# Patient Record
Sex: MALE | Race: WHITE | NOT HISPANIC OR LATINO | Employment: OTHER | URBAN - METROPOLITAN AREA
[De-identification: names, ages, dates, MRNs, and addresses within clinical notes are randomized per-mention and may not be internally consistent; named-entity substitution may affect disease eponyms.]

---

## 2017-01-12 ENCOUNTER — ALLSCRIPTS OFFICE VISIT (OUTPATIENT)
Dept: OTHER | Facility: OTHER | Age: 67
End: 2017-01-12

## 2017-04-21 ENCOUNTER — GENERIC CONVERSION - ENCOUNTER (OUTPATIENT)
Dept: OTHER | Facility: OTHER | Age: 67
End: 2017-04-21

## 2017-05-18 ENCOUNTER — ALLSCRIPTS OFFICE VISIT (OUTPATIENT)
Dept: OTHER | Facility: OTHER | Age: 67
End: 2017-05-18

## 2017-09-09 ENCOUNTER — GENERIC CONVERSION - ENCOUNTER (OUTPATIENT)
Dept: OTHER | Facility: OTHER | Age: 67
End: 2017-09-09

## 2017-10-30 ENCOUNTER — GENERIC CONVERSION - ENCOUNTER (OUTPATIENT)
Dept: OTHER | Facility: OTHER | Age: 67
End: 2017-10-30

## 2017-11-17 ENCOUNTER — GENERIC CONVERSION - ENCOUNTER (OUTPATIENT)
Dept: OTHER | Facility: OTHER | Age: 67
End: 2017-11-17

## 2017-11-21 ENCOUNTER — TRANSCRIBE ORDERS (OUTPATIENT)
Dept: ADMINISTRATIVE | Facility: HOSPITAL | Age: 67
End: 2017-11-21

## 2017-11-21 DIAGNOSIS — C61 PROSTATE CA (HCC): Primary | ICD-10-CM

## 2017-11-22 ENCOUNTER — GENERIC CONVERSION - ENCOUNTER (OUTPATIENT)
Dept: OTHER | Facility: OTHER | Age: 67
End: 2017-11-22

## 2017-11-24 ENCOUNTER — HOSPITAL ENCOUNTER (OUTPATIENT)
Dept: RADIOLOGY | Facility: HOSPITAL | Age: 67
Discharge: HOME | End: 2017-11-24
Payer: MEDICARE

## 2017-11-24 DIAGNOSIS — C61 PROSTATE CA (HCC): ICD-10-CM

## 2017-11-24 PROCEDURE — A9503 TC99M MEDRONATE: HCPCS

## 2017-11-24 PROCEDURE — 78306 BONE IMAGING WHOLE BODY: CPT

## 2017-11-24 PROCEDURE — 74178 CT ABD&PLV WO CNTR FLWD CNTR: CPT

## 2017-11-24 RX ADMIN — IOHEXOL 100 ML: 350 INJECTION, SOLUTION INTRAVENOUS at 08:08

## 2017-11-28 ENCOUNTER — GENERIC CONVERSION - ENCOUNTER (OUTPATIENT)
Dept: OTHER | Facility: OTHER | Age: 67
End: 2017-11-28

## 2017-12-15 ENCOUNTER — GENERIC CONVERSION - ENCOUNTER (OUTPATIENT)
Dept: FAMILY MEDICINE CLINIC | Facility: CLINIC | Age: 67
End: 2017-12-15

## 2017-12-20 ENCOUNTER — ALLSCRIPTS OFFICE VISIT (OUTPATIENT)
Dept: OTHER | Facility: OTHER | Age: 67
End: 2017-12-20

## 2018-01-10 ENCOUNTER — TRANSCRIBE ORDERS (OUTPATIENT)
Dept: ADMINISTRATIVE | Facility: HOSPITAL | Age: 68
End: 2018-01-10

## 2018-01-10 DIAGNOSIS — R60.0 LOCALIZED EDEMA: ICD-10-CM

## 2018-01-10 DIAGNOSIS — C61 PROSTATE CANCER (HCC): Primary | ICD-10-CM

## 2018-01-11 ENCOUNTER — GENERIC CONVERSION - ENCOUNTER (OUTPATIENT)
Dept: OTHER | Facility: OTHER | Age: 68
End: 2018-01-11

## 2018-01-11 ENCOUNTER — HOSPITAL ENCOUNTER (OUTPATIENT)
Dept: RADIOLOGY | Facility: HOSPITAL | Age: 68
Discharge: HOME | End: 2018-01-11
Payer: COMMERCIAL

## 2018-01-11 DIAGNOSIS — C61 PROSTATE CANCER (HCC): ICD-10-CM

## 2018-01-11 DIAGNOSIS — R60.0 LOCALIZED EDEMA: ICD-10-CM

## 2018-01-11 PROCEDURE — 93970 EXTREMITY STUDY: CPT

## 2018-01-11 NOTE — RESULT NOTES
Verified Results  (1) CBC/PLT/DIFF [id number] 07:59AM J Carlos Musca     Test Name Result Flag Reference   WBC 8 0 x10E3/uL  3 4-10 8   RBC 4 58 x10E6/uL  4 14-5 80   Hemoglobin 13 6 g/dL  12 6-17 7   Hematocrit 41 7 %  37 5-51 0   MCV 91 fL  79-97   MCH 29 7 pg  26 6-33 0   MCHC 32 6 g/dL  31 5-35 7   RDW 13 8 %  12 3-15 4   Platelets 373 G50V0/ZD  150-379   Neutrophils 56 %     Lymphs 29 %     Monocytes 11 %     Eos 3 %     Basos 1 %     Neutrophils (Absolute) 4 6 x10E3/uL  1 4-7 0   Lymphs (Absolute) 2 3 x10E3/uL  0 7-3 1   Monocytes(Absolute) 0 8 x10E3/uL  0 1-0 9   Eos (Absolute) 0 2 x10E3/uL  0 0-0 4   Baso (Absolute) 0 0 x10E3/uL  0 0-0 2   Immature Granulocytes 0 %     Immature Grans (Abs) 0 0 x10E3/uL  0 0-0 1     (1) COMPREHENSIVE METABOLIC PANEL [id number] 68:88NX J Carlos Musca     Test Name Result Flag Reference   Glucose, Serum 90 mg/dL  65-99   BUN 20 mg/dL  8-27   Creatinine, Serum 1 01 mg/dL  0 76-1 27   eGFR If NonAfricn Am 77 mL/min/1 73  >59   eGFR If Africn Am 89 mL/min/1 73  >59   BUN/Creatinine Ratio 20  10-22   Sodium, Serum 138 mmol/L  134-144   Potassium, Serum 4 3 mmol/L  3 5-5 2   Chloride, Serum 97 mmol/L     Carbon Dioxide, Total 23 mmol/L  18-29   Calcium, Serum 9 1 mg/dL  8 6-10 2   Protein, Total, Serum 7 4 g/dL  6 0-8 5   Albumin, Serum 4 1 g/dL  3 6-4 8   Globulin, Total 3 3 g/dL  1 5-4 5   A/G Ratio 1 2  1 1-2 5   Bilirubin, Total 0 6 mg/dL  0 0-1 2   Alkaline Phosphatase, S 39 IU/L     AST (SGOT) 16 IU/L  0-40   ALT (SGPT) 16 IU/L  0-44     (1) LIPID PANEL, FASTING [id number] 07:59AM J Carlos Lloyd     Test Name Result Flag Reference   Cholesterol, Total 171 mg/dL  100-199   Triglycerides 153 mg/dL H 0-149   HDL Cholesterol 43 mg/dL  >39   According to ATP-III Guidelines, HDL-C >59 mg/dL is considered a  negative risk factor for CHD  VLDL Cholesterol Marvin 31 mg/dL  5-40   LDL Cholesterol Calc 97 mg/dL  0-99   T  Chol/HDL Ratio 4 0 ratio units  0 0-5 0   T   Chol/HDL Ratio                                                             Men  Women                                               1/2 Avg  Risk  3 4    3 3                                                   Avg Risk  5 0    4 4                                                2X Avg  Risk  9 6    7 1                                                3X Avg  Risk 23 4   11 0       Discussion/Summary   Will discuss labs at follow up appt

## 2018-01-13 VITALS
WEIGHT: 230 LBS | TEMPERATURE: 97.7 F | HEIGHT: 73 IN | HEART RATE: 72 BPM | RESPIRATION RATE: 16 BRPM | DIASTOLIC BLOOD PRESSURE: 74 MMHG | BODY MASS INDEX: 30.48 KG/M2 | SYSTOLIC BLOOD PRESSURE: 118 MMHG

## 2018-01-15 VITALS
BODY MASS INDEX: 30.75 KG/M2 | HEIGHT: 73 IN | DIASTOLIC BLOOD PRESSURE: 76 MMHG | WEIGHT: 232 LBS | HEART RATE: 76 BPM | RESPIRATION RATE: 20 BRPM | SYSTOLIC BLOOD PRESSURE: 128 MMHG | TEMPERATURE: 97.8 F

## 2018-01-15 NOTE — PROGRESS NOTES
Assessment   1  Adenocarcinoma of prostate (185) (C61)  2  Anemia (285 9) (D64 9)  3  Benign essential hypertension (401 1) (I10)  4  Mixed hyperlipidemia (272 2) (E78 2)  5  Need for vaccination (V05 9) (Z23)  6  Encounter for screening for osteoporosis (V82 81) (Z13 820)  7  Medicare annual wellness visit, initial (V70 0) (Z00 00)    Plan  Benign essential hypertension    · Continue: Quinapril HCl - 10 MG Oral Tablet; TAKE 1 TABLET EVERY 12 HOURS DAILY  Encounter for screening for osteoporosis, Medicare annual wellness visit, initial    · * DXA BONE DENSITY SPINE HIP AND PELVIS; Status:Hold For - Scheduling;  Requested for:30Atd5306;   Need for vaccination    · Administer: Pneumo (Pneumovax); INJECT 0 5  ML Intramuscular; To Be Done:  [id number]  Screening for genitourinary condition    · *VB-Urinary Incontinence Screen (Dx V81 6 Screen for UI); Status:Complete;   Done:  08CSZ9140 10:57AM    Discussion/Summary    Pt did not do great on the depression screen but does not feel he is depressed  we will follow this  Impression: Initial Annual Wellness Visit  Cardiovascular screening and counseling: the risks and benefits of screening were discussed, screening is current, due for a lipid panel and Dx - V81 2 Screen for CV Disorder  Diabetes screening and counseling: the risks and benefits of screening were discussed, screening is current and Dx - V77 1 Screen for DM  Colorectal cancer screening and counseling: the risks and benefits of screening were discussed, screening is current, counseling was given on ways to eat a high fiber diet, due for a colonoscopy (low risk), due in [id number] and Dx - V76 51 Screen for CRC  Prostate cancer screening and counseling: the risks and benefits of screening were discussed, screening is current, Pt see dr Tatiana Roberto - cancer instite of [address] - last seen inmay and Dx - V76 44 Screen PSA     Osteoporosis screening and counseling: the risks and benefits of screening were discussed and due for bone density ultrasound  Abdominal aortic aneurysm screening and counseling: screening not indicated  Glaucoma screening and counseling: the risks and benefits of screening were discussed, screening is current, last seen 6 months ago and Dx - V80 1 Screen for Glaucoma  HIV screening and counseling: screening not indicated  Chief Complaint  AWV rmklpn      Advance Directives  Advance Directive [facility]:   YES - Patient has an advance health care directive  The patient has a living will located   Durable Power of  For Healthcare:    Name: Wife   Capacity/Competence: This patient has full decision making capacity for discussion of advance care planning and This patient has full decision making competency for discussion of advance care planning  The provider spent 5 minutes discussing Advance Directives  History of Present Illness  HPI: pt is here fow an Annual wellness visit  had labs   Welcome to [employer] and Wellness Visits: The patient is being seen for the initial annual wellness visit  Medicare Screening and Risk Factors   Hospitalizations: no previous hospitalizations  Medicare Screening Tests Risk Questions   Drug and Alcohol Use: The patient has never smoked cigarettes  The patient reports rare alcohol use  He has never used illicit drugs  Diet and Physical Activity: Current diet includes unhealthy food choices, frequent junk food, 1 servings of fruit per day, 1 servings of meat per day, 4 cups of coffee per day and 3 glasses of water  He exercises 4 times per week  Exercise: walking, water exercises 3-4 hours per week  Mood Disorder and Cognitive Impairment Screening: He reports feeling down, depressed, or hopeless over the past two weeks  He reports feeling little interest or pleasure in doing things over the past two weeks     Cognitive impairment screening: denies difficulty learning/retaining new information, denies difficulty handling complex tasks, denies difficulty with reasoning, denies difficulty with spatial ability and orientation, denies difficulty with language and denies difficulty with behavior  Functional Ability/Level of Safety: Hearing is normal bilaterally and a hearing aid is not used  He denies hearing difficulties  Activities of daily living details: needs help managing money, but does not need help using the phone, no transportation help needed, does not need help shopping, no meal preparation help needed, does not need help doing housework, does not need help doing laundry and does not need help managing medications  Fall risk factors: The patient fell 1 times in the past 12 months  R leg gave out  Home safety risk factors:  household clutter, but no unfamiliar surroundings, no loose rugs, no poor household lighting, no uneven floors, grab bars in the bathroom and handrails on the stairs  Advance Directives: Advance directives: living will and durable power of  for health care directives  Co-Managers and Medical Equipment/Suppliers: See Patient Care Team   Preventive Quality Program 65 and Older: The patient currently has no urinary incontinence symptoms  Patient Care Team    Care Team Member Role Specialty Office Number   Joby Montesinos Referring Family Medicine [phone]   Pablito Maynard MD Specialist Ophthalmology [phone]     Active Problems   1  Abnormal liver enzymes (790 5) (R74 8)  2  Actinic keratosis (702 0) (L57 0)  3  Acute gastroenteritis (558 9) (K52 9)  4  Acute upper respiratory infection (465 9) (J06 9)  5  Adenocarcinoma of prostate (185) (C61)  6  Anemia (285 9) (D64 9)  7  Arthralgia of right hip (719 45) (M25 551)  8  Benign essential hypertension (401 1) (I10)  9  Chronic rhinitis (472 0) (J31 0)  10  Contact dermatitis due to plant (692 6) (L25 5)  11  Dehydration (276 51) (E86 0)  12  Disc degeneration, lumbar (722 52) (M51 36)  13   Elevated liver function tests (790  6) (R79 89)  14  Encounter for screening for malignant neoplasm of colon (V76 51) (Z12 11)  15  Fatty liver (571 8) (K76 0)  16  Flu vaccine need (V04 81) (Z23)  17  Herpes simplex infection (054 9) (B00 9)  18  Hip arthritis (716 95) (M19 90)  19  Inguinal hernia (550 90) (K40 90)  20  Lumbar radiculopathy (724 4) (M54 16)  21  Lumbar Strain (847 2)  22  Mixed hyperlipidemia (272 2) (E78 2)  23  Narrowing of intervertebral disc space (722 6) (M99 79)  24  Need for pneumococcal vaccination (V03 82) (Z23)  25  Nodular prostate without lower urinary tract symptoms (600 10) (N40 2)  26  Organic impotence (607 84) (N52 9)  27  Otitis media, right (382 9) (H66 91)  28  Pain in joint (719 40) (M25 50)  29  Screening for diabetes mellitus (DM) (V77 1) (Z13 1)  30  Shoulder bursitis (726 10) (M75 50)  31  Syncope (780 2) (R55)  32  Well adult on routine health check (V70 0) (Z00 00)    Past Medical History    · History of Acute upper respiratory infection (465 9) (J06 9)   · History of Herpes simplex type 1 infection (054 9) (B00 9)   · History of acute bronchitis (V12 69) (Z87 09)   · History of hypertension (V12 59) (Z86 79)   · Screening for depression (V79 0) (Z13 89)   · Screening for genitourinary condition (V81 6) (Z13 89)   · Screening for neurological condition (V80 09) (Z13 89)    The active problems and past medical history were reviewed and updated today  Surgical History    · History of Hernia Repair Inguinal Sliding    The surgical history was reviewed and updated today  Family History  Mother    · Family history of hypertension (V17 49) (Z82 49)  Family History    · Family history of Lung Cancer (V16 1)   · Family history of Prostate Cancer (V16 42)    The family history was reviewed and updated today  Social History    · Never A Smoker  The social history was reviewed and updated today  Current Meds  1   Ibuprofen 800 MG Oral Tablet; TAKE 1 TABLET THREE TIMES A DAY AS NEEDED; Therapy: 90IHC1161 to (Last MH:55SRC1820)  Requested for: 55QMS4601 Ordered  2  Multivitamins Oral Capsule; TAKE 1 CAPSULE Daily Recorded  3  Quinapril HCl - 10 MG Oral Tablet; TAKE 1 TABLET EVERY 12 HOURS DAILY; Therapy: 01YDU2771 to (Evaluate:46Lqd7090)  Requested for: 12WUF7426; Last   Rx:92Qro2765 Ordered    The medication list was reviewed and updated today  Allergies   1  DEMEROL    Immunizations   ** Printed in Appendix #1 below  Vitals  Signs    Systolic: 295  Diastolic: 72  Heart Rate: 70  Respiration: 18  Temperature: 97 2 F  Height: 6 ft 1 in  Weight: 225 lb   BMI Calculated: 29 68  BSA Calculated: 2 26    Results/Data  *VB-Urinary Incontinence Screen (Dx V81 6 Screen for UI) 85IGX6062 10:57AM Beckabeverly Petersenes     Test Name Result Flag Reference   Urinary Incontinence Assessment 37SPT6647       PHQ-2 Adult Depression Screening 39Dso7178 10:55AM User, [facility]     Test Name Result Flag Reference   PHQ-2 Adult Depression Score 0     Over the last two weeks, how often have you been bothered by any of the following problems?   Little interest or pleasure in doing things: Not at all - 0  Feeling down, depressed, or hopeless: Not at all - 0   PHQ-2 Adult Depression Screening Negative       Falls Risk Assessment (Dx V80 09 Screen for Neurologic Disorder) 53FZV3669 10:55AM User, [facility]     Test Name Result Flag Reference   Falls Risk      1 fall without injury in the past year     (1) CBC/PLT/DIFF 74BKR5540 07:59AM [facility]     Test Name Result Flag Reference   WBC 8 0 x10E3/uL  3 4-10 8   RBC 4 58 x10E6/uL  4 14-5 80   Hemoglobin 13 6 g/dL  12 6-17 7   Hematocrit 41 7 %  37 5-51 0   MCV 91 fL  79-97   MCH 29 7 pg  26 6-33 0   MCHC 32 6 g/dL  31 5-35 7   RDW 13 8 %  12 3-15 4   Platelets 366 D41A9/VG  150-379   Neutrophils 56 %     Lymphs 29 %     Monocytes 11 %     Eos 3 %     Basos 1 %     Neutrophils (Absolute) 4 6 x10E3/uL  1 4-7 0   Lymphs (Absolute) 2 3 x10E3/uL  0 7-3 1   Monocytes(Absolute) 0 8 x10E3/uL  0 1-0 9   Eos (Absolute) 0 2 x10E3/uL  0 0-0 4   Baso (Absolute) 0 0 x10E3/uL  0 0-0 2   Immature Granulocytes 0 %     Immature Grans (Abs) 0 0 x10E3/uL  0 0-0 1     (1) COMPREHENSIVE METABOLIC PANEL 17MAP6942 06:84PG [facility]     Test Name Result Flag Reference   Glucose, Serum 90 mg/dL  65-99   BUN 20 mg/dL  8-27   Creatinine, Serum 1 01 mg/dL  0 76-1 27   eGFR If NonAfricn Am 77 mL/min/1 73  >59   eGFR If Africn Am 89 mL/min/1 73  >59   BUN/Creatinine Ratio 20  10-22   Sodium, Serum 138 mmol/L  134-144   Potassium, Serum 4 3 mmol/L  3 5-5 2   Chloride, Serum 97 mmol/L     Carbon Dioxide, Total 23 mmol/L  18-29   Calcium, Serum 9 1 mg/dL  8 6-10 2   Protein, Total, Serum 7 4 g/dL  6 0-8 5   Albumin, Serum 4 1 g/dL  3 6-4 8   Globulin, Total 3 3 g/dL  1 5-4 5   A/G Ratio 1 2  1 1-2 5   Bilirubin, Total 0 6 mg/dL  0 0-1 2   Alkaline Phosphatase, S 39 IU/L     AST (SGOT) 16 IU/L  0-40   ALT (SGPT) 16 IU/L  0-44     (1) LIPID PANEL, FASTING 29Jyf7336 07:59AM [facility]     Test Name Result Flag Reference   Cholesterol, Total 171 mg/dL  100-199   Triglycerides 153 mg/dL H 0-149   HDL Cholesterol 43 mg/dL  >39   According to ATP-III Guidelines, HDL-C >59 mg/dL is considered a  negative risk factor for CHD  VLDL Cholesterol Marvin 31 mg/dL  5-40   LDL Cholesterol Calc 97 mg/dL  0-99   T  Chol/HDL Ratio 4 0 ratio units  0 0-5 0   T  Chol/HDL Ratio                                                             Men  Women                                               1/2 Avg  Risk  3 4    3 3                                                   Avg Risk  5 0    4 4                                                2X Avg  Risk  9 6    7 1                                                3X Avg  Risk 23 4   11 0     [facility]) Cardiovascular Risk Assessment 14Qyo2211 07:59AM [facility]     Test Name Result Flag Reference   Interpretation Note     Supplement report is available  PDF Image          Health Management  Mixed hyperlipidemia   COLONOSCOPY; every 3 years; Next Due: 62VAB5543;  Overdue    Signatures   Electronically signed by : Felecia Chi DO; 2016  7:26PM EST                       (Author)    Appendix #1     Patient: Gonzalez Solitario ; : 1950; MRN: [id number]      1 2 3 4 5 6    Influenza  25-Oct-2007 13-Oct-2008 09-Sep-2009 04-Oct-2010 28-Sep-2011 25-Oct-2012    PCV  15-Lavell-2015         Tdap  2008         Zoster  15-Apr-2015

## 2018-01-22 VITALS
RESPIRATION RATE: 16 BRPM | WEIGHT: 234 LBS | SYSTOLIC BLOOD PRESSURE: 134 MMHG | DIASTOLIC BLOOD PRESSURE: 76 MMHG | BODY MASS INDEX: 31.01 KG/M2 | TEMPERATURE: 96.7 F | HEIGHT: 73 IN | HEART RATE: 84 BPM

## 2018-01-23 VITALS
DIASTOLIC BLOOD PRESSURE: 76 MMHG | WEIGHT: 230 LBS | BODY MASS INDEX: 30.48 KG/M2 | HEART RATE: 82 BPM | TEMPERATURE: 97.7 F | HEIGHT: 73 IN | RESPIRATION RATE: 16 BRPM | SYSTOLIC BLOOD PRESSURE: 126 MMHG

## 2018-01-23 NOTE — CONSULTS
Chief Complaint  pt present for follow up on prostate surgery on friday  ac/cma      History of Present Illness  Pre-Op Visit (Brief): The patient is being seen for a preoperative visit and Robot Prostatectomy  Surgical Risk Assessment:   Prior Anesthesia: He had prior anesthesia and no prior adverse reaction to general anesthesia  Pertinent Past Medical History: no angina, no arrhythmia, no CAD, CAD without prior MI, CAD without recent PCI, no CHF, no chronic liver disease, no acute hepatitis, no coagulation delay, no primary hypercoagulable state, no secondary hypercoagulable state, no pulmonary embolism, no DVT, does not use anticoagulants, no diabetes, does not use insulin, no thyroid disease, no neck osteoarthrosis, no TMJ osteoarthrosis, does not wear dentures, no seizure disorder, no CVA, no asthma, no COPD, not GRUPO, no renal disease, no low serum albumin and no obesity  Exercise Capacity: able to walk four blocks without symptoms and able to walk two flights of stairs without symptoms  Lifestyle Factors: denies alcohol use, denies tobacco use and denies illegal drug use  Symptoms: no easy bleeding, no easy bruising, no frequent nosebleeds, no chest pain, no cough, no dyspnea, no edema, no palpitations and no wheezing  Pertinent Family History: no pertinent family history  Living Situation: home is secure and supportive  HPI: pt is here to review chronic conditions prior to surgery  Robotic prostatectomy      Review of Systems    Constitutional: No fever or chills, feels well, no tiredness, no recent weight gain or weight loss  Eyes: No complaints of eye pain, no red eyes, no discharge from eyes, no itchy eyes  ENT: no complaints of earache, no hearing loss, no nosebleeds, no nasal discharge, no sore throat, no hoarseness  Cardiovascular: No complaints of slow heart rate, no fast heart rate, no chest pain, no palpitations, no leg claudication, no lower extremity     Respiratory: No complaints of shortness of breath, no wheezing, no cough, no SOB on exertion, no orthopnea or PND  Gastrointestinal: No complaints of abdominal pain, no constipation, no nausea or vomiting, no diarrhea or bloody stools  Musculoskeletal: No complaints of arthralgia, no myalgias, no joint swelling or stiffness, no limb pain or swelling  Integumentary: No complaints of skin rash or skin lesions, no itching, no skin wound, no dry skin  Neurological: No compliants of headache, no confusion, no convulsions, no numbness or tingling, no dizziness or fainting, no limb weakness, no difficulty walking  Psychiatric: Is not suicidal, no sleep disturbances, no anxiety or depression, no change in personality, no emotional problems  Endocrine: No complaints of proptosis, no hot flashes, no muscle weakness, no erectile dysfunction, no deepening of the voice, no feelings of weakness  Active Problems    1  Actinic keratosis (702 0) (L57 0)   2  Adenocarcinoma of prostate (185) (C61)   3  Anemia (285 9) (D64 9)   4  Arthralgia of right hip (719 45) (M25 551)   5  Benign essential hypertension (401 1) (I10)   6  BMI 30 0-30 9,adult (V85 30) (Z68 30)   7  Disc degeneration, lumbar (722 52) (M51 36)   8  Encounter for screening for malignant neoplasm of colon (V76 51) (Z12 11)   9  Encounter for screening for osteoporosis (V82 81) (Z13 820)   10  Fatty liver disease, nonalcoholic (496 8) (R23 1)   11  Herpes simplex infection (054 9) (B00 9)   12  Inguinal hernia (550 90) (K40 90)   13  Lumbar radiculopathy (724 4) (M54 16)   14  Medicare annual wellness visit, initial (V70 0) (Z00 00)   15  Mixed hyperlipidemia (272 2) (E78 2)   16  Narrowing of intervertebral disc space (722 6) (M99 79)   17  Never a smoker   18  Organic impotence (607 84) (N52 9)   19  Screening for diabetes mellitus (DM) (V77 1) (Z13 1)   20   Well adult on routine health check (V70 0) (Z00 00)    Past Medical History    · History of Acute gastroenteritis (558 9) (K52 9)   · History of Acute upper respiratory infection (465 9) (J06 9)   · History of Acute upper respiratory infection (465 9) (J06 9)   · History of Acute URI (465 9) (J06 9)   · History of Chronic rhinitis (472 0) (J31 0)   · History of Contact dermatitis due to plant (692 6) (L25 5)   · History of Elevated liver function tests (790 6) (R79 89)   · History of Flu vaccine need (V04 81) (Z23)   · History of Herpes simplex type 1 infection (054 9) (B00 9)   · History of Hip arthritis (716 95) (M16 10)   · History of acute bronchitis (V12 69) (Z87 09)   · History of dehydration (V12 29) (Z86 39)   · History of hypertension (V12 59) (Z86 79)   · History of influenza vaccination (V49 89) (Z92 29)   · History of neck pain (V13 59) (Z87 39)   · History of syncope (V15 89) (F83 719)   · History of tension headache (V13 89) (T05 225)   · History of Lumbar Strain (847 2)   · History of Need for pneumococcal vaccination (V03 82) (Z23)   · History of Need for vaccination (V05 9) (Z23)   · History of Otitis media, right (382 9) (H66 91)   · History of Pain in joint (719 40) (M25 50)   · Screening for depression (V79 0) (Z13 89)   · Screening for genitourinary condition (V81 6) (Z13 89)   · Screening for genitourinary condition (V81 6) (Z13 89)   · Screening for neurological condition (V80 09) (Z13 89)   · History of Shoulder bursitis (726 10) (M75 50)   · History of Skin lesion (709 9) (L98 9)    The active problems and past medical history were reviewed and updated today  Surgical History    · History of Hernia Repair Inguinal Sliding    The surgical history was reviewed and updated today  Family History    · Family history of hypertension (V17 49) (Z82 49)    · Family history of Lung Cancer (V16 1)   · Family history of Prostate Cancer (V16 42)    The family history was reviewed and updated today  Social History    · Never a smoker  The social history was reviewed and updated today  Current Meds   1  Multivitamins Oral Capsule; TAKE 1 CAPSULE Daily Recorded   2  Quinapril HCl - 10 MG Oral Tablet; TAKE 1 TABLET EVERY 12 HOURS DAILY; Therapy: 25MCH1106 to (Emcruzito Hidalgo)  Requested for: 02Sep2017; Last   Rx:28Rul2300 Ordered    The medication list was reviewed and updated today  Allergies    1  DEMEROL    Vitals  Signs    Temperature: 97 7 F  Heart Rate: 82  Respiration: 16  Systolic: 570  Diastolic: 76  Height: 6 ft 1 in  Weight: 230 lb   BMI Calculated: 30 35  BSA Calculated: 2 28    Physical Exam    Constitutional   General appearance: No acute distress, well appearing and well nourished  Head and Face   Head and face: Normal     Palpation of the face and sinuses: No sinus tenderness  Eyes   Conjunctiva and lids: No erythema, swelling or discharge  Pupils and irises: Equal, round, reactive to light  Ears, Nose, Mouth, and Throat   External inspection of ears and nose: Normal     Otoscopic examination: Tympanic membranes translucent with normal light reflex  Canals patent without erythema  Neck   Neck: Supple, symmetric, trachea midline, no masses  Thyroid: Normal, no thyromegaly  Pulmonary   Respiratory effort: No increased work of breathing or signs of respiratory distress  Percussion of chest: Normal     Cardiovascular   Auscultation of heart: Normal rate and rhythm, normal S1 and S2, no murmurs  Abdomen   Abdomen: Non-tender, no masses  Liver and spleen: No hepatomegaly or splenomegaly  Musculoskeletal   Gait and station: Normal     Skin   Skin and subcutaneous tissue: Normal without rashes or lesions  Assessment    1  Preoperative clearance (V72 84) (Z01 818)   2  Adenocarcinoma of prostate (185) (C61)   3  Benign essential hypertension (401 1) (I10)   4  Mixed hyperlipidemia (272 2) (E78 2)   5  Never a smoker   6   Fatty liver disease, nonalcoholic (058 3) (Z35 2)    Plan  Benign essential hypertension    · Quinapril HCl - 10 MG Oral Tablet; TAKE 1 TABLET EVERY 12 HOURS DAILY    Discussion/Summary  Surgical Clearance: He is at a LOW risk from a cardiovascular standpoint at this time without any additional cardiac testing  Reevaluation needed, if he should present with symptoms prior to surgery/procedure  Surgical clearance faxed to Dr Ann-Marie Infante   No nsaids 7 days prior to surgery  no fish oil 7 days prior to surgery    labs reviewed - were acceptable for surgery  EKG - sinus gayla - no signs of ischemia  Chest x ray - acceptable  End of Encounter Meds    1  Quinapril HCl - 10 MG Oral Tablet; TAKE 1 TABLET EVERY 12 HOURS DAILY; Therapy: 38EBC1721 to (Evaluate:17Nai1040)  Requested for: 19Egk3099; Last   Rx:[id number] Ordered    2   Multivitamins Oral Capsule; TAKE 1 CAPSULE Daily Recorded    Signatures   Electronically signed by : Ashish Morrissey DO; Dec 20 2017  2:22PM EST                       (Author)

## 2018-01-24 VITALS
DIASTOLIC BLOOD PRESSURE: 72 MMHG | WEIGHT: 230 LBS | BODY MASS INDEX: 30.48 KG/M2 | RESPIRATION RATE: 16 BRPM | TEMPERATURE: 97.6 F | SYSTOLIC BLOOD PRESSURE: 126 MMHG | HEART RATE: 84 BPM | HEIGHT: 73 IN

## 2018-03-01 DIAGNOSIS — I10 ESSENTIAL HYPERTENSION: Primary | ICD-10-CM

## 2018-03-01 RX ORDER — QUINAPRIL 10 MG/1
10 TABLET ORAL EVERY 12 HOURS
Qty: 90 TABLET | Refills: 3 | Status: SHIPPED | OUTPATIENT
Start: 2018-03-01 | End: 2018-07-24 | Stop reason: SDUPTHER

## 2018-03-01 RX ORDER — QUINAPRIL 10 MG/1
1 TABLET ORAL EVERY 12 HOURS
COMMUNITY
Start: 2015-01-21 | End: 2018-03-01 | Stop reason: SDUPTHER

## 2018-06-08 ENCOUNTER — OFFICE VISIT (OUTPATIENT)
Dept: FAMILY MEDICINE CLINIC | Facility: CLINIC | Age: 68
End: 2018-06-08
Payer: COMMERCIAL

## 2018-06-08 VITALS
BODY MASS INDEX: 30.42 KG/M2 | WEIGHT: 230.6 LBS | TEMPERATURE: 97.6 F | HEART RATE: 76 BPM | RESPIRATION RATE: 18 BRPM | SYSTOLIC BLOOD PRESSURE: 142 MMHG | DIASTOLIC BLOOD PRESSURE: 74 MMHG

## 2018-06-08 DIAGNOSIS — C61 ADENOCARCINOMA OF PROSTATE (HCC): ICD-10-CM

## 2018-06-08 DIAGNOSIS — I10 BENIGN ESSENTIAL HYPERTENSION: ICD-10-CM

## 2018-06-08 DIAGNOSIS — R19.09 GROIN MASS: ICD-10-CM

## 2018-06-08 DIAGNOSIS — R60.0 LOWER EXTREMITY EDEMA: Primary | ICD-10-CM

## 2018-06-08 PROBLEM — G44.209 TENSION TYPE HEADACHE: Status: ACTIVE | Noted: 2017-05-18

## 2018-06-08 PROCEDURE — 3725F SCREEN DEPRESSION PERFORMED: CPT | Performed by: FAMILY MEDICINE

## 2018-06-08 PROCEDURE — 1101F PT FALLS ASSESS-DOCD LE1/YR: CPT | Performed by: FAMILY MEDICINE

## 2018-06-08 PROCEDURE — 99214 OFFICE O/P EST MOD 30 MIN: CPT | Performed by: FAMILY MEDICINE

## 2018-06-08 RX ORDER — SILDENAFIL CITRATE 20 MG/1
20 TABLET ORAL AS NEEDED
COMMUNITY
Start: 2018-01-11 | End: 2019-09-19 | Stop reason: ALTCHOICE

## 2018-06-08 NOTE — ASSESSMENT & PLAN NOTE
Pt has unilateral swelling since surgery may be beginnings of lymphedema     He was scanned six months ago clot was neg,  Will do a reflux study not looking for insuf and follow

## 2018-06-08 NOTE — PROGRESS NOTES
Assessment/Plan:    Problem List Items Addressed This Visit     Adenocarcinoma of prostate ([facility] [address] )    Relevant Orders    CT abdomen pelvis wo contrast    Benign essential hypertension     Little elevated bt overall stable         Lower extremity edema - Primary     Pt has unilateral swelling since surgery may be beginnings of lymphedema   He was scanned six months ago clot was neg,  Will do a reflux study not looking for insuf and follow         Relevant Orders    VAS reflux lower limb venous duplex study with reflux assessment, complete bilateral    Groin mass     Given the history of prostate cancer I would like a ct scan, he may need a biopsy by gen surgery         Relevant Orders    CT abdomen pelvis wo contrast          There are no Patient Instructions on file for this visit  Return for Next scheduled follow up  Subjective:      Patient ID: Debbie Rdz is a 76 y o  male  Chief Complaint   Patient presents with    Possible cyst      for about a week  af/rma        Pt states he ha has some type of rash boil or something on his stomach that showed up a week or so ago    Pt is askingme to evaluate the swelling he has in his legs  Pt states this swelling started a montha fetr his prostate operation  Pt was scanned no clot  - we scanned this 6 months ago  No sob        The following portions of the patient's history were reviewed and updated as appropriate: allergies, current medications, past family history, past medical history, past social history, past surgical history and problem list     Review of Systems   Constitutional: Negative for activity change, appetite change, chills, diaphoresis, fatigue, fever and unexpected weight change  HENT: Negative for congestion, dental problem, ear pain, mouth sores, sinus pain, sinus pressure, sore throat and trouble swallowing  Eyes: Negative for photophobia, discharge and itching     Respiratory: Negative for apnea, chest tightness and shortness of breath  Cardiovascular: Positive for leg swelling  Negative for chest pain and palpitations  Gastrointestinal: Negative for abdominal distention, abdominal pain, blood in stool, nausea and vomiting  Endocrine: Negative for cold intolerance, heat intolerance, polydipsia, polyphagia and polyuria  Genitourinary: Negative for difficulty urinating  Musculoskeletal: Negative for arthralgias  Skin: Negative for color change and wound  Mass in skin   Neurological: Negative for dizziness, syncope, speech difficulty and headaches  Hematological: Negative for adenopathy  Psychiatric/Behavioral: Negative for agitation and behavioral problems  Current Outpatient Prescriptions   Medication Sig Dispense Refill    Multiple Vitamin (MULTIVITAMINS PO) Take 1 capsule by mouth daily      quinapril (ACCUPRIL) 10 mg tablet Take 1 tablet (10 mg total) by mouth every 12 (twelve) hours 90 tablet 3    sildenafil (REVATIO) 20 mg tablet Take by mouth       No current facility-administered medications for this visit  Objective:    /74   Pulse 76   Temp 97 6 °F (36 4 °C)   Resp 18   Wt 105 kg (230 lb 9 6 oz)   BMI 30 42 kg/m²        Physical Exam   Constitutional: He appears well-developed and well-nourished  No distress  HENT:   Head: Normocephalic and atraumatic  Right Ear: External ear normal    Left Ear: External ear normal    Nose: Nose normal    Mouth/Throat: Oropharynx is clear and moist  No oropharyngeal exudate  Eyes: EOM are normal  Pupils are equal, round, and reactive to light  Right eye exhibits no discharge  Left eye exhibits no discharge  No scleral icterus  Neck: No thyromegaly present  Cardiovascular: Normal rate and normal heart sounds  No murmur heard  Pulmonary/Chest: Effort normal and breath sounds normal  No respiratory distress  He has no wheezes  Abdominal: Soft  Bowel sounds are normal  He exhibits no distension and no mass  There is no tenderness  There is no rebound and no guarding  Musculoskeletal: Normal range of motion  Neurological: He is alert  He displays normal reflexes  Coordination normal    Skin: Skin is warm and dry  No rash noted  He is not diaphoretic  No erythema  Psychiatric: He has a normal mood and affect  His behavior is normal    Nursing note and vitals reviewed               Catarina Bloch, DO

## 2018-06-18 ENCOUNTER — HOSPITAL ENCOUNTER (OUTPATIENT)
Dept: RADIOLOGY | Facility: HOSPITAL | Age: 68
Discharge: HOME | End: 2018-06-18
Attending: FAMILY MEDICINE
Payer: COMMERCIAL

## 2018-06-18 DIAGNOSIS — R60.0 LOWER EXTREMITY EDEMA: ICD-10-CM

## 2018-06-18 PROCEDURE — 93970 EXTREMITY STUDY: CPT

## 2018-06-19 PROCEDURE — 93970 EXTREMITY STUDY: CPT | Performed by: SURGERY

## 2018-06-28 ENCOUNTER — OFFICE VISIT (OUTPATIENT)
Dept: FAMILY MEDICINE CLINIC | Facility: CLINIC | Age: 68
End: 2018-06-28
Payer: COMMERCIAL

## 2018-06-28 VITALS
HEART RATE: 72 BPM | TEMPERATURE: 96.9 F | SYSTOLIC BLOOD PRESSURE: 136 MMHG | RESPIRATION RATE: 18 BRPM | WEIGHT: 229.6 LBS | DIASTOLIC BLOOD PRESSURE: 78 MMHG | BODY MASS INDEX: 30.29 KG/M2

## 2018-06-28 DIAGNOSIS — I10 BENIGN ESSENTIAL HYPERTENSION: ICD-10-CM

## 2018-06-28 DIAGNOSIS — J06.9 ACUTE UPPER RESPIRATORY INFECTION: Primary | ICD-10-CM

## 2018-06-28 PROCEDURE — 3078F DIAST BP <80 MM HG: CPT | Performed by: NURSE PRACTITIONER

## 2018-06-28 PROCEDURE — 3075F SYST BP GE 130 - 139MM HG: CPT | Performed by: NURSE PRACTITIONER

## 2018-06-28 PROCEDURE — 99214 OFFICE O/P EST MOD 30 MIN: CPT | Performed by: NURSE PRACTITIONER

## 2018-06-28 NOTE — PROGRESS NOTES
Assessment/Plan:    Advised on supportive care of viral illness  Follow up for productive cough, fever, difficulty breathing or if symptoms do not improve in 1 week  Problem List Items Addressed This Visit     None      Visit Diagnoses     Acute upper respiratory infection    -  Primary          Patient Instructions   Coricidin HBP over the counter  Drink plenty of fluids  No Follow-up on file  Subjective:      Patient ID: Mary Reeder is a 76 y o  male  Chief Complaint   Patient presents with    Cough      pt  satets this has been going on since saturday 6/23/18  af/rma     Generalized Body Aches    chest congestion       4-5 days ago he had a sore throat  This improved and now has a dry cough and body aches  His eyes are watering  Symptoms got worse after he was outside cutting the grass  Has mild sinus congestion  Denies fevers, SOB, or wheezing  Breathing is heavy  Denies abdominal pain, n/v/d  Has been taking Tylenol OTC which helps his body aches  The following portions of the patient's history were reviewed and updated as appropriate: allergies, current medications, past family history, past medical history, past social history, past surgical history and problem list     Review of Systems   Constitutional: Positive for fatigue  Negative for chills and fever  HENT: Positive for congestion and sore throat  Negative for ear pain, postnasal drip, rhinorrhea and sinus pressure  Respiratory: Positive for cough  Negative for shortness of breath and wheezing  Cardiovascular: Negative for chest pain  Gastrointestinal: Negative for abdominal pain, diarrhea, nausea and vomiting  Musculoskeletal: Positive for arthralgias  Skin: Negative for rash  Neurological: Negative for headaches           Current Outpatient Prescriptions   Medication Sig Dispense Refill    Multiple Vitamin (MULTIVITAMINS PO) Take 1 capsule by mouth daily      quinapril (ACCUPRIL) 10 mg tablet Take 1 tablet (10 mg total) by mouth every 12 (twelve) hours 90 tablet 3    sildenafil (REVATIO) 20 mg tablet Take 20 mg by mouth as needed         No current facility-administered medications for this visit  Objective:    /78   Pulse 72   Temp (!) 96 9 °F (36 1 °C)   Resp 18   Wt 104 kg (229 lb 9 6 oz)   BMI 30 29 kg/m²        Physical Exam   Constitutional: He appears well-developed and well-nourished  HENT:   Right Ear: Tympanic membrane, external ear and ear canal normal    Left Ear: Tympanic membrane, external ear and ear canal normal    Nose: Mucosal edema and rhinorrhea (clear) present  Mouth/Throat: Oropharynx is clear and moist and mucous membranes are normal    Eyes: Conjunctivae are normal    Cardiovascular: Normal rate, regular rhythm and normal heart sounds  Pulmonary/Chest: Effort normal and breath sounds normal    Abdominal: Bowel sounds are normal  He exhibits no distension  There is no splenomegaly or hepatomegaly  There is no tenderness  Lymphadenopathy:        Right cervical: No superficial cervical adenopathy present  Left cervical: No superficial cervical adenopathy present  Skin: No rash noted  Psychiatric: He has a normal mood and affect  Nursing note and vitals reviewed               Leandro De Jesus

## 2018-07-13 ENCOUNTER — OFFICE VISIT (OUTPATIENT)
Dept: FAMILY MEDICINE CLINIC | Facility: CLINIC | Age: 68
End: 2018-07-13
Payer: COMMERCIAL

## 2018-07-13 VITALS
HEART RATE: 68 BPM | TEMPERATURE: 98.8 F | DIASTOLIC BLOOD PRESSURE: 70 MMHG | SYSTOLIC BLOOD PRESSURE: 110 MMHG | BODY MASS INDEX: 30.06 KG/M2 | HEIGHT: 73 IN | RESPIRATION RATE: 16 BRPM | WEIGHT: 226.8 LBS

## 2018-07-13 DIAGNOSIS — J01.00 ACUTE NON-RECURRENT MAXILLARY SINUSITIS: Primary | ICD-10-CM

## 2018-07-13 PROCEDURE — 99213 OFFICE O/P EST LOW 20 MIN: CPT | Performed by: FAMILY MEDICINE

## 2018-07-13 RX ORDER — AMOXICILLIN AND CLAVULANATE POTASSIUM 875; 125 MG/1; MG/1
1 TABLET, FILM COATED ORAL EVERY 12 HOURS SCHEDULED
Qty: 20 TABLET | Refills: 0 | Status: SHIPPED | OUTPATIENT
Start: 2018-07-13 | End: 2018-07-23

## 2018-07-13 RX ORDER — METHYLPREDNISOLONE 4 MG/1
TABLET ORAL
Qty: 21 TABLET | Refills: 0 | Status: SHIPPED | OUTPATIENT
Start: 2018-07-13 | End: 2019-07-09

## 2018-07-13 NOTE — PROGRESS NOTES
PAssessment/Plan:    Problem List Items Addressed This Visit     None      Visit Diagnoses     Acute non-recurrent maxillary sinusitis    -  Primary    Relevant Medications    amoxicillin-clavulanate (AUGMENTIN) 875-125 mg per tablet    Methylprednisolone 4 MG TBPK          There are no Patient Instructions on file for this visit  No Follow-up on file  Subjective:      Patient ID: Adiel Arriaza is a 76 y o  male  Chief Complaint   Patient presents with    Headache     Left side prcma    Sinus Problem    Fatigue     For 10 days        Pt states he was here a week or two ago with a diff providor - states he was told he was viral    Pt states he is tired all the time  States lunder left eye pain and pressure  Pt states he is congested  Took mucinex this am  No fevr no chills          The following portions of the patient's history were reviewed and updated as appropriate: allergies, current medications, past family history, past medical history, past social history, past surgical history and problem list     Review of Systems   Constitutional: Negative for activity change, appetite change, diaphoresis, fatigue, fever and unexpected weight change  HENT: Positive for congestion, sinus pain and sinus pressure  Negative for dental problem, ear pain, mouth sores, sore throat and trouble swallowing  Eyes: Negative for photophobia, discharge and itching  Respiratory: Positive for cough  Negative for apnea, chest tightness and shortness of breath  Cardiovascular: Negative for chest pain, palpitations and leg swelling  Gastrointestinal: Negative for abdominal distention, abdominal pain, blood in stool, nausea and vomiting  Endocrine: Negative for cold intolerance, heat intolerance, polydipsia, polyphagia and polyuria  Genitourinary: Negative for difficulty urinating  Musculoskeletal: Negative for arthralgias  Skin: Negative for color change and wound     Neurological: Negative for dizziness, syncope, speech difficulty and headaches  Hematological: Negative for adenopathy  Psychiatric/Behavioral: Negative for agitation and behavioral problems  Current Outpatient Prescriptions   Medication Sig Dispense Refill    Multiple Vitamin (MULTIVITAMINS PO) Take 1 capsule by mouth daily      quinapril (ACCUPRIL) 10 mg tablet Take 1 tablet (10 mg total) by mouth every 12 (twelve) hours 90 tablet 3    amoxicillin-clavulanate (AUGMENTIN) 875-125 mg per tablet Take 1 tablet by mouth every 12 (twelve) hours for 10 days 20 tablet 0    Methylprednisolone 4 MG TBPK Use as directed on package 21 tablet 0    sildenafil (REVATIO) 20 mg tablet Take 20 mg by mouth as needed         No current facility-administered medications for this visit  Objective:    /70   Pulse 68   Temp 98 8 °F (37 1 °C)   Resp 16   Ht 6' 1" (1 854 m)   Wt 103 kg (226 lb 12 8 oz)   BMI 29 92 kg/m²        Physical Exam   Constitutional: He appears well-developed and well-nourished  No distress  HENT:   Head: Normocephalic and atraumatic  Right Ear: External ear normal  Tympanic membrane is bulging  Left Ear: External ear normal  Tympanic membrane is erythematous and bulging  Nose: Mucosal edema present  Mouth/Throat: Posterior oropharyngeal erythema present  No oropharyngeal exudate  Eyes: EOM are normal  Pupils are equal, round, and reactive to light  Right eye exhibits no discharge  Left eye exhibits no discharge  No scleral icterus  Neck: No thyromegaly present  Cardiovascular: Normal rate and normal heart sounds  No murmur heard  Pulmonary/Chest: Effort normal and breath sounds normal  No respiratory distress  He has no wheezes  Abdominal: Soft  Bowel sounds are normal  He exhibits no distension and no mass  There is no tenderness  There is no rebound and no guarding  Musculoskeletal: Normal range of motion  Neurological: He is alert  He displays normal reflexes   Coordination normal  Skin: Skin is warm and dry  No rash noted  He is not diaphoretic  No erythema  Psychiatric: He has a normal mood and affect  His behavior is normal    Nursing note and vitals reviewed               Louis Orta DO

## 2018-07-23 ENCOUNTER — OFFICE VISIT (OUTPATIENT)
Dept: FAMILY MEDICINE CLINIC | Facility: CLINIC | Age: 68
End: 2018-07-23
Payer: COMMERCIAL

## 2018-07-23 VITALS
HEIGHT: 73 IN | HEART RATE: 64 BPM | TEMPERATURE: 97.7 F | DIASTOLIC BLOOD PRESSURE: 66 MMHG | BODY MASS INDEX: 30.75 KG/M2 | RESPIRATION RATE: 18 BRPM | SYSTOLIC BLOOD PRESSURE: 102 MMHG | WEIGHT: 232 LBS

## 2018-07-23 DIAGNOSIS — R11.0 NAUSEA: Primary | ICD-10-CM

## 2018-07-23 PROCEDURE — 99213 OFFICE O/P EST LOW 20 MIN: CPT | Performed by: FAMILY MEDICINE

## 2018-07-23 PROCEDURE — 3008F BODY MASS INDEX DOCD: CPT | Performed by: FAMILY MEDICINE

## 2018-07-23 PROCEDURE — 1160F RVW MEDS BY RX/DR IN RCRD: CPT | Performed by: FAMILY MEDICINE

## 2018-07-23 PROCEDURE — 1036F TOBACCO NON-USER: CPT | Performed by: FAMILY MEDICINE

## 2018-07-23 PROCEDURE — 4040F PNEUMOC VAC/ADMIN/RCVD: CPT | Performed by: FAMILY MEDICINE

## 2018-07-23 RX ORDER — OMEPRAZOLE 40 MG/1
40 CAPSULE, DELAYED RELEASE ORAL DAILY
Qty: 30 CAPSULE | Refills: 0 | Status: SHIPPED | OUTPATIENT
Start: 2018-07-23 | End: 2019-07-09

## 2018-07-23 NOTE — ASSESSMENT & PLAN NOTE
Seems to be from the abx as well as maybe a little gerd, pt does have an abd hernia for which he is seeing surgery, could also be contributing

## 2018-07-23 NOTE — PROGRESS NOTES
Assessment/Plan:    Problem List Items Addressed This Visit     Nausea - Primary     Seems to be from the abx as well as maybe a little gerd, pt does have an abd hernia for which he is seeing surgery, could also be contributing         Relevant Medications    omeprazole (PriLOSEC) 40 MG capsule          There are no Patient Instructions on file for this visit  No Follow-up on file  Subjective:      Patient ID: Dee Roberts is a 76 y o  male  Chief Complaint   Patient presents with    Nausea     started saturday night, no vomiting, lightheaded       Pt states he was here ten days ago for a sinus infection was treated with abx  Pt last dose of the bx was this am   Pt states sinus inefction is clear  Pt states he was taking abx with fooos  Two days ago was getting up in the evening trying to vomit  Unsure if it is worse when he lies flat    Pt states tomorrow he is going down to [facility] for a checkup, he would like me to look at it now too  The following portions of the patient's history were reviewed and updated as appropriate: allergies, current medications, past family history, past medical history, past social history, past surgical history and problem list     Review of Systems   Constitutional: Negative for activity change, appetite change, chills, diaphoresis, fatigue, fever and unexpected weight change  HENT: Negative for congestion, dental problem, ear pain, mouth sores, sinus pain, sinus pressure, sore throat and trouble swallowing  Eyes: Negative for photophobia, discharge and itching  Respiratory: Negative for apnea, chest tightness and shortness of breath  Cardiovascular: Negative for chest pain, palpitations and leg swelling  Gastrointestinal: Positive for nausea  Negative for abdominal distention, blood in stool, constipation, diarrhea, rectal pain and vomiting  Endocrine: Negative for cold intolerance, heat intolerance, polydipsia, polyphagia and polyuria  Genitourinary: Negative for difficulty urinating  Musculoskeletal: Negative for arthralgias  Skin: Negative for color change and wound  Neurological: Negative for dizziness, syncope, speech difficulty and headaches  Hematological: Negative for adenopathy  Psychiatric/Behavioral: Negative for agitation and behavioral problems  Current Outpatient Prescriptions   Medication Sig Dispense Refill    Multiple Vitamin (MULTIVITAMINS PO) Take 1 capsule by mouth daily      quinapril (ACCUPRIL) 10 mg tablet Take 1 tablet (10 mg total) by mouth every 12 (twelve) hours 90 tablet 3    sildenafil (REVATIO) 20 mg tablet Take 20 mg by mouth as needed        amoxicillin-clavulanate (AUGMENTIN) 875-125 mg per tablet Take 1 tablet by mouth every 12 (twelve) hours for 10 days 20 tablet 0    Methylprednisolone 4 MG TBPK Use as directed on package 21 tablet 0    omeprazole (PriLOSEC) 40 MG capsule Take 1 capsule (40 mg total) by mouth daily 30 capsule 0     No current facility-administered medications for this visit  Objective:    /66   Pulse 64   Temp 97 7 °F (36 5 °C)   Resp 18   Ht 6' 1" (1 854 m)   Wt 105 kg (232 lb)   BMI 30 61 kg/m²        Physical Exam   Constitutional: He appears well-developed and well-nourished  HENT:   Head: Normocephalic and atraumatic  Eyes: Conjunctivae and EOM are normal  Pupils are equal, round, and reactive to light  Neck: Normal range of motion  Neck supple  Cardiovascular: Normal rate and regular rhythm  Pulmonary/Chest: Effort normal    Abdominal: Soft  Bowel sounds are normal  He exhibits distension  Nursing note and vitals reviewed          Pt advised to take a probiotic     Fatou Ward DO

## 2018-07-24 DIAGNOSIS — I10 ESSENTIAL HYPERTENSION: ICD-10-CM

## 2018-07-24 RX ORDER — QUINAPRIL 10 MG/1
TABLET ORAL
Qty: 180 TABLET | Refills: 1 | Status: SHIPPED | OUTPATIENT
Start: 2018-07-24 | End: 2018-10-31 | Stop reason: SDUPTHER

## 2018-10-31 DIAGNOSIS — I10 ESSENTIAL HYPERTENSION: ICD-10-CM

## 2018-10-31 RX ORDER — QUINAPRIL 10 MG/1
TABLET ORAL
Qty: 180 TABLET | Refills: 1 | Status: SHIPPED | OUTPATIENT
Start: 2018-10-31 | End: 2019-05-26 | Stop reason: SDUPTHER

## 2019-03-17 ENCOUNTER — OFFICE VISIT (OUTPATIENT)
Dept: URGENT CARE | Facility: CLINIC | Age: 69
End: 2019-03-17
Payer: COMMERCIAL

## 2019-03-17 VITALS
HEART RATE: 76 BPM | DIASTOLIC BLOOD PRESSURE: 62 MMHG | WEIGHT: 225 LBS | OXYGEN SATURATION: 99 % | SYSTOLIC BLOOD PRESSURE: 119 MMHG | BODY MASS INDEX: 29.82 KG/M2 | HEIGHT: 73 IN | RESPIRATION RATE: 17 BRPM | TEMPERATURE: 97.7 F

## 2019-03-17 DIAGNOSIS — J02.9 ACUTE PHARYNGITIS, UNSPECIFIED ETIOLOGY: Primary | ICD-10-CM

## 2019-03-17 DIAGNOSIS — J04.0 ACUTE LARYNGITIS: ICD-10-CM

## 2019-03-17 PROCEDURE — 99213 OFFICE O/P EST LOW 20 MIN: CPT | Performed by: PHYSICIAN ASSISTANT

## 2019-03-17 PROCEDURE — 87070 CULTURE OTHR SPECIMN AEROBIC: CPT | Performed by: PHYSICIAN ASSISTANT

## 2019-03-17 NOTE — PROGRESS NOTES
[email or web address]        NAME: Phyllis Wilson is a 76 y o  male  : 1950    MRN: [id number]  DATE: 2019  TIME: 8:58 AM    Assessment and Plan   Acute pharyngitis, unspecified etiology [J02 9]  1  Acute pharyngitis, unspecified etiology  Throat culture   2  Acute laryngitis       Patient Instructions   Call in 48-72 hours for the result of a throat culture  An antibiotic will be started at this time if necessary  Rest your voice as much as possible  Avoid whispering  When you do need to talk, speak with your full voice capacity  Tylenol or Motrin may be taken for fever and discomfort  Saltwater gargles, warm tea with honey, and throat lozenges may be relieving of throat discomfort  Use a cool mist humidifier at bedtime, turning on hours prior to bed with your bedroom doors shut for maximum relief  Follow up with your family doctor in 3-5 days if symptoms persist   Proceed to the ER if symptoms worsen  The diagnosis, likely viral etiology, expected course of illness, and treatment plan were reviewed  All questions answered  Precautions given  Patient verbalized understanding and agreement the treatment plan  Chief Complaint     Chief Complaint   Patient presents with    Sore Throat     Pt reports of worsening sore throat started approx 2 days ago  Pt denies any fever     History of Present Illness   78-year-old male presenting with complaint of sore throat x2 days  He notes fatigue and lethargy over the course of the illness  He notes sore throat has been persistent over the past 2 days, however, was slightly reduced in severity this morning  He awoke today with a significantly hoarse voice and decided to seek evaluation  No difficulty or obstruction of swallowing  No fevers, chills, sweats, URI or GI symptoms  He notes he has recently been traveling to various parts of [place] due to a change in health status of multiple family members    He thinks the stress of her conditions and recent travel has caused him to become run down  He denies any sick contacts  No treatments tried  No recent travel  Had the flu shot  Nonsmoker  Review of Systems   Review of Systems   Constitutional: Positive for fatigue  Negative for chills, diaphoresis and fever  HENT: Positive for sore throat and voice change  Negative for congestion, ear pain, postnasal drip and rhinorrhea  Respiratory: Negative for cough, shortness of breath and wheezing  Cardiovascular: Negative for chest pain and palpitations  Gastrointestinal: Negative for abdominal pain, diarrhea, nausea and vomiting  Musculoskeletal: Negative for arthralgias and myalgias  Skin: Negative for rash  Neurological: Negative for dizziness and headaches       Current Medications       Current Outpatient Medications:     Multiple Vitamin (MULTIVITAMINS PO), Take 1 capsule by mouth daily, Disp: , Rfl:     quinapril (ACCUPRIL) 10 mg tablet, TAKE 1 TABLET BY MOUTH  EVERY 12 HOURS, Disp: 180 tablet, Rfl: 1    Methylprednisolone 4 MG TBPK, Use as directed on package, Disp: 21 tablet, Rfl: 0    omeprazole (PriLOSEC) 40 MG capsule, Take 1 capsule (40 mg total) by mouth daily (Patient not taking: Reported on 3/17/2019), Disp: 30 capsule, Rfl: 0    sildenafil (REVATIO) 20 mg tablet, Take 20 mg by mouth as needed  , Disp: , Rfl:     Current Allergies     Allergies as of 03/17/2019 - Reviewed 03/17/2019   Allergen Reaction Noted    Meperidine  02/05/2005          The following portions of the patient's history were reviewed and updated as appropriate: allergies, current medications, past family history, past medical history, past social history, past surgical history and problem list      Past Medical History:   Diagnosis Date    Chronic rhinitis 02/05/2005    last assessed 2/5/05, resolved 5/18/17     Elevated liver function tests     last assessed 3/9/15, resolved 5/18/17     Herpes simplex type 1 infection     Hip arthritis     resolved 12/20/17     Hypertension     Shoulder bursitis     last assessed 9/23/13, resolved 5/18/17      Past Surgical History:   Procedure Laterality Date    HERNIA REPAIR      Inguinal sliding     PROSTATOTOMY      last assessed 1/11/18     Family History   Problem Relation Age of Onset    Hypertension Mother     Lung cancer Family     Prostate cancer Family      Medications have been verified  Objective   /62   Pulse 76   Temp 97 7 °F (36 5 °C)   Resp 17   Ht 6' 1" (1 854 m)   Wt 102 kg (225 lb)   SpO2 99%   BMI 29 69 kg/m²     Unable to perform rapid strep in office  Will send for culture  Physical Exam     Physical Exam   Constitutional: He is oriented to person, place, and time  Vital signs are normal  He appears well-developed and well-nourished  He is cooperative  He does not appear ill  No distress  HENT:   Head: Normocephalic and atraumatic  Right Ear: Hearing, tympanic membrane, external ear and ear canal normal  No middle ear effusion  Left Ear: Hearing, tympanic membrane, external ear and ear canal normal   No middle ear effusion  Nose: Nose normal  No mucosal edema or rhinorrhea  Mouth/Throat: Uvula is midline and mucous membranes are normal  Mucous membranes are not pale, not dry and not cyanotic  No oral lesions  No uvula swelling  Posterior oropharyngeal erythema present  No oropharyngeal exudate, posterior oropharyngeal edema or tonsillar abscesses  Tonsils are 0 on the right  Tonsils are 0 on the left  No tonsillar exudate  Eyes: Conjunctivae and lids are normal  Right eye exhibits no discharge and no exudate  Left eye exhibits no discharge and no exudate  Neck: Trachea normal and phonation normal  Neck supple  No tracheal tenderness present  No neck rigidity  No edema and no erythema present  Cardiovascular: Normal rate, regular rhythm and normal heart sounds  Exam reveals no gallop, no distant heart sounds and no friction rub     No murmur heard   Pulmonary/Chest: Effort normal and breath sounds normal  No stridor  No respiratory distress  He has no decreased breath sounds  He has no wheezes  He has no rhonchi  He has no rales  Abdominal: Soft  Bowel sounds are normal  He exhibits no distension and no mass  There is no tenderness  There is no rigidity, no rebound and no guarding  Lymphadenopathy:     He has cervical adenopathy (nontender)  Right cervical: Superficial cervical adenopathy present  No posterior cervical adenopathy present  Left cervical: Superficial cervical adenopathy present  No posterior cervical adenopathy present  Neurological: He is alert and oriented to person, place, and time  He is not disoriented  No cranial nerve deficit  He exhibits normal muscle tone  Coordination normal    Skin: Skin is warm, dry and intact  No rash noted  He is not diaphoretic  No erythema  No pallor  Psychiatric: He has a normal mood and affect  His behavior is normal  Judgment and thought content normal    Nursing note and vitals reviewed

## 2019-03-17 NOTE — PATIENT INSTRUCTIONS
Call in 48-72 hours for the result of a throat culture  An antibiotic will be started at this time if necessary  Rest your voice as much as possible  Avoid whispering  When you do need to talk, speak with your full voice capacity  Tylenol or Motrin may be taken for fever and discomfort  Saltwater gargles, warm tea with honey, and throat lozenges may be relieving of throat discomfort  Use a cool mist humidifier at bedtime, turning on hours prior to bed with your bedroom doors shut for maximum relief  Follow up with your family doctor in 3-5 days if symptoms persist   Proceed to the ER if symptoms worsen

## 2019-03-19 LAB — BACTERIA THROAT CULT: NORMAL

## 2019-03-20 ENCOUNTER — TELEPHONE (OUTPATIENT)
Dept: URGENT CARE | Facility: CLINIC | Age: 69
End: 2019-03-20

## 2019-05-26 DIAGNOSIS — I10 ESSENTIAL HYPERTENSION: ICD-10-CM

## 2019-05-28 RX ORDER — QUINAPRIL 10 MG/1
TABLET ORAL
Qty: 180 TABLET | Refills: 1 | Status: SHIPPED | OUTPATIENT
Start: 2019-05-28 | End: 2019-10-15 | Stop reason: SDUPTHER

## 2019-07-09 ENCOUNTER — OFFICE VISIT (OUTPATIENT)
Dept: FAMILY MEDICINE CLINIC | Facility: CLINIC | Age: 69
End: 2019-07-09
Payer: COMMERCIAL

## 2019-07-09 VITALS
BODY MASS INDEX: 29.82 KG/M2 | WEIGHT: 225 LBS | RESPIRATION RATE: 18 BRPM | TEMPERATURE: 98.4 F | HEART RATE: 66 BPM | HEIGHT: 73 IN | DIASTOLIC BLOOD PRESSURE: 66 MMHG | SYSTOLIC BLOOD PRESSURE: 104 MMHG

## 2019-07-09 DIAGNOSIS — M19.90 ARTHRITIS: ICD-10-CM

## 2019-07-09 DIAGNOSIS — E66.3 OVERWEIGHT (BMI 25.0-29.9): ICD-10-CM

## 2019-07-09 DIAGNOSIS — R51.9 CHRONIC NONINTRACTABLE HEADACHE, UNSPECIFIED HEADACHE TYPE: ICD-10-CM

## 2019-07-09 DIAGNOSIS — Z90.79 S/P PROSTATECTOMY: ICD-10-CM

## 2019-07-09 DIAGNOSIS — Z11.59 NEED FOR HEPATITIS C SCREENING TEST: ICD-10-CM

## 2019-07-09 DIAGNOSIS — C61 ADENOCARCINOMA OF PROSTATE (HCC): ICD-10-CM

## 2019-07-09 DIAGNOSIS — Z00.00 MEDICARE ANNUAL WELLNESS VISIT, SUBSEQUENT: Primary | ICD-10-CM

## 2019-07-09 DIAGNOSIS — G89.29 CHRONIC NONINTRACTABLE HEADACHE, UNSPECIFIED HEADACHE TYPE: ICD-10-CM

## 2019-07-09 DIAGNOSIS — E78.2 MIXED HYPERLIPIDEMIA: ICD-10-CM

## 2019-07-09 DIAGNOSIS — Z13.820 SCREENING FOR OSTEOPOROSIS: ICD-10-CM

## 2019-07-09 DIAGNOSIS — R60.0 LOWER EXTREMITY EDEMA: ICD-10-CM

## 2019-07-09 DIAGNOSIS — Z13.6 SCREENING FOR CARDIOVASCULAR CONDITION: ICD-10-CM

## 2019-07-09 PROBLEM — R11.0 NAUSEA: Status: RESOLVED | Noted: 2018-07-23 | Resolved: 2019-07-09

## 2019-07-09 PROBLEM — G44.209 TENSION TYPE HEADACHE: Status: RESOLVED | Noted: 2017-05-18 | Resolved: 2019-07-09

## 2019-07-09 PROCEDURE — 1170F FXNL STATUS ASSESSED: CPT | Performed by: FAMILY MEDICINE

## 2019-07-09 PROCEDURE — 3725F SCREEN DEPRESSION PERFORMED: CPT | Performed by: FAMILY MEDICINE

## 2019-07-09 PROCEDURE — 1101F PT FALLS ASSESS-DOCD LE1/YR: CPT | Performed by: FAMILY MEDICINE

## 2019-07-09 PROCEDURE — 1125F AMNT PAIN NOTED PAIN PRSNT: CPT | Performed by: FAMILY MEDICINE

## 2019-07-09 PROCEDURE — G0439 PPPS, SUBSEQ VISIT: HCPCS | Performed by: FAMILY MEDICINE

## 2019-07-09 RX ORDER — ACETAMINOPHEN 500 MG
500 TABLET ORAL AS NEEDED
COMMUNITY

## 2019-07-09 RX ORDER — MULTIVITAMIN WITH FOLIC ACID 400 MCG
1 TABLET ORAL DAILY
COMMUNITY
End: 2019-08-16 | Stop reason: SDUPTHER

## 2019-07-09 NOTE — PATIENT INSTRUCTIONS
Obesity   AMBULATORY CARE:   Obesity  is when your body mass index (BMI) is greater than 30  Your healthcare provider will use your height and weight to measure your BMI  The risks of obesity include  many health problems, such as injuries or physical disability  You may need tests to check for the following:  · Diabetes     · High blood pressure or high cholesterol     · Heart disease     · Gallbladder or liver disease     · Cancer of the colon, breast, prostate, liver, or kidney     · Sleep apnea     · Arthritis or gout  Seek care immediately if:   · You have a severe headache, confusion, or difficulty speaking  · You have weakness on one side of your body  · You have chest pain, sweating, or shortness of breath  Contact your healthcare provider if:   · You have symptoms of gallbladder or liver disease, such as pain in your upper abdomen  · You have knee or hip pain and discomfort while walking  · You have symptoms of diabetes, such as intense hunger and thirst, and frequent urination  · You have symptoms of sleep apnea, such as snoring or daytime sleepiness  · You have questions or concerns about your condition or care  Treatment for obesity  focuses on helping you lose weight to improve your health  Even a small decrease in BMI can reduce the risk for many health problems  Your healthcare provider will help you set a weight-loss goal   · Lifestyle changes  are the first step in treating obesity  These include making healthy food choices and getting regular physical activity  Your healthcare provider may suggest a weight-loss program that involves coaching, education, and therapy  · Medicine  may help you lose weight when it is used with a healthy diet and physical activity  · Surgery  can help you lose weight if you are very obese and have other health problems  There are several types of weight-loss surgery  Ask your healthcare provider for more information    Be successful losing weight:   · Set small, realistic goals  An example of a small goal is to walk for 20 minutes 5 days a week  Anther goal is to lose 5% of your body weight  · Tell friends, family members, and coworkers about your goals  and ask for their support  Ask a friend to lose weight with you, or join a weight-loss support group  · Identify foods or triggers that may cause you to overeat , and find ways to avoid them  Remove tempting high-calorie foods from your home and workplace  Place a bowl of fresh fruit on your kitchen counter  If stress causes you to eat, then find other ways to cope with stress  · Keep a diary to track what you eat and drink  Also write down how many minutes of physical activity you do each day  Weigh yourself once a week and record it in your diary  Eating changes: You will need to eat 500 to 1,000 fewer calories each day than you currently eat to lose 1 to 2 pounds a week  The following changes will help you cut calories:  · Eat smaller portions  Use small plates, no larger than 9 inches in diameter  Fill your plate half full of fruits and vegetables  Measure your food using measuring cups until you know what a serving size looks like  · Eat 3 meals and 1 or 2 snacks each day  Plan your meals in advance  Edgar Lambing and eat at home most of the time  Eat slowly  · Eat fruits and vegetables at every meal   They are low in calories and high in fiber, which makes you feel full  Do not add butter, margarine, or cream sauce to vegetables  Use herbs to season steamed vegetables  · Eat less fat and fewer fried foods  Eat more baked or grilled chicken and fish  These protein sources are lower in calories and fat than red meat  Limit fast food  Dress your salads with olive oil and vinegar instead of bottled dressing  · Limit the amount of sugar you eat  Do not drink sugary beverages  Limit alcohol  Activity changes:  Physical activity is good for your body in many ways   It helps you burn calories and build strong muscles  It decreases stress and depression, and improves your mood  It can also help you sleep better  Talk to your healthcare provider before you begin an exercise program   · Exercise for at least 30 minutes 5 days a week  Start slowly  Set aside time each day for physical activity that you enjoy and that is convenient for you  It is best to do both weight training and an activity that increases your heart rate, such as walking, bicycling, or swimming  · Find ways to be more active  Do yard work and housecleaning  Walk up the stairs instead of using elevators  Spend your leisure time going to events that require walking, such as outdoor festivals or fairs  This extra physical activity can help you lose weight and keep it off  Follow up with your healthcare provider as directed: You may need to meet with a dietitian  Write down your questions so you remember to ask them during your visits  © 2017 [facility] Information is for End User's use only and may not be sold, redistributed or otherwise used for commercial purposes  All illustrations and images included in CareNotes® are the copyrighted property of [employer]  or Benito Wall  The above information is an  only  It is not intended as medical advice for individual conditions or treatments  Talk to your doctor, nurse or pharmacist before following any medical regimen to see if it is safe and effective for you  Urinary Incontinence   WHAT YOU NEED TO KNOW:   What is urinary incontinence? Urinary incontinence (UI) is when you lose control of your bladder  What causes UI? UI occurs because your bladder cannot store or empty urine properly  The following are the most common types of UI:  · Stress incontinence  is when you leak urine due to increased bladder pressure  This may happen when you cough, sneeze, or exercise       · Urge incontinence  is when you feel the need to urinate right away and leak urine accidentally  · Mixed incontinence  is when you have both stress and urge UI  What are the signs and symptoms of UI?   · You feel like your bladder does not empty completely when you urinate  · You urinate often and need to urinate immediately  · You leak urine when you sleep, or you wake up with the urge to urinate  · You leak urine when you cough, sneeze, exercise, or laugh  How is UI diagnosed? Your healthcare provider will ask how often you leak urine and whether you have stress or urge symptoms  Tell him which medicines you take, how often you urinate, and how much liquid you drink each day  You may need any of the following tests:  · Urine tests  may show infection or kidney function  · A pelvic exam  may be done to check for blockages  A pelvic exam will also show if your bladder, uterus, or other organs have moved out of place  · An x-ray, ultrasound, or CT  may show problems with parts of your urinary system  You may be given contrast liquid to help your organs show up better in the pictures  Tell the healthcare provider if you have ever had an allergic reaction to contrast liquid  Do not enter the MRI room with anything metal  Metal can cause serious injury  Tell the healthcare provider if you have any metal in or on your body  · A bladder scan  will show how much urine is left in your bladder after you urinate  You will be asked to urinate and then healthcare providers will use a small ultrasound machine to check the urine left in your bladder  · Cystometry  is used to check the function of your urinary system  Your healthcare provider checks the pressure in your bladder while filling it with fluid  Your bladder pressure may also be tested when your bladder is full and while you urinate  How is UI treated? · Medicines  can help strengthen your bladder control      · Electrical stimulation  is used to send a small amount of electrical energy to your pelvic floor muscles  This helps control your bladder function  Electrodes may be placed outside your body or in your rectum  For women, the electrodes may be placed in the vagina  · A bulking agent  may be injected into the wall of your urethra to make it thicker  This helps keep your urethra closed and decreases urine leakage  · Devices  such as a clamp, pessary, or tampon may help stop urine leaks  Ask your healthcare provider for more information about these and other devices  · Surgery  may be needed if other treatments do not work  Several types of surgery can help improve your bladder control  Ask your healthcare provider for more information about the surgery you may need  How can I manage my symptoms? · Do pelvic muscle exercises often  Your pelvic muscles help you stop urinating  Squeeze these muscles tight for 5 seconds, then relax for 5 seconds  Gradually work up to squeezing for 10 seconds  Do 3 sets of 15 repetitions a day, or as directed  This will help strengthen your pelvic muscles and improve bladder control  · A catheter  may be used to help empty your bladder  A catheter is a tiny, plastic tube that is put into your bladder to drain your urine  Your healthcare provider may tell you to use a catheter to prevent your bladder from getting too full and leaking urine  · Keep a UI record  Write down how often you leak urine and how much you leak  Make a note of what you were doing when you leaked urine  · Train your bladder  Go to the bathroom at set times, such as every 2 hours, even if you do not feel the urge to go  You can also try to hold your urine when you feel the urge to go  For example, hold your urine for 5 minutes when you feel the urge to go  As that becomes easier, hold your urine for 10 minutes  · Drink liquids as directed  Ask your healthcare provider how much liquid to drink each day and which liquids are best for you   You may need to limit the amount of liquid you drink to help control your urine leakage  Limit or do not have drinks that contain caffeine or alcohol  Do not drink any liquid right before you go to bed  · Prevent constipation  Eat a variety of high-fiber foods  Good examples are high-fiber cereals, beans, vegetables, and whole-grain breads  Prune juice may help make your bowel movement softer  Walking is the best way to trigger your intestines to have a bowel movement  · Exercise regularly and maintain a healthy weight  Ask your healthcare provider how much you should weigh and about the best exercise plan for you  Weight loss and exercise will decrease pressure on your bladder and help you control your leakage  Ask him to help you create a weight loss plan if you are overweight  When should I seek immediate care? · You have severe pain  · You are confused or cannot think clearly  When should I contact my healthcare provider? · You have a fever  · You see blood in your urine  · You have pain when you urinate  · You have new or worse pain, even after treatment  · Your mouth feels dry or you have vision changes  · Your urine is cloudy or smells bad  · You have questions or concerns about your condition or care  CARE AGREEMENT:   You have the right to help plan your care  Learn about your health condition and how it may be treated  Discuss treatment options with your caregivers to decide what care you want to receive  You always have the right to refuse treatment  The above information is an  only  It is not intended as medical advice for individual conditions or treatments  Talk to your doctor, nurse or pharmacist before following any medical regimen to see if it is safe and effective for you  © 2017 [address] Information is for End User's use only and may not be sold, redistributed or otherwise used for commercial purposes   All illustrations and images included in CareNotes® are the copyrighted property of [employer] D [employer] M , Inc  or Benito Wall  Cigarette Smoking and Your Health   AMBULATORY CARE:   Risks to your health if you smoke:  Nicotine and other chemicals found in tobacco damage every cell in your body  Even if you are a light smoker, you have an increased risk for cancer, heart disease, and lung disease  If you are pregnant or have diabetes, smoking increases your risk for complications  Benefits to your health if you stop smoking:   · You decrease respiratory symptoms such as coughing, wheezing, and shortness of breath  · You reduce your risk for cancers of the lung, mouth, throat, kidney, bladder, pancreas, stomach, and cervix  If you already have cancer, you increase the benefits of chemotherapy  You also reduce your risk for cancer returning or a second cancer from developing  · You reduce your risk for heart disease, blood clots, heart attack, and stroke  · You reduce your risk for lung infections, and diseases such as pneumonia, asthma, chronic bronchitis, and emphysema  · Your circulation improves  More oxygen can be delivered to your body  If you have diabetes, you lower your risk for complications, such as kidney, artery, and eye diseases  You also lower your risk for nerve damage  Nerve damage can lead to amputations, poor vision, and blindness  · You improve your body's ability to heal and to fight infections  Benefits to the health of others if you stop smoking:  Tobacco is harmful to nonsmokers who breathe in your secondhand smoke  The following are ways the health of others around you may improve when you stop smoking:  · You lower the risks for lung cancer and heart disease in nonsmoking adults  · If you are pregnant, you lower the risk for miscarriage, early delivery, low birth weight, and stillbirth  You also lower your baby's risk for SIDS, obesity, developmental delay, and neurobehavioral problems, such as ADHD  · If you have children, you lower their risk for ear infections, colds, pneumonia, bronchitis, and asthma  For more information and support to stop smoking:   · Smokefree  gov  Phone: 6- 198 - 203-4761  Web Address: www smokefrPonominalu.ru  Follow up with your healthcare provider as directed:  Write down your questions so you remember to ask them during your visits  © 2017 [address] Information is for End User's use only and may not be sold, redistributed or otherwise used for commercial purposes  All illustrations and images included in CareNotes® are the copyrighted property of [employer] D [employer] M , Inc  or Benito Wall  The above information is an  only  It is not intended as medical advice for individual conditions or treatments  Talk to your doctor, nurse or pharmacist before following any medical regimen to see if it is safe and effective for you  Fall Prevention   WHAT YOU NEED TO KNOW:   What is fall prevention? Fall prevention includes ways to make your home and other areas safer  It also includes ways you can move more carefully to prevent a fall  What increases my risk for falls? · Lack of vitamin D    · Not getting enough sleep each night    · Trouble walking or keeping your balance, or foot problems    · Health conditions that cause changes in your blood pressure, vision, or muscle strength and coordination    · Medicines that make you dizzy, weak, or sleepy    · Problems seeing clearly    · Shoes that have high heels or are not supportive    · Tripping hazards, such as items left on the floor, no handrails on the stairs, or broken steps  How can I help protect myself from falls? · Stand or sit up slowly  This may help you keep your balance and prevent falls  If you need to get up during the night, sit up first  Be sure you are fully awake before you stand  Turn on the light before you start walking  Go slowly in case you are still sleepy   Make sure you will not trip over any pets sleeping in the bedroom  · Use assistive devices as directed  Your healthcare provider may suggest that you use a cane or walker to help you keep your balance  You may need to have grab bars put in your bathroom near the toilet or in the shower  · Wear shoes that fit well and have soles that   Wear shoes both inside and outside  Use slippers with good   Do not wear shoes with high heels  · Wear a personal alarm  This is a device that allows you to call 911 if you fall and need help  Ask your healthcare provider for more information  · Stay active  Exercise can help strengthen your muscles and improve your balance  Your healthcare provider may recommend water aerobics or walking  He or she may also recommend physical therapy to improve your coordination  Never start an exercise program without talking to your healthcare provider first      · Manage medical conditions  Keep all appointments with your healthcare providers  Visit your eye doctor as directed  How can I make my home safer? · Add items to prevent falls in the bathroom  Put nonslip strips on your bath or shower floor to prevent you from slipping  Use a bath mat if you do not have carpet in the bathroom  This will prevent you from falling when you step out of the bath or shower  Use a shower seat so you do not need to stand while you shower  Sit on the toilet or a chair in your bathroom to dry yourself and put on clothing  This will prevent you from losing your balance from drying or dressing yourself while you are standing  · Keep paths clear  Remove books, shoes, and other objects from walkways and stairs  Place cords for telephones and lamps out of the way so that you do not need to walk over them  Tape them down if you cannot move them  Remove small rugs  If you cannot remove a rug, secure it with double-sided tape  This will prevent you from tripping  · Install bright lights in your home  Use night lights to help light paths to the bathroom or kitchen  Always turn on the light before you start walking  · Keep items you use often on shelves within reach  Do not use a step stool to help you reach an item  · Paint or place reflective tape on the edges of your stairs  This will help you see the stairs better  Call 911 or have someone else call if:   · You have fallen and are unconscious  · You have fallen and cannot move part of your body  Contact your healthcare provider if:   · You have fallen and have pain or a headache  · You have questions or concerns about your condition or care  CARE AGREEMENT:   You have the right to help plan your care  Learn about your health condition and how it may be treated  Discuss treatment options with your caregivers to decide what care you want to receive  You always have the right to refuse treatment  The above information is an  only  It is not intended as medical advice for individual conditions or treatments  Talk to your doctor, nurse or pharmacist before following any medical regimen to see if it is safe and effective for you  © 2017 [facility] Information is for End User's use only and may not be sold, redistributed or otherwise used for commercial purposes  All illustrations and images included in CareNotes® are the copyrighted property of A D A M , Inc  or Benito Wall  Advance Directives   WHAT YOU NEED TO KNOW:   What are advance directives? Advance directives are legal documents that state your wishes and plans for medical care  These plans are made ahead of time in case you lose your ability to make decisions for yourself  Advance directives can apply to any medical decision, such as the treatments you want, and if you want to donate organs  What are the types of advance directives? There are many types of advance directives, and each state has rules about how to use them   You may choose a combination of any of the following:  · Living will: This is a written record of the treatment you want  You can also choose which treatments you do not want, which to limit, and which to stop at a certain time  This includes surgery, medicine, IV fluid, and tube feedings  · Durable power of  for healthcare [facility]): This is a written record that states who you want to make healthcare choices for you when you are unable to make them for yourself  This person, called a proxy, is usually a family member or a friend  You may choose more than 1 proxy  · Do not resuscitate (DNR) order:  A DNR order is used in case your heart stops beating or you stop breathing  It is a request not to have certain forms of treatment, such as CPR  A DNR order may be included in other types of advance directives  · Medical directive: This covers the care that you want if you are in a coma, near death, or unable to make decisions for yourself  You can list the treatments you want for each condition  Treatment may include pain medicine, surgery, blood transfusions, dialysis, IV or tube feedings, and a ventilator (breathing machine)  · Values history: This document has questions about your views, beliefs, and how you feel and think about life  This information can help others choose the care that you would choose  Why are advance directives important? An advance directive helps you control your care  Although spoken wishes may be used, it is better to have your wishes written down  Spoken wishes can be misunderstood, or not followed  Treatments may be given even if you do not want them  An advance directive may make it easier for your family to make difficult choices about your care  How do I decide what to put in my advance directives? · Make informed decisions:  Make sure you fully understand treatments or care you may receive   Think about the benefits and problems your decisions could cause for you or your family  Talk to healthcare providers if you have concerns or questions before you write down your wishes  You may also want to talk with your Shinto or , or a   Check your state laws to make sure that what you put in your advance directive is legal      · Sign all forms:  Sign and date your advance directive when you have finished  You may also need 2 witnesses to sign the forms  Witnesses cannot be your doctor or his staff, your spouse, heirs or beneficiaries, people you owe money to, or your chosen proxy  Talk to your family, proxy, and healthcare providers about your advance directive  Give each person a copy, and keep one for yourself in a place you can get to easily  Do not keep it hidden or locked away  · Review and revise your plans: You can revise your advance directive at any time, as long as you are able to make decisions  Review your plan every year, and when there are changes in your life, or your health  When you make changes, let your family, proxy, and healthcare providers know  Give each a new copy  Where can I find more information? · [employer]  [address] , [address]  Phone: 6- [phone]  Phone: 0- 620 - [phone]  Web Address: http://www  aafp org  · [address] [facility])  [address] [facility], [address] [facility] , [address] [place] [address]  Phone: 2- [phone] - [phone]  Phone: [phone]  Web Address: Aamir german  CARE AGREEMENT:   You have the right to help plan your care  To help with this plan, you must learn about your health condition and treatment options  You must also learn about advance directives and how they are used  Work with your healthcare providers to decide what care will be used to treat you  You always have the right to refuse treatment  The above information is an  only   It is not intended as medical advice for individual conditions or treatments  Talk to your doctor, nurse or pharmacist before following any medical regimen to see if it is safe and effective for you  © 2017 [address] Information is for End User's use only and may not be sold, redistributed or otherwise used for commercial purposes  All illustrations and images included in CareNotes® are the copyrighted property of [employer]  or Benito Wall  Weight Management   AMBULATORY CARE:   Why it is important to manage your weight:  Being overweight increases your risk of health conditions such as heart disease, high blood pressure, type 2 diabetes, and certain types of cancer  It can also increase your risk for osteoarthritis, sleep apnea, and other respiratory problems  Aim for a slow, steady weight loss  Even a small amount of weight loss can lower your risk of health problems  How to lose weight safely:  A safe and healthy way to lose weight is to eat fewer calories and get regular exercise  You can lose up about 1 pound a week by decreasing the number of calories you eat by 500 calories each day  You can decrease calories by eating smaller portion sizes or by cutting out high-calorie foods  Read labels to find out how many calories are in the foods you eat  You can also burn calories with exercise such as walking, swimming, or biking  You will be more likely to keep weight off if you make these changes part of your lifestyle  Healthy meal plan for weight management:  A healthy meal plan includes a variety of foods, contains fewer calories, and helps you stay healthy  A healthy meal plan includes the following:  · Eat whole-grain foods more often  A healthy meal plan should contain fiber  Fiber is the part of grains, fruits, and vegetables that is not broken down by your body  Whole-grain foods are healthy and provide extra fiber in your diet   Some examples of whole-grain foods are whole-wheat breads and pastas, oatmeal, brown rice, and bulgur  · Eat a variety of vegetables every day  Include dark, leafy greens such as spinach, kale, heaven greens, and mustard greens  Eat yellow and orange vegetables such as carrots, sweet potatoes, and winter squash  · Eat a variety of fruits every day  Choose fresh or canned fruit (canned in its own juice or light syrup) instead of juice  Fruit juice has very little or no fiber  · Eat low-fat dairy foods  Drink fat-free (skim) milk or 1% milk  Eat fat-free yogurt and low-fat cottage cheese  Try low-fat cheeses such as mozzarella and other reduced-fat cheeses  · Choose meat and other protein foods that are low in fat  Choose beans or other legumes such as split peas or lentils  Choose fish, skinless poultry (chicken or turkey), or lean cuts of red meat (beef or pork)  Before you cook meat or poultry, cut off any visible fat  · Use less fat and oil  Try baking foods instead of frying them  Add less fat, such as margarine, sour cream, regular salad dressing and mayonnaise to foods  Eat fewer high-fat foods  Some examples of high-fat foods include french fries, doughnuts, ice cream, and cakes  · Eat fewer sweets  Limit foods and drinks that are high in sugar  This includes candy, cookies, regular soda, and sweetened drinks  Ways to decrease calories:   · Eat smaller portions  ¨ Use a small plate with smaller servings  ¨ Do not eat second helpings  ¨ When you eat at a restaurant, ask for a box and place half of your meal in the box before you eat  ¨ Share an entrée with someone else  · Replace high-calorie snacks with healthy, low-calorie snacks  ¨ Choose fresh fruit, vegetables, fat-free rice cakes, or air-popped popcorn instead of potato chips, nuts, or chocolate  ¨ Choose water or calorie-free drinks instead of soda or sweetened drinks  · Eat regular meals  Skipping meals can lead to overeating later in the day   Eat a healthy snack in place of a meal if you do not have time to eat a regular meal      · Do not shop for groceries when you are hungry  You may be more likely to make unhealthy food choices  Take a grocery list of healthy foods and shop after you have eaten  Exercise:  Exercise at least 30 minutes per day on most days of the week  Some examples of exercise include walking, biking, dancing, and swimming  You can also fit in more physical activity by taking the stairs instead of the elevator or parking farther away from stores  Ask your healthcare provider about the best exercise plan for you  Other things to consider as you try to lose weight:   · Be aware of situations that may give you the urge to overeat, such as eating while watching television  Find ways to avoid these situations  For example, read a book, go for a walk, or do crafts  · Meet with a weight loss support group or friends who are also trying to lose weight  This may help you stay motivated to continue working on your weight loss goals  © 2017 [employer]  Information is for End User's use only and may not be sold, redistributed or otherwise used for commercial purposes  All illustrations and images included in CareNotes® are the copyrighted property of [employer]  or [employer]  The above information is an  only  It is not intended as medical advice for individual conditions or treatments  Talk to your doctor, nurse or pharmacist before following any medical regimen to see if it is safe and effective for you

## 2019-07-09 NOTE — PROGRESS NOTES
Assessment and Plan:     Problem List Items Addressed This Visit        Genitourinary    Adenocarcinoma of prostate ([address] )    Relevant Orders    CT head wo contrast    PSA, Total Screen       Other    Mixed hyperlipidemia    Lower extremity edema    Relevant Orders    Compression Stocking    S/P prostatectomy      Other Visit Diagnoses     Medicare annual wellness visit, subsequent    -  Primary    Arthritis        Relevant Orders    RF Screen w/ Reflex to Titer    Cyclic citrul peptide antibody, IgG    Sedimentation rate, automated    Chronic nonintractable headache, unspecified headache type        Relevant Orders    CT head wo contrast    Screening for cardiovascular condition        Relevant Orders    Comprehensive metabolic panel    Lipid Panel with Direct LDL reflex    Need for hepatitis C screening test        Relevant Orders    Hepatitis C antibody    Overweight (BMI 25 0-29  9)        BMI 29 0-29 9,adult        Screening for osteoporosis        Relevant Orders    DXA bone density spine hip and pelvis      pt advised lymphadema therapy is done in [place]  Pt requesting Adacel - wants top check if ins will cover   History of Present Illness:     Patient presents for Medicare Annual Wellness visit  Pt states he would like labs for arthritis, states his joints are stiff in the am  Pt states he stopped seeing his urologist in [place] and would like a psa  Pt states while he was here he was also hoping I could order him a cat scan of his head  States he has pain in the left side fo ghsi head    Does not want an MRI states he gets clautophobic  Pt states the local physical therapy people do not do lymphedema therapy - wondering where it is done   Patient Care Team:  Drew Leone DO as PCP - Qian Mata MD as Consulting Physician (Ophthalmology)  Suzi Crawford as Consulting Physician (Gastroenterology)     Problem List:     Patient Active Problem List   Diagnosis    Actinic keratosis    [facility]enocarcinoma of [address] ([facility] [address]    Anemia    Benign essential hypertension    Disc degeneration, lumbar    Fatty liver disease, nonalcoholic    Herpes simplex infection    Mixed hyperlipidemia    Organic impotence    Lower extremity edema    Groin mass    S/P prostatectomy      Past Medical and Surgical History:     Past Medical History:   Diagnosis Date    Chronic rhinitis 02/05/2005    last assessed 2/5/05, resolved 5/18/17     Elevated liver function tests     last assessed 3/9/15, resolved 5/18/17     Herpes simplex type 1 infection     Hip arthritis     resolved 12/20/17     Hypertension     Shoulder bursitis     last assessed 9/23/13, resolved 5/18/17      Past Surgical History:   Procedure Laterality Date    HERNIA REPAIR      Inguinal sliding     PROSTATOTOMY      last assessed 1/11/18      Family History:     Family History   Problem Relation Age of Onset    Hypertension Mother     Lung cancer Family     Prostate cancer Family     Mental illness Neg Hx       Social History:     Social History     Tobacco Use   Smoking Status Never Smoker   Smokeless Tobacco Never Used     Social History     Substance and Sexual Activity   Alcohol Use Yes    Frequency: Monthly or less    Drinks per session: 1 or 2    Binge frequency: Never     Social History     Substance and Sexual Activity   Drug Use Never      Medications and Allergies:     Current Outpatient Medications   Medication Sig Dispense Refill    acetaminophen (TYLENOL) 500 mg tablet Take 500 mg by mouth as needed      Multiple Vitamin (MULTIVITAMINS PO) Take 1 capsule by mouth daily      Multiple Vitamin (TAB-A-JEMAL) TABS Take 1 tablet by mouth daily      quinapril (ACCUPRIL) 10 mg tablet TAKE 1 TABLET BY MOUTH  EVERY 12 HOURS 180 tablet 1    sildenafil (REVATIO) 20 mg tablet Take 20 mg by mouth as needed         No current facility-administered medications for this visit        Allergies   Allergen Reactions    Meperidine Reaction Date: [id number]; Annotation - [id number]: unsure of side effect      Immunizations:     Immunization History   Administered Date(s) Administered    H1N1, All Formulations 10/01/2018    Influenza Quadrivalent Preservative Free 3 years and older IM 10/23/2014    Influenza Quadrivalent, 6-35 Months IM 10/11/2016, 10/11/2016    Influenza Split High Dose Preservative Free IM 10/29/2015, 09/21/2017    Influenza TIV (IM) 10/25/2007, 10/13/2008, 09/09/2009, 10/04/2010, 09/28/2011, 10/25/2012, 10/29/2013    Pneumococcal Conjugate 13-Valent 06/15/2015    Pneumococcal Polysaccharide PPV23 07/19/2016    Tdap 07/21/2008    Zoster 04/15/2015      Medicare Screening Tests and Risk Assessments: Maye Tate is here for his Subsequent Wellness visit  Last Medicare Wellness visit information reviewed, patient interviewed, no change since last AWV  Health Risk Assessment:  Patient rates overall health as fair  Patient feels that their physical health rating is Same  Eyesight was rated as Same  Hearing was rated as Same  Patient feels that their emotional and mental health rating is Slightly worse  Pain experienced by patient in the last 7 days has been Some  Patient's pain rating has been 4/10  Patient states that he has experienced no weight loss or gain in last 6 months  (Additional comments: Joint pain every day   Pain on L side of head over the ear for past year )    Emotional/Mental Health:  Patient has not been feeling nervous/anxious  PHQ-9 Depression Screening:    Frequency of the following problems over the past two weeks:      1  Little interest or pleasure in doing things: 0 - not at all      2  Feeling down, depressed, or hopeless: 2 - more than half the days  PHQ-2 Score: 2          Broken Bones/Falls: Fall Risk Assessment:    In the past year, patient has experienced: No history of falling in past year          Bladder/Bowel:  Patient has not leaked urine accidently in the last six months  Patient reports no loss of bowel control  Immunizations:  Patient has had a flu vaccination within the last year  Patient has received a pneumonia shot  Patient has received a shingles shot  Patient has received tetanus/diphtheria shot  Home Safety:  Patient does not have trouble with stairs inside or outside of their home  Patient currently reports that there are no safety hazards present in home, working smoke alarms, no working carbon monoxide detectors  Preventative Screenings:   prostate cancer screen performed, colon cancer screen completed, cholesterol screen completed, glaucoma eye exam completed,     Nutrition:  Current diet: Regular with servings of the following:    Medications:  Patient is currently taking over-the-counter supplements  List of OTC medications includes: tylenol  Patient is able to manage medications  Lifestyle Choices:  Patient reports no tobacco use  Patient reports alcohol use  Alcohol use per week: very rare  Patient drives a vehicle  Patient wears seat belt  Current level of exercise of physical activity described by patient as: none  Activities of Daily Living:  Can get out of bed by his or her self, able to dress self, able to make own meals, able to do own shopping, able to bathe self, can do own laundry/housekeeping, can manage own money, pay bills and track expenses    Previous Hospitalizations:  Hospitalization or ED visit in past 12 months  Additional Comments: Hernia repair 10/2018    Advanced Directives:  Patient has decided on a power of   Patient has spoken to designated power of   Patient has completed advanced directive          Preventative Screening/Counseling:      Cardiovascular:      General: Risks and Benefits Discussed      Counseling: Healthy Diet     Due for Labs/Analytes/Optional EKG: Lipid Panel          Diabetes:      General: Risks and Benefits Discussed      Counseling: Healthy Diet      Due for labs: Blood Glucose          Colorectal Cancer:      General: Risks and Benefits Discussed and Screening Current      Counseling: high fiber diet      Comments: Next due in jan 2020        Prostate Cancer:      General: Risks and Benefits Discussed      Due for labs: PSA          Osteoporosis:      General: Risks and Benefits Discussed      Counseling: Calcium and Vitamin D Intake and Regular Weightbearing Exercise      Due for studies: Bone Density Ultrasound          AAA:      General: Screening Not Indicated          Glaucoma:      General: Risks and Benefits Discussed and Screening Current      Comments: Sees [facility]        HIV:      General: Screening Not Indicated and Risks and Benefits Discussed          Hepatitis C:      General: Risks and Benefits Discussed      Counseling: has received general HCV counseling        Advanced Directives:   Patient has living will for healthcare, has durable POA for healthcare, patient has an advanced directive  Information on ACP and/or AD provided  No 5 wishes given  End of life assessment reviewed with patient  Provider agrees with end of life decisions   Additional Comments: Wife is POA      BMI Counseling: Body mass index is 29 69 kg/m²  Discussed the patient's BMI with him  The BMI is above average  BMI counseling and education was provided to the patient  Nutrition recommendations include reducing portion sizes

## 2019-07-12 LAB
ALBUMIN SERPL-MCNC: 4.3 G/DL (ref 3.6–4.8)
ALBUMIN/GLOB SERPL: 1.3 {RATIO} (ref 1.2–2.2)
ALP SERPL-CCNC: 43 IU/L (ref 39–117)
ALT SERPL-CCNC: 17 IU/L (ref 0–44)
AST SERPL-CCNC: 22 IU/L (ref 0–40)
BILIRUB SERPL-MCNC: 0.5 MG/DL (ref 0–1.2)
BUN SERPL-MCNC: 15 MG/DL (ref 8–27)
BUN/CREAT SERPL: 14 (ref 10–24)
CALCIUM SERPL-MCNC: 9.5 MG/DL (ref 8.6–10.2)
CCP IGA+IGG SERPL IA-ACNC: 4 UNITS (ref 0–19)
CHLORIDE SERPL-SCNC: 105 MMOL/L (ref 96–106)
CHOLEST SERPL-MCNC: 198 MG/DL (ref 100–199)
CO2 SERPL-SCNC: 25 MMOL/L (ref 20–29)
CREAT SERPL-MCNC: 1.11 MG/DL (ref 0.76–1.27)
ERYTHROCYTE [SEDIMENTATION RATE] IN BLOOD BY WESTERGREN METHOD: 15 MM/HR (ref 0–30)
GLOBULIN SER-MCNC: 3.3 G/DL (ref 1.5–4.5)
GLUCOSE SERPL-MCNC: 104 MG/DL (ref 65–99)
HCV AB S/CO SERPL IA: <0.1 S/CO RATIO (ref 0–0.9)
HDLC SERPL-MCNC: 40 MG/DL
LDLC SERPL CALC-MCNC: 131 MG/DL (ref 0–99)
MICRODELETION SYND BLD/T FISH: NORMAL
POTASSIUM SERPL-SCNC: 4.9 MMOL/L (ref 3.5–5.2)
PROT SERPL-MCNC: 7.6 G/DL (ref 6–8.5)
PSA SERPL-MCNC: <0.1 NG/ML (ref 0–4)
RHEUMATOID FACT SERPL-ACNC: <10 IU/ML (ref 0–13.9)
SL AMB EGFR AFRICAN AMERICAN: 78 ML/MIN/1.73
SL AMB EGFR NON AFRICAN AMERICAN: 67 ML/MIN/1.73
SODIUM SERPL-SCNC: 142 MMOL/L (ref 134–144)
TRIGL SERPL-MCNC: 133 MG/DL (ref 0–149)

## 2019-07-17 ENCOUNTER — HOSPITAL ENCOUNTER (OUTPATIENT)
Dept: RADIOLOGY | Facility: HOSPITAL | Age: 69
Discharge: HOME | End: 2019-07-17
Attending: FAMILY MEDICINE
Payer: COMMERCIAL

## 2019-07-17 DIAGNOSIS — R51.9 CHRONIC NONINTRACTABLE HEADACHE, UNSPECIFIED HEADACHE TYPE: ICD-10-CM

## 2019-07-17 DIAGNOSIS — G89.29 CHRONIC NONINTRACTABLE HEADACHE, UNSPECIFIED HEADACHE TYPE: ICD-10-CM

## 2019-07-17 DIAGNOSIS — C61 ADENOCARCINOMA OF PROSTATE (HCC): ICD-10-CM

## 2019-07-17 PROCEDURE — 70450 CT HEAD/BRAIN W/O DYE: CPT

## 2019-08-12 ENCOUNTER — HOSPITAL ENCOUNTER (OUTPATIENT)
Dept: RADIOLOGY | Facility: HOSPITAL | Age: 69
Discharge: HOME | End: 2019-08-12
Attending: FAMILY MEDICINE
Payer: COMMERCIAL

## 2019-08-12 DIAGNOSIS — Z13.820 SCREENING FOR OSTEOPOROSIS: ICD-10-CM

## 2019-08-12 PROCEDURE — 77080 DXA BONE DENSITY AXIAL: CPT

## 2019-08-15 ENCOUNTER — HOSPITAL ENCOUNTER (EMERGENCY)
Facility: HOSPITAL | Age: 69
Discharge: HOME | End: 2019-08-15
Attending: EMERGENCY MEDICINE | Admitting: EMERGENCY MEDICINE
Payer: COMMERCIAL

## 2019-08-15 ENCOUNTER — APPOINTMENT (EMERGENCY)
Dept: RADIOLOGY | Facility: HOSPITAL | Age: 69
End: 2019-08-15
Payer: COMMERCIAL

## 2019-08-15 VITALS
SYSTOLIC BLOOD PRESSURE: 127 MMHG | WEIGHT: 235 LBS | DIASTOLIC BLOOD PRESSURE: 62 MMHG | HEART RATE: 54 BPM | BODY MASS INDEX: 31.14 KG/M2 | TEMPERATURE: 96.9 F | RESPIRATION RATE: 16 BRPM | OXYGEN SATURATION: 98 % | HEIGHT: 73 IN

## 2019-08-15 DIAGNOSIS — M54.2 NECK PAIN ON LEFT SIDE: ICD-10-CM

## 2019-08-15 DIAGNOSIS — I72.0 PSEUDOANEURYSM OF CAROTID ARTERY (HCC): Primary | ICD-10-CM

## 2019-08-15 LAB
ANION GAP SERPL CALCULATED.3IONS-SCNC: 6 MMOL/L (ref 4–13)
BASOPHILS # BLD AUTO: 0.03 THOUSANDS/ΜL (ref 0–0.1)
BASOPHILS NFR BLD AUTO: 1 % (ref 0–1)
BUN SERPL-MCNC: 16 MG/DL (ref 5–25)
CALCIUM SERPL-MCNC: 8.7 MG/DL (ref 8.3–10.1)
CHLORIDE SERPL-SCNC: 106 MMOL/L (ref 100–108)
CO2 SERPL-SCNC: 28 MMOL/L (ref 21–32)
CREAT SERPL-MCNC: 1.01 MG/DL (ref 0.6–1.3)
EOSINOPHIL # BLD AUTO: 0.21 THOUSAND/ΜL (ref 0–0.61)
EOSINOPHIL NFR BLD AUTO: 5 % (ref 0–6)
ERYTHROCYTE [DISTWIDTH] IN BLOOD BY AUTOMATED COUNT: 12.6 % (ref 11.6–15.1)
GFR SERPL CREATININE-BSD FRML MDRD: 76 ML/MIN/1.73SQ M
GLUCOSE SERPL-MCNC: 109 MG/DL (ref 65–140)
HCT VFR BLD AUTO: 36.6 % (ref 36.5–49.3)
HGB BLD-MCNC: 12.3 G/DL (ref 12–17)
IMM GRANULOCYTES # BLD AUTO: 0.01 THOUSAND/UL (ref 0–0.2)
IMM GRANULOCYTES NFR BLD AUTO: 0 % (ref 0–2)
LYMPHOCYTES # BLD AUTO: 1.32 THOUSANDS/ΜL (ref 0.6–4.47)
LYMPHOCYTES NFR BLD AUTO: 29 % (ref 14–44)
MCH RBC QN AUTO: 30.9 PG (ref 26.8–34.3)
MCHC RBC AUTO-ENTMCNC: 33.6 G/DL (ref 31.4–37.4)
MCV RBC AUTO: 92 FL (ref 82–98)
MONOCYTES # BLD AUTO: 0.36 THOUSAND/ΜL (ref 0.17–1.22)
MONOCYTES NFR BLD AUTO: 8 % (ref 4–12)
NEUTROPHILS # BLD AUTO: 2.69 THOUSANDS/ΜL (ref 1.85–7.62)
NEUTS SEG NFR BLD AUTO: 57 % (ref 43–75)
NRBC BLD AUTO-RTO: 0 /100 WBCS
PLATELET # BLD AUTO: 161 THOUSANDS/UL (ref 149–390)
PMV BLD AUTO: 9.7 FL (ref 8.9–12.7)
POTASSIUM SERPL-SCNC: 3.9 MMOL/L (ref 3.5–5.3)
RBC # BLD AUTO: 3.98 MILLION/UL (ref 3.88–5.62)
SODIUM SERPL-SCNC: 140 MMOL/L (ref 136–145)
WBC # BLD AUTO: 4.62 THOUSAND/UL (ref 4.31–10.16)

## 2019-08-15 PROCEDURE — 70498 CT ANGIOGRAPHY NECK: CPT

## 2019-08-15 PROCEDURE — 99447 NTRPROF PH1/NTRNET/EHR 11-20: CPT | Performed by: PHYSICIAN ASSISTANT

## 2019-08-15 PROCEDURE — 99284 EMERGENCY DEPT VISIT MOD MDM: CPT

## 2019-08-15 PROCEDURE — 80048 BASIC METABOLIC PNL TOTAL CA: CPT | Performed by: EMERGENCY MEDICINE

## 2019-08-15 PROCEDURE — 96374 THER/PROPH/DIAG INJ IV PUSH: CPT

## 2019-08-15 PROCEDURE — 85025 COMPLETE CBC W/AUTO DIFF WBC: CPT | Performed by: EMERGENCY MEDICINE

## 2019-08-15 PROCEDURE — 70496 CT ANGIOGRAPHY HEAD: CPT

## 2019-08-15 PROCEDURE — 36415 COLL VENOUS BLD VENIPUNCTURE: CPT | Performed by: EMERGENCY MEDICINE

## 2019-08-15 PROCEDURE — 96375 TX/PRO/DX INJ NEW DRUG ADDON: CPT

## 2019-08-15 RX ORDER — LISINOPRIL 5 MG/1
10 TABLET ORAL ONCE
Status: DISCONTINUED | OUTPATIENT
Start: 2019-08-15 | End: 2019-08-15 | Stop reason: HOSPADM

## 2019-08-15 RX ORDER — DIAZEPAM 5 MG/1
5 TABLET ORAL ONCE
Status: COMPLETED | OUTPATIENT
Start: 2019-08-15 | End: 2019-08-15

## 2019-08-15 RX ORDER — METOCLOPRAMIDE HYDROCHLORIDE 5 MG/ML
10 INJECTION INTRAMUSCULAR; INTRAVENOUS ONCE
Status: COMPLETED | OUTPATIENT
Start: 2019-08-15 | End: 2019-08-15

## 2019-08-15 RX ORDER — BUTALBITAL, ACETAMINOPHEN AND CAFFEINE 50; 325; 40 MG/1; MG/1; MG/1
1 TABLET ORAL EVERY 4 HOURS PRN
Qty: 30 TABLET | Refills: 0 | Status: SHIPPED | OUTPATIENT
Start: 2019-08-15 | End: 2019-09-24

## 2019-08-15 RX ORDER — QUINAPRIL 10 MG/1
10 TABLET ORAL ONCE
Status: DISCONTINUED | OUTPATIENT
Start: 2019-08-15 | End: 2019-08-15 | Stop reason: CLARIF

## 2019-08-15 RX ORDER — DEXAMETHASONE SODIUM PHOSPHATE 4 MG/ML
10 INJECTION, SOLUTION INTRA-ARTICULAR; INTRALESIONAL; INTRAMUSCULAR; INTRAVENOUS; SOFT TISSUE ONCE
Status: COMPLETED | OUTPATIENT
Start: 2019-08-15 | End: 2019-08-15

## 2019-08-15 RX ORDER — MORPHINE SULFATE 4 MG/ML
4 INJECTION, SOLUTION INTRAMUSCULAR; INTRAVENOUS ONCE
Status: DISCONTINUED | OUTPATIENT
Start: 2019-08-15 | End: 2019-08-15 | Stop reason: HOSPADM

## 2019-08-15 RX ORDER — KETOROLAC TROMETHAMINE 30 MG/ML
15 INJECTION, SOLUTION INTRAMUSCULAR; INTRAVENOUS ONCE
Status: COMPLETED | OUTPATIENT
Start: 2019-08-15 | End: 2019-08-15

## 2019-08-15 RX ORDER — MORPHINE SULFATE 4 MG/ML
4 INJECTION, SOLUTION INTRAMUSCULAR; INTRAVENOUS ONCE
Status: DISCONTINUED | OUTPATIENT
Start: 2019-08-15 | End: 2019-08-15

## 2019-08-15 RX ORDER — ACETAMINOPHEN 325 MG/1
650 TABLET ORAL ONCE
Status: COMPLETED | OUTPATIENT
Start: 2019-08-15 | End: 2019-08-15

## 2019-08-15 RX ADMIN — DEXAMETHASONE SODIUM PHOSPHATE 10 MG: 4 INJECTION, SOLUTION INTRAMUSCULAR; INTRAVENOUS at 10:32

## 2019-08-15 RX ADMIN — ACETAMINOPHEN 650 MG: 325 TABLET, FILM COATED ORAL at 09:40

## 2019-08-15 RX ADMIN — DIAZEPAM 5 MG: 5 TABLET ORAL at 07:26

## 2019-08-15 RX ADMIN — METOCLOPRAMIDE 10 MG: 5 INJECTION, SOLUTION INTRAMUSCULAR; INTRAVENOUS at 10:35

## 2019-08-15 RX ADMIN — KETOROLAC TROMETHAMINE 15 MG: 30 INJECTION, SOLUTION INTRAMUSCULAR at 07:24

## 2019-08-15 RX ADMIN — IOHEXOL 85 ML: 350 INJECTION, SOLUTION INTRAVENOUS at 08:26

## 2019-08-15 NOTE — TELEMEDICINE
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 measuring approx 1 5cm and has a wide neck measuring 6 mm for which neurosurgery was consulted  Per reports, pt's exam was non-focal without weakness, numbness or tingling  Assessment and Plan:    · Continue regular neurologic checks  · Imaging reviewed personally  Final results as below  · CTA head and Neck 8/15/19: No acute intracranial abnormality 2  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 superiorly and medially  This aneurysm measures approximately 1 5 cm in length and has a wide neck measuring 6 mm  Mild cervical carotid atherosclerotic disease  · Pt to continue on BP control on lisinopril, Systolic BP goal < 879  Pt should follow up with PCP for BP control outpatient  · Pain control per primary team    · Pt has a follow up appointment scheduled with neurosurgery on Aug 30 th with a repeat CTA of head and neck that has been ordered  ·  Please call neurosurgery with any questions or concerns  A total time of 20 min was spent reviewing patient's case, imaging, coordinating care and determining treatment plan

## 2019-08-15 NOTE — ED PROVIDER NOTES
History  Chief Complaint   Patient presents with    Neck Pain     pain woke him up out of a sound sleep this morning  Patient had a CT scan done last week  patient has been having this pain on and off for the last year but nothing like this morning     HPI    This is a 71 year male that presents today with neck pain  Patient states he woke up with left-sided neck pain  Patient states he has been having pain the past year but this is different  States he is unable to move his neck around  Denies any headaches  No fevers or chills no blurry vision no weakness numbness or tingling  States he might have pulled a muscle  71 year male that presents with neck pain  Patient does have some tenderness around the muscle will get labs and imaging    Prior to Admission Medications   Prescriptions Last Dose Informant Patient Reported? Taking?    Multiple Vitamin (MULTIVITAMINS PO) 8/14/2019 at 1100  Yes Yes   Sig: Take 1 capsule by mouth daily   Multiple Vitamin (TAB-A-JEMAL) TABS Not Taking at Unknown time  Yes No   Sig: Take 1 tablet by mouth daily   acetaminophen (TYLENOL) 500 mg tablet Not Taking at Unknown time  Yes No   Sig: Take 500 mg by mouth as needed   quinapril (ACCUPRIL) 10 mg tablet 8/14/2019 at 1900  No Yes   Sig: TAKE 1 TABLET BY MOUTH  EVERY 12 HOURS   sildenafil (REVATIO) 20 mg tablet Not Taking at Unknown time  Yes No   Sig: Take 20 mg by mouth as needed        Facility-Administered Medications: None       Past Medical History:   Diagnosis Date    Chronic rhinitis 02/05/2005    last assessed 2/5/05, resolved 5/18/17     Elevated liver function tests     last assessed 3/9/15, resolved 5/18/17     Herpes simplex type 1 infection     Hip arthritis     resolved 12/20/17     Hypertension     Shoulder bursitis     last assessed 9/23/13, resolved 5/18/17        Past Surgical History:   Procedure Laterality Date    HERNIA REPAIR      Inguinal sliding     PROSTATOTOMY      last assessed 1/11/18 Family History   Problem Relation Age of Onset    Hypertension Mother     Lung cancer Family     Prostate cancer Family     Mental illness Neg Hx      I have reviewed and agree with the history as documented  Social History     Tobacco Use    Smoking status: Never Smoker    Smokeless tobacco: Never Used   Substance Use Topics    Alcohol use: Yes     Frequency: Monthly or less     Drinks per session: 1 or 2     Binge frequency: Never    Drug use: Never        Review of Systems   Constitutional: Negative  Negative for diaphoresis and fever  HENT: Negative  Respiratory: Negative  Negative for cough, shortness of breath and wheezing  Cardiovascular: Negative  Negative for chest pain, palpitations and leg swelling  Gastrointestinal: Negative for abdominal distention, abdominal pain, nausea and vomiting  Genitourinary: Negative  Musculoskeletal: Positive for neck pain  Skin: Negative  Neurological: Negative  Psychiatric/Behavioral: Negative  All other systems reviewed and are negative  Physical Exam  Physical Exam   Constitutional: He is oriented to person, place, and time  He appears well-developed and well-nourished  No distress  HENT:   Head: Normocephalic and atraumatic  Nose: Nose normal    Mouth/Throat: Oropharynx is clear and moist    Eyes: Pupils are equal, round, and reactive to light  Conjunctivae and EOM are normal    Neck:   Left posterior neck pain with no midline tenderness  Normal neuro exam     Cardiovascular: Normal rate, regular rhythm and normal heart sounds  No murmur heard  Pulmonary/Chest: Effort normal and breath sounds normal  No respiratory distress  He has no wheezes  He has no rales  Abdominal: Soft  Bowel sounds are normal  He exhibits no distension  There is no tenderness  There is no rebound and no guarding  Musculoskeletal: Normal range of motion  He exhibits no edema, tenderness or deformity     Neurological: He is alert and oriented to person, place, and time  No cranial nerve deficit  Skin: Skin is warm and dry  No rash noted  He is not diaphoretic  No pallor  Psychiatric: He has a normal mood and affect  Vitals reviewed        Vital Signs  ED Triage Vitals   Temperature Pulse Respirations Blood Pressure SpO2   08/15/19 0639 08/15/19 0639 08/15/19 0639 08/15/19 0639 08/15/19 0639   (!) 96 9 °F (36 1 °C) 58 18 (!) 165/117 99 %      Temp src Heart Rate Source Patient Position - Orthostatic VS BP Location FiO2 (%)   -- 08/15/19 0719 08/15/19 0719 08/15/19 0639 --    Monitor Lying Right arm       Pain Score       08/15/19 0639       Worst Possible Pain           Vitals:    08/15/19 0719 08/15/19 0745 08/15/19 0901 08/15/19 1052   BP: 136/76 146/78 154/88 127/62   Pulse: 57 (!) 52 (!) 52 (!) 54   Patient Position - Orthostatic VS: Lying  Lying Lying         Visual Acuity      ED Medications  Medications   lisinopril (ZESTRIL) tablet 10 mg (10 mg Oral Not Given 8/15/19 1101)   morphine (PF) 4 mg/mL injection 4 mg (4 mg Intravenous Not Given 8/15/19 0942)   ketorolac (TORADOL) injection 15 mg (15 mg Intravenous Given 8/15/19 0724)   diazepam (VALIUM) tablet 5 mg (5 mg Oral Given 8/15/19 0726)   iohexol (OMNIPAQUE) 350 MG/ML injection (SINGLE-DOSE) 85 mL (85 mL Intravenous Given 8/15/19 0826)   acetaminophen (TYLENOL) tablet 650 mg (650 mg Oral Given 8/15/19 0940)   dexamethasone (DECADRON) injection 10 mg (10 mg Intravenous Given 8/15/19 1032)   metoclopramide (REGLAN) injection 10 mg (10 mg Intravenous Given 8/15/19 1035)       Diagnostic Studies  Results Reviewed     Procedure Component Value Units Date/Time    Basic metabolic panel [[id number]] Collected:  08/15/19 0717    Lab Status:  Final result Specimen:  Blood from Arm, Left Updated:  08/15/19 0747     Sodium 140 mmol/L      Potassium 3 9 mmol/L      Chloride 106 mmol/L      CO2 28 mmol/L      ANION GAP 6 mmol/L      BUN 16 mg/dL      Creatinine 1 01 mg/dL      Glucose 109 mg/dL Calcium 8 7 mg/dL      eGFR 76 ml/min/1 73sq m     Narrative:       National Kidney Disease Foundation guidelines for Chronic Kidney Disease (CKD):     Stage 1 with normal or high GFR (GFR > 90 mL/min/1 73 square meters)    Stage 2 Mild CKD (GFR = 60-89 mL/min/1 73 square meters)    Stage 3A Moderate CKD (GFR = 45-59 mL/min/1 73 square meters)    Stage 3B Moderate CKD (GFR = 30-44 mL/min/1 73 square meters)    Stage 4 Severe CKD (GFR = 15-29 mL/min/1 73 square meters)    Stage 5 End Stage CKD (GFR <15 mL/min/1 73 square meters)  Note: GFR calculation is accurate only with a steady state creatinine    CBC and differential [[id number]] Collected:  08/15/19 0717    Lab Status:  Final result Specimen:  Blood from Arm, Left Updated:  08/15/19 0726     WBC 4 62 Thousand/uL      RBC 3 98 Million/uL      Hemoglobin 12 3 g/dL      Hematocrit 36 6 %      MCV 92 fL      MCH 30 9 pg      MCHC 33 6 g/dL      RDW 12 6 %      MPV 9 7 fL      Platelets 244 Thousands/uL      nRBC 0 /100 WBCs      Neutrophils Relative 57 %      Immat GRANS % 0 %      Lymphocytes Relative 29 %      Monocytes Relative 8 %      Eosinophils Relative 5 %      Basophils Relative 1 %      Neutrophils Absolute 2 69 Thousands/µL      Immature Grans Absolute 0 01 Thousand/uL      Lymphocytes Absolute 1 32 Thousands/µL      Monocytes Absolute 0 36 Thousand/µL      Eosinophils Absolute 0 21 Thousand/µL      Basophils Absolute 0 03 Thousands/µL                  CTA head and neck with and without contrast   Final Result by Vandana Garsia MD (08/15 1978)         1  No acute intracranial abnormality  2   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 superiorly and medially  This aneurysm measures approximately 1 5 cm in length and has a wide neck measuring 6 mm  Neurosurgical/neuro interventional    consultation is recommended  3   Mild cervical carotid atherosclerotic disease     4   No intracranial large vessel occlusion  I personally discussed this study with Tali Alcantara on 8/15/2019 at 9:10 AM                            Workstation performed: [id number]         CTA head and neck w wo contrast    (Results Pending)              Procedures  Procedures       ED Course  ED Course as of Aug 15 1255   Thu Aug 15, 2019   0926 1   No acute intracranial abnormality  2   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 superiorly and medially   This aneurysm measures approximately 1 5 cm in length and has a wide neck measuring 6 mm   Neurosurgical/neuro interventional   consultation is recommended  3   Mild cervical carotid atherosclerotic disease  4   No intracranial large vessel occlusion  56 Spoke with Neurosurgery they will call me back after discussing the plan with interventional      [phone] I spoke with Neurosurgery who recommended patient is to follow up in 2 weeks for evaluation then they will determine if he needs any intervention patient needs better blood pressure management  Currently patient's blood pressure is normal   I also spoke with Neurology Dr Jeffrey Crews who recommended trying a GI cocktail along with some steroids  Recommended he follow up with Dr Tano Reyez from Neurology                                   MDM  Number of Diagnoses or Management Options  Neck pain on left side:   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[facility]):   Diagnosis management comments: 71 year male that presents with neck pain  The incidental findings were discussed with Neurosurgery  He already has an appointment scheduled with Neurosurgery  Spoke with Neurology with follow-up  Will discharge patient home with appropriate return precautions         Amount and/or Complexity of Data Reviewed  Clinical lab tests: ordered and reviewed  Tests in the radiology section of CPT®: ordered and reviewed  Tests in the medicine section of CPT®: ordered and reviewed        Disposition  Final diagnoses:   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(HCC)   Neck pain on left side     Time reflects when diagnosis was documented in both MDM as applicable and the Disposition within this note     Time User Action Codes Description Comment    8/15/2019 10:36 AM Rufina Rumble Add [I72 0] 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([facility] [address] )     8/15/2019 10:36 AM Rufina Rumble Modify [I72 0] 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([facility] [address] )     8/15/2019 10:38 AM [place] March, Youkj Modify [I72 0] 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([facility] [address] )     8/15/2019 10:38 AM Apoorva Oanh Add [M54 2] Neck pain on left side       ED Disposition     ED Disposition Condition Date/Time Comment    Discharge Stable u Aug 15, 2019 10:37 AM Jose Rodriguez discharge to home/self care              Follow-up Information     Follow up With Specialties Details Why Contact Info    Edyta Aguayo MD Neurosurgery Schedule an appointment as soon as possible for a visit in 2 days  [facility]      Adithya Gomez MD Neurology Schedule an appointment as soon as possible for a visit   [address] [address]  [phone]            Discharge Medication List as of 8/15/2019 10:38 AM      START taking these medications    Details   butalbital-acetaminophen-caffeine (FIORICET,ESGIC) -40 mg per tablet Take 1 tablet by mouth every 4 (four) hours as needed for headaches, Starting Thu 8/15/2019, Print         CONTINUE these medications which have NOT CHANGED    Details   Multiple Vitamin (MULTIVITAMINS PO) Take 1 capsule by mouth daily, Historical Med      quinapril (ACCUPRIL) 10 mg tablet TAKE 1 TABLET BY MOUTH  EVERY 12 HOURS, Normal      acetaminophen (TYLENOL) 500 mg tablet Take 500 mg by mouth as needed, Historical Med      Multiple Vitamin (TAB-A-JEMAL) TABS Take 1 tablet by mouth daily, Historical Med      sildenafil (REVATIO) 20 mg tablet Take 20 mg by mouth as needed  , Starting Thu 1/11/2018, Historical Med Outpatient Discharge Orders   CTA head and neck w wo contrast   Standing Status: Future Standing Exp   Date: 08/15/23       ED Provider  Electronically Signed by           Kristin Nova MD  08/15/19 [phone]

## 2019-08-16 ENCOUNTER — OFFICE VISIT (OUTPATIENT)
Dept: FAMILY MEDICINE CLINIC | Facility: CLINIC | Age: 69
End: 2019-08-16
Payer: COMMERCIAL

## 2019-08-16 VITALS
DIASTOLIC BLOOD PRESSURE: 82 MMHG | HEIGHT: 73 IN | BODY MASS INDEX: 30.43 KG/M2 | WEIGHT: 229.6 LBS | SYSTOLIC BLOOD PRESSURE: 136 MMHG | HEART RATE: 74 BPM | RESPIRATION RATE: 18 BRPM | TEMPERATURE: 98.2 F

## 2019-08-16 DIAGNOSIS — I72.0 PSEUDOANEURYSM OF CAROTID ARTERY (HCC): ICD-10-CM

## 2019-08-16 DIAGNOSIS — I10 BENIGN ESSENTIAL HYPERTENSION: ICD-10-CM

## 2019-08-16 DIAGNOSIS — I10 ESSENTIAL HYPERTENSION: ICD-10-CM

## 2019-08-16 DIAGNOSIS — M54.2 CERVICALGIA: Primary | ICD-10-CM

## 2019-08-16 PROCEDURE — 1160F RVW MEDS BY RX/DR IN RCRD: CPT | Performed by: FAMILY MEDICINE

## 2019-08-16 PROCEDURE — 1036F TOBACCO NON-USER: CPT | Performed by: FAMILY MEDICINE

## 2019-08-16 PROCEDURE — 99214 OFFICE O/P EST MOD 30 MIN: CPT | Performed by: FAMILY MEDICINE

## 2019-08-16 PROCEDURE — 3008F BODY MASS INDEX DOCD: CPT | Performed by: FAMILY MEDICINE

## 2019-08-16 PROCEDURE — 3075F SYST BP GE 130 - 139MM HG: CPT | Performed by: FAMILY MEDICINE

## 2019-08-16 RX ORDER — AMLODIPINE BESYLATE 2.5 MG/1
2.5 TABLET ORAL DAILY
Qty: 90 TABLET | Refills: 3 | Status: SHIPPED | OUTPATIENT
Start: 2019-08-16 | End: 2020-06-22

## 2019-08-16 RX ORDER — PREDNISONE 20 MG/1
TABLET ORAL
Qty: 32 TABLET | Refills: 0 | Status: SHIPPED | OUTPATIENT
Start: 2019-08-16 | End: 2019-09-19 | Stop reason: ALTCHOICE

## 2019-08-16 RX ORDER — AMLODIPINE BESYLATE 2.5 MG/1
2.5 TABLET ORAL DAILY
Qty: 90 TABLET | Refills: 3 | Status: SHIPPED | OUTPATIENT
Start: 2019-08-16 | End: 2019-08-16 | Stop reason: SDUPTHER

## 2019-08-16 RX ORDER — CYCLOBENZAPRINE HCL 10 MG
10 TABLET ORAL
Qty: 21 TABLET | Refills: 0 | Status: SHIPPED | OUTPATIENT
Start: 2019-08-16 | End: 2019-09-19 | Stop reason: ALTCHOICE

## 2019-08-16 NOTE — PROGRESS NOTES
Assessment/Plan:    Problem List Items Addressed This Visit        Cardiovascular and Mediastinum    Benign essential hypertension     Bp was elevated in the ed he did not have his bp meds before he went to the ed and he was in pain         Relevant Medications    amLODIPine (NORVASC) 2 5 mg tablet      Other Visit Diagnoses     Cervicalgia    -  Primary    Relevant Medications    predniSONE 20 mg tablet    cyclobenzaprine (FLEXERIL) 10 mg tablet    Essential hypertension        Relevant Medications    amLODIPine (NORVASC) 2 5 mg tablet    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(HCC)          Warning signs discussed  Advised on BP  Def follow appt with neurosurgery    BMI Counseling: Body mass index is 30 29 kg/m²  Discussed the patient's BMI with him  The BMI is above average  BMI counseling and education was provided to the patient  Nutrition recommendations include reducing portion sizes  There are no Patient Instructions on file for this visit  No follow-ups on file  Subjective:      Patient ID: Marco Antonio Pickard is a 71 y o  male  Chief Complaint   Patient presents with    Follow-up     ER  [facility] lpn       Pt is here for an ed follow up for neck paun back of head pain, left side  Pt states about 11:00 last night he turned his head heard a crack and it felt pretty good  Still feels better  Hurts when he stretches    No cp, no SOB  No polyuria no polydipsia          The following portions of the patient's history were reviewed and updated as appropriate: allergies, current medications, past family history, past medical history, past social history, past surgical history and problem list     Review of Systems   Constitutional: Negative for activity change, appetite change, chills, diaphoresis, fatigue, fever and unexpected weight change  HENT: Negative for congestion, dental problem, ear pain, mouth sores, sinus pressure, sinus pain, sore throat and trouble swallowing      Eyes: Negative for photophobia, discharge and itching  Respiratory: Negative for apnea, chest tightness and shortness of breath  Cardiovascular: Negative for chest pain, palpitations and leg swelling  Gastrointestinal: Negative for abdominal distention, abdominal pain, blood in stool, nausea and vomiting  Endocrine: Negative for cold intolerance, heat intolerance, polydipsia, polyphagia and polyuria  Genitourinary: Negative for difficulty urinating  Musculoskeletal: Positive for arthralgias, myalgias, neck pain and neck stiffness  Skin: Negative for color change and wound  Neurological: Negative for dizziness, syncope, speech difficulty and headaches  Hematological: Negative for adenopathy  Psychiatric/Behavioral: Negative for agitation and behavioral problems  Current Outpatient Medications   Medication Sig Dispense Refill    acetaminophen (TYLENOL) 500 mg tablet Take 500 mg by mouth as needed      Multiple Vitamin (MULTIVITAMINS PO) Take 1 capsule by mouth daily      quinapril (ACCUPRIL) 10 mg tablet TAKE 1 TABLET BY MOUTH  EVERY 12 HOURS 180 tablet 1    amLODIPine (NORVASC) 2 5 mg tablet Take 1 tablet (2 5 mg total) by mouth daily 90 tablet 3    butalbital-acetaminophen-caffeine (FIORICET,ESGIC) -40 mg per tablet Take 1 tablet by mouth every 4 (four) hours as needed for headaches (Patient not taking: Reported on 8/16/2019) 30 tablet 0    cyclobenzaprine (FLEXERIL) 10 mg tablet Take 1 tablet (10 mg total) by mouth daily at bedtime for 21 days 21 tablet 0    predniSONE 20 mg tablet 4 tabs for three days, 3 tabs for three days, 2 tabs for three days, 1 tab for three days, 1/2 tab for 4 days 32 tablet 0    sildenafil (REVATIO) 20 mg tablet Take 20 mg by mouth as needed         No current facility-administered medications for this visit          Objective:    /82   Pulse 74   Temp 98 2 °F (36 8 °C)   Resp 18   Ht 6' 1" (1 854 m)   Wt 104 kg (229 lb 9 6 oz)   BMI 30 29 kg/m²        Physical Exam Constitutional: He appears well-developed and well-nourished  No distress  HENT:   Head: Normocephalic and atraumatic  Right Ear: External ear normal    Left Ear: External ear normal    Nose: Nose normal    Mouth/Throat: Oropharynx is clear and moist  No oropharyngeal exudate  Eyes: Pupils are equal, round, and reactive to light  EOM are normal  Right eye exhibits no discharge  Left eye exhibits no discharge  No scleral icterus  Neck: No thyromegaly present  Cardiovascular: Normal rate and normal heart sounds  No murmur heard  Pulmonary/Chest: Effort normal and breath sounds normal  No respiratory distress  He has no wheezes  Abdominal: Soft  Bowel sounds are normal  He exhibits no distension and no mass  There is no tenderness  There is no rebound and no guarding  Musculoskeletal: Normal range of motion  Arms:  Neurological: He is alert  He displays normal reflexes  Coordination normal    Skin: Skin is warm and dry  No rash noted  He is not diaphoretic  No erythema  Psychiatric: He has a normal mood and affect  His behavior is normal    Nursing note and vitals reviewed  CTA head and neck with and without contrast   Status: Final result   PACS Images      Show images for CTA head and neck with and without contrast   Study Result     CTA NECK AND BRAIN WITH AND WITHOUT CONTRAST     INDICATION: severe neck pain, weakness     COMPARISON:   Head CT from July 17, 2019      TECHNIQUE:  Routine CT imaging of the Brain without contrast   Post contrast imaging was performed after administration of iodinated contrast through the neck and brain  Post contrast axial 0 625 mm images timed to opacify the arterial system        3D rendering was performed on an independent workstation  MIP reconstructions performed  Coronal reconstructions were performed of the noncontrast portion of the brain        Radiation dose length product (DLP) for this visit:  1459 73 mGy-cm     This examination, like all CT scans performed in the [facility], was performed utilizing techniques to minimize radiation dose exposure, including the use of   iterative reconstruction and automated exposure control         IV Contrast:  85 mL of iohexol (OMNIPAQUE)     IMAGE QUALITY:   Diagnostic     FINDINGS:  NONCONTRAST BRAIN  PARENCHYMA:  No intracranial mass, mass effect or midline shift  No CT signs of acute infarction  No acute parenchymal hemorrhage      VENTRICLES AND EXTRA-AXIAL SPACES:  Normal for the patient's age      VISUALIZED ORBITS AND PARANASAL SINUSES:  Frothy secretions layering within the left and maxillary sinus, correlate for sinusitis      CERVICAL VASCULATURE  AORTIC ARCH AND GREAT VESSELS:  Normal aortic arch and great vessel origins  Normal visualized subclavian vessels      RIGHT VERTEBRAL ARTERY CERVICAL SEGMENT:  Normal origin  The vessel is nondominant and small in caliber throughout the neck  This is likely a congenital variation      LEFT VERTEBRAL ARTERY CERVICAL SEGMENT:  Normal origin  The vessel is normal in caliber throughout the neck      RIGHT EXTRACRANIAL CAROTID SEGMENT: At the right skull base, there is a widemouth lobulated saccular pseudo-aneurysm projecting medially and superiorly  The aneurysm measures approximately 6 x 9 mm in greatest transverse dimension and is approximately   1 6 cm in length as noted on image 56 of series 602  The neck of the aneurysm measures 6 mm      Mild atherosclerotic disease of the distal common carotid artery and proximal cervical internal carotid artery without significant stenosis compared to the more distal ICA  There is less than 30% stenosis at the origin of the right ICA and carotid bulb      LEFT EXTRACRANIAL CAROTID SEGMENT:  Mild atherosclerotic disease of the distal common carotid artery and proximal cervical internal carotid artery without significant stenosis compared to the more distal ICA    There is less than 30% stenosis at the   origin of the left ICA and carotid bulb      NASCET criteria was used to determine the degree of internal carotid artery diameter stenosis        INTRACRANIAL VASCULATURE   INTERNAL CAROTID ARTERIES:  Normal enhancement of the intracranial portions of the internal carotid arteries  Normal ophthalmic artery origins  Normal ICA terminus        ANTERIOR CIRCULATION:  Symmetric A1 segments and anterior cerebral arteries with normal enhancement  Normal anterior communicating artery      MIDDLE CEREBRAL ARTERY CIRCULATION:  M1 segment and middle cerebral artery branches demonstrate normal enhancement bilaterally      DISTAL VERTEBRAL ARTERIES:  The left intradural vertebral artery is dominant  The right is diminutive but patent  Posterior inferior cerebellar artery branches are visualized      BASILAR ARTERY:  Basilar artery is normal in caliber  Normal superior cerebellar arteries      POSTERIOR CEREBRAL ARTERIES: Both posterior cerebral arteries arises from the basilar tip  Both arteries demonstrate normal enhancement  Normal posterior communicating arteries      DURAL VENOUS SINUSES:  Normal      NON VASCULAR ANATOMY  BONY STRUCTURES:  No acute osseous abnormality  Cervical spondylosis with mild degenerative anterolisthesis of C4 on C5      SOFT TISSUES OF THE NECK:  Normal      THORACIC INLET:  Unremarkable         IMPRESSION:        1  No acute intracranial abnormality  2   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

## 2019-09-06 ENCOUNTER — HOSPITAL ENCOUNTER (OUTPATIENT)
Dept: RADIOLOGY | Facility: HOSPITAL | Age: 69
Discharge: HOME | End: 2019-09-06
Payer: COMMERCIAL

## 2019-09-06 DIAGNOSIS — I72.0 PSEUDOANEURYSM OF CAROTID ARTERY (HCC): ICD-10-CM

## 2019-09-06 PROCEDURE — 70496 CT ANGIOGRAPHY HEAD: CPT

## 2019-09-06 PROCEDURE — 70498 CT ANGIOGRAPHY NECK: CPT

## 2019-09-06 RX ADMIN — IOHEXOL 85 ML: 350 INJECTION, SOLUTION INTRAVENOUS at 07:56

## 2019-09-09 NOTE — ED NOTES
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

## 2019-09-19 ENCOUNTER — CONSULT (OUTPATIENT)
Dept: NEUROSURGERY | Facility: CLINIC | Age: 69
End: 2019-09-19
Payer: COMMERCIAL

## 2019-09-19 VITALS
HEART RATE: 68 BPM | WEIGHT: 226 LBS | SYSTOLIC BLOOD PRESSURE: 128 MMHG | DIASTOLIC BLOOD PRESSURE: 80 MMHG | TEMPERATURE: 97.8 F | HEIGHT: 73 IN | BODY MASS INDEX: 29.95 KG/M2

## 2019-09-19 DIAGNOSIS — I72.0 PSEUDOANEURYSM OF CAROTID ARTERY (HCC): Primary | ICD-10-CM

## 2019-09-19 PROCEDURE — 99204 OFFICE O/P NEW MOD 45 MIN: CPT | Performed by: PHYSICIAN ASSISTANT

## 2019-09-19 RX ORDER — LORAZEPAM 0.5 MG/1
TABLET ORAL
Qty: 3 TABLET | Refills: 0 | Status: SHIPPED | OUTPATIENT
Start: 2019-09-19 | End: 2020-05-29

## 2019-09-19 NOTE — PROGRESS NOTES
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, stable from prior imaging 8/15/19  · Patient originally presented with left sided head pain, headache workup revealed incidental finding of pseudoaneurysm  · Patient told to keep SBP<160 and follow up with repeat CTA in 2 weeks  · His headache is improved with medications, he denies any other neurological symtpoms  · He is a never smoker and has no family history of aneurysms  He has HTN and borderline HLD  Imaging:  CTA head and neck w/wo, 9/6/19: Redemonstration of distal right cervical ICA pseudoaneurysm, slightly increased in size in AP dimension measuring 13 mm, previously 11 mm on CTA 8/15/2019  Continued surveillance CTA is  recommended    Plan:   · We would like the patient to follow up with close surveillance of his pseudoaneurysm  Imaging was reviewed with Dr Hardy Rizzo, who recommended daily baby aspirin and MRA in 3 months  He should follow up with either Dr Owen Mari or Dr Princess Fung at that time  · The patient is extremely claustrophobic; ativan 0 5mg was prescribed for the patient and instructions for its use was given  If the patient cannot under MRA after ativan, we will get another CTA  MRA is to avoid contrast   · He should start daily ASA 81mg, which can be bought OTC at any pharmacy  · All questions were answered and the patient and his wife expressed understanding  Diagnoses and all orders for this visit:    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([address] )  -     MRI brain wo mra head and neck wo; Future  -     LORazepam (ATIVAN) 0 5 mg tablet;  Take one tablet 30 minutes prior to procedure            CHIEF COMPLAINT    Chief Complaint   Patient presents with    Consult     Pseudoaneurysm        HISTORY    History of Present Illness     71y o  year old male who was seen in the ED 1 month ago after new onset left head pain  Imaging was obtained and showed an incidental right distal ICA pseudoaneurysm at the right skull base  He is currently asymptomatic from this  He is not on baby aspirin or other AC/AP agents  His only PMH is HTN, managed on 2 medications, borderline HLD, and h/o prostate cancer s/p prostatectomy  He is here today to review a repeat CTA  Images were reviewed with Dr Luis Manuel White who recommended daily baby aspirin and close surveillance with MRA in 3 months  Regular CTA's should be avoided if possible to preserve kidney function; however, the patient is severely claustrophobic and may not be able to under MRA  He did agree to trying, however  He has no new complaints today  He denies worsening headache, syncope, seizures, vision changes, speech deficits, facial droop, unilateral weakness, numbness, pain, or gait abnormality  REVIEW OF SYSTEMS    Review of Systems   Constitutional: Negative  HENT: Positive for postnasal drip  Eyes: Negative  Respiratory: Negative  Cardiovascular: Negative  Gastrointestinal: Negative  Endocrine: Negative  Genitourinary: Negative  Musculoskeletal: Negative  Skin: Negative  Neurological: Positive for headaches (right almas eof head )  Hematological: Negative  Psychiatric/Behavioral: Negative            Meds/Allergies     Current Outpatient Medications   Medication Sig Dispense Refill    acetaminophen (TYLENOL) 500 mg tablet Take 500 mg by mouth as needed      amLODIPine (NORVASC) 2 5 mg tablet Take 1 tablet (2 5 mg total) by mouth daily 90 tablet 3    butalbital-acetaminophen-caffeine (FIORICET,ESGIC) -40 mg per tablet Take 1 tablet by mouth every 4 (four) hours as needed for headaches (Patient not taking: Reported on 8/16/2019) 30 tablet 0    cyclobenzaprine (FLEXERIL) 10 mg tablet Take 1 tablet (10 mg total) by mouth daily at bedtime for 21 days 21 tablet 0    Multiple Vitamin (MULTIVITAMINS PO) Take 1 capsule by mouth daily  predniSONE 20 mg tablet 4 tabs for three days, 3 tabs for three days, 2 tabs for three days, 1 tab for three days, 1/2 tab for 4 days 32 tablet 0    quinapril (ACCUPRIL) 10 mg tablet TAKE 1 TABLET BY MOUTH  EVERY 12 HOURS 180 tablet 1    sildenafil (REVATIO) 20 mg tablet Take 20 mg by mouth as needed         No current facility-administered medications for this visit  Allergies   Allergen Reactions    Demerol [Meperidine]      Reaction Date: [id number]; Annotation - [id number]: unsure of side effect       PAST HISTORY    Past Medical History:   Diagnosis Date    Chronic rhinitis 02/05/2005    last assessed 2/5/05, resolved 5/18/17     Elevated liver function tests     last assessed 3/9/15, resolved 5/18/17     Herpes simplex type 1 infection     Hip arthritis     resolved 12/20/17     Hypertension     Shoulder bursitis     last assessed 9/23/13, resolved 5/18/17        Past Surgical History:   Procedure Laterality Date    HERNIA REPAIR      Inguinal sliding     PROSTATOTOMY      last assessed 1/11/18       Social History     Tobacco Use    Smoking status: Never Smoker    Smokeless tobacco: Never Used   Substance Use Topics    Alcohol use: Yes     Frequency: Monthly or less     Drinks per session: 1 or 2     Binge frequency: Never    Drug use: Never       Family History   Problem Relation Age of Onset    Hypertension Mother     Lung cancer Family     Prostate cancer Family     Mental illness Neg Hx          Above history personally reviewed  EXAM    Vitals:Blood pressure 128/80, pulse 68, temperature 97 8 °F (36 6 °C), temperature source Temporal, height 6' 1" (1 854 m), weight 103 kg (226 lb)  ,Body mass index is 29 82 kg/m²  Physical Exam   Constitutional: He is oriented to person, place, and time  He appears well-developed and well-nourished  HENT:   Head: Normocephalic and atraumatic  Eyes: Pupils are equal, round, and reactive to light   EOM are normal    Neck: Normal range of motion  Cardiovascular: Normal rate  Pulmonary/Chest: Effort normal  No respiratory distress  Abdominal: Soft  Musculoskeletal: Normal range of motion  Neurological: He is alert and oriented to person, place, and time  He has normal strength  He has a normal Finger-Nose-Finger Test  Gait normal    Reflex Scores:       Tricep reflexes are 2+ on the right side and 2+ on the left side  Bicep reflexes are 2+ on the right side and 2+ on the left side  Brachioradialis reflexes are 2+ on the right side and 2+ on the left side  Patellar reflexes are 2+ on the right side and 2+ on the left side  Achilles reflexes are 2+ on the right side and 2+ on the left side  Skin: Skin is warm  Psychiatric: He has a normal mood and affect  His speech is normal and behavior is normal  Judgment and thought content normal    Nursing note and vitals reviewed  Neurologic Exam     Mental Status   Oriented to person, place, and time  Registration: recalls 3 of 3 objects  Recall at 5 minutes: recalls 3 of 3 objects  Follows 2 step commands  Attention: normal  Concentration: normal    Speech: speech is normal   Level of consciousness: alert  Knowledge: good  Able to name object  Able to repeat  Normal comprehension  Cranial Nerves   Cranial nerves II through XII intact  CN III, IV, VI   Pupils are equal, round, and reactive to light  Extraocular motions are normal      Motor Exam   Muscle bulk: normal  Overall muscle tone: normal  Right arm pronator drift: absent  Left arm pronator drift: absent    Strength   Strength 5/5 throughout       Sensory Exam   Light touch normal    Proprioception normal    Pinprick normal      Gait, Coordination, and Reflexes     Gait  Gait: normal    Coordination   Finger to nose coordination: normal    Tremor   Resting tremor: absent    Reflexes   Right brachioradialis: 2+  Left brachioradialis: 2+  Right biceps: 2+  Left biceps: 2+  Right triceps: 2+  Left triceps: 2+  Right patellar: 2+  Left patellar: 2+  Right achilles: 2+  Left achilles: 2+  Right : 2+  Left : 2+  Right Sepulveda: absent  Left Sepulveda: absent  Right ankle clonus: absent  Left ankle clonus: absent        MEDICAL DECISION MAKING    Imaging Studies:     Cta Head And Neck W Wo Contrast  Result Date: 9/9/2019  Impression: 1  Unremarkable CT appearance of the brain  2   Redemonstration of distal right cervical ICA pseudoaneurysm, slightly increased in size in AP dimension measuring 13 mm, previously 11 mm on CTA 8/15/2019  Continued surveillance CTA is  recommended  3   Unremarkable CT angiogram of the brain  4    Bilateral carotid bifurcations and cervical ICAs atherosclerotic disease without significant stenosis (less than 50%)  The study was marked in EPIC for significant notification  Workstation performed: [id number]       I have personally reviewed pertinent reports     and I have personally reviewed pertinent films in PACS

## 2019-09-19 NOTE — PATIENT INSTRUCTIONS
Return in 3 months after MRI/MRA brain    Ativan - take one tablet to determine reaction  *take one tablet 30 minutes prior to MRI  Let staff know before you take it  You must have a      *one pill is extra if needed    Take aspirin 81mg daily (baby aspirin)

## 2019-09-19 NOTE — ASSESSMENT & PLAN NOTE
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, stable from prior imaging 8/15/19  · Patient originally presented with left sided head pain, headache workup revealed incidental finding of pseudoaneurysm  · Patient told to keep SBP<160 and follow up with repeat CTA in 2 weeks  · His headache is improved with medications, he denies any other neurological symtpoms  · He is a never smoker and has no family history of aneurysms  He has HTN and borderline HLD  Imaging:  CTA head and neck w/wo, 9/6/19: Redemonstration of distal right cervical ICA pseudoaneurysm, slightly increased in size in AP dimension measuring 13 mm, previously 11 mm on CTA 8/15/2019  Continued surveillance CTA is  recommended    Plan:   · We would like the patient to follow up with close surveillance of his pseudoaneurysm  Imaging was reviewed with Dr Slava Galvan, who recommended daily baby aspirin and MRA in 3 months  He should follow up with either Dr Drew Porter or Dr Melony Zamorano at that time  · The patient is extremely claustrophobic; ativan 0 5mg was prescribed for the patient and instructions for its use was given  If the patient cannot under MRA after ativan, we will get another CTA  MRA is to avoid contrast   · He should start daily ASA 81mg, which can be bought OTC at any pharmacy  · All questions were answered and the patient and his wife expressed understanding

## 2019-09-24 ENCOUNTER — OFFICE VISIT (OUTPATIENT)
Dept: FAMILY MEDICINE CLINIC | Facility: CLINIC | Age: 69
End: 2019-09-24
Payer: COMMERCIAL

## 2019-09-24 ENCOUNTER — TELEPHONE (OUTPATIENT)
Dept: FAMILY MEDICINE CLINIC | Facility: CLINIC | Age: 69
End: 2019-09-24

## 2019-09-24 VITALS
DIASTOLIC BLOOD PRESSURE: 70 MMHG | BODY MASS INDEX: 29.69 KG/M2 | RESPIRATION RATE: 18 BRPM | WEIGHT: 224 LBS | OXYGEN SATURATION: 98 % | HEIGHT: 73 IN | HEART RATE: 68 BPM | TEMPERATURE: 97.4 F | SYSTOLIC BLOOD PRESSURE: 120 MMHG

## 2019-09-24 DIAGNOSIS — J01.00 ACUTE NON-RECURRENT MAXILLARY SINUSITIS: Primary | ICD-10-CM

## 2019-09-24 DIAGNOSIS — I72.0 PSEUDOANEURYSM OF CAROTID ARTERY (HCC): ICD-10-CM

## 2019-09-24 PROCEDURE — 99214 OFFICE O/P EST MOD 30 MIN: CPT | Performed by: FAMILY MEDICINE

## 2019-09-24 PROCEDURE — 3008F BODY MASS INDEX DOCD: CPT | Performed by: FAMILY MEDICINE

## 2019-09-24 RX ORDER — ASPIRIN 81 MG/1
325 TABLET ORAL DAILY
COMMUNITY
End: 2020-04-22 | Stop reason: ALTCHOICE

## 2019-09-24 RX ORDER — AMOXICILLIN AND CLAVULANATE POTASSIUM 875; 125 MG/1; MG/1
1 TABLET, FILM COATED ORAL EVERY 12 HOURS SCHEDULED
Qty: 20 TABLET | Refills: 0 | Status: SHIPPED | OUTPATIENT
Start: 2019-09-24 | End: 2019-09-25

## 2019-09-24 NOTE — PROGRESS NOTES
Assessment/Plan:    1  Acute non-recurrent maxillary sinusitis  -     amoxicillin-clavulanate (AUGMENTIN) 875-125 mg per tablet; Take 1 tablet by mouth every 12 (twelve) hours for 10 days    2  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[facility])  Assessment & Plan:  Pt is seeing neuro, was placed on babt Aspirin  Bp is well controlled  Pt has a repeat study coming up in Dec            BMI Counseling: Body mass index is 29 55 kg/m²  Discussed the patient's BMI with him  The BMI is above normal  Nutrition recommendations include reducing portion sizes  There are no Patient Instructions on file for this visit  No follow-ups on file  Subjective:      Patient ID: Debbie Rdz is a 71 y o  male  Chief Complaint   Patient presents with    Cough     harsh productive cough colored mucus  symptoms for the past 2 weeks  [id number]    Fatigue    Sore Throat       Pt states 12 days ago he started feeling sick  Cough  Phlem  Sweaty  Pharyngitis on and off  No fever   Chills  Tired  Achy    bp is stable    Recently seen by Neuro for pseudo aneurysm      The following portions of the patient's history were reviewed and updated as appropriate: allergies, current medications, past family history, past medical history, past social history, past surgical history and problem list     Review of Systems   Constitutional: Negative for activity change, appetite change, chills, diaphoresis, fatigue, fever and unexpected weight change  HENT: Positive for congestion, sinus pressure and sore throat  Negative for dental problem, ear pain, mouth sores, sinus pain and trouble swallowing  Eyes: Negative for photophobia, discharge and itching  Respiratory: Positive for cough  Negative for apnea, chest tightness and shortness of breath  Cardiovascular: Negative for chest pain, palpitations and leg swelling  Gastrointestinal: Negative for abdominal distention, abdominal pain, blood in stool, nausea and vomiting     Endocrine: Negative for cold intolerance, heat intolerance, polydipsia, polyphagia and polyuria  Genitourinary: Negative for difficulty urinating  Musculoskeletal: Negative for arthralgias  Skin: Negative for color change and wound  Neurological: Negative for dizziness, syncope, speech difficulty and headaches  Hematological: Negative for adenopathy  Psychiatric/Behavioral: Negative for agitation and behavioral problems  Current Outpatient Medications   Medication Sig Dispense Refill    acetaminophen (TYLENOL) 500 mg tablet Take 500 mg by mouth as needed      amLODIPine (NORVASC) 2 5 mg tablet Take 1 tablet (2 5 mg total) by mouth daily 90 tablet 3    aspirin (ECOTRIN LOW STRENGTH) 81 mg EC tablet Take 81 mg by mouth daily      Multiple Vitamin (MULTIVITAMINS PO) Take 1 capsule by mouth daily      quinapril (ACCUPRIL) 10 mg tablet TAKE 1 TABLET BY MOUTH  EVERY 12 HOURS 180 tablet 1    amoxicillin-clavulanate (AUGMENTIN) 875-125 mg per tablet Take 1 tablet by mouth every 12 (twelve) hours for 10 days 20 tablet 0    LORazepam (ATIVAN) 0 5 mg tablet Take one tablet 30 minutes prior to procedure (Patient not taking: Reported on 9/24/2019) 3 tablet 0     No current facility-administered medications for this visit  Objective:    /70   Pulse 68   Temp (!) 97 4 °F (36 3 °C)   Resp 18   Ht 6' 1" (1 854 m)   Wt 102 kg (224 lb)   SpO2 98%   BMI 29 55 kg/m²        Physical Exam   Constitutional: He appears well-developed and well-nourished  No distress  HENT:   Head: Normocephalic and atraumatic  Right Ear: External ear normal  Tympanic membrane is bulging  Left Ear: External ear normal  Tympanic membrane is bulging  Nose: Mucosal edema present  Mouth/Throat: Oropharynx is clear and moist  No oropharyngeal exudate  Eyes: Pupils are equal, round, and reactive to light  EOM are normal  Right eye exhibits no discharge  Left eye exhibits no discharge  No scleral icterus     Neck: No thyromegaly present  Cardiovascular: Normal rate and normal heart sounds  No murmur heard  Pulmonary/Chest: Effort normal and breath sounds normal  No respiratory distress  He has no wheezes  Abdominal: Soft  Bowel sounds are normal  He exhibits no distension and no mass  There is no tenderness  There is no rebound and no guarding  Musculoskeletal: Normal range of motion  Neurological: He is alert  He displays normal reflexes  Coordination normal    Skin: Skin is warm and dry  No rash noted  He is not diaphoretic  No erythema  Psychiatric: He has a normal mood and affect  His behavior is normal    Nursing note and vitals reviewed               Drew Leone DO

## 2019-09-24 NOTE — ASSESSMENT & PLAN NOTE
Pt is seeing neuro, was placed on babt Aspirin  Bp is well controlled    Pt has a repeat study coming up in Dec

## 2019-09-24 NOTE — TELEPHONE ENCOUNTER
Pt was here earlier and was prescribed Augmentin he took it and then had some yogurt and pretzels and has been throwing up all afternoon, please call back tonight and ask what he should do? Stop the med? Please call back

## 2019-09-24 NOTE — TELEPHONE ENCOUNTER
I spoke with his wife and she said he took the Augmentin this afternoon,  ate the yogurt and had pretzels (after he took the Augmentin) and then started vomiting  He vomited 4 times  The last time he vomited was 5 pm  He is afebrile  As per Dr Sinan Friend, he should try to take another dose tomorrow am after he eats something substantial and if he still has the same issue with vomiting, he should call here for further advice from Dr Nichole Clark  She will have him continue with sips of  ginger ale and I told her to have him eat as much yogurt as he is able to tolerate   She knows to call any time with any questions/concerns [email or web address]

## 2019-09-25 DIAGNOSIS — J01.00 ACUTE NON-RECURRENT MAXILLARY SINUSITIS: Primary | ICD-10-CM

## 2019-09-25 RX ORDER — CLARITHROMYCIN 500 MG/1
500 TABLET, COATED ORAL EVERY 12 HOURS SCHEDULED
Qty: 20 TABLET | Refills: 0 | Status: SHIPPED | OUTPATIENT
Start: 2019-09-25 | End: 2019-10-05

## 2019-09-25 RX ORDER — AZITHROMYCIN 250 MG/1
TABLET, FILM COATED ORAL
Qty: 6 TABLET | Refills: 0 | Status: SHIPPED | OUTPATIENT
Start: 2019-09-25 | End: 2019-09-30

## 2019-09-25 NOTE — TELEPHONE ENCOUNTER
Spoke  with Pt's wife, stated pt is not taking the Augmentin and would like a different antibiotic sent to [facility]  Pt states still feeling very tired with a cough and was up until 2 am vomiting  Pt has not vomited today, pt ate some cheerios, sips of ginger ale  and apple sauce  Pt denies fever, sob , nausea   Pt aware to stay hydrated with water, Gatorade, Pedialyte        Please advise      Cecily Mckeon MA

## 2019-10-15 DIAGNOSIS — I10 ESSENTIAL HYPERTENSION: ICD-10-CM

## 2019-10-16 RX ORDER — QUINAPRIL 10 MG/1
TABLET ORAL
Qty: 180 TABLET | Refills: 1 | Status: SHIPPED | OUTPATIENT
Start: 2019-10-16 | End: 2020-04-27

## 2019-10-24 ENCOUNTER — IMMUNIZATIONS (OUTPATIENT)
Dept: FAMILY MEDICINE CLINIC | Facility: CLINIC | Age: 69
End: 2019-10-24
Payer: COMMERCIAL

## 2019-10-24 DIAGNOSIS — Z23 FLU VACCINE NEED: Primary | ICD-10-CM

## 2019-10-24 PROCEDURE — 90662 IIV NO PRSV INCREASED AG IM: CPT

## 2019-10-24 PROCEDURE — G0008 ADMIN INFLUENZA VIRUS VAC: HCPCS

## 2019-12-12 ENCOUNTER — EVALUATION (OUTPATIENT)
Dept: PHYSICAL THERAPY | Facility: CLINIC | Age: 69
End: 2019-12-12
Payer: COMMERCIAL

## 2019-12-12 DIAGNOSIS — R51.9 NONINTRACTABLE HEADACHE, UNSPECIFIED CHRONICITY PATTERN, UNSPECIFIED HEADACHE TYPE: Primary | ICD-10-CM

## 2019-12-12 PROCEDURE — 97140 MANUAL THERAPY 1/> REGIONS: CPT | Performed by: PHYSICAL THERAPIST

## 2019-12-12 PROCEDURE — 97161 PT EVAL LOW COMPLEX 20 MIN: CPT | Performed by: PHYSICAL THERAPIST

## 2019-12-12 PROCEDURE — 97110 THERAPEUTIC EXERCISES: CPT | Performed by: PHYSICAL THERAPIST

## 2019-12-12 NOTE — LETTER
2019    Matt Allen DO  [address],  [address], [place] [address]    Patient: Edilberto Bullock  YOB: 1950   Date of Visit: 2019      Dear Dr Russell Randall:    One of your patients, Edilberto Bullock, was referred to me by the [facility] program   Please see the evaluation summary attached below  If care is required beyond 30 days to help your patient reach their goals, I will send you a request for signature on the plan of care  If you have any questions or concerns, please don't hesitate to call  Sincerely,    Zoltan Sandoval, PT      Primary Care Provider:      I certify that I have read the below Plan of Care and certify the need for these services furnished under this plan of treatment while under my care  Matt Allen DO  [address], [place] [address] [place]: [phone]           PT Evaluation     Today's date: 2019  Patient name: Edilberto Bullock  : 1950  MRN: [id number]  Referring provider: Sarita Blackman  Dx:   Encounter Diagnosis     ICD-10-CM    1  Nonintractable headache, unspecified chronicity pattern, unspecified headache type R51                   Assessment  Assessment details: Patient is a 71year old male who reports suffering from headache daily with occurences of 6 to 8 times per day lasting 0-2 hours at a time  Patient notes issue has been going on for the past year with unknown cause and trigger and limited relief  Patient displays decrease C spine mobility with assessment  Displays overall improvement in range of motion post manual therapy with focus on SCM and suboccipital region on left side  Patient will benefit from skilled PT for headache treatment, patient given Chin tucks and Rotation stretch only currently  Patient is agreeable to PT recommendation     Other impairment: Headaches     Symptom irritability: highUnderstanding of Dx/Px/POC: good   Prognosis: good    Goals  Goals  Short term goals:  6 weeks   1  Patient will tolerate 60 seconds of oculomotor exercises with minimal increase in symptoms  2  Patient will demonstrate 10% decrease in symptom severity scoring with independent use of modalities  3  Patient will be independent in basic HEP 2-3 weeks   4  Patient will demonstrate improved soft tissue density t/o cervical region with independent self-release    5  Patient will report reduction in occurrence during the day from 6 to 8 times a day to 4 times a day or less  6  Patient will reprot reduction in headache pain to 2/10 or less   7  Patient will report reduction in frequency to 4 times a week or less     Long term goals: 12 weeks   1  Patient will be independent in a comprehensive home exercise program   2  Patient will report >= 50% improvement on symptom severity scoring  3   Patient will demonstrate ability to perform HT in gait without veering  4   Patient will demonstrate normalized soft tissue t/o 2  5  Patient will increase FOTO score to 15 points   6  Patient will report headache frequency of 0-1 time a week pain of 0-1/10 and duration of 0-1 hours at most     Plan  Planned modality interventions: TENS  Planned therapy interventions: manual therapy, neuromuscular re-education, patient education, postural training, therapeutic activities, therapeutic exercise, home exercise program, functional ROM exercises, body mechanics training, behavior modification and activity modification  Frequency: 2x week  Plan of Care beginning date: 12/12/2019  Plan of Care expiration date: 2/12/2020  Treatment plan discussed with: patient        Subjective Evaluation    History of Present Illness  Mechanism of injury: Patient is a 71year old male who presents to skilled PT for headache which has been going for the past year on the left side  Patient only notes the headache which is usually in suboccipital region     Pain  No pain reported  Current pain ratin  At best pain ratin  At worst pain ratin    Patient Goals  Patient goal: reduce the headache or learn how to management them         Objective     Active Range of Motion   Cervical/Thoracic Spine       Cervical    Flexion:  WFL  Extension:  WFL  Left lateral flexion:  WFL  Right lateral flexion:  WFL  Left rotation:  with pain Restriction level: minimal  Right rotation:  with pain Restriction level: moderate    Additional Active Range of Motion Details  Headache   Frequency:7 times a day with occurrence of 6 to 8 times a day   Intensity: 4/10 Pain   Duration: 0-2 hours  Location: left side behind the ear   Sensation: stabbing shooting    Exacerbating Factors: not sure   Relieving Factors:  Not sure     Sharp P Test: Normal  Modified vertebral artery test: denies passing out sensation   Posture: moderate forward head   Palpation: significant hypertonicity left SCM region, with suboccipital lateral aspect L > R  Precautions:  has a past medical history of Chronic rhinitis (2005), Elevated liver function tests, Herpes simplex type 1 infection, Hip arthritis, Hypertension, and Shoulder bursitis  Manual Therapy: 15 minutes     UT, LV SCM, paraspinal BL noted increased hypertonicity L > R      TE: 10 minutes   C spine rotation stretch 30 sec hold 3 reps BL  Chin tucks 20 reps

## 2019-12-12 NOTE — PROGRESS NOTES
PT Evaluation     Today's date: 2019  Patient name: Olman Bee  : 1950  MRN: [id number]  Referring provider: Mary Lou Pavon  Dx:   Encounter Diagnosis     ICD-10-CM    1  Nonintractable headache, unspecified chronicity pattern, unspecified headache type R51                   Assessment  Assessment details: Patient is a 71year old male who reports suffering from headache daily with occurences of 6 to 8 times per day lasting 0-2 hours at a time  Patient notes issue has been going on for the past year with unknown cause and trigger and limited relief  Patient displays decrease C spine mobility with assessment  Displays overall improvement in range of motion post manual therapy with focus on SCM and suboccipital region on left side  Patient will benefit from skilled PT for headache treatment, patient given Chin tucks and Rotation stretch only currently  Patient is agreeable to PT recommendation  Other impairment: Headaches     Symptom irritability: highUnderstanding of Dx/Px/POC: good   Prognosis: good    Goals  Goals  Short term goals:  6 weeks   1  Patient will tolerate 60 seconds of oculomotor exercises with minimal increase in symptoms  2  Patient will demonstrate 10% decrease in symptom severity scoring with independent use of modalities  3  Patient will be independent in basic HEP 2-3 weeks   4  Patient will demonstrate improved soft tissue density t/o cervical region with independent self-release    5  Patient will report reduction in occurrence during the day from 6 to 8 times a day to 4 times a day or less  6  Patient will reprot reduction in headache pain to 2/10 or less   7  Patient will report reduction in frequency to 4 times a week or less     Long term goals: 12 weeks   1  Patient will be independent in a comprehensive home exercise program   2  Patient will report >= 50% improvement on symptom severity scoring    3   Patient will demonstrate ability to perform HT in gait without patricia  4   Patient will demonstrate normalized soft tissue t/o 2  5  Patient will increase FOTO score to 15 points   6  Patient will report headache frequency of 0-1 time a week pain of 0-1/10 and duration of 0-1 hours at most     Plan  Planned modality interventions: TENS  Planned therapy interventions: manual therapy, neuromuscular re-education, patient education, postural training, therapeutic activities, therapeutic exercise, home exercise program, functional ROM exercises, body mechanics training, behavior modification and activity modification  Frequency: 2x week  Plan of Care beginning date: 2019  Plan of Care expiration date: 2020  Treatment plan discussed with: patient        Subjective Evaluation    History of Present Illness  Mechanism of injury: Patient is a 71year old male who presents to skilled PT for headache which has been going for the past year on the left side  Patient only notes the headache which is usually in suboccipital region     Pain  No pain reported  Current pain ratin  At best pain ratin  At worst pain ratin    Patient Goals  Patient goal: reduce the headache or learn how to management them         Objective     Active Range of Motion   Cervical/Thoracic Spine       Cervical    Flexion:  WFL  Extension:  WFL  Left lateral flexion:  WFL  Right lateral flexion:  WFL  Left rotation:  with pain Restriction level: minimal  Right rotation:  with pain Restriction level: moderate    Additional Active Range of Motion Details  Headache   Frequency:7 times a day with occurrence of 6 to 8 times a day   Intensity: 4/10 Pain   Duration: 0-2 hours  Location: left side behind the ear   Sensation: stabbing shooting    Exacerbating Factors: not sure   Relieving Factors:  Not sure     Sharp P Test: Normal  Modified vertebral artery test: denies passing out sensation   Posture: moderate forward head   Palpation: significant hypertonicity left SCM region, with suboccipital lateral aspect L > R  Precautions:  has a past medical history of Chronic rhinitis (02/05/2005), Elevated liver function tests, Herpes simplex type 1 infection, Hip arthritis, Hypertension, and Shoulder bursitis  Manual Therapy: 15 minutes     UT, LV SCM, paraspinal BL noted increased hypertonicity L > R      TE: 10 minutes   C spine rotation stretch 30 sec hold 3 reps BL  Chin tucks 20 reps

## 2019-12-17 ENCOUNTER — OFFICE VISIT (OUTPATIENT)
Dept: PHYSICAL THERAPY | Facility: CLINIC | Age: 69
End: 2019-12-17
Payer: COMMERCIAL

## 2019-12-17 DIAGNOSIS — R51.9 NONINTRACTABLE HEADACHE, UNSPECIFIED CHRONICITY PATTERN, UNSPECIFIED HEADACHE TYPE: Primary | ICD-10-CM

## 2019-12-17 PROCEDURE — 97110 THERAPEUTIC EXERCISES: CPT | Performed by: PHYSICAL THERAPIST

## 2019-12-17 PROCEDURE — 97140 MANUAL THERAPY 1/> REGIONS: CPT | Performed by: PHYSICAL THERAPIST

## 2019-12-17 NOTE — PROGRESS NOTES
Daily Note     Today's date: 2019  Patient name: Aida Burgos  : 1950  MRN: [id number]  Referring provider: Zelda Severino  Dx:   Encounter Diagnosis     ICD-10-CM    1  Nonintractable headache, unspecified chronicity pattern, unspecified headache type R51                   Subjective: Pt reports neck moving and feeling better for that day  Was able to sleep better  Objective: See treatment diary below    TE:  Stretches: UT, LV, SCM 30 sec hold 3 reps   Chin tucks 10 reps 3 sec hold 2 sets  Towel snags Left 5 reps 10 sec hold   Towel snags Right 3 reps 10 sec hold light headed  Manuals:  TRP: UT, LV, SCM 35 minutes          Assessment: Tolerated treatment well  Patient exhibited good technique with therapeutic exercises  Cuing for correct posture and form with SNAGs noted light headed to right side which scared patient  This was due to improper form with dizziness resolving within 3 minutes  Pt notes feeling looser in the neck and having more range  education to increase hydration with fluid to prevent soreness  Plan: Continue per plan of care  Precautions:  has a past medical history of Chronic rhinitis (2005), Elevated liver function tests, Herpes simplex type 1 infection, Hip arthritis, Hypertension, and Shoulder bursitis

## 2019-12-19 ENCOUNTER — OFFICE VISIT (OUTPATIENT)
Dept: PHYSICAL THERAPY | Facility: CLINIC | Age: 69
End: 2019-12-19
Payer: COMMERCIAL

## 2019-12-19 DIAGNOSIS — R51.9 NONINTRACTABLE HEADACHE, UNSPECIFIED CHRONICITY PATTERN, UNSPECIFIED HEADACHE TYPE: Primary | ICD-10-CM

## 2019-12-19 PROCEDURE — 97140 MANUAL THERAPY 1/> REGIONS: CPT | Performed by: PHYSICAL THERAPIST

## 2019-12-19 PROCEDURE — 97110 THERAPEUTIC EXERCISES: CPT | Performed by: PHYSICAL THERAPIST

## 2019-12-23 ENCOUNTER — OFFICE VISIT (OUTPATIENT)
Dept: PHYSICAL THERAPY | Facility: CLINIC | Age: 69
End: 2019-12-23
Payer: COMMERCIAL

## 2019-12-23 DIAGNOSIS — R51.9 NONINTRACTABLE HEADACHE, UNSPECIFIED CHRONICITY PATTERN, UNSPECIFIED HEADACHE TYPE: Primary | ICD-10-CM

## 2019-12-23 PROCEDURE — 97110 THERAPEUTIC EXERCISES: CPT

## 2019-12-23 PROCEDURE — 97140 MANUAL THERAPY 1/> REGIONS: CPT

## 2019-12-23 NOTE — PROGRESS NOTES
Daily Note     Today's date: 2019  Patient name: Enrique Jacob  : 1950  MRN: [id number]  Referring provider: Elder Mckeon  Dx:   Encounter Diagnosis     ICD-10-CM    1  Nonintractable headache, unspecified chronicity pattern, unspecified headache type R51      Start Time: 808  Stop Time: 843  Total time in clinic (min): 35 minutes     Subjective: Pt enters session feeling "pretty good" and is without headache  He states it has been a while since he has experienced headache  He states it is more of a left sided neck-pain when it comes  Objective: See treatment diary below  *Pt asked to leave session at 8:45 due to holiday engagement*    TE: 15  Stretches: UT, LV, SCM 30 sec hold 3 reps   Chin tucks 10 reps 3 sec hold 2 sets  Doorway pec stretch 30 sec hold 3 reps   T band Blue Rows 20 reps   T band Blue Ext: 20 reps      Manuals:  TRP and [employer]  TRP: UT, LV, SCM 30 minutes seated    Assessment: Tolerated treatment well  Patient exhibited good technique with therapeutic exercises and remained pleasant and motivated throughout session  Pt is still experiencing intermittent L sided cervical pain that ends above his L mastoid, exacerbated with cervical spine rotation, indicating a cervical spine involvement, specifically due to upper cervical limitations in ROM  Pt will continue to benefit from PT to reduce pain, improve posture, and increase cervical ROM  Plan: Continue per plan of care  Precautions:  has a past medical history of Chronic rhinitis (2005), Elevated liver function tests, Herpes simplex type 1 infection, Hip arthritis, Hypertension, and Shoulder bursitis

## 2019-12-27 ENCOUNTER — OFFICE VISIT (OUTPATIENT)
Dept: PHYSICAL THERAPY | Facility: CLINIC | Age: 69
End: 2019-12-27
Payer: COMMERCIAL

## 2019-12-27 DIAGNOSIS — R51.9 NONINTRACTABLE HEADACHE, UNSPECIFIED CHRONICITY PATTERN, UNSPECIFIED HEADACHE TYPE: Primary | ICD-10-CM

## 2019-12-27 PROCEDURE — 97140 MANUAL THERAPY 1/> REGIONS: CPT

## 2019-12-27 PROCEDURE — 97110 THERAPEUTIC EXERCISES: CPT

## 2019-12-27 NOTE — PROGRESS NOTES
Daily Note     Today's date: 2019  Patient name: Leanna Finn  : 1950  MRN: [id number]  Referring provider: Sayra Mendoza  Dx:   Encounter Diagnosis     ICD-10-CM    1  Nonintractable headache, unspecified chronicity pattern, unspecified headache type R51                  Subjective: Patient arrives to PT today with 0/10 HA and notes that is has been coming down  He reports that he has not been doing his HEP and he was educated on benefits of performing them  Objective: See treatment diary below  TE:  Stretches: UT, LV, SCM 30 sec hold 3 reps  Chin tucks 10 reps 3 sec hold 2 sets  Doorway pec stretch 30 sec hold 3 reps   - T band Blue Rows: 30 reps, 3 sec holds  - T band Blue Ext: 30 reps, 3 sec holds  - T band Bilateral ER with towel roll for tactile cue: 20 reps, 3 sec holds  - T's, Y's: 20 reps  - D2 flex/ext (blue TB): 20 reps      Manuals:  TRP and [employer]  TRP: UT, LV, SCM    Assessment: Patient able to tolerate treatment session well today with addition of exercises for posture correction  He displayed compensatory UT utilization during postural exercises with good correction following PT verbal and tactile cues  Patient with good benefits following manual therapy with improvements noted in active cervical rotation (L>R) and remained 0/10 CEDILLO  He will continue to benefit from skilled outpatient PT to reduce pain, improve posture, and increase cervical ROM  Plan: Continue per plan of care  Precautions:  has a past medical history of Chronic rhinitis (2005), Elevated liver function tests, Herpes simplex type 1 infection, Hip arthritis, Hypertension, and Shoulder bursitis

## 2019-12-30 ENCOUNTER — APPOINTMENT (OUTPATIENT)
Dept: PHYSICAL THERAPY | Facility: CLINIC | Age: 69
End: 2019-12-30
Payer: COMMERCIAL

## 2020-01-02 ENCOUNTER — DOCUMENTATION (OUTPATIENT)
Dept: NEUROSURGERY | Facility: CLINIC | Age: 70
End: 2020-01-02

## 2020-01-02 NOTE — PROGRESS NOTES
Called pt I called his insurance to get auths for upcoming studies he was termed as of June 2019   ? Lm to call me back toa vicente new insurance ?

## 2020-01-03 ENCOUNTER — OFFICE VISIT (OUTPATIENT)
Dept: URGENT CARE | Facility: CLINIC | Age: 70
End: 2020-01-03
Payer: COMMERCIAL

## 2020-01-03 VITALS
TEMPERATURE: 102.7 F | SYSTOLIC BLOOD PRESSURE: 139 MMHG | HEIGHT: 74 IN | RESPIRATION RATE: 15 BRPM | OXYGEN SATURATION: 97 % | HEART RATE: 86 BPM | DIASTOLIC BLOOD PRESSURE: 63 MMHG | WEIGHT: 233 LBS | BODY MASS INDEX: 29.9 KG/M2

## 2020-01-03 DIAGNOSIS — R11.0 NAUSEA: ICD-10-CM

## 2020-01-03 DIAGNOSIS — J11.1 INFLUENZA: Primary | ICD-10-CM

## 2020-01-03 PROCEDURE — 99213 OFFICE O/P EST LOW 20 MIN: CPT | Performed by: PHYSICIAN ASSISTANT

## 2020-01-03 PROCEDURE — 3725F SCREEN DEPRESSION PERFORMED: CPT | Performed by: PHYSICIAN ASSISTANT

## 2020-01-03 RX ORDER — OSELTAMIVIR PHOSPHATE 75 MG/1
75 CAPSULE ORAL 2 TIMES DAILY
Qty: 10 CAPSULE | Refills: 0 | Status: SHIPPED | OUTPATIENT
Start: 2020-01-03 | End: 2020-01-08

## 2020-01-03 RX ORDER — ONDANSETRON 4 MG/1
4 TABLET, ORALLY DISINTEGRATING ORAL EVERY 8 HOURS PRN
Qty: 15 TABLET | Refills: 0 | Status: SHIPPED | OUTPATIENT
Start: 2020-01-03 | End: 2020-01-22

## 2020-01-03 NOTE — PATIENT INSTRUCTIONS
Influenza   WHAT YOU NEED TO KNOW:   Influenza (the flu) is an infection caused by the influenza virus  The flu is easily spread when an infected person coughs, sneezes, or has close contact with others  You may be able to spread the flu to others for 1 week or longer after signs or symptoms appear  DISCHARGE INSTRUCTIONS:   Call 911 for any of the following:   · You have trouble breathing, and your lips look purple or blue  · You have a seizure  Return to the emergency department if:   · You are dizzy, or you are urinating less or not at all  · You have a headache with a stiff neck, and you feel tired or confused  · You have new pain or pressure in your chest     · Your symptoms, such as shortness of breath, vomiting, or diarrhea, get worse  · Your symptoms, such as fever and coughing, seem to get better, but then get worse  Contact your healthcare provider if:   · You have new muscle pain or weakness  · You have questions or concerns about your condition or care  Medicines: You may need any of the following:  · Acetaminophen  decreases pain and fever  It is available without a doctor's order  Ask how much to take and how often to take it  Follow directions  Acetaminophen can cause liver damage if not taken correctly  · NSAIDs , such as ibuprofen, help decrease swelling, pain, and fever  This medicine is available with or without a doctor's order  NSAIDs can cause stomach bleeding or kidney problems in certain people  If you take blood thinner medicine, always ask your healthcare provider if NSAIDs are safe for you  Always read the medicine label and follow directions  · Antivirals  help fight a viral infection  · Take your medicine as directed  Contact your healthcare provider if you think your medicine is not helping or if you have side effects  Tell him or her if you are allergic to any medicine  Keep a list of the medicines, vitamins, and herbs you take   Include the amounts, and when and why you take them  Bring the list or the pill bottles to follow-up visits  Carry your medicine list with you in case of an emergency  Rest  as much as you can to help you recover  Drink liquids as directed  to help prevent dehydration  Ask how much liquid to drink each day and which liquids are best for you  Prevent the spread of influenza:   · Wash your hands often  Use soap and water  Wash your hands after you use the bathroom, change a child's diapers, or sneeze  Wash your hands before you prepare or eat food  Use gel hand cleanser when soap and water are not available  Do not touch your eyes, nose, or mouth unless you have washed your hands first            · Cover your mouth when you sneeze or cough  Cough into a tissue or the bend of your arm  · Clean shared items with a germ-killing   Clean table surfaces, doorknobs, and light switches  Do not share towels, silverware, and dishes with people who are sick  Wash bed sheets, towels, silverware, and dishes with soap and water  · Wear a mask  over your mouth and nose if you are sick or are near anyone who is sick  · Stay away from others  if you are sick  · Influenza vaccine  helps prevent influenza (flu)  Everyone older than 6 months should get a yearly influenza vaccine  Get the vaccine as soon as it is available, usually in September or October each year  Follow up with your healthcare provider as directed:  Write down your questions so you remember to ask them during your visits  © 2017 [employer] Information is for End User's use only and may not be sold, redistributed or otherwise used for commercial purposes  All illustrations and images included in CareNotes® are the copyrighted property of [employer] , [employer]  or [employer]  The above information is an  only  It is not intended as medical advice for individual conditions or treatments   Talk to your doctor, nurse or pharmacist before following any medical regimen to see if it is safe and effective for you

## 2020-01-03 NOTE — PROGRESS NOTES
[facility]        NAME: Jesus Jalloh is a 71 y o  male  : 1950    MRN: [id number]  DATE: January 3, 2020  TIME: 8:27 AM    Assessment and Plan   Influenza [J11 1]  1  Influenza  oseltamivir (TAMIFLU) 75 mg capsule   2  Nausea  ondansetron (ZOFRAN-ODT) 4 mg disintegrating tablet         Patient Instructions     Discussed condition with patient  His presentation is strongly suspicious for influenza  I will start him on Tamiflu and I prescribed Zofran for nausea and recommended he take a Tamiflu with food as he has had GI is issues with it in the past   I recommend hydration, rest, discussed aggressive fever management, OTC cough and cold meds, and close observation  Should his condition worsen, then he should proceed to the ER  He voiced understanding and agree with plan  Follow up with PCP in 3-5 days  Proceed to  ER if symptoms worsen  Chief Complaint     Chief Complaint   Patient presents with    Fever     Fever x 4 days with chills and body aches, cough and congestion  History of Present Illness       Patient presents with 3 day history of fever, shaking chills, body aches, sweats, slight cough  Denies significant congestion, sore throat  Has nausea but denies vomiting or diarrhea  He has taken DayQuil and NyQuil  He has had a flu shot  Denies asthma or allergies and is not a smoker  Review of Systems   Review of Systems   Constitutional: Positive for chills, diaphoresis and fever  HENT: Negative  Respiratory: Positive for cough  Cardiovascular: Negative  Gastrointestinal: Positive for nausea  Negative for diarrhea and vomiting  Genitourinary: Negative  Musculoskeletal: Positive for myalgias           Current Medications       Current Outpatient Medications:     acetaminophen (TYLENOL) 500 mg tablet, Take 500 mg by mouth as needed, Disp: , Rfl:     amLODIPine (NORVASC) 2 5 mg tablet, Take 1 tablet (2 5 mg total) by mouth daily, Disp: 90 tablet, Rfl: 3    aspirin (ECOTRIN LOW STRENGTH) 81 mg EC tablet, Take 81 mg by mouth daily, Disp: , Rfl:     LORazepam (ATIVAN) 0 5 mg tablet, Take one tablet 30 minutes prior to procedure (Patient not taking: Reported on 9/24/2019), Disp: 3 tablet, Rfl: 0    Multiple Vitamin (MULTIVITAMINS PO), Take 1 capsule by mouth daily, Disp: , Rfl:     ondansetron (ZOFRAN-ODT) 4 mg disintegrating tablet, Take 1 tablet (4 mg total) by mouth every 8 (eight) hours as needed for nausea or vomiting, Disp: 15 tablet, Rfl: 0    oseltamivir (TAMIFLU) 75 mg capsule, Take 1 capsule (75 mg total) by mouth 2 (two) times a day for 5 days, Disp: 10 capsule, Rfl: 0    quinapril (ACCUPRIL) 10 mg tablet, TAKE 1 TABLET BY MOUTH  EVERY 12 HOURS, Disp: 180 tablet, Rfl: 1    Current Allergies     Allergies as of 01/03/2020 - Reviewed 01/03/2020   Allergen Reaction Noted    Demerol [meperidine]  02/05/2005            The following portions of the patient's history were reviewed and updated as appropriate: allergies, current medications, past family history, past medical history, past social history, past surgical history and problem list      Past Medical History:   Diagnosis Date    Chronic rhinitis 02/05/2005    last assessed 2/5/05, resolved 5/18/17     Elevated liver function tests     last assessed 3/9/15, resolved 5/18/17     Herpes simplex type 1 infection     Hip arthritis     resolved 12/20/17     Hypertension     Shoulder bursitis     last assessed 9/23/13, resolved 5/18/17        Past Surgical History:   Procedure Laterality Date    HERNIA REPAIR      Inguinal sliding  10/2018    PROSTATOTOMY      last assessed 1/11/18       Family History   Problem Relation Age of Onset    Hypertension Mother     Lung cancer Family     Prostate cancer Family     Mental illness Neg Hx          Medications have been verified          Objective   /63   Pulse 86   Temp (!) 102 7 °F (39 3 °C)   Resp 15   Ht 6' 2" (1 88 m)   Wt 106 kg (233 lb)   SpO2 97%   BMI 29 92 kg/m²        Physical Exam     Physical Exam   Constitutional: He is oriented to person, place, and time  He appears well-developed and well-nourished  No distress  Patient appears ill   HENT:   Right Ear: Hearing, tympanic membrane, external ear and ear canal normal    Left Ear: Hearing, tympanic membrane, external ear and ear canal normal    Mouth/Throat: Mucous membranes are normal  Posterior oropharyngeal erythema present  No oropharyngeal exudate  No tonsillar exudate  Neck: Neck supple  Cardiovascular: Normal rate, regular rhythm and normal heart sounds  Pulmonary/Chest: Effort normal and breath sounds normal    Lymphadenopathy:     He has no cervical adenopathy  Neurological: He is alert and oriented to person, place, and time  Skin: There is pallor  Psychiatric: He has a normal mood and affect  Vitals reviewed

## 2020-01-13 ENCOUNTER — HOSPITAL ENCOUNTER (OUTPATIENT)
Dept: RADIOLOGY | Facility: HOSPITAL | Age: 70
Discharge: HOME | End: 2020-01-13
Payer: COMMERCIAL

## 2020-01-13 DIAGNOSIS — I72.0 PSEUDOANEURYSM OF CAROTID ARTERY (HCC): ICD-10-CM

## 2020-01-13 PROCEDURE — 70547 MR ANGIOGRAPHY NECK W/O DYE: CPT

## 2020-01-13 PROCEDURE — 70551 MRI BRAIN STEM W/O DYE: CPT

## 2020-01-16 ENCOUNTER — HOSPITAL ENCOUNTER (OUTPATIENT)
Dept: RADIOLOGY | Facility: HOSPITAL | Age: 70
Discharge: HOME | End: 2020-01-16

## 2020-01-16 DIAGNOSIS — I72.0 PSEUDOANEURYSM OF CAROTID ARTERY (HCC): ICD-10-CM

## 2020-01-22 ENCOUNTER — OFFICE VISIT (OUTPATIENT)
Dept: NEUROSURGERY | Facility: CLINIC | Age: 70
End: 2020-01-22
Payer: COMMERCIAL

## 2020-01-22 ENCOUNTER — TRANSCRIBE ORDERS (OUTPATIENT)
Dept: NEUROSURGERY | Facility: CLINIC | Age: 70
End: 2020-01-22

## 2020-01-22 VITALS
BODY MASS INDEX: 30.48 KG/M2 | TEMPERATURE: 98.2 F | HEIGHT: 73 IN | WEIGHT: 230 LBS | RESPIRATION RATE: 16 BRPM | DIASTOLIC BLOOD PRESSURE: 62 MMHG | HEART RATE: 66 BPM | SYSTOLIC BLOOD PRESSURE: 112 MMHG

## 2020-01-22 DIAGNOSIS — Z01.818 PRE-PROCEDURAL EXAMINATION: Primary | ICD-10-CM

## 2020-01-22 DIAGNOSIS — I72.0 PSEUDOANEURYSM OF CAROTID ARTERY (HCC): Primary | ICD-10-CM

## 2020-01-22 PROCEDURE — 99215 OFFICE O/P EST HI 40 MIN: CPT | Performed by: NEUROLOGICAL SURGERY

## 2020-01-23 ENCOUNTER — TELEPHONE (OUTPATIENT)
Dept: NEUROSURGERY | Facility: CLINIC | Age: 70
End: 2020-01-23

## 2020-01-23 NOTE — TELEPHONE ENCOUNTER
Received a call from Harjeet Guidry requesting clarification of instructions received during recent office visit  Review of the progress note shows Dr Christie Wong would like to repeat CTA head and neck in 3 months and if it appears to have increased in size would like to proceed with formal angiogram and discuss treatment options  Also confirmed Dr Christie Wong would like him to transition to full dose ASA 325mg daily as tolerated  He was appreciative of information  Was encouraged to call back with additional questions or concerns

## 2020-02-10 ENCOUNTER — OFFICE VISIT (OUTPATIENT)
Dept: FAMILY MEDICINE CLINIC | Facility: CLINIC | Age: 70
End: 2020-02-10
Payer: COMMERCIAL

## 2020-02-10 VITALS
HEIGHT: 73 IN | BODY MASS INDEX: 31.28 KG/M2 | RESPIRATION RATE: 16 BRPM | HEART RATE: 59 BPM | WEIGHT: 236 LBS | DIASTOLIC BLOOD PRESSURE: 80 MMHG | SYSTOLIC BLOOD PRESSURE: 130 MMHG | OXYGEN SATURATION: 95 % | TEMPERATURE: 97.3 F

## 2020-02-10 DIAGNOSIS — I72.0 PSEUDOANEURYSM OF CAROTID ARTERY (HCC): ICD-10-CM

## 2020-02-10 DIAGNOSIS — J20.9 ACUTE BRONCHITIS, UNSPECIFIED ORGANISM: Primary | ICD-10-CM

## 2020-02-10 DIAGNOSIS — I10 BENIGN ESSENTIAL HYPERTENSION: ICD-10-CM

## 2020-02-10 PROCEDURE — 3075F SYST BP GE 130 - 139MM HG: CPT | Performed by: FAMILY MEDICINE

## 2020-02-10 PROCEDURE — 1036F TOBACCO NON-USER: CPT | Performed by: FAMILY MEDICINE

## 2020-02-10 PROCEDURE — 3008F BODY MASS INDEX DOCD: CPT | Performed by: FAMILY MEDICINE

## 2020-02-10 PROCEDURE — 99213 OFFICE O/P EST LOW 20 MIN: CPT | Performed by: FAMILY MEDICINE

## 2020-02-10 PROCEDURE — 1160F RVW MEDS BY RX/DR IN RCRD: CPT | Performed by: FAMILY MEDICINE

## 2020-02-10 PROCEDURE — 4040F PNEUMOC VAC/ADMIN/RCVD: CPT | Performed by: FAMILY MEDICINE

## 2020-02-10 PROCEDURE — 3079F DIAST BP 80-89 MM HG: CPT | Performed by: FAMILY MEDICINE

## 2020-02-10 RX ORDER — AZITHROMYCIN 250 MG/1
TABLET, FILM COATED ORAL
Qty: 6 TABLET | Refills: 0 | Status: SHIPPED | OUTPATIENT
Start: 2020-02-10 | End: 2020-02-15

## 2020-02-10 NOTE — PATIENT INSTRUCTIONS
Over-the-counter products can be useful for your symptoms:                      <<GUAIFENESIN>> is an expectorant that is useful for thick mucus  Often found in Robitussin and Mucinex products  <<DEXTROMETHORPHAN>> is a cough suppressant that works in the brain   to help reduce bothersome cough  Use with caution with other medications that work in the brain  <<ANTI-HISTAMINES>> are useful as allergy medications and to help dry up   bothersome thin secretions  Less sedating options include   Claritin, Zyrtec, Allegra and Xyzal and have generic OTC forms  <<PSEUDOEPHEDRINE and PHENYLEPHRINE>> are stimulant decongestants   that can be used for congestion and sinus pressure  Avoid or use with caution with high blood pressure or heart conditions  <<NASAL SPRAYS>> Steroid nasal sprays (flonase, nasacort) are used for allergies but   can be used for congestion also  Safe for high blood pressure  Afrin is a decongestant that works quickly but for up to 3 days        mucinex DM is a suggestion today

## 2020-02-10 NOTE — PROGRESS NOTES
Assessment/Plan:    No problem-specific Assessment & Plan notes found for this encounter  Bronchitis new  mucinex DM  Call if no better after 1w for possible cxr    ICA aneurysm copies given  htn stable     Diagnoses and all orders for this visit:    Acute bronchitis, unspecified organism  -     azithromycin (ZITHROMAX) 250 mg tablet; Take 500mg on day 1, 250mg on days 2-5    Benign essential hypertension    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(HCC)        Return if symptoms worsen or fail to improve  Subjective:      Patient ID: Shemar Nazario is a 71 y o  male  Chief Complaint   Patient presents with    Cough     vfrma    Nasal Congestion       HPI  Had flu in early Jan  Never quite better  Ongoing chills  No sore throat  Thick phlegm at times  Yellow color  Nasal congestion  Malaise  No sob  No otc  No wt loss    The following portions of the patient's history were reviewed and updated as appropriate: allergies, current medications, past family history, past medical history, past social history, past surgical history and problem list     Review of Systems   Respiratory: Negative for shortness of breath and wheezing  Current Outpatient Medications   Medication Sig Dispense Refill    acetaminophen (TYLENOL) 500 mg tablet Take 500 mg by mouth as needed      amLODIPine (NORVASC) 2 5 mg tablet Take 1 tablet (2 5 mg total) by mouth daily 90 tablet 3    aspirin (ECOTRIN LOW STRENGTH) 81 mg EC tablet Take 324 mg by mouth daily       quinapril (ACCUPRIL) 10 mg tablet TAKE 1 TABLET BY MOUTH  EVERY 12 HOURS 180 tablet 1    azithromycin (ZITHROMAX) 250 mg tablet Take 500mg on day 1, 250mg on days 2-5 6 tablet 0    LORazepam (ATIVAN) 0 5 mg tablet Take one tablet 30 minutes prior to procedure (Patient not taking: Reported on 9/24/2019) 3 tablet 0    Multiple Vitamin (MULTIVITAMINS PO) Take 1 capsule by mouth daily       No current facility-administered medications for this visit          Objective:    BP 130/80   Pulse 59   Temp (!) 97 3 °F (36 3 °C)   Resp 16   Ht 6' 1" (1 854 m)   Wt 107 kg (236 lb)   SpO2 95%   BMI 31 14 kg/m²        Physical Exam   Constitutional: He appears well-developed  No distress  HENT:   Head: Normocephalic  Right Ear: External ear normal    Left Ear: External ear normal    Mouth/Throat: No oropharyngeal exudate  Eyes: Conjunctivae are normal  No scleral icterus  Neck: Neck supple  Cardiovascular: Normal rate, regular rhythm and intact distal pulses  Pulmonary/Chest: Effort normal  No respiratory distress  He has no wheezes  He has no rales  Coarse midline cough present  Abdominal: Soft  Musculoskeletal: He exhibits no edema or deformity  Neurological: He is alert  Skin: Skin is warm and dry  No pallor  Psychiatric: His behavior is normal  Thought content normal    Nursing note and vitals reviewed               Joe Maxwell DO

## 2020-04-02 ENCOUNTER — TELEMEDICINE (OUTPATIENT)
Dept: FAMILY MEDICINE CLINIC | Facility: CLINIC | Age: 70
End: 2020-04-02
Payer: COMMERCIAL

## 2020-04-02 VITALS — TEMPERATURE: 98.2 F

## 2020-04-02 DIAGNOSIS — J01.00 ACUTE NON-RECURRENT MAXILLARY SINUSITIS: Primary | ICD-10-CM

## 2020-04-02 PROCEDURE — G2012 BRIEF CHECK IN BY MD/QHP: HCPCS | Performed by: FAMILY MEDICINE

## 2020-04-02 RX ORDER — AMOXICILLIN AND CLAVULANATE POTASSIUM 875; 125 MG/1; MG/1
1 TABLET, FILM COATED ORAL EVERY 12 HOURS SCHEDULED
Qty: 20 TABLET | Refills: 0 | Status: SHIPPED | OUTPATIENT
Start: 2020-04-02 | End: 2020-04-12

## 2020-04-03 ENCOUNTER — TELEPHONE (OUTPATIENT)
Dept: FAMILY MEDICINE CLINIC | Facility: CLINIC | Age: 70
End: 2020-04-03

## 2020-04-03 DIAGNOSIS — J01.00 ACUTE NON-RECURRENT MAXILLARY SINUSITIS: Primary | ICD-10-CM

## 2020-04-03 RX ORDER — SULFAMETHOXAZOLE AND TRIMETHOPRIM 800; 160 MG/1; MG/1
1 TABLET ORAL EVERY 12 HOURS SCHEDULED
Qty: 20 TABLET | Refills: 0 | Status: SHIPPED | OUTPATIENT
Start: 2020-04-03 | End: 2020-04-13

## 2020-04-15 ENCOUNTER — TELEPHONE (OUTPATIENT)
Dept: NEUROSURGERY | Facility: CLINIC | Age: 70
End: 2020-04-15

## 2020-04-15 DIAGNOSIS — I72.0 PSEUDOANEURYSM OF CAROTID ARTERY (HCC): Primary | ICD-10-CM

## 2020-04-17 LAB
BUN SERPL-MCNC: 18 MG/DL (ref 8–27)
CREAT SERPL-MCNC: 1.18 MG/DL (ref 0.76–1.27)
SL AMB EGFR AFRICAN AMERICAN: 72 ML/MIN/1.73
SL AMB EGFR NON AFRICAN AMERICAN: 62 ML/MIN/1.73

## 2020-04-20 ENCOUNTER — HOSPITAL ENCOUNTER (OUTPATIENT)
Dept: RADIOLOGY | Facility: HOSPITAL | Age: 70
Discharge: HOME | End: 2020-04-20
Attending: NEUROLOGICAL SURGERY
Payer: COMMERCIAL

## 2020-04-20 DIAGNOSIS — I72.0 PSEUDOANEURYSM OF CAROTID ARTERY (HCC): ICD-10-CM

## 2020-04-20 PROCEDURE — 70498 CT ANGIOGRAPHY NECK: CPT

## 2020-04-20 PROCEDURE — 70496 CT ANGIOGRAPHY HEAD: CPT

## 2020-04-20 RX ADMIN — IOHEXOL 85 ML: 350 INJECTION, SOLUTION INTRAVENOUS at 08:05

## 2020-04-22 ENCOUNTER — TELEMEDICINE (OUTPATIENT)
Dept: NEUROSURGERY | Facility: CLINIC | Age: 70
End: 2020-04-22
Payer: COMMERCIAL

## 2020-04-22 DIAGNOSIS — I72.0 PSEUDOANEURYSM OF CAROTID ARTERY (HCC): Primary | ICD-10-CM

## 2020-04-22 PROCEDURE — 99214 OFFICE O/P EST MOD 30 MIN: CPT | Performed by: NEUROLOGICAL SURGERY

## 2020-04-22 PROCEDURE — 1160F RVW MEDS BY RX/DR IN RCRD: CPT | Performed by: NEUROLOGICAL SURGERY

## 2020-04-22 RX ORDER — ASPIRIN 325 MG
325 TABLET, DELAYED RELEASE (ENTERIC COATED) ORAL DAILY
Qty: 90 TABLET | Refills: 0
Start: 2020-04-22 | End: 2022-01-27

## 2020-04-24 DIAGNOSIS — I72.0 PSEUDOANEURYSM OF CAROTID ARTERY (HCC): Primary | ICD-10-CM

## 2020-04-26 DIAGNOSIS — I10 ESSENTIAL HYPERTENSION: ICD-10-CM

## 2020-04-27 RX ORDER — QUINAPRIL 10 MG/1
TABLET ORAL
Qty: 180 TABLET | Refills: 1 | Status: SHIPPED | OUTPATIENT
Start: 2020-04-27 | End: 2020-10-14

## 2020-04-29 ENCOUNTER — OFFICE VISIT (OUTPATIENT)
Dept: URGENT CARE | Facility: CLINIC | Age: 70
End: 2020-04-29
Payer: COMMERCIAL

## 2020-04-29 ENCOUNTER — TELEPHONE (OUTPATIENT)
Dept: FAMILY MEDICINE CLINIC | Facility: CLINIC | Age: 70
End: 2020-04-29

## 2020-04-29 VITALS
HEART RATE: 75 BPM | OXYGEN SATURATION: 98 % | DIASTOLIC BLOOD PRESSURE: 90 MMHG | SYSTOLIC BLOOD PRESSURE: 140 MMHG | TEMPERATURE: 97.7 F | HEIGHT: 74 IN | WEIGHT: 236 LBS | RESPIRATION RATE: 18 BRPM | BODY MASS INDEX: 30.29 KG/M2

## 2020-04-29 DIAGNOSIS — H92.02 ACUTE EAR PAIN, LEFT: Primary | ICD-10-CM

## 2020-04-29 DIAGNOSIS — H61.22 EXCESSIVE CERUMEN IN LEFT EAR CANAL: ICD-10-CM

## 2020-04-29 PROCEDURE — 99213 OFFICE O/P EST LOW 20 MIN: CPT | Performed by: PHYSICIAN ASSISTANT

## 2020-04-29 PROCEDURE — 3080F DIAST BP >= 90 MM HG: CPT | Performed by: PHYSICIAN ASSISTANT

## 2020-04-29 PROCEDURE — 69209 REMOVE IMPACTED EAR WAX UNI: CPT | Performed by: PHYSICIAN ASSISTANT

## 2020-04-29 PROCEDURE — 3008F BODY MASS INDEX DOCD: CPT | Performed by: PHYSICIAN ASSISTANT

## 2020-04-29 PROCEDURE — 4040F PNEUMOC VAC/ADMIN/RCVD: CPT | Performed by: PHYSICIAN ASSISTANT

## 2020-04-29 PROCEDURE — 3077F SYST BP >= 140 MM HG: CPT | Performed by: PHYSICIAN ASSISTANT

## 2020-04-29 PROCEDURE — 1036F TOBACCO NON-USER: CPT | Performed by: PHYSICIAN ASSISTANT

## 2020-04-29 PROCEDURE — 1160F RVW MEDS BY RX/DR IN RCRD: CPT | Performed by: PHYSICIAN ASSISTANT

## 2020-05-29 ENCOUNTER — HOSPITAL ENCOUNTER (EMERGENCY)
Facility: HOSPITAL | Age: 70
Discharge: HOME | End: 2020-05-29
Attending: EMERGENCY MEDICINE | Admitting: EMERGENCY MEDICINE
Payer: COMMERCIAL

## 2020-05-29 ENCOUNTER — OFFICE VISIT (OUTPATIENT)
Dept: OBGYN CLINIC | Facility: CLINIC | Age: 70
End: 2020-05-29
Payer: COMMERCIAL

## 2020-05-29 ENCOUNTER — APPOINTMENT (EMERGENCY)
Dept: RADIOLOGY | Facility: HOSPITAL | Age: 70
End: 2020-05-29
Payer: COMMERCIAL

## 2020-05-29 VITALS — SYSTOLIC BLOOD PRESSURE: 163 MMHG | DIASTOLIC BLOOD PRESSURE: 93 MMHG | TEMPERATURE: 99 F | HEART RATE: 72 BPM

## 2020-05-29 VITALS
HEART RATE: 68 BPM | SYSTOLIC BLOOD PRESSURE: 148 MMHG | RESPIRATION RATE: 18 BRPM | OXYGEN SATURATION: 97 % | TEMPERATURE: 99.2 F | HEIGHT: 73 IN | BODY MASS INDEX: 31.28 KG/M2 | DIASTOLIC BLOOD PRESSURE: 83 MMHG | WEIGHT: 236 LBS

## 2020-05-29 DIAGNOSIS — S86.012A ACHILLES TENDON TEAR, LEFT, INITIAL ENCOUNTER: Primary | ICD-10-CM

## 2020-05-29 DIAGNOSIS — M25.579 ANKLE PAIN: ICD-10-CM

## 2020-05-29 DIAGNOSIS — S86.012A RUPTURE OF LEFT ACHILLES TENDON, INITIAL ENCOUNTER: Primary | ICD-10-CM

## 2020-05-29 PROCEDURE — 1036F TOBACCO NON-USER: CPT | Performed by: ORTHOPAEDIC SURGERY

## 2020-05-29 PROCEDURE — 1160F RVW MEDS BY RX/DR IN RCRD: CPT | Performed by: ORTHOPAEDIC SURGERY

## 2020-05-29 PROCEDURE — 3077F SYST BP >= 140 MM HG: CPT | Performed by: ORTHOPAEDIC SURGERY

## 2020-05-29 PROCEDURE — 73630 X-RAY EXAM OF FOOT: CPT

## 2020-05-29 PROCEDURE — 3080F DIAST BP >= 90 MM HG: CPT | Performed by: ORTHOPAEDIC SURGERY

## 2020-05-29 PROCEDURE — 3008F BODY MASS INDEX DOCD: CPT | Performed by: ORTHOPAEDIC SURGERY

## 2020-05-29 PROCEDURE — 99204 OFFICE O/P NEW MOD 45 MIN: CPT | Performed by: ORTHOPAEDIC SURGERY

## 2020-05-29 PROCEDURE — 4040F PNEUMOC VAC/ADMIN/RCVD: CPT | Performed by: ORTHOPAEDIC SURGERY

## 2020-05-29 PROCEDURE — 99284 EMERGENCY DEPT VISIT MOD MDM: CPT | Performed by: EMERGENCY MEDICINE

## 2020-05-29 PROCEDURE — 73610 X-RAY EXAM OF ANKLE: CPT

## 2020-05-29 PROCEDURE — 99283 EMERGENCY DEPT VISIT LOW MDM: CPT

## 2020-06-04 ENCOUNTER — TELEPHONE (OUTPATIENT)
Dept: OBGYN CLINIC | Facility: HOSPITAL | Age: 70
End: 2020-06-04

## 2020-06-05 ENCOUNTER — OFFICE VISIT (OUTPATIENT)
Dept: URGENT CARE | Facility: CLINIC | Age: 70
End: 2020-06-05
Payer: COMMERCIAL

## 2020-06-05 VITALS
SYSTOLIC BLOOD PRESSURE: 132 MMHG | TEMPERATURE: 99.6 F | BODY MASS INDEX: 31.14 KG/M2 | HEIGHT: 73 IN | DIASTOLIC BLOOD PRESSURE: 78 MMHG | WEIGHT: 235 LBS | RESPIRATION RATE: 16 BRPM | HEART RATE: 85 BPM | OXYGEN SATURATION: 100 %

## 2020-06-05 DIAGNOSIS — H10.31 ACUTE BACTERIAL CONJUNCTIVITIS OF RIGHT EYE: Primary | ICD-10-CM

## 2020-06-05 PROCEDURE — 3075F SYST BP GE 130 - 139MM HG: CPT | Performed by: NURSE PRACTITIONER

## 2020-06-05 PROCEDURE — 99213 OFFICE O/P EST LOW 20 MIN: CPT | Performed by: NURSE PRACTITIONER

## 2020-06-05 PROCEDURE — 1160F RVW MEDS BY RX/DR IN RCRD: CPT | Performed by: NURSE PRACTITIONER

## 2020-06-05 PROCEDURE — 1036F TOBACCO NON-USER: CPT | Performed by: NURSE PRACTITIONER

## 2020-06-05 PROCEDURE — 3078F DIAST BP <80 MM HG: CPT | Performed by: NURSE PRACTITIONER

## 2020-06-05 PROCEDURE — 4040F PNEUMOC VAC/ADMIN/RCVD: CPT | Performed by: NURSE PRACTITIONER

## 2020-06-05 RX ORDER — POLYMYXIN B SULFATE AND TRIMETHOPRIM 1; 10000 MG/ML; [USP'U]/ML
1 SOLUTION OPHTHALMIC EVERY 4 HOURS
Qty: 10 ML | Refills: 0 | Status: SHIPPED | OUTPATIENT
Start: 2020-06-05 | End: 2020-06-12

## 2020-06-12 ENCOUNTER — OFFICE VISIT (OUTPATIENT)
Dept: OBGYN CLINIC | Facility: CLINIC | Age: 70
End: 2020-06-12
Payer: COMMERCIAL

## 2020-06-12 VITALS
SYSTOLIC BLOOD PRESSURE: 123 MMHG | WEIGHT: 235 LBS | DIASTOLIC BLOOD PRESSURE: 74 MMHG | HEART RATE: 70 BPM | BODY MASS INDEX: 31.14 KG/M2 | TEMPERATURE: 97.9 F | HEIGHT: 73 IN

## 2020-06-12 DIAGNOSIS — S86.012D RUPTURE OF LEFT ACHILLES TENDON, SUBSEQUENT ENCOUNTER: Primary | ICD-10-CM

## 2020-06-12 PROCEDURE — 4040F PNEUMOC VAC/ADMIN/RCVD: CPT | Performed by: ORTHOPAEDIC SURGERY

## 2020-06-12 PROCEDURE — 3074F SYST BP LT 130 MM HG: CPT | Performed by: ORTHOPAEDIC SURGERY

## 2020-06-12 PROCEDURE — 1160F RVW MEDS BY RX/DR IN RCRD: CPT | Performed by: ORTHOPAEDIC SURGERY

## 2020-06-12 PROCEDURE — 3008F BODY MASS INDEX DOCD: CPT | Performed by: ORTHOPAEDIC SURGERY

## 2020-06-12 PROCEDURE — 99214 OFFICE O/P EST MOD 30 MIN: CPT | Performed by: ORTHOPAEDIC SURGERY

## 2020-06-12 PROCEDURE — 1036F TOBACCO NON-USER: CPT | Performed by: ORTHOPAEDIC SURGERY

## 2020-06-12 PROCEDURE — 3078F DIAST BP <80 MM HG: CPT | Performed by: ORTHOPAEDIC SURGERY

## 2020-06-20 DIAGNOSIS — I10 ESSENTIAL HYPERTENSION: ICD-10-CM

## 2020-06-22 RX ORDER — AMLODIPINE BESYLATE 2.5 MG/1
TABLET ORAL
Qty: 90 TABLET | Refills: 3 | Status: SHIPPED | OUTPATIENT
Start: 2020-06-22 | End: 2021-05-17

## 2020-06-26 ENCOUNTER — OFFICE VISIT (OUTPATIENT)
Dept: OBGYN CLINIC | Facility: CLINIC | Age: 70
End: 2020-06-26
Payer: COMMERCIAL

## 2020-06-26 DIAGNOSIS — S86.012D RUPTURE OF LEFT ACHILLES TENDON, SUBSEQUENT ENCOUNTER: Primary | ICD-10-CM

## 2020-06-26 PROCEDURE — 1036F TOBACCO NON-USER: CPT | Performed by: ORTHOPAEDIC SURGERY

## 2020-06-26 PROCEDURE — 3078F DIAST BP <80 MM HG: CPT | Performed by: ORTHOPAEDIC SURGERY

## 2020-06-26 PROCEDURE — 3074F SYST BP LT 130 MM HG: CPT | Performed by: ORTHOPAEDIC SURGERY

## 2020-06-26 PROCEDURE — 1160F RVW MEDS BY RX/DR IN RCRD: CPT | Performed by: ORTHOPAEDIC SURGERY

## 2020-06-26 PROCEDURE — 4040F PNEUMOC VAC/ADMIN/RCVD: CPT | Performed by: ORTHOPAEDIC SURGERY

## 2020-06-26 PROCEDURE — 99213 OFFICE O/P EST LOW 20 MIN: CPT | Performed by: ORTHOPAEDIC SURGERY

## 2020-07-01 ENCOUNTER — EVALUATION (OUTPATIENT)
Dept: PHYSICAL THERAPY | Facility: CLINIC | Age: 70
End: 2020-07-01
Payer: COMMERCIAL

## 2020-07-01 DIAGNOSIS — S86.012D RUPTURE OF LEFT ACHILLES TENDON, SUBSEQUENT ENCOUNTER: Primary | ICD-10-CM

## 2020-07-01 PROCEDURE — 97161 PT EVAL LOW COMPLEX 20 MIN: CPT

## 2020-07-01 NOTE — PROGRESS NOTES
PT Evaluation     Today's date: 2020  Patient name: Calos Cerrato  : 1950  MRN: [id number]  Referring provider: Kady Canales MD  Dx:   Encounter Diagnosis     ICD-10-CM    1  Rupture of left Achilles tendon, subsequent encounter S86 012D Ambulatory referral to Physical Therapy                  Assessment  Assessment details: Pt is a pleasant 79 y o  male who presents to [address] for non-operative rehab of L Achilles tear sustained on 20  Today, he presents with expected limitations of decreased and painful L ankle ROM, decreased B LE strength, mod to high self reports of pain, decreased tolerance to activity, and poor exercise program  Functionally, he is limited in his ability to WB in all positions through L LE, which makes ambulation, stair negotiation, age appropriate recreation and exercise, and performance of normal household duties very difficult  He is motivated to improve  Pt will benefit from skilled PT to address the aforementioned deficits and limitations in an effort to maximize pain free functional mobility and overall quality of life  Progress as able with these goals in mind  Impairments: abnormal gait, abnormal muscle firing, abnormal muscle tone, abnormal or restricted ROM, abnormal movement, activity intolerance, impaired physical strength, lacks appropriate home exercise program and pain with function  Understanding of Dx/Px/POC: good   Prognosis: good    Goals  Short term goals (3 weeks):  1) Pt will improve L ankle A ROM to by 25% or to within protocol restrictions pain free  2) Pt will improve B LE and core strength deficits by 1/3 grade MMT  3) Pt will reports pain at worse <5/10  4) Pt will initiate and progress HEP w/ special emphasis on L ankle and LE functional ankle mobility and strength      Long term goals (6 weeks)  1) Pt will improve FOTO to at least 50   2) Pt will negotiate on full flight of stairs w/ min UE support, step over step gait pattern, and no pain  3) Pt will stand and ambulate for durations of at least 15 min w/o AD, normalized gait mechanics and no pain  4) Pt will be independent and compliant w/ HEP in order to maximize functional benefit of skilled PT following d/c        Plan  Plan details: POC to include: non-op Achilles tendon repair    HEP to start: see scanned doc     Patient would benefit from: skilled PT  Planned modality interventions: cryotherapy and thermotherapy: hydrocollator packs  Planned therapy interventions: abdominal trunk stabilization, activity modification, joint mobilization, manual therapy, massage, motor coordination training, neuromuscular re-education, patient education, postural training, stretching, strengthening, therapeutic activities, therapeutic exercise, therapeutic training, transfer training, home exercise program, gait training, graded activity, functional ROM exercises, graded exercise, flexibility, body mechanics training, balance and balance/weight bearing training  Frequency: 2x week  Duration in visits: 12  Duration in weeks: 6  Treatment plan discussed with: patient        Subjective Evaluation    History of Present Illness  Mechanism of injury: Pt was cutting tree branches while standing on ladder, fell, and tore L Achilles on   Pt was casted for 4 weeks, was placed in walking boot on   Pt has trouble sleeping at night, feels that boot is heavy and makes it hard to fall asleep  Pt has lymphedema in R LE from cancer surgery 3 years ago, notes that swelling has been exacerbated lately  Notes that stairs are extremely difficult  Pt has young grand children and would like to be able to roberto them again, would like to be able to walk and swim without pain  Pt has been seen by [facility] for HA and dizziness   Pt functional status is listed below:  Quality of life: good    Pain  Current pain ratin  At best pain ratin  At worst pain ratin  Location: L Achilles along insertion into calcaneous   Quality: dull ache, burning and sharp  Relieving factors: ice, rest and support  Aggravating factors: standing, walking, stair climbing, overhead activity, lifting and running  Progression: no change    Social Support  Steps to enter house: yes  Stairs in house: yes   Lives in: multiple-level home  Lives with: spouse    Employment status: not working (retired  )  Treatments  Previous treatment: physical therapy  Patient Goals  Patient goals for therapy: increased strength, decreased pain, increased motion and return to sport/leisure activities  Patient goal: be able to walk, run, swim, roberto grand kids without pain!         Objective     Palpation     Additional Palpation Details  Min TTP around area of original Achilles injury, bruising evident along base of L hindfoot laterally>medially     Neurological Testing     Sensation     Ankle/Foot   Left Ankle/Foot   Intact: light touch    Right Ankle/Foot   Intact: light touch     Active Range of Motion   Left Ankle/Foot   Dorsiflexion (ke): -20 degrees   Plantar flexion: 40 degrees   Inversion: 10 degrees   Eversion: 5 degrees     Right Ankle/Foot   Normal active range of motion    Passive Range of Motion   Left Ankle/Foot    Dorsiflexion (ke): -20 degrees   Plantar flexion: 45 degrees   Inversion: 15 degrees   Eversion: 8 degrees     Right Ankle/Foot  Normal passive range of motion    Joint Play     Additional Joint Play Details  Not assessed today    Strength/Myotome Testing     Left Ankle/Foot   Dorsiflexion: 2+  Plantar flexion: 3-  Inversion: 3-  Eversion: 3-    Right Ankle/Foot   Dorsiflexion: 4  Plantar flexion: 4  Inversion: 4  Eversion: 4    Tests     Additional Tests Details  Deferred s/t knowledge of diagnosis       Swelling   Left Ankle/Foot   Figure 8: 57 5 cm    Right Ankle/Foot   Figure 8: 60 5 cm    Ambulation     Ambulation: Level Surfaces     Additional Level Surfaces Ambulation Details  Two point step through w/ crutches, forward flexed through shoulders and upper tspine, decreased pace, fatigues quickly  Functional Assessment        Comments  Sit<>stand: requires UE support and momentum, no WB through L LE, quick fatigue    BW squat: unable     Stairs: reviewed    SLS: unable      Flowsheet Rows      Most Recent Value   PT/OT G-Codes   Current Score  16   Projected Score  46   FOTO information reviewed  Yes             Precautions: Chronic rhinitis (02/05/2005), Elevated liver function tests, Herpes simplex type 1 infection, Hip arthritis, Hypertension, prostate cancer w/ resulting lymphedema and shoulder bursitis  [email or web address]? [id number]    Date (Visit #) 7/1 (1) IE       Manuals             DTM and TPR  x6 mins to L ankle w/ effleurage for swelling            Post TC joint mobs             MWM for DF                                           Exercise Diary        Ther Ex             Active w/u             PROM  tested in all directions for L ankle except DF           Ankle isometrics   tested            Ankle 4 way tband Active motion tested                        Neuro Re Ed             WS training              Heel raises             Tandem walking             balance             airex                             Ther Act        Gait  w/ crutches reviewed            Stairs  w/ crutches reviewed            Functional lifting/transfers                HEP and POC review x 5 mins                                                                                                                Modalities             heat             Ice

## 2020-07-07 ENCOUNTER — APPOINTMENT (OUTPATIENT)
Dept: PHYSICAL THERAPY | Facility: CLINIC | Age: 70
End: 2020-07-07
Payer: COMMERCIAL

## 2020-07-09 ENCOUNTER — OFFICE VISIT (OUTPATIENT)
Dept: PHYSICAL THERAPY | Facility: CLINIC | Age: 70
End: 2020-07-09
Payer: COMMERCIAL

## 2020-07-09 DIAGNOSIS — S86.012D RUPTURE OF LEFT ACHILLES TENDON, SUBSEQUENT ENCOUNTER: Primary | ICD-10-CM

## 2020-07-09 PROCEDURE — 97112 NEUROMUSCULAR REEDUCATION: CPT

## 2020-07-09 PROCEDURE — 97110 THERAPEUTIC EXERCISES: CPT

## 2020-07-09 PROCEDURE — 97140 MANUAL THERAPY 1/> REGIONS: CPT

## 2020-07-09 NOTE — PROGRESS NOTES
Daily Note     Today's date: 2020  Patient name: Chiara Ragland  : 1950  MRN: [id number]  Referring provider: Tereso Wing MD  Dx:   Encounter Diagnosis     ICD-10-CM    1  Rupture of left Achilles tendon, subsequent encounter S86 012D                   Subjective: Pt arrives w/ wife, both note that ankle and foot have been really swollen as of late  Pt is not performing any significant WB through L LE, even w/ crutches  Objective: Pt requires cueing to perform proper patterning w/ stair negotiation but is able to correct and maintain  No pain noted  Assessment: Tolerated treatment well  Patient demonstrated fatigue post treatment and would benefit from continued PT  Pt reports fatigue but no increase in pain by end of session  Does well w/ intro to gait training w/ crutches, along w/ safe stair negotiation w/ crutches  L distal LE is swollen but reduces well w/ effleurage  Progress as indicated by protocol  Plan: Continue per plan of care  week 6       Precautions: Chronic rhinitis (2005), Elevated liver function tests, Herpes simplex type 1 infection, Hip arthritis, Hypertension, prostate cancer w/ resulting lymphedema and shoulder bursitis  [email or web address]  [email or web address]? [id number]=1    Date (Visit #)  (1) IE  (2)      Manuals             DTM and TPR  x6 mins to L ankle w/ effleurage for swelling   x15 min to L LE w/ effleurage          Post TC joint mobs             MWM for DF                                           Exercise Diary        Ther Ex             Active w/u             PROM  tested in all directions for L ankle except DF  x6 mins all directions save for DF          Ankle isometrics   tested   for IR/ER gently x 2-3 mins          Ankle 4 way tband Active motion tested                        Neuro Re Ed             WS training              Heel raises             Tandem walking balance             airex               Stairs and gait training on crutches per protocol x 12 min              Ther Act        Gait  w/ crutches reviewed            Stairs  w/ crutches reviewed            Functional lifting/transfers                HEP and POC review x 5 mins                                                                                                                Modalities             heat             Ice     home

## 2020-07-14 ENCOUNTER — OFFICE VISIT (OUTPATIENT)
Dept: PHYSICAL THERAPY | Facility: CLINIC | Age: 70
End: 2020-07-14
Payer: COMMERCIAL

## 2020-07-14 DIAGNOSIS — S86.012D RUPTURE OF LEFT ACHILLES TENDON, SUBSEQUENT ENCOUNTER: Primary | ICD-10-CM

## 2020-07-14 PROCEDURE — 97112 NEUROMUSCULAR REEDUCATION: CPT

## 2020-07-14 PROCEDURE — 97110 THERAPEUTIC EXERCISES: CPT

## 2020-07-14 PROCEDURE — 97140 MANUAL THERAPY 1/> REGIONS: CPT

## 2020-07-14 NOTE — PROGRESS NOTES
Daily Note     Today's date: 2020  Patient name: Yoel Pal  : 1950  MRN: [id number]  Referring provider: Prashant Cruz MD  Dx:   Encounter Diagnosis     ICD-10-CM    1  Rupture of left Achilles tendon, subsequent encounter S86 012D                   Subjective: Pt offers no new complaints  Arrives w/ minimal WB through L LE on crutches, reports that he is still navigating stairs on backside for most part  Objective: Figure 8 on L post effleurage: 61 cm  Pt shows perfect step-to form leading w/ R leg when ascending and L leg when descending on stairs  Ambulates PWB in CAM walker on crutches w/ improved pacing and mechanics following cueing and practice  Assessment: Tolerated treatment well  Patient demonstrated fatigue post treatment and would benefit from continued PT  Pt does well w/ manual work and gait/stair training  He is able to quickly improve form following cueing for proper patterning w/ stairs and PWB in crutches  Heavily encouraged to continue these techniques at home  Progress as able at next session, per protocol  Plan: Continue per plan of care  week 6 at next visit, week 7 next week      Precautions: Chronic rhinitis (2005), Elevated liver function tests, Herpes simplex type 1 infection, Hip arthritis, Hypertension, prostate cancer w/ resulting lymphedema and shoulder bursitis  [id number]    Date (Visit #)  (1) IE  (2)  (3)     Manuals            DTM and TPR  x6 mins to L ankle w/ effleurage for swelling    x15 min to L LE w/ effleurage  x15 min same area       Post TC joint mobs            MWM for DF                                        Exercise Diary        Ther Ex             Active w/u             PROM  tested in all directions for L ankle except DF   x6 mins all directions save for DF   x4-5 mins all directions save for DF       Ankle isometrics   tested     for IR/ER gently x 2-3 mins   no       Ankle 4 way tband Active motion tested   no        Review of self circles cw/ccw and circles x4-5 mins total             Neuro Re Ed             WS training              Heel raises             Tandem walking             balance             airex               Stairs and gait training on crutches per protocol x 12 min Stair and gait training w/ crutches x 15 min total, step to on stairs leading w/ R and descending w/ L             Ther Act        Gait  w/ crutches reviewed            Stairs  w/ crutches reviewed            Functional lifting/transfers                HEP and POC review x 5 mins     review of all x 2-3 mins                                                                                                            Modalities             heat             Ice      home  home

## 2020-07-16 ENCOUNTER — OFFICE VISIT (OUTPATIENT)
Dept: PHYSICAL THERAPY | Facility: CLINIC | Age: 70
End: 2020-07-16
Payer: COMMERCIAL

## 2020-07-16 DIAGNOSIS — S86.012D RUPTURE OF LEFT ACHILLES TENDON, SUBSEQUENT ENCOUNTER: Primary | ICD-10-CM

## 2020-07-16 PROCEDURE — 97140 MANUAL THERAPY 1/> REGIONS: CPT

## 2020-07-16 PROCEDURE — 97112 NEUROMUSCULAR REEDUCATION: CPT

## 2020-07-16 NOTE — PROGRESS NOTES
Daily Note     Today's date: 2020  Patient name: Phyllis Wilson  : 1950  MRN: [id number]  Referring provider: oRsanna Adhikari MD  Dx:   Encounter Diagnosis     ICD-10-CM    1  Rupture of left Achilles tendon, subsequent encounter S86 012D                   Subjective: Pt reports no new complaints  Very pleased after last session, was able to ascend/descend stairs using crutch and method we discussed at last visit  Feels he is improving overall  Objective: Excellent carryover of proper gait pattern w/ crutches from last session  Tissue density similar today to last session through distal gastroc/soleus  No pain w/ soft tissue work  Assessment: Tolerated treatment well  Patient demonstrated fatigue post treatment and would benefit from continued PT  Pt reports fatigue but no pain by end  One heel wedge was removed, per protocol  HEP reviewed and well understood  Continue to progress as sx allow  Edema through L distal LE is improved today as compared to previous sessions  Plan: Continue per plan of care  week 7 at next visit, remove another wedge by end of next week  Precautions: Chronic rhinitis (2005), Elevated liver function tests, Herpes simplex type 1 infection, Hip arthritis, Hypertension, prostate cancer w/ resulting lymphedema and shoulder bursitis  [id number]    Date (Visit #)  (1) IE  (2)  (3)  (4)    Manuals            DTM and TPR  x6 mins to L ankle w/ effleurage for swelling    x15 min to L LE w/ effleurage  x15 min same area  x15 min same area     Post TC joint mobs            MWM for DF                                        Exercise Diary        Ther Ex             Active w/u             PROM  tested in all directions for L ankle except DF   x6 mins all directions save for DF    x4-5 mins all directions save for DF  x2-3 mins      Ankle isometrics   tested     for IR/ER gently x 2-3 mins    no       Ankle 4 way tband Active motion tested   no        Review of self circles cw/ccw and circles x4-5 mins total Review x 1 min             Neuro Re Ed             WS training              Heel raises             Tandem walking             balance             airex               Stairs and gait training on crutches per protocol x 12 min Stair and gait training w/ crutches x 15 min total, step to on stairs leading w/ R and descending w/ L Gait training step through PWB on L LE w/ crutches x 8 in total - ~500-700' total            Ther Act        Gait  w/ crutches reviewed            Stairs  w/ crutches reviewed            Functional lifting/transfers                HEP and POC review x 5 mins      review of all x 2-3 mins   review of all x2-3 mins                                                                                                         Modalities             heat             Ice      home   home  home

## 2020-07-21 ENCOUNTER — OFFICE VISIT (OUTPATIENT)
Dept: PHYSICAL THERAPY | Facility: CLINIC | Age: 70
End: 2020-07-21
Payer: COMMERCIAL

## 2020-07-21 DIAGNOSIS — S86.012D RUPTURE OF LEFT ACHILLES TENDON, SUBSEQUENT ENCOUNTER: Primary | ICD-10-CM

## 2020-07-21 PROCEDURE — 97112 NEUROMUSCULAR REEDUCATION: CPT

## 2020-07-21 PROCEDURE — 97140 MANUAL THERAPY 1/> REGIONS: CPT

## 2020-07-21 NOTE — PROGRESS NOTES
Daily Note     Today's date: 2020  Patient name: Patrick Savage  : 1950  MRN: [id number]  Referring provider: Trinity Mckeon MD  Dx:   Encounter Diagnosis     ICD-10-CM    1  Rupture of left Achilles tendon, subsequent encounter S86 012D                   Subjective: Pt reports that he felt good after removing one wedge last week  Has continued w/ PWB w/ crutches, stairs are getting easier on his own  Follows up w/ MD later this week  Objective: excellent control over ambulation w/ crutches and PWB, pain free w/ controlled pacing  Assessment: Tolerated treatment well  Patient demonstrated fatigue post treatment and would benefit from continued PT  Continues to show good progress per protocol  HEP reviewed and well understood  Plan to take one more heel wedge at next visit prior to MD follow up  Plan: Continue per plan of care  remove heel wedge, review HEP, prep for MD visit  Precautions: Chronic rhinitis (2005), Elevated liver function tests, Herpes simplex type 1 infection, Hip arthritis, Hypertension, prostate cancer w/ resulting lymphedema and shoulder bursitis  [email or web address]  [id number]? [id number]    Date (Visit #)  (1) IE  (2)  (3)  (4)  (5)   Manuals            DTM and TPR  x6 mins to L ankle w/ effleurage for swelling    x15 min to L LE w/ effleurage  x15 min same area   x15 min same area  x15 min same area   Post TC joint mobs            MWM for DF                                        Exercise Diary        Ther Ex             Active w/u             PROM  tested in all directions for L ankle except DF   x6 mins all directions save for DF    x4-5 mins all directions save for DF   x2-3 mins   x2 mins    Ankle isometrics   tested     for IR/ER gently x 2-3 mins    no       Ankle 4 way tband Active motion tested   no        Review of self circles cw/ccw and circles x4-5 mins total Review x 1 min  Review of all            Neuro Re Ed             WS training              Heel raises             Tandem walking             balance             airex               Stairs and gait training on crutches per protocol x 12 min Stair and gait training w/ crutches x 15 min total, step to on stairs leading w/ R and descending w/ L Gait training step through [facility] on L LE w/ crutches x 8 in total - ~500-700' total Same x 300' total step through [facility] w/ crutches - 8 min        Standing hip abdm, ext, marching kicks x 20 total demo and practice    Ther Act        Gait  w/ crutches reviewed            Stairs  w/ crutches reviewed            Functional lifting/transfers                HEP and POC review x 5 mins      review of all x 2-3 mins    review of all x2-3 mins                                                                                                         Modalities             heat             Ice      home   home   home  home

## 2020-07-23 ENCOUNTER — EVALUATION (OUTPATIENT)
Dept: PHYSICAL THERAPY | Facility: CLINIC | Age: 70
End: 2020-07-23
Payer: COMMERCIAL

## 2020-07-23 DIAGNOSIS — S86.012D RUPTURE OF LEFT ACHILLES TENDON, SUBSEQUENT ENCOUNTER: Primary | ICD-10-CM

## 2020-07-23 PROCEDURE — 97140 MANUAL THERAPY 1/> REGIONS: CPT

## 2020-07-23 PROCEDURE — 97110 THERAPEUTIC EXERCISES: CPT

## 2020-07-23 NOTE — PROGRESS NOTES
Progress Note     Today's date: 2020  Patient name: Tony Olmstead  : 1950  MRN: [id number]  Referring provider: Lia Thakkar MD  Dx:   Encounter Diagnosis     ICD-10-CM    1  Rupture of left Achilles tendon, subsequent encounter S86 012D                   Subjective: Pt reports that ankle was sore yesterday, feels better today  Not sure what caused this soreness  Feels better walking w/ crutches and boot, stairs are still a challenge  Follows up w/ MD tomorrow  Pt     Goals  Short term goals (3 weeks): ALL PROGRESSED   1) Pt will improve L ankle A ROM to by 25% or to within protocol restrictions pain free  2) Pt will improve B LE and core strength deficits by 1/3 grade MMT  3) Pt will reports pain at worse <5/10  - achieved (change to <3/10)  4) Pt will initiate and progress HEP w/ special emphasis on L ankle and LE functional ankle mobility and strength  Long term goals (6 weeks) ALL PROGRESSED   1) Pt will improve FOTO to at least 50   2) Pt will negotiate on full flight of stairs w/ min UE support, step over step gait pattern, and no pain  3) Pt will stand and ambulate for durations of at least 15 min w/o AD, normalized gait mechanics and no pain  4) Pt will be independent and compliant w/ HEP in order to maximize functional benefit of skilled PT following d/c      **goals extended by 2 and 4 weeks, respectively       Pain  Current pain ratin  At best pain ratin  At worst pain ratin  Location: L Achilles along insertion into calcaneous   Quality: dull ache, burning and sharp  Relieving factors: ice, rest and support  Aggravating factors: standing, walking, stair climbing, overhead activity, lifting and running  Progression: improved     Social Support  Steps to enter house: yes  Stairs in house: yes   Lives in: multiple-level home  Lives with: spouse    Employment status: not working (retired  )  Treatments  Previous treatment: physical therapy  Patient Goals  Patient goals for therapy: increased strength, decreased pain, increased motion and return to sport/leisure activities  Patient goal: be able to walk, run, swim, roberto grand kids without pain! Objective     Palpation     Additional Palpation Details  Min TTP around area of original Achilles injury, bruising evident along base of L hindfoot laterally>medially    -7/23: continued in same areas to lesser extent     Neurological Testing     Sensation     Ankle/Foot   Left Ankle/Foot   Intact: light touch    Right Ankle/Foot   Intact: light touch     Active Range of Motion   Left Ankle/Foot   Dorsiflexion (ke): -10 degrees   Plantar flexion: 45 degrees   Inversion: 10 degrees   Eversion: 5 degrees     Right Ankle/Foot   Normal active range of motion    Passive Range of Motion   Left Ankle/Foot    Dorsiflexion (ke): to neutral   Plantar flexion: 50 degrees   Inversion: 15 degrees   Eversion: 10 degrees     Right Ankle/Foot  Normal passive range of motion    Joint Play     Additional Joint Play Details  Not assessed today    Strength/Myotome Testing     Left Ankle/Foot   Dorsiflexion: 2+  Plantar flexion: 3  Inversion: 3  Eversion: 3    Right Ankle/Foot   Dorsiflexion: 4  Plantar flexion: 4  Inversion: 4  Eversion: 4    Tests     Additional Tests Details  Deferred s/t knowledge of diagnosis       Swelling   Left Ankle/Foot   Figure 8: 57 5 cm    Right Ankle/Foot   Figure 8: 60 5 cm    7/23: swelling not specifically measured but less visible edema as compared to IE  Ambulation     Ambulation: Level Surfaces     Additional Level Surfaces Ambulation Details  Two point step through w/ crutches, forward flexed through shoulders and upper tspine, decreased pace, fatigues quickly      -7/23: Pt ambulates w/ PWB an B axillary crutches, improved upright posture and pacing, better stamina throughout - additional wedge removed today    Functional Assessment        Comments  Sit<>stand: requires UE support and momentum, no WB through L LE, quick fatigue    BW squat: unable     Stairs: reviewed    SLS: unable      Assessment: Tolerated treatment well  Patient demonstrated fatigue post treatment and would benefit from continued PT  Pt is on track pre protocol at this point  An additional wedge was removed today, pt felt comfortable w/ ambulation in clinic following  He has made good progress towards all goals and has MD follow up tomorrow  We will assess results of MD visit and proceed accordingly  Pt notes no pain to finish session  Plan: Continue per plan of care  week 8-9 per protocol      Precautions: Chronic rhinitis (02/05/2005), Elevated liver function tests, Herpes simplex type 1 infection, Hip arthritis, Hypertension, prostate cancer w/ resulting lymphedema and shoulder bursitis  [email or web address] [id number]? [id number]    Date (Visit #) 7/23 (6)  7/14 (3) 7/16 (4) 7/21 (5)   Manuals            DTM and TPR  x12 mins to L ankle w/ effleurage for swelling    x15 min to L LE w/ effleurage  x15 min same area   x15 min same area   x15 min same area   Post TC joint mobs            MWM for DF                                        Exercise Diary        Ther Ex             Active w/u             PROM Gently tested all directions    x6 mins all directions save for DF    x4-5 mins all directions save for DF   x2-3 mins    x2 mins    Ankle isometrics  Gently tested    for IR/ER gently x 2-3 mins    no       Ankle 4 way tband FOTO x 2-3 mins   no      Circles cw/ccw x 30 each  Review of self circles cw/ccw and circles x4-5 mins total Review x 1 min  Review of all     Gait training in crutches w/ additional wedge removed x 5-6 mins       Neuro Re Ed             WS training              Heel raises             Tandem walking             balance             airex               Stairs and gait training on crutches per protocol x 12 min Stair and gait training w/ crutches x 15 min total, step to on stairs leading w/ R and descending w/ L Gait training step through [facility] on L LE w/ crutches x 8 in total - ~500-700' total Same x 300' total step through [facility] w/ crutches - 8 min        Standing hip abdm, ext, marching kicks x 20 total demo and practice    Ther Act        Gait            Stairs            Functional lifting/transfers               HEP and POC review x 2 mins      review of all x 2-3 mins    review of all x2-3 mins                                                                                                         Modalities             heat             Ice   home   home   home   home   home

## 2020-07-24 ENCOUNTER — OFFICE VISIT (OUTPATIENT)
Dept: OBGYN CLINIC | Facility: CLINIC | Age: 70
End: 2020-07-24
Payer: COMMERCIAL

## 2020-07-24 VITALS
TEMPERATURE: 96.3 F | BODY MASS INDEX: 31 KG/M2 | HEART RATE: 64 BPM | SYSTOLIC BLOOD PRESSURE: 141 MMHG | DIASTOLIC BLOOD PRESSURE: 72 MMHG | HEIGHT: 73 IN

## 2020-07-24 DIAGNOSIS — S86.012D RUPTURE OF LEFT ACHILLES TENDON, SUBSEQUENT ENCOUNTER: Primary | ICD-10-CM

## 2020-07-24 PROCEDURE — 3078F DIAST BP <80 MM HG: CPT | Performed by: ORTHOPAEDIC SURGERY

## 2020-07-24 PROCEDURE — 1160F RVW MEDS BY RX/DR IN RCRD: CPT | Performed by: ORTHOPAEDIC SURGERY

## 2020-07-24 PROCEDURE — 99213 OFFICE O/P EST LOW 20 MIN: CPT | Performed by: ORTHOPAEDIC SURGERY

## 2020-07-24 PROCEDURE — 3077F SYST BP >= 140 MM HG: CPT | Performed by: ORTHOPAEDIC SURGERY

## 2020-07-24 PROCEDURE — 1036F TOBACCO NON-USER: CPT | Performed by: ORTHOPAEDIC SURGERY

## 2020-07-24 PROCEDURE — 4040F PNEUMOC VAC/ADMIN/RCVD: CPT | Performed by: ORTHOPAEDIC SURGERY

## 2020-07-24 RX ORDER — ERYTHROMYCIN 5 MG/G
OINTMENT OPHTHALMIC
COMMUNITY
Start: 2020-06-30 | End: 2022-01-27

## 2020-07-24 NOTE — PROGRESS NOTES
Assessment/Plan:  1  Rupture of left Achilles tendon, subsequent encounter         The patient's achilles appears to be healing well  We did discuss that his swelling his quite common and can last for many months  The patient is doing well and can fully weight bear in his boot with his 1 wedge at this time  He will continue PT on the non-operative achilles tendon tear protocol  He will be out of his boot by the next time we see him in 4 weeks  We did review his PT note today  Subjective: Patrick Savage is a 79 y o  male who presents today for follow-up of his left ankle, now almost 2 months status post non-operative treatment of achilles tendon tear  He is doing well denies any pain about the ankle  He does still complain of swelling bout the ankle still  He notes good sensation of the left lower extremity  He has been partial weightbearing in his boot with 1 wedge  He has also been doing physical therapy  Review of Systems   Constitutional: Negative  Negative for chills and fever  HENT: Negative  Negative for ear pain and sore throat  Eyes: Negative  Negative for pain and redness  Respiratory: Negative  Negative for shortness of breath and wheezing  Cardiovascular: Negative for chest pain and palpitations  Gastrointestinal: Negative  Negative for abdominal pain and blood in stool  Endocrine: Negative  Negative for polydipsia and polyuria  Genitourinary: Negative  Negative for difficulty urinating and dysuria  Musculoskeletal:        As noted in HPI   Skin: Negative  Negative for pallor and rash  Neurological: Negative  Negative for dizziness and numbness  Hematological: Negative  Negative for adenopathy  Does not bruise/bleed easily  Psychiatric/Behavioral: Negative  Negative for confusion and suicidal ideas           Past Medical History:   Diagnosis Date    Chronic rhinitis 02/05/2005    last assessed 2/5/05, resolved 5/18/17     Elevated liver function tests last assessed 3/9/15, resolved 5/18/17     Herpes simplex type 1 infection     Hip arthritis     resolved 12/20/17     Hypertension     Shoulder bursitis     last assessed 9/23/13, resolved 5/18/17        Past Surgical History:   Procedure Laterality Date    HERNIA REPAIR      Inguinal sliding  10/2018    PROSTATOTOMY      last assessed 1/11/18       Family History   Problem Relation Age of Onset    Hypertension Mother     Lung cancer Family     Prostate cancer Family     Mental illness Neg Hx        Social History     Occupational History    Not on file   Tobacco Use    Smoking status: Never Smoker    Smokeless tobacco: Never Used   Substance and Sexual Activity    Alcohol use: Yes     Frequency: Monthly or less     Drinks per session: 1 or 2     Binge frequency: Never    Drug use: Never    Sexual activity: Not on file         Current Outpatient Medications:     acetaminophen (TYLENOL) 500 mg tablet, Take 500 mg by mouth as needed, Disp: , Rfl:     amLODIPine (NORVASC) 2 5 mg tablet, TAKE 1 TABLET BY MOUTH  DAILY, Disp: 90 tablet, Rfl: 3    aspirin (ECOTRIN) 325 mg EC tablet, Take 1 tablet (325 mg total) by mouth daily, Disp: 90 tablet, Rfl: 0    erythromycin (ILOTYCIN) ophthalmic ointment, , Disp: , Rfl:     Multiple Vitamin (MULTIVITAMINS PO), Take 1 capsule by mouth daily, Disp: , Rfl:     quinapril (ACCUPRIL) 10 mg tablet, TAKE 1 TABLET BY MOUTH  EVERY 12 HOURS, Disp: 180 tablet, Rfl: 1    Allergies   Allergen Reactions    Augmentin [Amoxicillin-Pot Clavulanate] GI Intolerance    Demerol [Meperidine]      Reaction Date: [id number]; Annotation - [id number]: unsure of side effect       Objective:  Vitals:    07/24/20 0800   BP: 141/72   Pulse: 64   Temp: (!) 96 3 °F (35 7 °C)       Ortho Exam    Physical Exam   Constitutional: He is oriented to person, place, and time  He appears well-developed and well-nourished  No distress  HENT:   Head: Normocephalic and atraumatic     Eyes: Conjunctivae and EOM are normal  No scleral icterus  Neck: No JVD present  Cardiovascular: Normal rate and intact distal pulses  Pulmonary/Chest: Effort normal  No respiratory distress  Neurological: He is alert and oriented to person, place, and time  Coordination normal    Skin: Skin is warm  Psychiatric: He has a normal mood and affect  Left ankle: NO visible or palpable achilles tendon defect  Ankle gets to neutral comfortably  4/5 strength ankle plantar flexion  Sensation intact  2+ DP pulse  MIld diffuse swelling of the ankle and foot  No calf tenderness

## 2020-07-28 ENCOUNTER — OFFICE VISIT (OUTPATIENT)
Dept: PHYSICAL THERAPY | Facility: CLINIC | Age: 70
End: 2020-07-28
Payer: COMMERCIAL

## 2020-07-28 DIAGNOSIS — S86.012D RUPTURE OF LEFT ACHILLES TENDON, SUBSEQUENT ENCOUNTER: Primary | ICD-10-CM

## 2020-07-28 PROCEDURE — 97140 MANUAL THERAPY 1/> REGIONS: CPT

## 2020-07-28 PROCEDURE — 97110 THERAPEUTIC EXERCISES: CPT

## 2020-07-28 PROCEDURE — 97112 NEUROMUSCULAR REEDUCATION: CPT

## 2020-07-28 NOTE — PROGRESS NOTES
Daily Note     Today's date: 2020  Patient name: Sandra Jeffrey  : 1950  MRN: [id number]  Referring provider: José Chowdary MD  Dx:   Encounter Diagnosis     ICD-10-CM    1  Rupture of left Achilles tendon, subsequent encounter S86 012D                   Subjective: Pt reports MD follow up went well, he was cleared to start next phase of protocol  Notes that pain has been well controlled w/ only one wedge in boot  No new complaints overall  Objective: Pt requires initial cueing for ambulation in // bars w/o crutches to improve step length and WB on L LE, able to correct  Seated heel raises through 50-75% on L w/ fatigue but no acute pain  Assessment: Tolerated treatment well  Patient demonstrated fatigue post treatment and would benefit from continued PT  Pt reports fatigue but no increase in pain by end of session  Does well w/ higher level progressions per protocol and is safe to perform seated heel raises, 4 way ankle ROM, and WS in walking boot on own  Notes no adverse effects to end session  Plan: Continue per plan of care  week 8-9 of protocol  Precautions: Chronic rhinitis (2005), Elevated liver function tests, Herpes simplex type 1 infection, Hip arthritis, Hypertension, prostate cancer w/ resulting lymphedema and shoulder bursitis  [email or web address]? [id number]    Date (Visit #)  (6) PN   (7)   (4)  (5)   Manuals            DTM and TPR  x 12 mins to L ankle w/ effleurage for swelling    x10 min to L LE w/ effleurage  x15 min same area   x15 min same area   x15 min same area   Post TC joint mobs            MWM for DF                                        Exercise Diary        Ther Ex             Active w/u             PROM Gently tested all directions    x3-4 mins all directions, self ROM review    x4-5 mins all directions save for DF   x2-3 mins    x2 mins Ankle isometrics  Gently tested  All directions 2x10-15 total   no       Ankle 4 way tband FOTO x 2-3 mins   no      Circles cw/ccw x 30 each Review  Review of self circles cw/ccw and circles x4-5 mins total Review x 1 min  Review of all     Gait training in crutches w/ additional wedge removed x 5-6 mins       Neuro Re Ed             WS training     in // bars w/ walking boot x 3-4 mins          Heel raises    seated x 20-30 total         Tandem walking             balance             airex               Gait training in // bars w/ walking boot, focus on step length and speed x 10 laps total Stair and gait training w/ crutches x 15 min total, step to on stairs leading w/ R and descending w/ L Gait training step through [facility] on L LE w/ crutches x 8 in total - ~500-700' total Same x 300' total step through [facility] w/ crutches - 8 min        Standing hip abdm, ext, marching kicks x 20 total demo and practice    Ther Act        Gait            Stairs            Functional lifting/transfers               HEP and POC review x 2 mins   review of all x 2-3 mins   review of all x 2-3 mins    review of all x2-3 mins                                                                                                         Modalities             heat             Ice    home   home   home   home   home

## 2020-07-30 ENCOUNTER — OFFICE VISIT (OUTPATIENT)
Dept: PHYSICAL THERAPY | Facility: CLINIC | Age: 70
End: 2020-07-30
Payer: COMMERCIAL

## 2020-07-30 DIAGNOSIS — S86.012D RUPTURE OF LEFT ACHILLES TENDON, SUBSEQUENT ENCOUNTER: Primary | ICD-10-CM

## 2020-07-30 PROCEDURE — 97112 NEUROMUSCULAR REEDUCATION: CPT

## 2020-07-30 PROCEDURE — 97110 THERAPEUTIC EXERCISES: CPT

## 2020-07-30 NOTE — PROGRESS NOTES
Progress Note     Today's date: 2020  Patient name: Brooke Oakley  : 1950  MRN: [id number]  Referring provider: Keyur Richard MD  Dx:   Encounter Diagnosis     ICD-10-CM    1  Rupture of left Achilles tendon, subsequent encounter S86 012D                   Subjective: Pt notes continued improvement  He is a bit apprehensive about transitioning to show w/ heel lift  Feels that things are improving overall  Frustrated w/ continued swelling  Functional update below:         Goals  Short term goals (3 weeks): ALL PROGRESSED   1) Pt will improve L ankle A ROM to by 25% or to within protocol restrictions pain free  2) Pt will improve B LE and core strength deficits by 1/3 grade MMT  3) Pt will reports pain at worse <5/10  - achieved (change to <3/10)  4) Pt will initiate and progress HEP w/ special emphasis on L ankle and LE functional ankle mobility and strength  Long term goals (6 weeks) ALL PROGRESSED   1) Pt will improve FOTO to at least 50   2) Pt will negotiate on full flight of stairs w/ min UE support, step over step gait pattern, and no pain  3) Pt will stand and ambulate for durations of at least 15 min w/o AD, normalized gait mechanics and no pain  4) Pt will be independent and compliant w/ HEP in order to maximize functional benefit of skilled PT following d/c      **goals extended by 2 and 4 weeks, respectively  - continued       Pain  Current pain ratin  At best pain ratin  At worst pain ratin  Location: L Achilles along insertion into calcaneous   Quality: dull ache, burning and sharp  Relieving factors: ice, rest and support  Aggravating factors: standing, walking, stair climbing, overhead activity, lifting and running  Progression: improved     Social Support  Steps to enter house: yes  Stairs in house: yes   Lives in: multiple-level home  Lives with: spouse    Employment status: not working (retired  )  Treatments  Previous treatment: physical therapy  Patient Goals  Patient goals for therapy: increased strength, decreased pain, increased motion and return to sport/leisure activities  Patient goal: be able to walk, run, swim, roberto grand kids without pain! Objective     Palpation     Additional Palpation Details  Min TTP around area of original Achilles injury, bruising evident along base of L hindfoot laterally>medially    -7/23: continued in same areas to lesser extent    -7/30: continued     Neurological Testing     Sensation     Ankle/Foot   Left Ankle/Foot   Intact: light touch    Right Ankle/Foot   Intact: light touch     Active Range of Motion   Left Ankle/Foot   Dorsiflexion (ke): 5 degrees   Plantar flexion: 45 degrees   Inversion: 10 degrees   Eversion: 5 degrees     Right Ankle/Foot   Normal active range of motion    Passive Range of Motion   Left Ankle/Foot    Dorsiflexion (ke): 10 degrees   Plantar flexion: 50 degrees   Inversion: 15 degrees   Eversion: 10 degrees     Right Ankle/Foot  Normal passive range of motion    Joint Play     Additional Joint Play Details  Not assessed today    Strength/Myotome Testing     Left Ankle/Foot   Dorsiflexion: 3-  Plantar flexion: 3  Inversion: 3+  Eversion: 3+    Right Ankle/Foot   Dorsiflexion: 4  Plantar flexion: 4  Inversion: 4  Eversion: 4    Tests     Additional Tests Details  Deferred s/t knowledge of diagnosis       Swelling   Left Ankle/Foot   Figure 8: 57 5 cm    Right Ankle/Foot   Figure 8: 60 5 cm    7/23: swelling not specifically measured but less visible edema as compared to IE      7/30: same swelling as IE    Ambulation     Ambulation: Level Surfaces     Additional Level Surfaces Ambulation Details  Two point step through w/ crutches, forward flexed through shoulders and upper tspine, decreased pace, fatigues quickly      -7/23: Pt ambulates w/ PWB an B axillary crutches, improved upright posture and pacing, better stamina throughout - additional wedge removed today   -7/30: able to ambulate independently w/ CAM boot  In // bars and sneakers is able to ambulate w/ 50% WB on L LE and heavy UE support  Functional Assessment        Comments  Sit<>stand: requires UE support and momentum, no WB through L LE, quick fatigue    BW squat: unable     Stairs: reviewed    SLS: unable    7/30: requires UE support and max WB through R LE for sit<>stand, other functional activities not assessed       Assessment: Tolerated treatment well  Patient demonstrated fatigue post treatment and would benefit from continued PT  Pt does very well w/ intro to WB and ambulation in sneaker w/ heel lift  He is improving in all aspects of care and was asked to use sneaker and heel lift for WB transfer work on own, does not yet have to ambulate at home like this if he feels uncomfortable  Continue w/ BAPS board, // bar work in sneakers, and gait training as able at next session  Plan: Continue per plan of care  BAPS, // bars, week 9 per protocol      Precautions: Chronic rhinitis (02/05/2005), Elevated liver function tests, Herpes simplex type 1 infection, Hip arthritis, Hypertension, prostate cancer w/ resulting lymphedema and shoulder bursitis  [email or web address]? [id number]    Date (Visit #) 7/23 (6) PN  7/28 (7) 7/30 (8) PN  7/21 (5)   Manuals            DTM and TPR  x 12 mins to L ankle w/ effleurage for swelling    x10 min to L LE w/ effleurage  x5-6 min same area   x15 min same area   x15 min same area   Post TC joint mobs            MWM for DF                                        Exercise Diary        Ther Ex             Active w/u             PROM Gently tested all directions    x3-4 mins all directions, self ROM review    x4-5 mins all directions   x2-3 mins    x2 mins    Ankle isometrics  Gently tested  All directions 2x10-15 total  all directions 2x10-15        Ankle 4 way tband FOTO x 2-3 mins   no      Circles cw/ccw x 30 each Review  Circles and ankle pumps x 10-15 each Review x 1 min  Review of all     Gait training in crutches w/ additional wedge removed x 5-6 mins  BAPS board DF/PF and med/lat x 5 mins        Seated heel raises x30 total        Standing WS in // bars demo and practice x 7-8 min        FOTO, ROM, MMT x 5 mins             Neuro Re Ed             WS training      in // bars w/ walking boot x 3-4 mins          Heel raises     seated x 20-30 total         Tandem walking             balance             airex               Gait training in // bars w/ walking boot, focus on step length and speed x 10 laps total Gait training in // bars x 9 min in sneakers w/ heel lift Gait training step through [facility] on L LE w/ crutches x 8 in total - ~500-700' total Same x 300' total step through [facility] w/ crutches - 8 min      Review of POC and HEP x 5 mins   Standing hip abdm, ext, marching kicks x 20 total demo and practice    Ther Act        Gait            Stairs            Functional lifting/transfers              HEP and POC review x 2 mins    review of all x 2-3 mins    review of all x2-3 mins                                                                                                         Modalities             heat             Ice    home   home   home   home   home No

## 2020-08-04 ENCOUNTER — APPOINTMENT (OUTPATIENT)
Dept: PHYSICAL THERAPY | Facility: CLINIC | Age: 70
End: 2020-08-04
Payer: COMMERCIAL

## 2020-08-06 ENCOUNTER — OFFICE VISIT (OUTPATIENT)
Dept: PHYSICAL THERAPY | Facility: CLINIC | Age: 70
End: 2020-08-06
Payer: COMMERCIAL

## 2020-08-06 DIAGNOSIS — S86.012D RUPTURE OF LEFT ACHILLES TENDON, SUBSEQUENT ENCOUNTER: Primary | ICD-10-CM

## 2020-08-06 PROCEDURE — 97110 THERAPEUTIC EXERCISES: CPT

## 2020-08-06 PROCEDURE — 97140 MANUAL THERAPY 1/> REGIONS: CPT

## 2020-08-06 NOTE — PROGRESS NOTES
Daily Note     Today's date: 2020  Patient name: Rimma Jimenez  : 1950  MRN: [id number]  Referring provider: Jessica Truong MD  Dx: No diagnosis found  Subjective: Patient denies pain left heel cord; ankle feels tight  walking in the sneaker       Objective: See treatment below       Assessment: Tolerated treatment well  Mild swelling left ankle; patient reported increased relief post  passive stretching; demonstrates good heel strike & push off with UE support in // bars & axillary crutches ; Patient would benefit from continued PT      Plan: Continue per plan of care  Precautions: Chronic rhinitis (2005), Elevated liver function tests, Herpes simplex type 1 infection, Hip arthritis, Hypertension, prostate cancer w/ resulting lymphedema and shoulder bursitis  [email or web address]? [id number]    Date (Visit #)  (6) PN   (7)  (8) PN 8-6  (9)     Manuals            DTM and TPR  x 12 mins to L ankle w/ effleurage for swelling    x10 min to L LE w/ effleurage  x5-6 min same area   x10 min same area   x15 min same area   Post TC joint mobs            MWM for DF                                        Exercise Diary        Ther Ex             Active w/u             PROM Gently tested all directions    x3-4 mins all directions, self ROM review    x4-5 mins all directions  x 5 min all directions    x2 mins    Ankle isometrics  Gently tested  All directions 2x10-15 total  all directions 2x10-15  All directions 2x 10      Ankle 4 way tband FOTO x 2-3 mins   no      Circles cw/ccw x 30 each Review  Circles and ankle pumps x 10-15 each Circles & ankle pumps x 15 ea   Review of all     Gait training in crutches w/ additional wedge removed x 5-6 mins  BAPS board DF/PF and med/lat x 5 mins BAPS lvl3   DF/PF & med/lat x 5 min        Seated heel raises x30 total Seated heel raises x 30 Standing WS in // bars demo and practice x 7-8 min Standing WS in // bars        FOTO, ROM, MMT x 5 mins         Nustep lvl 2 x 10 min     Neuro Re Ed             WS training      in // bars w/ walking boot x 3-4 mins          Heel raises     seated x 20-30 total         Tandem walking             balance             airex               Gait training in // bars w/ walking boot, focus on step length and speed x 10 laps total Gait training in // bars x 9 min in sneakers w/ heel lift Gait training in sneaker with // bars & 2 axillary crutches approx 500 ft  Same x 300' total step through [facility] w/ crutches - 8 min      Review of POC and HEP x 5 mins   Standing hip abdm, ext, marching kicks x 20 total demo and practice    Ther Act        Gait            Stairs            Functional lifting/transfers              HEP and POC review x 2 mins    review of all x 2-3 mins    review of all x2-3 mins                                                                                                         Modalities             heat             Ice    home   home   home   home   home

## 2020-08-11 ENCOUNTER — OFFICE VISIT (OUTPATIENT)
Dept: PHYSICAL THERAPY | Facility: CLINIC | Age: 70
End: 2020-08-11
Payer: COMMERCIAL

## 2020-08-11 DIAGNOSIS — S86.012D RUPTURE OF LEFT ACHILLES TENDON, SUBSEQUENT ENCOUNTER: Primary | ICD-10-CM

## 2020-08-11 PROCEDURE — 97140 MANUAL THERAPY 1/> REGIONS: CPT

## 2020-08-11 PROCEDURE — 97110 THERAPEUTIC EXERCISES: CPT

## 2020-08-11 NOTE — PROGRESS NOTES
Daily Note     Today's date: 2020  Patient name: Anali Aguayo  : 1950  MRN: [id number]  Referring provider: Zohreh Shirley MD  Dx:   Encounter Diagnosis     ICD-10-CM    1  Rupture of left Achilles tendon, subsequent encounter  S86 012D                   Subjective: Pt reports good improvement since last session  Arrives in sneakers w/ heel cup, no AD  Good pacing overall but pt feels generally hesitant w/ prolonged WB on L side  Was able to shower standing up recently and is very excited by this  Objective: Requires cueing for proper heel strike at IC and toe off at push off during gait training in // bars, able to correct but fatigues quickly  Assessment: Tolerated treatment well  Patient demonstrated fatigue post treatment and would benefit from continued PT  Fatigue but no pain by end  Does well w/ higher level WB progressions and will benefit from higher level work as sx allow  Appropriate for higher intensity work per protocol as sx present  Plan: Continue per plan of care  single leg work, continue in weeks 8-12 per protocol      Precautions: Chronic rhinitis (2005), Elevated liver function tests, Herpes simplex type 1 infection, Hip arthritis, Hypertension, prostate cancer w/ resulting lymphedema and shoulder bursitis  [id number]? [id number]    Date (Visit #)  (6) PN   (7)  (8) PN 8-  (9)   (10)   Manuals            DTM and TPR  x 12 mins to L ankle w/ effleurage for swelling    x10 min to L LE w/ effleurage  x5-6 min same area   x10 min same area   x10 min same area   Post TC joint mobs            MWM for DF                                        Exercise Diary        Ther Ex             Active w/u             PROM Gently tested all directions    x3-4 mins all directions, self ROM review    x4-5 mins all directions  x 5 min all directions  x2 min    Ankle isometrics  Gently tested  All directions 2x10-15 total  all directions 2x10-15  All directions 2x 10   review    Ankle 4 way tband FOTO x 2-3 mins   no      Circles cw/ccw x 30 each Review  Circles and ankle pumps x 10-15 each Circles & ankle pumps x 15 ea   Pumps and circles x 20 each    Gait training in crutches w/ additional wedge removed x 5-6 mins  BAPS board DF/PF and med/lat x 5 mins BAPS lvl3   DF/PF & med/lat x 5 min  BAPS lvl 2 DF and PF and med/lat x 4 mins       Seated heel raises x30 total Seated heel raises x 30  x50 total      Standing WS in // bars demo and practice x 7-8 min Standing WS in // bars  review      FOTO, ROM, MMT x 5 mins         Nustep lvl 2 x 10 min  NU step pre lvl 3-4 x 10 min   Neuro Re Ed             WS training      in // bars w/ walking boot x 3-4 mins          Heel raises     seated x 20-30 total         Tandem walking             balance             airex               Gait training in // bars w/ walking boot, focus on step length and speed x 10 laps total Gait training in // bars x 9 min in sneakers w/ heel lift Gait training in sneaker with // bars & 2 axillary crutches approx 500 ft  In // bars w/ focus on form x 5-6 laps, side step x 4-5 laps       Review of POC and HEP x 5 mins   Standing hip abd and ext x 10-15 each   Ther Act        Gait            Stairs            Functional lifting/transfers              HEP and POC review x 2 mins    review of all x 2-3 mins    review of all x2-3 mins  review of all x 2-3 mins                                                                                                        Modalities             heat             Ice    home   home   home   home   home

## 2020-08-13 ENCOUNTER — OFFICE VISIT (OUTPATIENT)
Dept: PHYSICAL THERAPY | Facility: CLINIC | Age: 70
End: 2020-08-13
Payer: COMMERCIAL

## 2020-08-13 DIAGNOSIS — S86.012D RUPTURE OF LEFT ACHILLES TENDON, SUBSEQUENT ENCOUNTER: Primary | ICD-10-CM

## 2020-08-13 PROCEDURE — 97110 THERAPEUTIC EXERCISES: CPT

## 2020-08-13 PROCEDURE — 97140 MANUAL THERAPY 1/> REGIONS: CPT

## 2020-08-13 PROCEDURE — 97112 NEUROMUSCULAR REEDUCATION: CPT

## 2020-08-13 NOTE — PROGRESS NOTES
Progress Note     Today's date: 2020  Patient name: Yanet Roa  : 1950  MRN: [id number]  Referring provider: Cecile Garay MD  Dx:   Encounter Diagnosis     ICD-10-CM    1  Rupture of left Achilles tendon, subsequent encounter  S86 012D                   Subjective: Pt reports good progress over the last 2-3 weeks  Notes that that he is now walking without walking boot for most part, using sneaker w/ heel cup  Feels that strength is still a ways off from what it used to be, however he feels that he is better able to move around home and use stairs w/o pain  He is performing HEP 3-4x/week, follows up w/ MD next week  Functional update below:         Goals  Short term goals (3 weeks): ALL PROGRESSED   1) Pt will improve L ankle A ROM to by 25% or to within protocol restrictions pain free  2) Pt will improve B LE and core strength deficits by 1/3 grade MMT  3) Pt will reports pain at worse <5/10  - achieved (change to <3/10)  4) Pt will initiate and progress HEP w/ special emphasis on L ankle and LE functional ankle mobility and strength  Long term goals (6 weeks) ALL PROGRESSED   1) Pt will improve FOTO to at least 50   2) Pt will negotiate on full flight of stairs w/ min UE support, step over step gait pattern, and no pain  3) Pt will stand and ambulate for durations of at least 15 min w/o AD, normalized gait mechanics and no pain  4) Pt will be independent and compliant w/ HEP in order to maximize functional benefit of skilled PT following d/c       STG #2 updated to read "abolish all complaints of pain "  STG #4 updated to read "Pt will progress HEP w/ special emphasis on functional strength and proper movement patterns "  LTG #1 updated to read "improve FOTO to at least 71 "    Pain  Current pain ratin  At best pain ratin  At worst pain ratin-2  Location: L Achilles along insertion into calcaneous   Quality: dull ache, burning and sharp  Relieving factors: ice, rest and support  Aggravating factors: standing, walking, stair climbing, overhead activity, lifting and running  Progression: improved     Social Support  Steps to enter house: yes  Stairs in house: yes   Lives in: multiple-level home  Lives with: spouse    Employment status: not working (retired  )  Treatments  Previous treatment: physical therapy  Patient Goals  Patient goals for therapy: increased strength, decreased pain, increased motion and return to sport/leisure activities  Patient goal: be able to walk, run, swim, roberto grand kids without pain!         Objective     Palpation     Additional Palpation Details  Min TTP around area of original Achilles injury, bruising evident along base of L hindfoot laterally>medially    -7/23: continued in same areas to lesser extent    -7/30: continued    -8/13: very minimal TTP over L lateral malleolus     Neurological Testing     Sensation     Ankle/Foot   Left Ankle/Foot   Intact: light touch    Right Ankle/Foot   Intact: light touch     Active Range of Motion   Left Ankle/Foot   Dorsiflexion (ke): 5-7 degrees   Plantar flexion: 45 degrees   Inversion: 10-15 degrees   Eversion: 5-7 degrees     Right Ankle/Foot   Normal active range of motion    Passive Range of Motion   Left Ankle/Foot    Dorsiflexion (ke): 10 degrees   Plantar flexion: 50 degrees   Inversion: 15 degrees   Eversion: 10 degrees     Right Ankle/Foot  Normal passive range of motion    Joint Play     Additional Joint Play Details  Not assessed today    Strength/Myotome Testing     Left Ankle/Foot   Dorsiflexion: 3+  Plantar flexion: 3+  Inversion: 4-  Eversion: 4-    Right Ankle/Foot   Dorsiflexion: 4  Plantar flexion: 4  Inversion: 4  Eversion: 4    Tests     Additional Tests Details  Deferred s/t knowledge of diagnosis       Swelling   Left Ankle/Foot   Figure 8: 57 5 cm    Right Ankle/Foot   Figure 8: 60 5 cm    7/23: swelling not specifically measured but less visible edema as compared to IE  7/30: same swelling as IE    8/13: decreased since IE but not specifically measured today    Ambulation     Ambulation: Level Surfaces     Additional Level Surfaces Ambulation Details  Two point step through w/ crutches, forward flexed through shoulders and upper tspine, decreased pace, fatigues quickly  -7/23: Pt ambulates w/ PWB an B axillary crutches, improved upright posture and pacing, better stamina throughout - additional wedge removed today   -7/30: able to ambulate independently w/ CAM boot  In // bars and sneakers is able to ambulate w/ 50% WB on L LE and heavy UE support    -8/13: through clinical distances in sneaker and heel cup w/o UE support or pain  Functional Assessment        Comments  Sit<>stand: requires UE support and momentum, no WB through L LE, quick fatigue    BW squat: unable     Stairs: reviewed    SLS: unable    7/30: requires UE support and max WB through R LE for sit<>stand, other functional activities not assessed     Assessment: Tolerated treatment well  Patient demonstrated fatigue post treatment and would benefit from continued PT  Continues to make good progress  He has show solid gains in pain free functional mobility to date  We are following non-op protocol and will start w/ heel raise progressions after MD follow up on 8/21  He demos solid understanding of HEP and restrictions  Continue to progress per protocol w/ special emphasis on normalizing gait mechanics on 2x/week basis  Plan: Continue per plan of care  week 11 per protocol, phase II     Precautions: Chronic rhinitis (02/05/2005), Elevated liver function tests, Herpes simplex type 1 infection, Hip arthritis, Hypertension, prostate cancer w/ resulting lymphedema and shoulder bursitis  [email or web address]  [id number]? [email or web address]=[id number]&disposition=0&alloworigin=1    Date (Visit #) 8/13 (11)  7/30 (8) PN 8-6  (9)  8/11 (10)   Manuals            DTM and TPR  x 10 mins to L ankle w/ effleurage for swelling    x10 min to L LE w/ effleurage  x5-6 min same area   x10 min same area   x10 min same area   Post TC joint mobs            MWM for DF                                        Exercise Diary        Ther Ex             Active w/u             PROM    x3-4 mins all directions, self ROM review    x4-5 mins all directions  x 5 min all directions  x2 min    Ankle isometrics  Inv/ev GTB sitting x 20 each, manual x 20 in supine  All directions 2x10-15 total  all directions 2x10-15  All directions 2x 10   review    Ankle 4 way tband   no      Circles x 20 Review  Circles and ankle pumps x 10-15 each Circles & ankle pumps x 15 ea   Pumps and circles x 20 each    BAPS board lvl 3 inv/ev and controlled PF/DF   BAPS board DF/PF and med/lat x 5 mins BAPS lvl3   DF/PF & med/lat x 5 min  BAPS lvl 2 DF and PF and med/lat x 4 mins     Seated heel raises x 30-40 total  Seated heel raises x30 total Seated heel raises x 30  x50 total      Standing WS in // bars demo and practice x 7-8 min Standing WS in // bars  review    FOTO, ROM, MMT x 3-4 mins  FOTO, ROM, MMT x 5 mins         Nustep lvl 2 x 10 min  NU step pre lvl 3-4 x 10 min   Neuro Re Ed             WS training     in // bars w/ walking boot x 3-4 mins          Heel raises     seated x 20-30 total         Tandem walking             balance             airex              Gait training in // bars x 5 mins total Gait training in // bars w/ walking boot, focus on step length and speed x 10 laps total Gait training in // bars x 9 min in sneakers w/ heel lift Gait training in sneaker with // bars & 2 axillary crutches approx 500 ft  In // bars w/ focus on form x 5-6 laps, side step x 4-5 laps     8" ant step ups leading w/ L x 20 total  Review of POC and HEP x 5 mins   Standing hip abd and ext x 10-15 each    Review of all standing kicks x 2-3 mins                               Ther Act        Gait            Stairs            Functional lifting/transfers              HEP and POC review x 2 mins    review of all x 2-3 mins    review of all x2-3 mins  review of all x 2-3 mins                                                                                                        Modalities             heat             Ice    home   home   home   home   home

## 2020-08-18 ENCOUNTER — OFFICE VISIT (OUTPATIENT)
Dept: PHYSICAL THERAPY | Facility: CLINIC | Age: 70
End: 2020-08-18
Payer: COMMERCIAL

## 2020-08-18 DIAGNOSIS — S86.012D RUPTURE OF LEFT ACHILLES TENDON, SUBSEQUENT ENCOUNTER: Primary | ICD-10-CM

## 2020-08-18 PROCEDURE — 97112 NEUROMUSCULAR REEDUCATION: CPT

## 2020-08-18 PROCEDURE — 97110 THERAPEUTIC EXERCISES: CPT

## 2020-08-18 PROCEDURE — 97140 MANUAL THERAPY 1/> REGIONS: CPT

## 2020-08-18 NOTE — PROGRESS NOTES
Daily Note     Today's date: 2020  Patient name: Kimber Sepulveda  : 1950  MRN: [id number]  Referring provider: Tavares Marino MD  Dx:   Encounter Diagnosis     ICD-10-CM    1  Rupture of left Achilles tendon, subsequent encounter  S86 012D        Start Time: 930  Stop Time: 1020  Total time in clinic (min): 50 minutes    Subjective: Patient states he has been out of the boot for about a week; concerned about increased swelling in his left foot & calf - thought he had a bug bite       Objective: See treatment diary below      Assessment: Tolerated treatment well  Patient arrived with increased swelling left foot & calf but without tenderness or redness ; swelling decreased by end of session post therex & LE elevation ; Patient would benefit from continued PT      Plan: Continue per plan of care  Add 3" step downs week 12 on 20     Precautions: Chronic rhinitis (2005), Elevated liver function tests, Herpes simplex type 1 infection, Hip arthritis, Hypertension, prostate cancer w/ resulting lymphedema and shoulder bursitis  [email or web address]  [facility]/[id number]? ObernvVcvIh=[id number]&[email or web address]&alloworigin=1    Date (Visit #)  (11)   8-6  (9)   (10)   Manuals            DTM and TPR  x 10 mins to L ankle w/ effleurage for swelling    x10 min to L LE w/ effleurage  x5-6 min same area   x10 min same area   x10 min same area   Post TC joint mobs            MWM for DF                                        Exercise Diary        Ther Ex             Active w/u             PROM  X 4 min all direc  Gentle/ no heel cord stretching    x4-5 mins all directions  x 5 min all directions  x2 min    Ankle isometrics  Inv/ev GTB sitting x 20 each, manual x 20 in supine  Inv/ev GTB 2 x 10 ea  & manual x 20 ea   all directions 2x10-15  All directions 2x 10   review    Ankle 4 way tband   no      Circles x 20  Circles and ankle pumps x 10-15 each Circles & ankle pumps x 15 ea   Pumps and circles x 20 each    BAPS board lvl 3 inv/ev and controlled PF/DF  BAPS board lvl 3 inv/ev & controlled PF/DF  BAPS board DF/PF and med/lat x 5 mins BAPS lvl3   DF/PF & med/lat x 5 min  BAPS lvl 2 DF and PF and med/lat x 4 mins     Seated heel raises x 30-40 total Seated heel raises x 30  Seated heel raises x30 total Seated heel raises x 30  x50 total      Standing WS in // bars demo and practice x 7-8 min Standing WS in // bars  review    FOTO, ROM, MMT x 3-4 mins  FOTO, ROM, MMT x 5 mins         Nustep lvl 2 x 10 min  NU step pre lvl 3-4 x 10 min   Neuro Re Ed             WS training            Heel raises            Tandem walking             balance             airex              Gait training in // bars x 5 mins total Gait training to increase DF & pushoff  Gait training in // bars x 9 min in sneakers w/ heel lift Gait training in sneaker with // bars & 2 axillary crutches approx 500 ft  In // bars w/ focus on form x 5-6 laps, side step x 4-5 laps     8" ant step ups leading w/ L x 20 total 8" ant steps ups leading w/L x 20 total  Review of POC and HEP x 5 mins   Standing hip abd and ext x 10-15 each    Review of all standing kicks x 2-3 mins                               Ther Act        Gait            Stairs            Functional lifting/transfers              HEP and POC review x 2 mins    reviewed ice application w elevation     review of all x2-3 mins  review of all x 2-3 mins                                                                                                        Modalities             heat             Ice    home   elevated rath table x 10 min with ankle pumps    home   home   home

## 2020-08-20 ENCOUNTER — OFFICE VISIT (OUTPATIENT)
Dept: PHYSICAL THERAPY | Facility: CLINIC | Age: 70
End: 2020-08-20
Payer: COMMERCIAL

## 2020-08-20 DIAGNOSIS — S86.012D RUPTURE OF LEFT ACHILLES TENDON, SUBSEQUENT ENCOUNTER: Primary | ICD-10-CM

## 2020-08-20 PROCEDURE — 97140 MANUAL THERAPY 1/> REGIONS: CPT

## 2020-08-20 PROCEDURE — 97112 NEUROMUSCULAR REEDUCATION: CPT

## 2020-08-20 PROCEDURE — 97110 THERAPEUTIC EXERCISES: CPT

## 2020-08-20 NOTE — PROGRESS NOTES
Daily Note     Today's date: 2020  Patient name: Anali Aguayo  : 1950  MRN: [id number]  Referring provider: Zohreh Shirley MD  Dx:   Encounter Diagnosis     ICD-10-CM    1  Rupture of left Achilles tendon, subsequent encounter  S86 012D        Start Time: 930  Stop Time: 1020  Total time in clinic (min): 50 minutes    Subjective: Patient states he has been elevating his left leg & applying ice; walking is getting better; can only go up steps only  one at a time       Objective: See treatment diary below      Assessment: Tolerated treatment well  Patient arrived with improved gait; moderate swelling left foot but decreased in calf;  Patient would benefit from continued PT      Plan: Continue per plan of care  Progress protocol 20      Precautions: Chronic rhinitis (2005), Elevated liver function tests, Herpes simplex type 1 infection, Hip arthritis, Hypertension, prostate cancer w/ resulting lymphedema and shoulder bursitis  [id number]? [id number]    Date (Visit #)  (11)  (10)   Manuals            DTM and TPR  x 10 mins to L ankle w/ effleurage for swelling    x10 min to L LE w/ effleurage  X 10 min to LLE  w effleurage for swelling    x10 min same area   x10 min same area   Post TC joint mobs            MWM for DF                                        Exercise Diary        Ther Ex             Active w/u             PROM  X 4 min all direc  Gentle/ no heel cord stretching  X 4 min all direc  Gentle/no heel cord stretching   x 5 min all directions  x2 min    Ankle isometrics  Inv/ev GTB sitting x 20 each, manual x 20 in supine  Inv/ev GTB 2 x 10 ea  & manual x 20 ea   inv/ev GTB 2 x 10 & manual x 20 ea  All directions 2x 10   review    Ankle 4 way tband         Circles x 20  Circles and ankle pumps x 10-15 each Circles & ankle pumps x 15 ea   Pumps and circles x 20 each BAPS board lvl 3 inv/ev and controlled PF/DF  BAPS board lvl 3 inv/ev & controlled PF/DF  BAPS board lvl 4  X 30 ea inv/ev & controlled   Pf/df BAPS lvl3   DF/PF & med/lat x 5 min  BAPS lvl 2 DF and PF and med/lat x 4 mins     Seated heel raises x 30-40 total Seated heel raises x 30  Seated heel raises x30 total Seated heel raises x 30  x50 total       Standing WS in // bars  review    FOTO, ROM, MMT x 3-4 mins           Nustep lvl 2 x 10 min  NU step pre lvl 3-4 x 10 min   Neuro Re Ed             WS training            Heel raises            Tandem walking             balance             airex              Gait training in // bars x 5 mins total Gait training to increase DF & pushoff  Gait training level surfaces no   // bars; added step forward/back in place w 1 UE support x 20  Gait training in sneaker with // bars & 2 axillary crutches approx 500 ft  In // bars w/ focus on form x 5-6 laps, side step x 4-5 laps     8" ant step ups leading w/ L x 20 total 8" ant steps ups leading w/L x 20 total  8" ant sep ups leading w L x 20   Standing hip abd and ext x 10-15 each    Review of all standing kicks x 2-3 mins                               Ther Act        Gait            Stairs            Functional lifting/transfers              HEP and POC review x 2 mins    reviewed ice application w elevation     review of all x2-3 mins  review of all x 2-3 mins                                                                                                        Modalities             heat             Ice    home   elevated rath table x 10 min with ankle pumps  Elevated Rath table x 10 min      home   home

## 2020-08-21 ENCOUNTER — OFFICE VISIT (OUTPATIENT)
Dept: OBGYN CLINIC | Facility: CLINIC | Age: 70
End: 2020-08-21
Payer: COMMERCIAL

## 2020-08-21 VITALS
HEART RATE: 65 BPM | SYSTOLIC BLOOD PRESSURE: 126 MMHG | DIASTOLIC BLOOD PRESSURE: 73 MMHG | TEMPERATURE: 96.2 F | HEIGHT: 73 IN | WEIGHT: 232.2 LBS | BODY MASS INDEX: 30.77 KG/M2

## 2020-08-21 DIAGNOSIS — Z12.11 ENCOUNTER FOR SCREENING COLONOSCOPY: ICD-10-CM

## 2020-08-21 DIAGNOSIS — S86.012D RUPTURE OF LEFT ACHILLES TENDON, SUBSEQUENT ENCOUNTER: Primary | ICD-10-CM

## 2020-08-21 PROCEDURE — 3008F BODY MASS INDEX DOCD: CPT | Performed by: ORTHOPAEDIC SURGERY

## 2020-08-21 PROCEDURE — 1160F RVW MEDS BY RX/DR IN RCRD: CPT | Performed by: ORTHOPAEDIC SURGERY

## 2020-08-21 PROCEDURE — 1036F TOBACCO NON-USER: CPT | Performed by: ORTHOPAEDIC SURGERY

## 2020-08-21 PROCEDURE — 99213 OFFICE O/P EST LOW 20 MIN: CPT | Performed by: ORTHOPAEDIC SURGERY

## 2020-08-21 PROCEDURE — 3074F SYST BP LT 130 MM HG: CPT | Performed by: ORTHOPAEDIC SURGERY

## 2020-08-21 PROCEDURE — 3078F DIAST BP <80 MM HG: CPT | Performed by: ORTHOPAEDIC SURGERY

## 2020-08-21 PROCEDURE — 4040F PNEUMOC VAC/ADMIN/RCVD: CPT | Performed by: ORTHOPAEDIC SURGERY

## 2020-08-21 NOTE — PROGRESS NOTES
Assessment/Plan:  1  Rupture of left Achilles tendon, subsequent encounter  Ambulatory referral to Physical Therapy   2  Encounter for screening colonoscopy  Ambulatory referral for colon cancer education       Scribe Attestation    I,:   Oneil Andrade am acting as a scribe while in the presence of the attending physician :        I,:   Krysta Mcmillan MD personally performed the services described in this documentation    as scribed in my presence :              Delmer Sprung upon examination and review of his physical therapy notes demonstrates steady improvement with his left Achilles tendon now 12 weeks non operative treatment  I did note that it is common for individual such as him to experience swelling into the lower extremity after an injury such as his  With the addition of his lymphedema this is also expected  I did suggest that he could try compression stockings however may continue with regular socks should he choose  I would like him to continue with physical therapy as per protocol and provided him with a new prescription today  I did also advise him to try and elevate the ankle above the heart as often as possible to help alleviate the swelling  He may continue to weightbear as tolerated  I would like him to follow up with me in 4 weeks time for repeat clinical evaluation  Subjective: Rimma Jimenez is a 79 y o  male who presents to the office today for follow-up evaluation of his left Achilles  He is now 12 weeks status post nonoperative treatment of Achilles tendon rupture  He remarks that he is doing very well and is improving with physical therapy  He states he has been able to progress out of the Cam walker boot and does walk around in his sneaker with a small heel lift  He notes that he is pain-free while weight-bearing as well as during nonweightbearing however does have recurrent swelling into his foot, ankle    He does note that he has a history of lymphedema bilaterally secondary to prostate cancer  He states that the only time he feels uneasy with his ankle is ascending and descending the stairs  However again he reiterates that he is nonpainful  Today he denies any distal paresthesias  Review of Systems   Constitutional: Negative for chills, fever and unexpected weight change  HENT: Negative for hearing loss, nosebleeds and sore throat  Eyes: Negative for pain, redness and visual disturbance  Respiratory: Negative for cough, shortness of breath and wheezing  Cardiovascular: Negative for chest pain, palpitations and leg swelling  Gastrointestinal: Negative for abdominal pain, nausea and vomiting  Endocrine: Negative for polyphagia and polyuria  Genitourinary: Negative for dysuria and hematuria  Musculoskeletal: Negative for arthralgias and joint swelling  See HPI   Skin: Negative for rash and wound  Neurological: Negative for dizziness, numbness and headaches  Psychiatric/Behavioral: Negative for decreased concentration and suicidal ideas  The patient is not nervous/anxious            Past Medical History:   Diagnosis Date    Chronic rhinitis 02/05/2005    last assessed 2/5/05, resolved 5/18/17     Elevated liver function tests     last assessed 3/9/15, resolved 5/18/17     Herpes simplex type 1 infection     Hip arthritis     resolved 12/20/17     Hypertension     Shoulder bursitis     last assessed 9/23/13, resolved 5/18/17        Past Surgical History:   Procedure Laterality Date    HERNIA REPAIR      Inguinal sliding  10/2018    PROSTATOTOMY      last assessed 1/11/18       Family History   Problem Relation Age of Onset    Hypertension Mother     Lung cancer Family     Prostate cancer Family     Mental illness Neg Hx        Social History     Occupational History    Not on file   Tobacco Use    Smoking status: Never Smoker    Smokeless tobacco: Never Used   Substance and Sexual Activity    Alcohol use: Yes     Frequency: Monthly or less     Drinks per session: 1 or 2     Binge frequency: Never    Drug use: Never    Sexual activity: Not on file         Current Outpatient Medications:     acetaminophen (TYLENOL) 500 mg tablet, Take 500 mg by mouth as needed, Disp: , Rfl:     amLODIPine (NORVASC) 2 5 mg tablet, TAKE 1 TABLET BY MOUTH  DAILY, Disp: 90 tablet, Rfl: 3    aspirin (ECOTRIN) 325 mg EC tablet, Take 1 tablet (325 mg total) by mouth daily, Disp: 90 tablet, Rfl: 0    erythromycin (ILOTYCIN) ophthalmic ointment, , Disp: , Rfl:     Multiple Vitamin (MULTIVITAMINS PO), Take 1 capsule by mouth daily, Disp: , Rfl:     quinapril (ACCUPRIL) 10 mg tablet, TAKE 1 TABLET BY MOUTH  EVERY 12 HOURS, Disp: 180 tablet, Rfl: 1    Allergies   Allergen Reactions    Augmentin [Amoxicillin-Pot Clavulanate] GI Intolerance    Demerol [Meperidine]      Reaction Date: [id number]; Annotation - [id number]: unsure of side effect       Objective:  Vitals:    08/21/20 0801   BP: 126/73   Pulse: 65   Temp: (!) 96 2 °F (35 7 °C)       Left Ankle Exam     Tenderness   The patient is experiencing no tenderness  Swelling: mild (foot)    Range of Motion   Dorsiflexion: 15   Plantar flexion: 30     Muscle Strength   Dorsiflexion:  4/5   Plantar flexion:  4/5     Other   Erythema: absent  Scars: absent  Sensation: normal  Pulse: present          Observations   Left Ankle/Foot   Negative for adhesive scar  Strength/Myotome Testing     Left Ankle/Foot   Dorsiflexion: 4  Plantar flexion: 4      Physical Exam  Vitals signs reviewed  Constitutional:       Appearance: He is well-developed  HENT:      Head: Normocephalic and atraumatic  Eyes:      General:         Right eye: No discharge  Left eye: No discharge  Conjunctiva/sclera: Conjunctivae normal    Neck:      Musculoskeletal: Normal range of motion and neck supple  Cardiovascular:      Rate and Rhythm: Normal rate  Pulmonary:      Effort: Pulmonary effort is normal  No respiratory distress  Musculoskeletal:      Comments: As noted in HPI   Skin:     General: Skin is warm and dry  Neurological:      Mental Status: He is alert and oriented to person, place, and time  Psychiatric:         Behavior: Behavior normal          Thought Content:  Thought content normal          Judgment: Judgment normal

## 2020-08-25 ENCOUNTER — EVALUATION (OUTPATIENT)
Dept: PHYSICAL THERAPY | Facility: CLINIC | Age: 70
End: 2020-08-25
Payer: COMMERCIAL

## 2020-08-25 DIAGNOSIS — S86.012D RUPTURE OF LEFT ACHILLES TENDON, SUBSEQUENT ENCOUNTER: Primary | ICD-10-CM

## 2020-08-25 PROCEDURE — 97112 NEUROMUSCULAR REEDUCATION: CPT

## 2020-08-25 PROCEDURE — 97110 THERAPEUTIC EXERCISES: CPT

## 2020-08-25 NOTE — PROGRESS NOTES
Daily Note     Today's date: 2020  Patient name: Randal Horn  : 1950  MRN: [id number]  Referring provider: Codie Chang MD  Dx:   Encounter Diagnosis     ICD-10-CM    1  Rupture of left Achilles tendon, subsequent encounter  S86 012D                   Subjective: Pt reports that he is improving overall  Feels that walking up stairs can still be difficult, going down is hard as well  Still moving just one step at a time  Objective: requires cueing for proper WS and B mod/max UE support w/ 3" step downs but is able to correct and perform w/ improved WB w/ continued reps  Assessment: Tolerated treatment well  Patient demonstrated fatigue post treatment and would benefit from continued PT  Does well w/ intro to heel raise and step down program  Pt educated on performing B heel raise (w/ UE support if necessary), 3x20 reps per day  Educated to not perform step downs on own just yet  Pt notes soreness in muscle but no pain by end of session  Progress as able  Plan: Continue per plan of care  middle of week 12, progress heel raise program as indicated      Precautions: Chronic rhinitis (2005), Elevated liver function tests, Herpes simplex type 1 infection, Hip arthritis, Hypertension, prostate cancer w/ resulting lymphedema and shoulder bursitis  [id number]? [id number]    Date (Visit #)  (11)  (12)   (13)  (14)    Manuals            DTM and TPR  x 10 mins to L ankle w/ effleurage for swelling    x10 min to L LE w/ effleurage  X 10 min to LLE  w effleurage for swelling  no   x10 min same area   Post TC joint mobs            MWM for DF                                        Exercise Diary        Ther Ex             Active w/u             PROM  X 4 min all direc  Gentle/ no heel cord stretching  X 4 min all direc  Gentle/no heel cord stretching   x2 min    Ankle isometrics  Inv/ev GTB sitting x 20 each, manual x 20 in supine  Inv/ev GTB 2 x 10 ea  & manual x 20 ea   inv/ev GTB 2 x 10 & manual x 20 ea  Review   review    Ankle 4 way tband         Circles x 20  Circles and ankle pumps x 10-15 each  Pumps and circles x 20 each    BAPS board lvl 3 inv/ev and controlled PF/DF  BAPS board lvl 3 inv/ev & controlled PF/DF  BAPS board lvl 4  X 30 ea inv/ev & controlled   Pf/df BAPS lvl3   DF/PF & med/lat x 5 min  BAPS lvl 2 DF and PF and med/lat x 4 mins     Seated heel raises x 30-40 total Seated heel raises x 30  Seated heel raises x30 total Seated heel raises x 30  x50 total       Standing WS in // bars reviewed  review    FOTO, ROM, MMT x 3-4 mins           Nustep lvl 3 x 10 min LE only NU step pre lvl 3-4 x 10 min   Neuro Re Ed             WS training            Heel raises       B standing heel raises 2x10 total      Tandem walking        3" step downs 3x5 w/ UE support     balance             airex              Gait training in // bars x 5 mins total Gait training to increase DF & pushoff  Gait training level surfaces no   // bars; added step forward/back in place w 1 UE support x 20  exag gait in // bars x 4-5 laps In // bars w/ focus on form x 5-6 laps, side step x 4-5 laps     8" ant step ups leading w/ L x 20 total 8" ant steps ups leading w/L x 20 total  8" ant sep ups leading w L x 20   Standing hip abd and ext x 10-15 each    Review of all standing kicks x 2-3 mins                               Ther Act        Gait            Stairs            Functional lifting/transfers              HEP and POC review x 2 mins    reviewed ice application w elevation     review of all x2-3 mins  review of all x 2-3 mins                                                                                                        Modalities             heat             Ice    home   elevated rath table x 10 min with ankle pumps  Elevated Rath table x 10 min      home   home

## 2020-08-27 ENCOUNTER — OFFICE VISIT (OUTPATIENT)
Dept: PHYSICAL THERAPY | Facility: CLINIC | Age: 70
End: 2020-08-27
Payer: COMMERCIAL

## 2020-08-27 DIAGNOSIS — S86.012D RUPTURE OF LEFT ACHILLES TENDON, SUBSEQUENT ENCOUNTER: Primary | ICD-10-CM

## 2020-08-27 PROCEDURE — 97110 THERAPEUTIC EXERCISES: CPT

## 2020-08-27 PROCEDURE — 97112 NEUROMUSCULAR REEDUCATION: CPT

## 2020-08-27 NOTE — PROGRESS NOTES
Daily Note     Today's date: 2020  Patient name: Maximiliano Ott  : 1950  MRN: [id number]  Referring provider: Keith Jaffe MD  Dx:   Encounter Diagnosis     ICD-10-CM    1  Rupture of left Achilles tendon, subsequent encounter  S86 012D                   Subjective: Pt reports fatigue but no increase in pain after last session  Heel/achilles felt "weird" yesterday, felt like he had moved in ways he wasn't used to  Objective: Requires UE support through heel raises and all but last two of 4" step downs  No pain noted throughout  Assessment: Tolerated treatment well  Patient demonstrated fatigue post treatment and would benefit from continued PT  Pt reports fatigue but no increase in pain by end of session  Shows improved form w/ both step downs and heel raises after continued reps  Pt shows good understanding of HEP and will continue to avoid descending full depth stairs at home for now  Progress per protocol  Plan: Continue per plan of care  week 13      Precautions: Chronic rhinitis (2005), Elevated liver function tests, Herpes simplex type 1 infection, Hip arthritis, Hypertension, prostate cancer w/ resulting lymphedema and shoulder bursitis  [email or web address]? [id number]    Date (Visit #)  (11)  (12)   (13)  (14)  (15)   Manuals            DTM and TPR  x 10 mins to L ankle w/ effleurage for swelling    x10 min to L LE w/ effleurage  X 10 min to LLE  w effleurage for swelling  no no   Post TC joint mobs            MWM for DF                                        Exercise Diary        Ther Ex             Active w/u             PROM  X 4 min all direc  Gentle/ no heel cord stretching  X 4 min all direc  Gentle/no heel cord stretching      Ankle isometrics  Inv/ev GTB sitting x 20 each, manual x 20 in supine  Inv/ev GTB 2 x 10 ea  & manual x 20 ea   inv/ev GTB 2 x 10 & manual x 20 ea  Review   review    Ankle 4 way tband         Circles x 20  Circles and ankle pumps x 10-15 each  Pumps and circles x 20 each    BAPS board lvl 3 inv/ev and controlled PF/DF  BAPS board lvl 3 inv/ev & controlled PF/DF  BAPS board lvl 4  X 30 ea inv/ev & controlled   Pf/df BAPS lvl3   DF/PF & med/lat x 5 min  BAPS lvl 3 DF/PF, inv/ev, and circles x 5 mins    Seated heel raises x 30-40 total Seated heel raises x 30  Seated heel raises x30 total Seated heel raises x 30  x40 total       Standing WS in // bars reviewed  review    FOTO, ROM, MMT x 3-4 mins           Nustep lvl 3 x 10 min LE only NU step pre lvl 4 x 10 min   Neuro Re Ed             WS training            Heel raises       B standing heel raises 2x10 total   2x10-15 total    Tandem walking        3" step downs 3x5 w/ UE support  3" step downs 3x5 w/ UE support    balance             airex              Gait training in // bars x 5 mins total Gait training to increase DF & pushoff  Gait training level surfaces no   // bars; added step forward/back in place w 1 UE support x 20  exag gait in // bars x 4-5 laps Review of gait in // bars     8" ant step ups leading w/ L x 20 total 8" ant steps ups leading w/L x 20 total  8" ant sep ups leading w L x 20   8" ant step up w/ glute drive x 30 leading w/ L    Review of all standing kicks x 2-3 mins                               Ther Act        Gait            Stairs            Functional lifting/transfers              HEP and POC review x 2 mins    reviewed ice application w elevation     review of all x2-3 mins Review of program x 4 mins                                                                                                        Modalities             heat             Ice    home   elevated rath table x 10 min with ankle pumps  Elevated Rath table x 10 min      home   home

## 2020-09-01 ENCOUNTER — OFFICE VISIT (OUTPATIENT)
Dept: PHYSICAL THERAPY | Facility: CLINIC | Age: 70
End: 2020-09-01
Payer: COMMERCIAL

## 2020-09-01 DIAGNOSIS — S86.012D RUPTURE OF LEFT ACHILLES TENDON, SUBSEQUENT ENCOUNTER: Primary | ICD-10-CM

## 2020-09-01 PROCEDURE — 97110 THERAPEUTIC EXERCISES: CPT

## 2020-09-01 PROCEDURE — 97140 MANUAL THERAPY 1/> REGIONS: CPT

## 2020-09-01 PROCEDURE — 97112 NEUROMUSCULAR REEDUCATION: CPT

## 2020-09-01 NOTE — PROGRESS NOTES
Daily Note     Today's date: 2020  Patient name: Skipper Kayser  : 1950  MRN: [id number]  Referring provider: Jennifer Whitney MD  Dx:   Encounter Diagnosis     ICD-10-CM    1  Rupture of left Achilles tendon, subsequent encounter  S86 012D                   Subjective: Pt reports that L ankle continues to feel swollen, doesn't hurt but pt feels that it shouldn't be swelling this far into rehab  Feels he is improving otherwise  Objective: Pt requires decreasing amounts of UE support w/ 3" step downs during second set, no pain noted  L LE mid calf diameter 42 cm, R side 39 cm  Figure 8 L side 62 cm, L side 59 cm  No tenderness about calf  Assessment: Tolerated treatment well  Patient demonstrated fatigue post treatment and would benefit from continued PT  Fatigue but no increase in pain by end of session  Does well w/ progressions based on protocol  Pt was reassured multiple times that he       Plan: Continue per plan of care  Precautions: Chronic rhinitis (2005), Elevated liver function tests, Herpes simplex type 1 infection, Hip arthritis, Hypertension, prostate cancer w/ resulting lymphedema and shoulder bursitis  [id number]? [id number]    Date (Visit #)  (16)   (13)  (14)  (15)   Manuals            DTM and TPR  x 10 mins to L ankle w/ effleurage for swelling    x10 min to L LE w/ effleurage  X 10 min to LLE  w effleurage for swelling  no no   Post TC joint mobs            MWM for DF                                        Exercise Diary        Ther Ex             Active w/u             PROM  X 4 min all direc  Gentle/ no heel cord stretching  X 4 min all direc  Gentle/no heel cord stretching      Ankle isometrics  Review  Inv/ev GTB 2 x 10 ea  & manual x 20 ea   inv/ev GTB 2 x 10 & manual x 20 ea  Review   review    Ankle 4 way tband           Circles and ankle pumps x 10-15 each  Pumps and circles x 20 each     BAPS board lvl 3 inv/ev & controlled PF/DF  BAPS board lvl 4  X 30 ea inv/ev & controlled   Pf/df BAPS lvl3   DF/PF & med/lat x 5 min  BAPS lvl 3 DF/PF, inv/ev, and circles x 5 mins    Seated heel raises x 30-40 total Seated heel raises x 30  Seated heel raises x30 total Seated heel raises x 30  x40 total       Standing WS in // bars reviewed  review            NU step pre lvl 3-4 x 10 min LE only    Nustep lvl 3 x 10 min LE only NU step pre lvl 4 x 10 min   Neuro Re Ed             WS training            Heel raises  standing x20 total, education in 2x20 more sets for home      B standing heel raises 2x10 total   2x10-15 total    Tandem walking  3" step downs 2x10 total min UE support       3" step downs 3x5 w/ UE support  3" step downs 3x5 w/ UE support    balance             airex              Gait training outside of bars x 2-3 mins  Gait training to increase DF & pushoff  Gait training level surfaces no   // bars; added step forward/back in place w 1 UE support x 20  exag gait in // bars x 4-5 laps Review of gait in // bars     8" ant step ups leading w/ L x 30 total 8" ant steps ups leading w/L x 20 total  8" ant sep ups leading w L x 20   8" ant step up w/ glute drive x 30 leading w/ L    Review of all standing kicks x 2-3 mins                               Ther Act        Gait            Stairs            Functional lifting/transfers              HEP and POC review x 2 mins    reviewed ice application w elevation     review of all x2-3 mins Review of program x 4 mins                                                                                                        Modalities             heat             Ice    home   elevated rath table x 10 min with ankle pumps  Elevated Rath table x 10 min      home   home

## 2020-09-03 ENCOUNTER — OFFICE VISIT (OUTPATIENT)
Dept: PHYSICAL THERAPY | Facility: CLINIC | Age: 70
End: 2020-09-03
Payer: COMMERCIAL

## 2020-09-03 DIAGNOSIS — S86.012D RUPTURE OF LEFT ACHILLES TENDON, SUBSEQUENT ENCOUNTER: Primary | ICD-10-CM

## 2020-09-03 PROCEDURE — 97140 MANUAL THERAPY 1/> REGIONS: CPT

## 2020-09-03 PROCEDURE — 97110 THERAPEUTIC EXERCISES: CPT

## 2020-09-03 PROCEDURE — 97112 NEUROMUSCULAR REEDUCATION: CPT

## 2020-09-03 NOTE — PROGRESS NOTES
Daily Note     Today's date: 9/3/2020  Patient name: Calos Cerrato  : 1950  MRN: [id number]  Referring provider: Kady Canales MD  Dx:   Encounter Diagnosis     ICD-10-CM    1  Rupture of left Achilles tendon, subsequent encounter  S86 012D                   Subjective: Pt reports no new complaints  Feels that stairs are difficult but getting better  Notices injury less and less, feels he is improving  Objective: Pt requires decreasing amount of UE support w/ 3" step downs, plan to bump to 6" at next session  Assessment: Tolerated treatment well  Patient demonstrated fatigue post treatment and would benefit from continued PT  Pt reports fatigue but no increase in pain by end of session, he does well w/ continued increases per protocol  Educated on importance of continuing w/ 3x20 heel raises on own, verbalizes understanding  Progress to next phase of program barring setback at next session  Plan: Continue per plan of care  6" step downs, next phase of heel raise progressions - week 14     Precautions: Chronic rhinitis (2005), Elevated liver function tests, Herpes simplex type 1 infection, Hip arthritis, Hypertension, prostate cancer w/ resulting lymphedema and shoulder bursitis  [email or web address]? [id number]    Date (Visit #)  (16) 9/3 (17)   (14)  (15)   Manuals            DTM and TPR  x 10 mins to L ankle w/ effleurage for swelling   x10 min to entire L distal LE w/ effleurage   X 10 min to LLE  w effleurage for swelling  no no   Post TC joint mobs            MWM for DF                                        Exercise Diary        Ther Ex             Active w/u             PROM   X 4 min all direc  Gentle/no heel cord stretching      Ankle isometrics  Review  Review   inv/ev GTB 2 x 10 & manual x 20 ea  Review   review    Ankle 4 way tband           Circles and ankle pumps x 10-15 each  Pumps and circles x 20 each     BAPS lvl 3  -inv/ev, DF/PF and circles x 20 each BAPS board lvl 4  X 30 ea inv/ev & controlled   Pf/df BAPS lvl3   DF/PF & med/lat x 5 min  BAPS lvl 3 DF/PF, inv/ev, and circles x 5 mins    Seated heel raises x 30-40 total Seated heel raises x 30  Seated heel raises x30 total Seated heel raises x 30  x40 total       Standing WS in // bars reviewed  review            NU step pre lvl 3-4 x 10 min LE only  NU step pre 10 min lvl 4-5 LE only   Nustep lvl 3 x 10 min LE only NU step pre lvl 4 x 10 min   Neuro Re Ed             WS training            Heel raises  standing x20 total, education in 2x20 more sets for home  Standing x 20 w/ demo and practice w/ controled ecc     B standing heel raises 2x10 total   2x10-15 total    Tandem walking  3" step downs 2x10 total min UE support   x25-30 total w/ min to no UE support     3" step downs 3x5 w/ UE support  3" step downs 3x5 w/ UE support    balance             airex              Gait training outside of bars x 2-3 mins  Review of proper gait x 2-3 mins  Gait training level surfaces no   // bars; added step forward/back in place w 1 UE support x 20  exag gait in // bars x 4-5 laps Review of gait in // bars     8" ant step ups leading w/ L x 30 total Review of stairs   8" ant sep ups leading w L x 20   8" ant step up w/ glute drive x 30 leading w/ L    Review of all standing kicks x 2-3 mins                               Ther Act        Gait            Stairs            Functional lifting/transfers              HEP and POC review x 2 mins      review of all x2-3 mins Review of program x 4 mins                                                                                                        Modalities             heat             Ice    home  home Elevated Rath table x 10 min      home   home

## 2020-09-08 ENCOUNTER — OFFICE VISIT (OUTPATIENT)
Dept: PHYSICAL THERAPY | Facility: CLINIC | Age: 70
End: 2020-09-08
Payer: COMMERCIAL

## 2020-09-08 DIAGNOSIS — S86.012D RUPTURE OF LEFT ACHILLES TENDON, SUBSEQUENT ENCOUNTER: Primary | ICD-10-CM

## 2020-09-08 PROCEDURE — 97110 THERAPEUTIC EXERCISES: CPT

## 2020-09-08 PROCEDURE — 97112 NEUROMUSCULAR REEDUCATION: CPT

## 2020-09-08 PROCEDURE — 97140 MANUAL THERAPY 1/> REGIONS: CPT

## 2020-09-08 NOTE — PROGRESS NOTES
Daily Note     Today's date: 2020  Patient name: Flory Morales  : 1950  MRN: [id number]  Referring provider: Jason Mckinney MD  Dx:   Encounter Diagnosis     ICD-10-CM    1  Rupture of left Achilles tendon, subsequent encounter  S86 012D                   Subjective: Pt reports that his calf and heel are a little sore today, had a busy weekend with grand kids  Taking care to avoid excessive activity or standing  Feels that swelling in L distal LE is not going away  Objective: requires cueing and mod/max UE support w/ attempts at single leg loweirng on L side x2-3 reps, changed to WS towards L w/ controlled ecc w/ improved form and no pain  Assessment: Tolerated treatment well  Patient demonstrated fatigue post treatment and would benefit from continued PT  Pt reports fatigue but no increase in pain by end  Does well w/ progressions to strengthening and stair program per protocol  Pt educated in phase II for home, will review this in future sessions  Given handout detailing all phases of heel raise program  Edema through L LE was more boggy as compared to previous sessions  I do not have experience in lymphatic massage but pt noted that this was similar to how leg looked after radiation treatments for prostate cancer  He had been reocmmended to trial compression stockings by MD at last visit, I mentioned that this would be a good thing to do prior to next MD visit next week to determine if this is effective or if other treatment options need to be explored  Pt verbalizes understanding  Plan: Continue per plan of care  week 15 per protocol, re-assessment, check single leg lowering on L side only        Precautions: Chronic rhinitis (2005), Elevated liver function tests, Herpes simplex type 1 infection, Hip arthritis, Hypertension, prostate cancer w/ resulting lymphedema and shoulder bursitis  [id number]    Date (Visit #) 9/1 (16) 9/3 (17) 9/8 (18)  8/27 (15)   Manuals            DTM and TPR  x 10 mins to L ankle w/ effleurage for swelling   x10 min to entire L distal LE w/ effleurage   X 12 min to LLE  w/ effleurage for swelling  no no   Post TC joint mobs            MWM for DF                                        Exercise Diary        Ther Ex             Active w/u             PROM        Ankle isometrics  Review  Review   Review   review    Ankle 4 way tband             Pumps and circles x 20 each     BAPS lvl 3  -inv/ev, DF/PF and circles x 20 each BAPS board lvl 3  X 30 ea inv/ev & PF/DF, circles  BAPS lvl3   DF/PF & med/lat x 5 min  BAPS lvl 3 DF/PF, inv/ev, and circles x 5 mins    Seated heel raises x 30-40 total Seated heel raises x 30  Seated heel raises x30 total Seated heel raises x 30  x40 total       Standing WS in // bars reviewed  review            NU step pre lvl 3-4 x 10 min LE only  NU step pre 10 min lvl 4-5 LE only  NU step pre 8 min lvl 5 LE only  Nustep lvl 3 x 10 min LE only NU step pre lvl 4 x 10 min   Neuro Re Ed             WS training            Heel raises  standing x20 total, education in 2x20 more sets for home  Standing x 20 w/ demo and practice w/ controled ecc   reg 2x10, x10 w/ double leg lift and L leg lowering   B standing heel raises 2x10 total   2x10-15 total    Tandem walking  3" step downs 2x10 total min UE support   x25-30 total w/ min to no UE support   3" step downs x10-15 total, 6" x10 total (2x5) w/ mod UE support progressing to min  3" step downs 3x5 w/ UE support  3" step downs 3x5 w/ UE support    balance             airex              Gait training outside of bars x 2-3 mins  Review of proper gait x 2-3 mins  Review of proper gait mechanics  exag gait in // bars x 4-5 laps Review of gait in // bars     8" ant step ups leading w/ L x 30 total Review of stairs     8" ant step up w/ glute drive x 30 leading w/ L    Review of all standing kicks x 2-3 mins                               Ther Act        Gait            Stairs            Functional lifting/transfers              HEP and POC review x 2 mins      review of all x2-3 mins Review of program x 4 mins                                                                                                        Modalities             heat             Ice    home  home home   home   home

## 2020-09-10 ENCOUNTER — EVALUATION (OUTPATIENT)
Dept: PHYSICAL THERAPY | Facility: CLINIC | Age: 70
End: 2020-09-10
Payer: COMMERCIAL

## 2020-09-10 DIAGNOSIS — S86.012D RUPTURE OF LEFT ACHILLES TENDON, SUBSEQUENT ENCOUNTER: Primary | ICD-10-CM

## 2020-09-10 PROCEDURE — 97112 NEUROMUSCULAR REEDUCATION: CPT

## 2020-09-10 PROCEDURE — 97110 THERAPEUTIC EXERCISES: CPT

## 2020-09-10 NOTE — PROGRESS NOTES
Progress Note     Today's date: 9/10/2020  Patient name: Chiara Ragland  : 1950  MRN: [id number]  Referring provider: Tereso Wing MD  Dx:   Encounter Diagnosis     ICD-10-CM    1  Rupture of left Achilles tendon, subsequent encounter  S86 012D                   Subjective: Pt reports that L distal leg has felt much better over the last two days  Stairs are still a big challenge but he feels he is walking better without as much of a limp  Feels that it takes less time for ankle/Achilles to loosen up in morning  Notes that transition to L sided lowering on heel raises is difficult but he feels this is helping  Functional update below:       Goals  Short term goals (3 weeks): ALL PROGRESSED   1) Pt will improve L ankle A ROM to by 25% or to within protocol restrictions pain free  2) Pt will improve B LE and core strength deficits by 1/3 grade MMT  3) Pt will reports pain at worse <5/10  - achieved (change to <3/10)  4) Pt will initiate and progress HEP w/ special emphasis on L ankle and LE functional ankle mobility and strength  Long term goals (6 weeks) ALL PROGRESSED   1) Pt will improve FOTO to at least 50   2) Pt will negotiate on full flight of stairs w/ min UE support, step over step gait pattern, and no pain  3) Pt will stand and ambulate for durations of at least 15 min w/o AD, normalized gait mechanics and no pain  4) Pt will be independent and compliant w/ HEP in order to maximize functional benefit of skilled PT following d/c  STG #2 updated to read "abolish all complaints of pain " - progressed   STG #4 updated to read "Pt will progress HEP w/ special emphasis on functional strength and proper movement patterns  " - progressed  LTG #1 updated to read "improve FOTO to at least 71 " - progressed     New Goals 9/10/20:   Short term goals (3 weeks):   1) Pt will improve L ankle A ROM to WNL w/o pain  2) Pt will further improve B LE and core strength deficits by 1/3 grade MMT     3) Pt will abolish all complaints of pain  4) Pt will progress HEP w/ special emphasis on calf strengthening and normalizing gait mechanics  Long term goals (6 weeks)   1) Pt will improve FOTO to at least 75   2) Pt will negotiate on full flight of stairs w/ min UE support, step over step gait pattern, and no pain  3) Pt will stand and ambulate for durations of at least 15 min w/o AD, normalized gait mechanics and no pain  4) Pt will be independent and compliant w/ HEP in order to maximize functional benefit of skilled PT following d/c  Pain  Current pain ratin  At best pain ratin  At worst pain ratin-1  Location: L Achilles along insertion into calcaneous   Quality: dull ache, burning and sharp  Relieving factors: ice, rest and support  Aggravating factors: standing, walking, stair climbing, overhead activity, lifting and running  Progression: improved     Social Support  Steps to enter house: yes  Stairs in house: yes   Lives in: multiple-level home  Lives with: spouse    Employment status: not working (retired  )  Treatments  Previous treatment: physical therapy  Patient Goals  Patient goals for therapy: increased strength, decreased pain, increased motion and return to sport/leisure activities  Patient goal: be able to walk, run, swim, roberto grand kids without pain!         Objective     Palpation     Additional Palpation Details  Min TTP around area of original Achilles injury, bruising evident along base of L hindfoot laterally>medially    -: continued in same areas to lesser extent    -: continued    -: very minimal TTP over L lateral malleolus    -9/10: no significant TTP throughout L LE today    Neurological Testing     Sensation     Ankle/Foot   Left Ankle/Foot   Intact: light touch    Right Ankle/Foot   Intact: light touch     Active Range of Motion   Left Ankle/Foot   Dorsiflexion (ke): 7-8 degrees   Plantar flexion: 45-50 degrees   Inversion: 25 degrees Eversion: 7-10 degrees     Right Ankle/Foot   Normal active range of motion    Passive Range of Motion   Left Ankle/Foot    Dorsiflexion (ke): 10-12 degrees   Plantar flexion: 50 degrees   Inversion: 25-30 degrees   Eversion: 10-14 degrees     Right Ankle/Foot  Normal passive range of motion    Joint Play     Additional Joint Play Details  Not assessed today    Strength/Myotome Testing     Left Ankle/Foot   Dorsiflexion: 4-  Plantar flexion: 4-  Inversion: 4  Eversion: 4    Right Ankle/Foot   Dorsiflexion: 4+  Plantar flexion: 4+  Inversion: 4  Eversion: 4+    Tests     Additional Tests Details  Deferred s/t knowledge of diagnosis       Swelling   Right Ankle/Foot   Figure 8: 57 5 cm    Left Ankle/Foot   Figure 8: 60 5 cm    7/23: swelling not specifically measured but less visible edema as compared to IE      7/30: same swelling as IE    8/13: decreased since IE but not specifically measured today    9/10: last measurement was 9/1 and was the same difference as IE     Ambulation     Ambulation: Level Surfaces     Additional Level Surfaces Ambulation Details  Two point step through w/ crutches, forward flexed through shoulders and upper tspine, decreased pace, fatigues quickly  -7/23: Pt ambulates w/ PWB an B axillary crutches, improved upright posture and pacing, better stamina throughout - additional wedge removed today   -7/30: able to ambulate independently w/ CAM boot   In // bars and sneakers is able to ambulate w/ 50% WB on L LE and heavy UE support    -8/13: through clinical distances in sneaker and heel cup w/o UE support or pain     -9/10: Pt able to walk through clinical distances w/ sneaker w/ heel cup, improved heel strike and toe off, improved lumbar rotation     Functional Assessment        Comments  Sit<>stand: requires UE support and momentum, no WB through L LE, quick fatigue    BW squat: unable     Stairs: reviewed    SLS: unable    7/30: requires UE support and max WB through R LE for sit<>stand, other functional activities not assessed     9/10: sit<>stand w/o UE support, 10x double leg heel raise through 75% w/ min UE support, unable to perform single leg heel raise on L side as of yet, SLS on L x 10 sec before LOB           Assessment: Tolerated treatment well  Patient demonstrated fatigue post treatment and would benefit from continued PT  Pt continues to make good progress in rehab  He has improved significantly in his ability to support and bear weight on L LE, showing good progress w/ heel raise progressions  Gait, functional ROM, and total LE strength are all improving  His pain is very well controlled  We will continue on 2x/week basis w/ heavy emphasis on increasing functional strength grades as sx allow, pt in agreement w/ this plan  Plan: Continue per plan of care  week 2 of phase II of heel raise program, week 16 next week     Precautions: Chronic rhinitis (02/05/2005), Elevated liver function tests, Herpes simplex type 1 infection, Hip arthritis, Hypertension, prostate cancer w/ resulting lymphedema and shoulder bursitis  [id number]    Date (Visit #) 9/1 (16) 9/3 (17) 9/8 (18) 9/10 (19)    Manuals            DTM and TPR  x 10 mins to L ankle w/ effleurage for swelling   x10 min to entire L distal LE w/ effleurage   X 12 min to LLE  w/ effleurage for swelling  no no   Post TC joint mobs            MWM for DF                                        Exercise Diary        Ther Ex             Active w/u             PROM        Ankle isometrics  Review  Review   Review   review    Ankle 4 way tband             Pumps and circles x 20 each     BAPS lvl 3  -inv/ev, DF/PF and circles x 20 each BAPS board lvl 3  X 30 ea inv/ev & PF/DF, circles  BAPS lvl3   DF/PF & med/lat x 5 min  BAPS lvl 3 DF/PF, inv/ev, and circles x 5 mins    Seated heel raises x 30-40 total Seated heel raises x 30  Seated heel raises x30 total Seated heel raises x 30-40 total x40 total        review            NU step pre lvl 3-4 x 10 min LE only  NU step pre 10 min lvl 4-5 LE only  NU step pre 8 min lvl 5 LE only  Nustep lvl 4-5 x 10 min LE only NU step pre lvl 4 x 10 min   Neuro Re Ed             WS training            Heel raises  standing x20 total, education in 2x20 more sets for home  Standing x 20 w/ demo and practice w/ controled ecc   reg 2x10, x10 w/ double leg lift and L leg lowering   B standing heel raises 2x10 total, double raise w/ L lowering x 10   Review of program for weekend x 2-3 mins   2x10-15 total    Tandem walking  3" step downs 2x10 total min UE support   x25-30 total w/ min to no UE support   3" step downs x10-15 total, 6" x10 total (2x5) w/ mod UE support progressing to min  3" x20 total, 6" 2x5" step downs   3" step downs 3x5 w/ UE support    balance        8" step ups leading w/ L x 20 w/ min UE support      airex              Gait training outside of bars x 2-3 mins  Review of proper gait x 2-3 mins  Review of proper gait mechanics  Review of proper gait  Review of gait in // bars     8" ant step ups leading w/ L x 30 total Review of stairs     8" ant step up w/ glute drive x 30 leading w/ L    Review of all standing kicks x 2-3 mins   FOTO, ROM, MMT x 3-4 mins total                            Ther Act        Gait            Stairs            Functional lifting/transfers             HEP and POC review x 2 mins     Review of program x 4 mins                                                                                                        Modalities             heat             Ice    home  home home   home   home

## 2020-09-15 ENCOUNTER — OFFICE VISIT (OUTPATIENT)
Dept: PHYSICAL THERAPY | Facility: CLINIC | Age: 70
End: 2020-09-15
Payer: COMMERCIAL

## 2020-09-15 DIAGNOSIS — S86.012D RUPTURE OF LEFT ACHILLES TENDON, SUBSEQUENT ENCOUNTER: Primary | ICD-10-CM

## 2020-09-15 PROCEDURE — 97112 NEUROMUSCULAR REEDUCATION: CPT

## 2020-09-15 PROCEDURE — 97110 THERAPEUTIC EXERCISES: CPT

## 2020-09-15 NOTE — PROGRESS NOTES
Daily Note     Today's date: 9/15/2020  Patient name: Lv Jane  : 1950  MRN: [id number]  Referring provider: Gemma Faria MD  Dx:   Encounter Diagnosis     ICD-10-CM    1  Rupture of left Achilles tendon, subsequent encounter  S86 012D                   Subjective: Pt reports he has continued to feel better over the weekend  Feels that walking is getting a lot easier  Pain has been very under control  Objective: Requires UE support and cueing for pacing during heel raises w/ L sided lowering  Improved control as compared to last week but unable to perform independently through any range as of yet  Assessment: Tolerated treatment well  Patient demonstrated fatigue post treatment and would benefit from continued PT  Pt notes fatigue but no increase in pain by end of session  Does well w/ continued heel raise and stair progressions  Will be appropriate for continued increases in challenge  Noted to have improved control during 6" step downs today as compared to previous session, no pain noted throughout  Plan: Continue per plan of care  week 2 of phase II of heel raise program this week, week 16      Precautions: Chronic rhinitis (2005), Elevated liver function tests, Herpes simplex type 1 infection, Hip arthritis, Hypertension, prostate cancer w/ resulting lymphedema and shoulder bursitis  [id number]? [id number]    Date (Visit #)  (16) 9/3 (17)  (18) 9/10 (19) 9/15 (20)   Manuals            DTM and TPR  x 10 mins to L ankle w/ effleurage for swelling   x10 min to entire L distal LE w/ effleurage   X 12 min to LLE  w/ effleurage for swelling  no no   Post TC joint mobs            MWM for DF                                        Exercise Diary        Ther Ex             Active w/u             PROM        Ankle isometrics  Review  Review   Review   review    Ankle 4 way tband BAPS lvl 3  -inv/ev, DF/PF and circles x 20 each BAPS board lvl 3  X 30 ea inv/ev & PF/DF, circles  BAPS lvl3   DF/PF & med/lat x 5 min  BAPs lvl 3 all directions and circles x 3-4 min total     Seated heel raises x 30-40 total Seated heel raises x 30  Seated heel raises x30 total Seated heel raises x 30-40 total Seated x 30 total         review            NU step pre lvl 3-4 x 10 min LE only  NU step pre 10 min lvl 4-5 LE only  NU step pre 8 min lvl 5 LE only  Nustep lvl 4-5 x 10 min LE only NU step pre lvl 4-5 x 10 min   Neuro Re Ed             WS training            Heel raises  standing x20 total, education in 2x20 more sets for home  Standing x 20 w/ demo and practice w/ controled ecc   reg 2x10, x10 w/ double leg lift and L leg lowering   B standing heel raises 2x10 total, double raise w/ L lowering x 10   Review of program for weekend x 2-3 mins  Reg heel raises x10, w/ L sided lowering x 10, x10 more reg    Tandem walking  3" step downs 2x10 total min UE support   x25-30 total w/ min to no UE support   3" step downs x10-15 total, 6" x10 total (2x5) w/ mod UE support progressing to min  3" x20 total, 6" 2x5" step downs  3" step down x 15, 6" step down x 2x10 total w/ decreasing UE support    balance        8" step ups leading w/ L x 20 w/ min UE support   10" step ups on L w/ min UE support 2x10-5    airex              Gait training outside of bars x 2-3 mins  Review of proper gait x 2-3 mins  Review of proper gait mechanics  Review of proper gait  exag gait x 5 laps, focus on heel strike in // bars     8" ant step ups leading w/ L x 30 total Review of stairs     pball squats w/ focus on glute 2x10-15     Review of all standing kicks x 2-3 mins   FOTO, ROM, MMT x 3-4 mins total         Review of HEP and POC x 2-3 min                   Ther Act        Gait            Stairs            Functional lifting/transfers             HEP and POC review x 2 mins Modalities             heat             Ice    home  home home   home   home

## 2020-09-17 ENCOUNTER — OFFICE VISIT (OUTPATIENT)
Dept: PHYSICAL THERAPY | Facility: CLINIC | Age: 70
End: 2020-09-17
Payer: COMMERCIAL

## 2020-09-17 DIAGNOSIS — S86.012D RUPTURE OF LEFT ACHILLES TENDON, SUBSEQUENT ENCOUNTER: Primary | ICD-10-CM

## 2020-09-17 PROCEDURE — 97140 MANUAL THERAPY 1/> REGIONS: CPT

## 2020-09-17 PROCEDURE — 97110 THERAPEUTIC EXERCISES: CPT

## 2020-09-17 PROCEDURE — 97112 NEUROMUSCULAR REEDUCATION: CPT

## 2020-09-17 NOTE — PROGRESS NOTES
Daily Note     Today's date: 2020  Patient name: Dena Michel  : 1950  MRN: [id number]  Referring provider: Chantel Palma MD  Dx:   Encounter Diagnosis     ICD-10-CM    1  Rupture of left Achilles tendon, subsequent encounter  S86 012D                   Subjective: Pt reports fatigue but no pain following last session  MD follow up tomorrow  Pt feels that swelling has been slightly better lately  Notes that he has not been wearing heel cup over the last few days and his heel feels much better  Objective: L figure 8 measurement: 60 25 cm, was 60 5 cm a week ago  10x double leg heel raise through 50-75% w/ min UE support  Unable to perform single leg heel raise and requires mod UE support w/ L side lowering       Assessment: Tolerated treatment well  Patient demonstrated fatigue post treatment and would benefit from continued PT  Pt has done well w/ progressions to date  His strength is improving in accordance w/ protocol, edema through distal L LE has been better over last two visits, and his gait has improved tremendously  Stairs remain the greatest challenge, but he has progressed to 6-8" step downs w/ min/mod UE support and fair control  Will check results of MD visit and progress accordingly  Plan: Continue per plan of care  week 17, start phase II     Precautions: Chronic rhinitis (2005), Elevated liver function tests, Herpes simplex type 1 infection, Hip arthritis, Hypertension, prostate cancer w/ resulting lymphedema and shoulder bursitis  [id number]    Date (Visit #)  (21)   (18) 9/10 (19) 9/15 (20)   Manuals            DTM and TPR  x 10 mins to L ankle w/ effleurage for swelling   x10 min to entire L distal LE w/ effleurage   X 12 min to LLE  w/ effleurage for swelling  no no   Post TC joint mobs            MWM for DF Exercise Diary        Ther Ex             Active w/u  NU step pre x10 min lvl 5           PROM        Ankle isometrics   Review   Review   review    Ankle 4 way tband                 No BAPS BAPS lvl 3  -inv/ev, DF/PF and circles x 20 each BAPS board lvl 3  X 30 ea inv/ev & PF/DF, circles  BAPS lvl3   DF/PF & med/lat x 5 min  BAPs lvl 3 all directions and circles x 3-4 min total     Seated heel raises x 20 total Seated heel raises x 30  Seated heel raises x30 total Seated heel raises x 30-40 total Seated x 30 total         review            NU step pre lvl 3-4 x 10 min LE only  NU step pre 10 min lvl 4-5 LE only  NU step pre 8 min lvl 5 LE only  Nustep lvl 4-5 x 10 min LE only NU step pre lvl 4-5 x 10 min   Neuro Re Ed             WS training            Heel raises Standing heel raises 2x10, w/ L sided lowering x10  Standing x 20 w/ demo and practice w/ controled ecc   reg 2x10, x10 w/ double leg lift and L leg lowering   B standing heel raises 2x10 total, double raise w/ L lowering x 10   Review of program for weekend x 2-3 mins  Reg heel raises x10, w/ L sided lowering x 10, x10 more reg    Tandem walking  3" step downs x20 w/ min UE support, x6-8" w/ min UE support x10-15   x25-30 total w/ min to no UE support   3" step downs x10-15 total, 6" x10 total (2x5) w/ mod UE support progressing to min  3" x20 total, 6" 2x5" step downs  3" step down x 15, 6" step down x 2x10 total w/ decreasing UE support    balance        8" step ups leading w/ L x 20 w/ min UE support   10" step ups on L w/ min UE support 2x10-5    airex              Review of gait, POC, exercises for weekend x 3-4 mins Review of proper gait x 2-3 mins  Review of proper gait mechanics  Review of proper gait  exag gait x 5 laps, focus on heel strike in // bars      Review of stairs     pball squats w/ focus on glute 2x10-15        FOTO, ROM, MMT x 3-4 mins total         Review of HEP and POC x 2-3 min                   Ther Act        Gait Stairs            Functional lifting/transfers                                                                                                                     Modalities             heat             Ice    home  home home   home   home

## 2020-09-18 ENCOUNTER — OFFICE VISIT (OUTPATIENT)
Dept: OBGYN CLINIC | Facility: CLINIC | Age: 70
End: 2020-09-18
Payer: COMMERCIAL

## 2020-09-18 VITALS
DIASTOLIC BLOOD PRESSURE: 70 MMHG | BODY MASS INDEX: 30.62 KG/M2 | HEART RATE: 64 BPM | WEIGHT: 231 LBS | HEIGHT: 73 IN | SYSTOLIC BLOOD PRESSURE: 122 MMHG | TEMPERATURE: 96.8 F

## 2020-09-18 DIAGNOSIS — S86.012D RUPTURE OF LEFT ACHILLES TENDON, SUBSEQUENT ENCOUNTER: Primary | ICD-10-CM

## 2020-09-18 DIAGNOSIS — K43.9 ABDOMINAL WALL HERNIA: ICD-10-CM

## 2020-09-18 PROCEDURE — 99213 OFFICE O/P EST LOW 20 MIN: CPT | Performed by: ORTHOPAEDIC SURGERY

## 2020-09-18 NOTE — PROGRESS NOTES
Assessment/Plan:  1  Rupture of left Achilles tendon, subsequent encounter  Ambulatory referral to Physical Therapy    CANCELED: Ambulatory referral to Physical Therapy   2  Abdominal wall hernia  Ambulatory referral to General Surgery       Scribe Attestation    I,:   Krista Pacheco am acting as a scribe while in the presence of the attending physician :        I,:   Carson Linares MD personally performed the services described in this documentation    as scribed in my presence :              Laly Barclay upon examination and review of his left ankle appears to be doing very well with non operative treatment of the Achilles tendon rupture that he suffered approximately 16 weeks ago  I would like him to continue with physical therapy over the next 2 weeks and to be graduated to a home exercise program afterwards  I did provide him with a new physical therapy prescription dictating this today  He may continue with activities of daily living, and activities as tolerated  Laly Barclay verbalized understanding to all information provided to him today  Am may follow up with me on as-needed basis in regards to his left Achilles  Laly Barclay does also demonstrate signs and symptoms as concerning for an abdominal hernia the mass is soft, mobile, and nontender and measures approximately 4 x 4 cm  It is superior and lateral to the left of the umbilicus  With this finding I would like to refer him to Dr Meena Grullon of general surgery for consultation and potential treatment  I did provide him a referral for this today  Chantal Bravo verbalize understanding to all information provided to him today had no further questions in regards to the potential abdominal hernia, and may follow up with me on as-needed basis  Subjective:    Diogo Contreras is a 79 y o  male who presents to the office today for follow-up evaluation of his left Achilles tendon rupture treated non operatively now 16 weeks status post   He notes that he is doing very well and is able to ambulate with little difficulty  He has discontinue the use the heel cups and is able ambulate with regular shoes  He denies any pain, recurrence of swelling into his lower extremity in relation to the Achilles tendon rupture  He does have a history of lymphedema  He notes that physical therapy has been beneficial and notes that he has been improving in strength however does still have some weakness  Today he denies any distal paresthesias  He remarks that he does also have complaints of a mass to his belly that he is not sure how it occurred  He notes that it has been present for a couple weeks  He denies any activities such as excessive lifting over the past couple weeks that may have resulted in this belly mass  He states he does have a history of a hernia the past   He denies that the mass is painful and denies any discoloration to his abdomen  Review of Systems   Constitutional: Negative for chills, fever and unexpected weight change  HENT: Negative for hearing loss, nosebleeds and sore throat  Eyes: Negative for pain, redness and visual disturbance  Respiratory: Negative for cough, shortness of breath and wheezing  Cardiovascular: Negative for chest pain, palpitations and leg swelling  Gastrointestinal: Negative for abdominal pain, nausea and vomiting  Endocrine: Negative for polyphagia and polyuria  Genitourinary: Negative for dysuria and hematuria  Musculoskeletal: Negative for arthralgias, joint swelling and myalgias  See HPI   Skin: Negative for rash and wound  Neurological: Negative for dizziness, numbness and headaches  Psychiatric/Behavioral: Negative for decreased concentration and suicidal ideas  The patient is not nervous/anxious            Past Medical History:   Diagnosis Date    Chronic rhinitis 02/05/2005    last assessed 2/5/05, resolved 5/18/17     Elevated liver function tests     last assessed 3/9/15, resolved 5/18/17     Herpes simplex type 1 infection     Hip arthritis     resolved 12/20/17     Hypertension     Shoulder bursitis     last assessed 9/23/13, resolved 5/18/17        Past Surgical History:   Procedure Laterality Date    HERNIA REPAIR      Inguinal sliding  10/2018    PROSTATOTOMY      last assessed 1/11/18       Family History   Problem Relation Age of Onset    Hypertension Mother     Lung cancer Family     Prostate cancer Family     Mental illness Neg Hx        Social History     Occupational History    Not on file   Tobacco Use    Smoking status: Never Smoker    Smokeless tobacco: Never Used   Substance and Sexual Activity    Alcohol use: Yes     Frequency: Monthly or less     Drinks per session: 1 or 2     Binge frequency: Never    Drug use: Never    Sexual activity: Not on file         Current Outpatient Medications:     acetaminophen (TYLENOL) 500 mg tablet, Take 500 mg by mouth as needed, Disp: , Rfl:     amLODIPine (NORVASC) 2 5 mg tablet, TAKE 1 TABLET BY MOUTH  DAILY, Disp: 90 tablet, Rfl: 3    aspirin (ECOTRIN) 325 mg EC tablet, Take 1 tablet (325 mg total) by mouth daily, Disp: 90 tablet, Rfl: 0    erythromycin (ILOTYCIN) ophthalmic ointment, , Disp: , Rfl:     Multiple Vitamin (MULTIVITAMINS PO), Take 1 capsule by mouth daily, Disp: , Rfl:     quinapril (ACCUPRIL) 10 mg tablet, TAKE 1 TABLET BY MOUTH  EVERY 12 HOURS, Disp: 180 tablet, Rfl: 1    Allergies   Allergen Reactions    Augmentin [Amoxicillin-Pot Clavulanate] GI Intolerance    Demerol [Meperidine]      Reaction Date: [id number]; Annotation - [id number]: unsure of side effect       Objective:  Vitals:    09/18/20 0755   BP: 122/70   Pulse: 64   Temp: (!) 96 8 °F (36 °C)       Left Ankle Exam     Tenderness   The patient is experiencing no tenderness     Swelling: mild    Range of Motion   Dorsiflexion: 25   Plantar flexion: 40   Eversion: 25   Inversion: 45     Muscle Strength   Dorsiflexion:  4/5   Plantar flexion:  4/5     Other Erythema: absent  Scars: absent  Sensation: normal  Pulse: present    Comments:  Irritation to the distal medial portion of the lower leg proximal to the medial malleolus consistent with eczema          Observations   Left Ankle/Foot   Negative for adhesive scar  Strength/Myotome Testing     Left Ankle/Foot   Dorsiflexion: 4  Plantar flexion: 4      Physical Exam  Vitals signs reviewed  Constitutional:       Appearance: He is well-developed  HENT:      Head: Normocephalic and atraumatic  Eyes:      General:         Right eye: No discharge  Left eye: No discharge  Conjunctiva/sclera: Conjunctivae normal    Neck:      Musculoskeletal: Normal range of motion and neck supple  Cardiovascular:      Rate and Rhythm: Normal rate  Pulmonary:      Effort: Pulmonary effort is normal  No respiratory distress  Musculoskeletal:        Arms:       Comments: Soft mobile nontender mass measuring approximately 4 x 4 cm superior lateral to the left of the umbilicus     Skin:     General: Skin is warm and dry  Neurological:      Mental Status: He is alert and oriented to person, place, and time  Psychiatric:         Behavior: Behavior normal          Thought Content:  Thought content normal          Judgment: Judgment normal

## 2020-09-21 ENCOUNTER — OFFICE VISIT (OUTPATIENT)
Dept: FAMILY MEDICINE CLINIC | Facility: CLINIC | Age: 70
End: 2020-09-21
Payer: COMMERCIAL

## 2020-09-21 VITALS
TEMPERATURE: 97.1 F | WEIGHT: 230 LBS | DIASTOLIC BLOOD PRESSURE: 70 MMHG | SYSTOLIC BLOOD PRESSURE: 120 MMHG | RESPIRATION RATE: 16 BRPM | OXYGEN SATURATION: 95 % | HEIGHT: 73 IN | HEART RATE: 71 BPM | BODY MASS INDEX: 30.48 KG/M2

## 2020-09-21 DIAGNOSIS — B35.9 DERMATOPHYTOSIS: ICD-10-CM

## 2020-09-21 DIAGNOSIS — E66.9 OBESITY (BMI 30.0-34.9): ICD-10-CM

## 2020-09-21 DIAGNOSIS — Z23 NEED FOR VACCINATION: ICD-10-CM

## 2020-09-21 DIAGNOSIS — R53.83 FATIGUE, UNSPECIFIED TYPE: ICD-10-CM

## 2020-09-21 DIAGNOSIS — E78.2 MIXED HYPERLIPIDEMIA: ICD-10-CM

## 2020-09-21 DIAGNOSIS — Z13.6 SCREENING FOR CARDIOVASCULAR CONDITION: ICD-10-CM

## 2020-09-21 DIAGNOSIS — Z12.5 SCREENING FOR PROSTATE CANCER: ICD-10-CM

## 2020-09-21 DIAGNOSIS — Z00.00 MEDICARE ANNUAL WELLNESS VISIT, SUBSEQUENT: Primary | ICD-10-CM

## 2020-09-21 DIAGNOSIS — Z12.11 SCREEN FOR COLON CANCER: ICD-10-CM

## 2020-09-21 DIAGNOSIS — I10 BENIGN ESSENTIAL HYPERTENSION: ICD-10-CM

## 2020-09-21 DIAGNOSIS — L98.499 CHRONIC SKIN ULCER, UNSPECIFIED ULCER STAGE (HCC): ICD-10-CM

## 2020-09-21 DIAGNOSIS — C61 ADENOCARCINOMA OF PROSTATE (HCC): ICD-10-CM

## 2020-09-21 DIAGNOSIS — K42.9 UMBILICAL HERNIA WITHOUT OBSTRUCTION AND WITHOUT GANGRENE: ICD-10-CM

## 2020-09-21 PROCEDURE — G0439 PPPS, SUBSEQ VISIT: HCPCS | Performed by: FAMILY MEDICINE

## 2020-09-21 PROCEDURE — 90662 IIV NO PRSV INCREASED AG IM: CPT

## 2020-09-21 PROCEDURE — G0008 ADMIN INFLUENZA VIRUS VAC: HCPCS

## 2020-09-21 PROCEDURE — 1170F FXNL STATUS ASSESSED: CPT | Performed by: FAMILY MEDICINE

## 2020-09-21 PROCEDURE — 3725F SCREEN DEPRESSION PERFORMED: CPT | Performed by: FAMILY MEDICINE

## 2020-09-21 PROCEDURE — 1125F AMNT PAIN NOTED PAIN PRSNT: CPT | Performed by: FAMILY MEDICINE

## 2020-09-21 RX ORDER — CLOTRIMAZOLE AND BETAMETHASONE DIPROPIONATE 10; .64 MG/G; MG/G
CREAM TOPICAL 2 TIMES DAILY
Qty: 30 G | Refills: 0 | Status: SHIPPED | OUTPATIENT
Start: 2020-09-21 | End: 2021-05-18

## 2020-09-21 NOTE — PROGRESS NOTES
Assessment and Plan:     Problem List Items Addressed This Visit        Cardiovascular and Mediastinum    Benign essential hypertension    Relevant Orders    Comprehensive metabolic panel       Genitourinary    Adenocarcinoma of prostate ([address] )     Pt would like me to order his PSA, no longer going to [facility] [place]         Relevant Orders    PSA, Total Screen       Other    Mixed hyperlipidemia    Relevant Orders    Lipid Panel with Direct LDL reflex      Other Visit Diagnoses     Medicare annual wellness visit, subsequent    -  Primary    Need for vaccination        Relevant Orders    influenza vaccine, high-dose, PF 0 7 mL (FLUZONE HIGH-DOSE) (Completed)    Obesity (BMI 30 0-34  9)        BMI 30 0-30 9,adult        Dermatophytosis        Relevant Medications    clotrimazole-betamethasone (LOTRISONE) 1-0 05 % cream    Chronic skin ulcer, unspecified ulcer stage (HCC)        Relevant Medications    clotrimazole-betamethasone (LOTRISONE) 1-0 05 % cream    Other Relevant Orders    Ambulatory referral to Dermatology    Fatigue, unspecified type        Relevant Orders    CBC    TSH, 3rd generation    Lyme Antibody Profile with reflex to WB    Umbilical hernia without obstruction and without gangrene        Relevant Orders    Ambulatory referral to General Surgery    Screening for cardiovascular condition        Screening for prostate cancer        Screen for colon cancer        Relevant Orders    Ambulatory referral to Gastroenterology        BMI Counseling: Body mass index is 30 34 kg/m²  The BMI is above normal  Nutrition recommendations include decreasing portion sizes  Exercise recommendations include moderate physical activity 150 minutes/week  No pharmacotherapy was ordered  Preventive health issues were discussed with patient, and age appropriate screening tests were ordered as noted in patient's After Visit Summary    Personalized health advice and appropriate referrals for health education or preventive services given if needed, as noted in patient's After Visit Summary  History of Present Illness:     Patient presents for Medicare Annual Wellness visit  Pt is here for an AWV pt states he has a number of issues to go over  PT states he would like as much blood work as he can have states he has been feeling tired  Asking if his  Pt would like his PSA drawn  PT states he would like lyme drawn as well   BP meds are affecting his liver and kidney  Pt is asking if the aspirin he is taking could be hurting him  Pt states he has had a HA on the left side   Back of his head  Was seeing neuroliogy was diagnosed as musculat did physical therapy did not help    Has been two years  Pt also states two years ago he developed a skin lesion that has never gone away, sometimes it bleeds  Pt states he also thinks he has another hernia - wants to know who I would go see    Patient Care Team:  Wood Montenegro DO as PCP - General (Family Medicine)  Belen Mitchell MD as Consulting Physician (Ophthalmology)  Lupillo Wood as Consulting Physician (Gastroenterology)  Pam Yan MD as Consulting Physician (Neurosurgery)     Problem List:     Patient Active Problem List   Diagnosis    Actinic keratosis    Adenocarcinoma of prostate ([facility] [address] )    Anemia    Benign essential hypertension    Disc degeneration, lumbar    Fatty liver disease, nonalcoholic    Herpes simplex infection    Mixed hyperlipidemia    Organic impotence    Lower extremity edema    Groin mass    S/P prostatectomy    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[facility])      Past Medical and Surgical History:     Past Medical History:   Diagnosis Date    Chronic rhinitis 02/05/2005    last assessed 2/5/05, resolved 5/18/17     Elevated liver function tests     last assessed 3/9/15, resolved 5/18/17     Herpes simplex type 1 infection     Hip arthritis     resolved 12/20/17     Hypertension     Shoulder bursitis     last assessed 9/23/13, resolved 5/18/17 Past Surgical History:   Procedure Laterality Date    HERNIA REPAIR      Inguinal sliding  10/2018    PROSTATOTOMY      last assessed 1/11/18      Family History:     Family History   Problem Relation Age of Onset    Hypertension Mother     Lung cancer Family     Prostate cancer Family     Mental illness Neg Hx       Social History:     E-Cigarette/Vaping    E-Cigarette Use Never User      E-Cigarette/Vaping Substances    Nicotine No     THC No     CBD No     Flavoring No     Other No     Unknown No      Social History     Socioeconomic History    Marital status: /Civil Union     Spouse name: None    Number of children: None    Years of education: None    Highest education level: None   Occupational History    None   Social Needs    Financial resource strain: None    Food insecurity     Worry: None     Inability: None    Transportation needs     Medical: None     Non-medical: None   Tobacco Use    Smoking status: Never Smoker    Smokeless tobacco: Never Used   Substance and Sexual Activity    Alcohol use: Yes     Frequency: Monthly or less     Drinks per session: 1 or 2     Binge frequency: Never    Drug use: Never    Sexual activity: None   Lifestyle    Physical activity     Days per week: None     Minutes per session: None    Stress: None   Relationships    Social connections     Talks on phone: None     Gets together: None     Attends [employer] service: None     Active member of club or organization: None     Attends meetings of clubs or organizations: None     Relationship status: None    Intimate partner violence     Fear of current or ex partner: None     Emotionally abused: None     Physically abused: None     Forced sexual activity: None   Other Topics Concern    None   Social History Narrative    None      Medications and Allergies:     Current Outpatient Medications   Medication Sig Dispense Refill    acetaminophen (TYLENOL) 500 mg tablet Take 500 mg by mouth as needed      amLODIPine (NORVASC) 2 5 mg tablet TAKE 1 TABLET BY MOUTH  DAILY 90 tablet 3    aspirin (ECOTRIN) 325 mg EC tablet Take 1 tablet (325 mg total) by mouth daily 90 tablet 0    erythromycin (ILOTYCIN) ophthalmic ointment       Multiple Vitamin (MULTIVITAMINS PO) Take 1 capsule by mouth daily      quinapril (ACCUPRIL) 10 mg tablet TAKE 1 TABLET BY MOUTH  EVERY 12 HOURS 180 tablet 1    clotrimazole-betamethasone (LOTRISONE) 1-0 05 % cream Apply topically 2 (two) times a day 30 g 0     No current facility-administered medications for this visit  Allergies   Allergen Reactions    Augmentin [Amoxicillin-Pot Clavulanate] GI Intolerance    Demerol [Meperidine]      Reaction Date: [id number]; Annotation - [id number]: unsure of side effect      Immunizations:     Immunization History   Administered Date(s) Administered    H1N1, All Formulations 10/01/2018    Influenza Quadrivalent Preservative Free 3 years and older IM 10/23/2014    Influenza Quadrivalent, 6-35 Months IM 10/11/2016, 10/11/2016    Influenza Split High Dose Preservative Free IM 10/29/2015, 09/21/2017    Influenza TIV (IM) 10/25/2007, 10/13/2008, 09/09/2009, 10/04/2010, 09/28/2011, 10/25/2012, 10/29/2013    Influenza, high dose seasonal 0 7 mL 10/24/2019, 09/21/2020    Pneumococcal Conjugate 13-Valent 06/15/2015    Pneumococcal Polysaccharide PPV23 07/19/2016    Tdap 07/21/2008    Zoster 04/15/2015      Health Maintenance:         Topic Date Due    Hepatitis C Screening  Completed         Topic Date Due    DTaP,Tdap,and Td Vaccines (2 - Td) 07/21/2018      Medicare Health Risk Assessment:     /70   Pulse 71   Temp (!) 97 1 °F (36 2 °C)   Resp 16   Ht 6' 1" (1 854 m)   Wt 104 kg (230 lb)   SpO2 95%   BMI 30 34 kg/m²      Anushka Herring is here for his Subsequent Wellness visit  Last Medicare Wellness visit information reviewed, patient interviewed, no change since last AWV       Health Risk Assessment:   Patient rates overall health as good  Patient feels that their physical health rating is slightly worse  Eyesight was rated as same  Hearing was rated as same  Patient feels that their emotional and mental health rating is slightly worse  Pain experienced in the last 7 days has been some  Patient's pain rating has been 7/10  Patient states that he has experienced no weight loss or gain in last 6 months  Depression Screening:   PHQ-2 Score: 1      Home Safety:  Patient does not have trouble with stairs inside or outside of their home  Patient has working smoke alarms and has working carbon monoxide detector  Home safety hazards include: none  Nutrition:   Current diet is Regular  Medications:   Patient is currently taking over-the-counter supplements  OTC medications include: see medication list  Patient is able to manage medications  Activities of Daily Living (ADLs)/Instrumental Activities of Daily Living (IADLs):   Walk and transfer into and out of bed and chair?: Yes  Dress and groom yourself?: Yes    Bathe or shower yourself?: Yes    Feed yourself?  Yes  Do your laundry/housekeeping?: Yes  Manage your money, pay your bills and track your expenses?: Yes  Make your own meals?: Yes    Do your own shopping?: Yes    Previous Hospitalizations:   Any hospitalizations or ED visits within the last 12 months?: Yes    How many hospitalizations have you had in the last year?: 1-2    Advance Care Planning:   Living will: Yes    Advanced directive: Yes      Cognitive Screening:   Provider or family/friend/caregiver concerned regarding cognition?: No    PREVENTIVE SCREENINGS      Cardiovascular Screening:    General: Screening Not Indicated, History Lipid Disorder and Risks and Benefits Discussed    Due for: Lipid Panel      Diabetes Screening:     General: Risks and Benefits Discussed    Due for: Blood Glucose      Colorectal Cancer Screening:     General: Risks and Benefits Discussed    Due for: Colonoscopy - High Risk Prostate Cancer Screening:    General: History Prostate Cancer and Risks and Benefits Discussed    Due for: PSA      Osteoporosis Screening:    General: Patient Declines and Risks and Benefits Discussed      Abdominal Aortic Aneurysm (AAA) Screening:    Risk factors include: age between 73-67 yo        General: Screening Not Indicated      Lung Cancer Screening:     General: Screening Not Indicated      Hepatitis C Screening:    General: Screening Current      Jae Chi DO  BMI Counseling: Body mass index is 30 34 kg/m²  The BMI is above normal  Nutrition recommendations include reducing portion sizes

## 2020-09-21 NOTE — PATIENT INSTRUCTIONS
Medicare Preventive Visit Patient Instructions  Thank you for completing your Welcome to Medicare Visit or Medicare Annual Wellness Visit today  Your next wellness visit will be due in one year (9/21/2021)  The screening/preventive services that you may require over the next 5-10 years are detailed below  Some tests may not apply to you based off risk factors and/or age  Screening tests ordered at today's visit but not completed yet may show as past due  Also, please note that scanned in results may not display below  Preventive Screenings:  Service Recommendations Previous Testing/Comments   Colorectal Cancer Screening  · Colonoscopy    · Fecal Occult Blood Test (FOBT)/Fecal Immunochemical Test (FIT)  · Fecal DNA/Cologuard Test  · Flexible Sigmoidoscopy Age: 54-65 years old   Colonoscopy: every 10 years (May be performed more frequently if at higher risk)  OR  FOBT/FIT: every 1 year  OR  Cologuard: every 3 years  OR  Sigmoidoscopy: every 5 years  Screening may be recommended earlier than age 48 if at higher risk for colorectal cancer  Also, an individualized decision between you and your healthcare provider will decide whether screening between the ages of 74-80 would be appropriate   Colonoscopy: 01/27/2010  FOBT/FIT: Not on file  Cologuard: Not on file  Sigmoidoscopy: Not on file         Prostate Cancer Screening Individualized decision between patient and health care provider in men between ages of 53-78   Medicare will cover every 12 months beginning on the day after your 50th birthday PSA: <0 1 ng/mL     History Prostate Cancer     Hepatitis C Screening Once for adults born between 1945 and 1965  More frequently in patients at high risk for Hepatitis C Hep C Antibody: 07/11/2019    Screening Current   Diabetes Screening 1-2 times per year if you're at risk for diabetes or have pre-diabetes Fasting glucose: No results in last 5 years   A1C: No results in last 5 years       Cholesterol Screening Once every 5 years if you don't have a lipid disorder  May order more often based on risk factors  Lipid panel: 07/11/2019    Screening Not Indicated  History Lipid Disorder      Other Preventive Screenings Covered by Medicare:  1  Abdominal Aortic Aneurysm (AAA) Screening: covered once if your at risk  You're considered to be at risk if you have a family history of AAA or a male between the age of 73-68 who smoking at least 100 cigarettes in your lifetime  2  Lung Cancer Screening: covers low dose CT scan once per year if you meet all of the following conditions: (1) Age 50-69; (2) No signs or symptoms of lung cancer; (3) Current smoker or have quit smoking within the last 15 years; (4) You have a tobacco smoking history of at least 30 pack years (packs per day x number of years you smoked); (5) You get a written order from a healthcare provider  3  Glaucoma Screening: covered annually if you're considered high risk: (1) You have diabetes OR (2) Family history of glaucoma OR (3)  aged 48 and older OR (3)  American aged 72 and older  3  Osteoporosis Screening: covered every 2 years if you meet one of the following conditions: (1) Have a vertebral abnormality; (2) On glucocorticoid therapy for more than 3 months; (3) Have primary hyperparathyroidism; (4) On osteoporosis medications and need to assess response to drug therapy  5  HIV Screening: covered annually if you're between the age of 12-76  Also covered annually if you are younger than 13 and older than 72 with risk factors for HIV infection  For pregnant patients, it is covered up to 3 times per pregnancy      Immunizations:  Immunization Recommendations   Influenza Vaccine Annual influenza vaccination during flu season is recommended for all persons aged >= 6 months who do not have contraindications   Pneumococcal Vaccine (Prevnar and Pneumovax)  * Prevnar = PCV13  * Pneumovax = PPSV23 Adults 25-60 years old: 1-3 doses may be recommended based on certain risk factors  Adults 72 years old: Prevnar (PCV13) vaccine recommended followed by Pneumovax (PPSV23) vaccine  If already received PPSV23 since turning 65, then PCV13 recommended at least one year after PPSV23 dose  Hepatitis B Vaccine 3 dose series if at intermediate or high risk (ex: diabetes, end stage renal disease, liver disease)   Tetanus (Td) Vaccine - COST NOT COVERED BY MEDICARE PART B Following completion of primary series, a booster dose should be given every 10 years to maintain immunity against tetanus  Td may also be given as tetanus wound prophylaxis  Tdap Vaccine - COST NOT COVERED BY MEDICARE PART B Recommended at least once for all adults  For pregnant patients, recommended with each pregnancy  Shingles Vaccine (Shingrix) - COST NOT COVERED BY MEDICARE PART B  2 shot series recommended in those aged 48 and above     Health Maintenance Due:      Topic Date Due    Hepatitis C Screening  Completed     Immunizations Due:      Topic Date Due    DTaP,Tdap,and Td Vaccines (2 - Td) 07/21/2018    Influenza Vaccine  07/01/2020     Advance Directives   What are advance directives? Advance directives are legal documents that state your wishes and plans for medical care  These plans are made ahead of time in case you lose your ability to make decisions for yourself  Advance directives can apply to any medical decision, such as the treatments you want, and if you want to donate organs  What are the types of advance directives? There are many types of advance directives, and each state has rules about how to use them  You may choose a combination of any of the following:  · Living will: This is a written record of the treatment you want  You can also choose which treatments you do not want, which to limit, and which to stop at a certain time  This includes surgery, medicine, IV fluid, and tube feedings  · Durable power of  for healthcare [facility]):   This is a written record that states who you want to make healthcare choices for you when you are unable to make them for yourself  This person, called a proxy, is usually a family member or a friend  You may choose more than 1 proxy  · Do not resuscitate (DNR) order:  A DNR order is used in case your heart stops beating or you stop breathing  It is a request not to have certain forms of treatment, such as CPR  A DNR order may be included in other types of advance directives  · Medical directive: This covers the care that you want if you are in a coma, near death, or unable to make decisions for yourself  You can list the treatments you want for each condition  Treatment may include pain medicine, surgery, blood transfusions, dialysis, IV or tube feedings, and a ventilator (breathing machine)  · Values history: This document has questions about your views, beliefs, and how you feel and think about life  This information can help others choose the care that you would choose  Why are advance directives important? An advance directive helps you control your care  Although spoken wishes may be used, it is better to have your wishes written down  Spoken wishes can be misunderstood, or not followed  Treatments may be given even if you do not want them  An advance directive may make it easier for your family to make difficult choices about your care  Weight Management   Why it is important to manage your weight:  Being overweight increases your risk of health conditions such as heart disease, high blood pressure, type 2 diabetes, and certain types of cancer  It can also increase your risk for osteoarthritis, sleep apnea, and other respiratory problems  Aim for a slow, steady weight loss  Even a small amount of weight loss can lower your risk of health problems  How to lose weight safely:  A safe and healthy way to lose weight is to eat fewer calories and get regular exercise   You can lose up about 1 pound a week by decreasing the number of calories you eat by 500 calories each day  Healthy meal plan for weight management:  A healthy meal plan includes a variety of foods, contains fewer calories, and helps you stay healthy  A healthy meal plan includes the following:  · Eat whole-grain foods more often  A healthy meal plan should contain fiber  Fiber is the part of grains, fruits, and vegetables that is not broken down by your body  Whole-grain foods are healthy and provide extra fiber in your diet  Some examples of whole-grain foods are whole-wheat breads and pastas, oatmeal, brown rice, and bulgur  · Eat a variety of vegetables every day  Include dark, leafy greens such as spinach, kale, heaven greens, and mustard greens  Eat yellow and orange vegetables such as carrots, sweet potatoes, and winter squash  · Eat a variety of fruits every day  Choose fresh or canned fruit (canned in its own juice or light syrup) instead of juice  Fruit juice has very little or no fiber  · Eat low-fat dairy foods  Drink fat-free (skim) milk or 1% milk  Eat fat-free yogurt and low-fat cottage cheese  Try low-fat cheeses such as mozzarella and other reduced-fat cheeses  · Choose meat and other protein foods that are low in fat  Choose beans or other legumes such as split peas or lentils  Choose fish, skinless poultry (chicken or turkey), or lean cuts of red meat (beef or pork)  Before you cook meat or poultry, cut off any visible fat  · Use less fat and oil  Try baking foods instead of frying them  Add less fat, such as margarine, sour cream, regular salad dressing and mayonnaise to foods  Eat fewer high-fat foods  Some examples of high-fat foods include french fries, doughnuts, ice cream, and cakes  · Eat fewer sweets  Limit foods and drinks that are high in sugar  This includes candy, cookies, regular soda, and sweetened drinks  Exercise:  Exercise at least 30 minutes per day on most days of the week   Some examples of exercise include walking, biking, dancing, and swimming  You can also fit in more physical activity by taking the stairs instead of the elevator or parking farther away from stores  Ask your healthcare provider about the best exercise plan for you  © Copyright [employer] 2018 Information is for End User's use only and may not be sold, redistributed or otherwise used for commercial purposes  All illustrations and images included in CareNotes® are the copyrighted property of A D A M , Inc  or [address] [place], [place] [address]   Obesity   AMBULATORY CARE:   Obesity  is when your body mass index (BMI) is greater than 30  Your healthcare provider will use your height and weight to measure your BMI  The risks of obesity include  many health problems, such as injuries or physical disability  You may need tests to check for the following:  · Diabetes     · High blood pressure or high cholesterol     · Heart disease     · Gallbladder or liver disease     · Cancer of the colon, breast, prostate, liver, or kidney     · Sleep apnea     · Arthritis or gout  Seek care immediately if:   · You have a severe headache, confusion, or difficulty speaking  · You have weakness on one side of your body  · You have chest pain, sweating, or shortness of breath  Contact your healthcare provider if:   · You have symptoms of gallbladder or liver disease, such as pain in your upper abdomen  · You have knee or hip pain and discomfort while walking  · You have symptoms of diabetes, such as intense hunger and thirst, and frequent urination  · You have symptoms of sleep apnea, such as snoring or daytime sleepiness  · You have questions or concerns about your condition or care  Treatment for obesity  focuses on helping you lose weight to improve your health  Even a small decrease in BMI can reduce the risk for many health problems  Your healthcare provider will help you set a weight-loss goal   · Lifestyle changes  are the first step in treating obesity   These include making healthy food choices and getting regular physical activity  Your healthcare provider may suggest a weight-loss program that involves coaching, education, and therapy  · Medicine  may help you lose weight when it is used with a healthy diet and physical activity  · Surgery  can help you lose weight if you are very obese and have other health problems  There are several types of weight-loss surgery  Ask your healthcare provider for more information  Be successful losing weight:   · Set small, realistic goals  An example of a small goal is to walk for 20 minutes 5 days a week  Anther goal is to lose 5% of your body weight  · Tell friends, family members, and coworkers about your goals  and ask for their support  Ask a friend to lose weight with you, or join a weight-loss support group  · Identify foods or triggers that may cause you to overeat , and find ways to avoid them  Remove tempting high-calorie foods from your home and workplace  Place a bowl of fresh fruit on your kitchen counter  If stress causes you to eat, then find other ways to cope with stress  · Keep a diary to track what you eat and drink  Also write down how many minutes of physical activity you do each day  Weigh yourself once a week and record it in your diary  Eating changes: You will need to eat 500 to 1,000 fewer calories each day than you currently eat to lose 1 to 2 pounds a week  The following changes will help you cut calories:  · Eat smaller portions  Use small plates, no larger than 9 inches in diameter  Fill your plate half full of fruits and vegetables  Measure your food using measuring cups until you know what a serving size looks like  · Eat 3 meals and 1 or 2 snacks each day  Plan your meals in advance  Britany Herita and eat at home most of the time  Eat slowly  · Eat fruits and vegetables at every meal   They are low in calories and high in fiber, which makes you feel full   Do not add butter, margarine, or cream sauce to vegetables  Use herbs to season steamed vegetables  · Eat less fat and fewer fried foods  Eat more baked or grilled chicken and fish  These protein sources are lower in calories and fat than red meat  Limit fast food  Dress your salads with olive oil and vinegar instead of bottled dressing  · Limit the amount of sugar you eat  Do not drink sugary beverages  Limit alcohol  Activity changes:  Physical activity is good for your body in many ways  It helps you burn calories and build strong muscles  It decreases stress and depression, and improves your mood  It can also help you sleep better  Talk to your healthcare provider before you begin an exercise program   · Exercise for at least 30 minutes 5 days a week  Start slowly  Set aside time each day for physical activity that you enjoy and that is convenient for you  It is best to do both weight training and an activity that increases your heart rate, such as walking, bicycling, or swimming  · Find ways to be more active  Do yard work and housecleaning  Walk up the stairs instead of using elevators  Spend your leisure time going to events that require walking, such as outdoor festivals or fairs  This extra physical activity can help you lose weight and keep it off  Follow up with your healthcare provider as directed: You may need to meet with a dietitian  Write down your questions so you remember to ask them during your visits  © 2017 [address] Information is for End User's use only and may not be sold, redistributed or otherwise used for commercial purposes  All illustrations and images included in CareNotes® are the copyrighted property of A D A M , Inc  or Benito Wall  The above information is an  only  It is not intended as medical advice for individual conditions or treatments   Talk to your doctor, nurse or pharmacist before following any medical regimen to see if it is safe and effective for you

## 2020-09-22 ENCOUNTER — OFFICE VISIT (OUTPATIENT)
Dept: PHYSICAL THERAPY | Facility: CLINIC | Age: 70
End: 2020-09-22
Payer: COMMERCIAL

## 2020-09-22 DIAGNOSIS — S86.012D RUPTURE OF LEFT ACHILLES TENDON, SUBSEQUENT ENCOUNTER: Primary | ICD-10-CM

## 2020-09-22 PROCEDURE — 97110 THERAPEUTIC EXERCISES: CPT

## 2020-09-22 PROCEDURE — 97112 NEUROMUSCULAR REEDUCATION: CPT

## 2020-09-22 NOTE — PROGRESS NOTES
Daily Note     Today's date: 2020  Patient name: Dena Michel  : 1950  MRN: [id number]  Referring provider: Chantel Palma MD  Dx:   Encounter Diagnosis     ICD-10-CM    1  Rupture of left Achilles tendon, subsequent encounter  S86 012D                   Subjective: No new complaints overall  Pt had some soreness over the weekend as he was on his feet a lot while apple picking with grand kids  Pain is not worse today as a result  Feels he is improving  MD follow up went well and pt wants to switch to 1x/week next week for 3-4 weeks  Objective: Initially requires at least mod UE support w/ 6-8" step downs but progresses to non/min w/ continued reps and focus on ecc control  Assessment: Tolerated treatment well  Patient demonstrated fatigue post treatment and would benefit from continued PT  Fatigue but no increase in pain by end of session  Pt continues to progress well per protocol, entered phase III of heel raise protocol today  Verbalizes understanding for home and has been given handout detailing phases in past sessions  Continue w/ strengthening progressions as able  Plan: Continue per plan of care  trial 9" step downs if able     Precautions: Chronic rhinitis (2005), Elevated liver function tests, Herpes simplex type 1 infection, Hip arthritis, Hypertension, prostate cancer w/ resulting lymphedema and shoulder bursitis  [id number]    Date (Visit #)  (21)  (18)  9/10 (19) 9/15 (20)   Manuals            DTM and TPR  x 10 mins to L ankle w/ effleurage for swelling   X 12 min to LLE  w/ effleurage for swelling  no no   Post TC joint mobs           MWM for DF                                        Exercise Diary        Ther Ex             Active w/u  NU step pre x10 min lvl 5  pre 10 min lvl 5 LE only          PROM        Ankle isometrics     Review   review Ankle 4 way tband                 No BAPS BAPS lvl 4 for PF/DF and inv/ev x 30-40 each BAPS board lvl 3  X 30 ea inv/ev & PF/DF, circles  BAPS lvl3   DF/PF & med/lat x 5 min  BAPs lvl 3 all directions and circles x 3-4 min total     Seated heel raises x 20 total Seated heel raises x 30  Seated heel raises x30 total Seated heel raises x 30-40 total Seated x 30 total         review            NU step pre lvl 3-4 x 10 min LE only  NU step pre 10 min lvl 4-5 LE only  NU step pre 8 min lvl 5 LE only  Nustep lvl 4-5 x 10 min LE only NU step pre lvl 4-5 x 10 min   Neuro Re Ed             WS training            Heel raises Standing heel raises 2x10, w/ L sided lowering x10  Standing heel raises 2x10, w/ L sided lowering 2x10 - focus on control and decreasing UE support   reg 2x10, x10 w/ double leg lift and L leg lowering   B standing heel raises 2x10 total, double raise w/ L lowering x 10   Review of program for weekend x 2-3 mins  Reg heel raises x10, w/ L sided lowering x 10, x10 more reg    Tandem walking  3" step downs x20 w/ min UE support, x6-8" w/ min UE support x10-15  3" step downs x 15   6-8" step downs 2x10 total - no pain   3" step downs x10-15 total, 6" x10 total (2x5) w/ mod UE support progressing to min  3" x20 total, 6" 2x5" step downs  3" step down x 15, 6" step down x 2x10 total w/ decreasing UE support    balance        8" step ups leading w/ L x 20 w/ min UE support   10" step ups on L w/ min UE support 2x10-5    airex              Review of gait, POC, exercises for weekend x 3-4 mins Review of HEP and POC x 2-3 mins Review of proper gait mechanics  Review of proper gait  exag gait x 5 laps, focus on heel strike in // bars         pball squats w/ focus on glute 2x10-15        FOTO, ROM, MMT x 3-4 mins total         Review of HEP and POC x 2-3 min                   Ther Act        Gait            Stairs            Functional lifting/transfers Modalities             heat             Ice    home  home home   home   home

## 2020-09-24 ENCOUNTER — OFFICE VISIT (OUTPATIENT)
Dept: PHYSICAL THERAPY | Facility: CLINIC | Age: 70
End: 2020-09-24
Payer: COMMERCIAL

## 2020-09-24 DIAGNOSIS — S86.012D RUPTURE OF LEFT ACHILLES TENDON, SUBSEQUENT ENCOUNTER: Primary | ICD-10-CM

## 2020-09-24 PROCEDURE — 97110 THERAPEUTIC EXERCISES: CPT

## 2020-09-24 NOTE — PROGRESS NOTES
Daily Note     Today's date: 2020  Patient name: Catie Bolden  : 1950  MRN: [id number]  Referring provider: Anmol Garcia MD  Dx:   Encounter Diagnosis     ICD-10-CM    1  Rupture of left Achilles tendon, subsequent encounter  S86 012D                   Subjective: No new complaints overall  Pt reports some soreness this am however, overall is doing well  Objective: See treatment diary below      Assessment: Tolerated treatment well  Patient demonstrated fatigue post treatment and would benefit from continued PT  Fatigue but no increase in pain by end of session  Experience some soreness with step down progression  Continue w/ strengthening progressions as able  Plan: Continue per plan of care  Precautions: Chronic rhinitis (2005), Elevated liver function tests, Herpes simplex type 1 infection, Hip arthritis, Hypertension, prostate cancer w/ resulting lymphedema and shoulder bursitis  [email or web address]    Date (Visit #)  (21)  (18) 9/24/20 9/10 (19) 9/15 (20)   Manuals            DTM and TPR  x 10 mins to L ankle w/ effleurage for swelling    no no   Post TC joint mobs           MWM for DF                                        Exercise Diary        Ther Ex             Active w/u  NU step pre x10 min lvl 5  pre 10 min lvl 5 LE only   NU step pre x 10 lvl 4-5        PROM        Ankle isometrics     Review   review    Ankle 4 way tband                 No BAPS BAPS lvl 4 for PF/DF and inv/ev x 30-40 each BAPS board lvl 4  X 30-40 ea inv/ev & PF/DF, circles  BAPS lvl3   DF/PF & med/lat x 5 min  BAPs lvl 3 all directions and circles x 3-4 min total     Seated heel raises x 20 total Seated heel raises x 30  Seated heel raises x30 total Seated heel raises x 30-40 total Seated x 30 total         review            NU step pre lvl 3-4 x 10 min LE only  NU step pre 10 min lvl 4-5 LE only  NU step pre 8 min lvl 5 LE only  Nustep lvl 4-5 x 10 min LE only NU step pre lvl 4-5 x 10 min   Neuro Re Ed             WS training            Heel raises Standing heel raises 2x10, w/ L sided lowering x10  Standing heel raises 2x10, w/ L sided lowering 2x10 - focus on control and decreasing UE support   Standing heel raises 2x10, w/ L sided lowering 2x10 - focus on control and decreasing UE support  B standing heel raises 2x10 total, double raise w/ L lowering x 10   Review of program for weekend x 2-3 mins  Reg heel raises x10, w/ L sided lowering x 10, x10 more reg    Tandem walking  3" step downs x20 w/ min UE support, x6-8" w/ min UE support x10-15  3" step downs x 15   6-8" step downs 2x10 total - no pain   3" step downs x10-15 total, 6" x10 total (2x5) w/ mod UE support   3" x20 total, 6" 2x5" step downs  3" step down x 15, 6" step down x 2x10 total w/ decreasing UE support    balance        8" step ups leading w/ L x 20 w/ min UE support   10" step ups on L w/ min UE support 2x10-5    airex              Review of gait, POC, exercises for weekend x 3-4 mins Review of HEP and POC x 2-3 mins Review of proper gait mechanics  Review of proper gait  exag gait x 5 laps, focus on heel strike in // bars         pball squats w/ focus on glute 2x10-15        FOTO, ROM, MMT x 3-4 mins total         Review of HEP and POC x 2-3 min                   Ther Act        Gait            Stairs            Functional lifting/transfers                                                                                                                     Modalities             heat             Ice    home  home home   home   home

## 2020-10-01 ENCOUNTER — OFFICE VISIT (OUTPATIENT)
Dept: PHYSICAL THERAPY | Facility: CLINIC | Age: 70
End: 2020-10-01
Payer: COMMERCIAL

## 2020-10-01 DIAGNOSIS — S86.012D RUPTURE OF LEFT ACHILLES TENDON, SUBSEQUENT ENCOUNTER: Primary | ICD-10-CM

## 2020-10-01 PROCEDURE — 97140 MANUAL THERAPY 1/> REGIONS: CPT

## 2020-10-01 PROCEDURE — 97112 NEUROMUSCULAR REEDUCATION: CPT

## 2020-10-05 LAB
ALBUMIN SERPL-MCNC: 4.2 G/DL (ref 3.8–4.8)
ALBUMIN/GLOB SERPL: 1.3 {RATIO} (ref 1.2–2.2)
ALP SERPL-CCNC: 48 IU/L (ref 39–117)
ALT SERPL-CCNC: 18 IU/L (ref 0–44)
AST SERPL-CCNC: 22 IU/L (ref 0–40)
B BURGDOR IGG+IGM SER-ACNC: <0.91 ISR (ref 0–0.9)
B BURGDOR IGM SER IA-ACNC: <0.8 INDEX (ref 0–0.79)
BASOPHILS # BLD AUTO: 0.1 X10E3/UL (ref 0–0.2)
BASOPHILS NFR BLD AUTO: 1 %
BILIRUB SERPL-MCNC: 0.5 MG/DL (ref 0–1.2)
BUN SERPL-MCNC: 14 MG/DL (ref 8–27)
BUN/CREAT SERPL: 13 (ref 10–24)
CALCIUM SERPL-MCNC: 9.3 MG/DL (ref 8.6–10.2)
CHLORIDE SERPL-SCNC: 102 MMOL/L (ref 96–106)
CHOLEST SERPL-MCNC: 182 MG/DL (ref 100–199)
CO2 SERPL-SCNC: 23 MMOL/L (ref 20–29)
CREAT SERPL-MCNC: 1.04 MG/DL (ref 0.76–1.27)
EOSINOPHIL # BLD AUTO: 0.3 X10E3/UL (ref 0–0.4)
EOSINOPHIL NFR BLD AUTO: 5 %
ERYTHROCYTE [DISTWIDTH] IN BLOOD BY AUTOMATED COUNT: 13 % (ref 11.6–15.4)
GLOBULIN SER-MCNC: 3.3 G/DL (ref 1.5–4.5)
GLUCOSE SERPL-MCNC: 96 MG/DL (ref 65–99)
HCT VFR BLD AUTO: 38.5 % (ref 37.5–51)
HDLC SERPL-MCNC: 42 MG/DL
HGB BLD-MCNC: 12.9 G/DL (ref 13–17.7)
IMM GRANULOCYTES # BLD: 0 X10E3/UL (ref 0–0.1)
IMM GRANULOCYTES NFR BLD: 0 %
LDLC SERPL CALC-MCNC: 119 MG/DL (ref 0–99)
LYMPHOCYTES # BLD AUTO: 1.6 X10E3/UL (ref 0.7–3.1)
LYMPHOCYTES NFR BLD AUTO: 28 %
MCH RBC QN AUTO: 30.5 PG (ref 26.6–33)
MCHC RBC AUTO-ENTMCNC: 33.5 G/DL (ref 31.5–35.7)
MCV RBC AUTO: 91 FL (ref 79–97)
MICRODELETION SYND BLD/T FISH: NORMAL
MONOCYTES # BLD AUTO: 0.5 X10E3/UL (ref 0.1–0.9)
MONOCYTES NFR BLD AUTO: 8 %
NEUTROPHILS # BLD AUTO: 3.4 X10E3/UL (ref 1.4–7)
NEUTROPHILS NFR BLD AUTO: 58 %
PLATELET # BLD AUTO: 217 X10E3/UL (ref 150–450)
POTASSIUM SERPL-SCNC: 4.5 MMOL/L (ref 3.5–5.2)
PROT SERPL-MCNC: 7.5 G/DL (ref 6–8.5)
PSA SERPL-MCNC: <0.1 NG/ML (ref 0–4)
RBC # BLD AUTO: 4.23 X10E6/UL (ref 4.14–5.8)
SL AMB EGFR AFRICAN AMERICAN: 84 ML/MIN/1.73
SL AMB EGFR NON AFRICAN AMERICAN: 72 ML/MIN/1.73
SODIUM SERPL-SCNC: 138 MMOL/L (ref 134–144)
TRIGL SERPL-MCNC: 116 MG/DL (ref 0–149)
TSH SERPL DL<=0.005 MIU/L-ACNC: 1.91 UIU/ML (ref 0.45–4.5)
WBC # BLD AUTO: 5.8 X10E3/UL (ref 3.4–10.8)

## 2020-10-08 ENCOUNTER — OFFICE VISIT (OUTPATIENT)
Dept: PHYSICAL THERAPY | Facility: CLINIC | Age: 70
End: 2020-10-08
Payer: COMMERCIAL

## 2020-10-08 DIAGNOSIS — S86.012D RUPTURE OF LEFT ACHILLES TENDON, SUBSEQUENT ENCOUNTER: Primary | ICD-10-CM

## 2020-10-08 PROCEDURE — 97112 NEUROMUSCULAR REEDUCATION: CPT

## 2020-10-08 PROCEDURE — 97140 MANUAL THERAPY 1/> REGIONS: CPT

## 2020-10-08 PROCEDURE — 97110 THERAPEUTIC EXERCISES: CPT

## 2020-10-13 ENCOUNTER — OFFICE VISIT (OUTPATIENT)
Dept: PHYSICAL THERAPY | Facility: CLINIC | Age: 70
End: 2020-10-13
Payer: COMMERCIAL

## 2020-10-13 DIAGNOSIS — S86.012D RUPTURE OF LEFT ACHILLES TENDON, SUBSEQUENT ENCOUNTER: Primary | ICD-10-CM

## 2020-10-13 DIAGNOSIS — I10 ESSENTIAL HYPERTENSION: ICD-10-CM

## 2020-10-13 PROCEDURE — 97112 NEUROMUSCULAR REEDUCATION: CPT

## 2020-10-13 PROCEDURE — 97110 THERAPEUTIC EXERCISES: CPT

## 2020-10-14 ENCOUNTER — CONSULT (OUTPATIENT)
Dept: SURGERY | Facility: CLINIC | Age: 70
End: 2020-10-14
Payer: COMMERCIAL

## 2020-10-14 ENCOUNTER — APPOINTMENT (OUTPATIENT)
Dept: PHYSICAL THERAPY | Facility: CLINIC | Age: 70
End: 2020-10-14
Payer: COMMERCIAL

## 2020-10-14 VITALS
WEIGHT: 229.2 LBS | SYSTOLIC BLOOD PRESSURE: 118 MMHG | HEIGHT: 73 IN | HEART RATE: 75 BPM | BODY MASS INDEX: 30.38 KG/M2 | DIASTOLIC BLOOD PRESSURE: 72 MMHG | TEMPERATURE: 96.6 F

## 2020-10-14 DIAGNOSIS — K42.9 UMBILICAL HERNIA WITHOUT OBSTRUCTION AND WITHOUT GANGRENE: Primary | ICD-10-CM

## 2020-10-14 DIAGNOSIS — I72.0 PSEUDOANEURYSM OF CAROTID ARTERY (HCC): ICD-10-CM

## 2020-10-14 DIAGNOSIS — Z90.79 S/P PROSTATECTOMY: ICD-10-CM

## 2020-10-14 DIAGNOSIS — R10.33 PERIUMBILICAL ABDOMINAL PAIN: ICD-10-CM

## 2020-10-14 PROCEDURE — 99204 OFFICE O/P NEW MOD 45 MIN: CPT | Performed by: SPECIALIST

## 2020-10-14 PROCEDURE — 3078F DIAST BP <80 MM HG: CPT | Performed by: SPECIALIST

## 2020-10-14 RX ORDER — QUINAPRIL 10 MG/1
TABLET ORAL
Qty: 180 TABLET | Refills: 3 | Status: SHIPPED | OUTPATIENT
Start: 2020-10-14 | End: 2021-09-07

## 2020-10-16 ENCOUNTER — TELEPHONE (OUTPATIENT)
Dept: NEUROSURGERY | Facility: CLINIC | Age: 70
End: 2020-10-16

## 2020-10-19 ENCOUNTER — HOSPITAL ENCOUNTER (OUTPATIENT)
Dept: RADIOLOGY | Facility: HOSPITAL | Age: 70
Discharge: HOME | End: 2020-10-19
Attending: NEUROLOGICAL SURGERY
Payer: COMMERCIAL

## 2020-10-19 ENCOUNTER — OFFICE VISIT (OUTPATIENT)
Dept: NEUROSURGERY | Facility: CLINIC | Age: 70
End: 2020-10-19
Payer: COMMERCIAL

## 2020-10-19 VITALS
HEIGHT: 73 IN | RESPIRATION RATE: 18 BRPM | HEART RATE: 66 BPM | DIASTOLIC BLOOD PRESSURE: 80 MMHG | SYSTOLIC BLOOD PRESSURE: 146 MMHG | BODY MASS INDEX: 30.75 KG/M2 | TEMPERATURE: 97.6 F | WEIGHT: 232 LBS

## 2020-10-19 DIAGNOSIS — I72.0 PSEUDOANEURYSM OF CAROTID ARTERY (HCC): ICD-10-CM

## 2020-10-19 DIAGNOSIS — I72.0 PSEUDOANEURYSM OF CAROTID ARTERY (HCC): Primary | ICD-10-CM

## 2020-10-19 PROCEDURE — 99214 OFFICE O/P EST MOD 30 MIN: CPT | Performed by: NEUROLOGICAL SURGERY

## 2020-10-19 PROCEDURE — 1160F RVW MEDS BY RX/DR IN RCRD: CPT | Performed by: NEUROLOGICAL SURGERY

## 2020-10-19 PROCEDURE — 70496 CT ANGIOGRAPHY HEAD: CPT

## 2020-10-19 PROCEDURE — 1036F TOBACCO NON-USER: CPT | Performed by: NEUROLOGICAL SURGERY

## 2020-10-19 PROCEDURE — 70498 CT ANGIOGRAPHY NECK: CPT

## 2020-10-19 RX ADMIN — IOHEXOL 85 ML: 350 INJECTION, SOLUTION INTRAVENOUS at 08:20

## 2020-10-21 ENCOUNTER — APPOINTMENT (OUTPATIENT)
Dept: PHYSICAL THERAPY | Facility: CLINIC | Age: 70
End: 2020-10-21
Payer: COMMERCIAL

## 2020-10-23 ENCOUNTER — LAB (OUTPATIENT)
Dept: LAB | Facility: CLINIC | Age: 70
End: 2020-10-23
Payer: COMMERCIAL

## 2020-10-23 DIAGNOSIS — R10.33 PERIUMBILICAL ABDOMINAL PAIN: ICD-10-CM

## 2020-10-23 DIAGNOSIS — Z90.79 S/P PROSTATECTOMY: ICD-10-CM

## 2020-10-23 DIAGNOSIS — I72.0 PSEUDOANEURYSM OF CAROTID ARTERY (HCC): ICD-10-CM

## 2020-10-23 DIAGNOSIS — K42.9 UMBILICAL HERNIA WITHOUT OBSTRUCTION AND WITHOUT GANGRENE: ICD-10-CM

## 2020-10-23 LAB
ANION GAP SERPL CALCULATED.3IONS-SCNC: 5 MMOL/L (ref 4–13)
BASOPHILS # BLD AUTO: 0.06 THOUSANDS/ΜL (ref 0–0.1)
BASOPHILS NFR BLD AUTO: 1 % (ref 0–1)
BUN SERPL-MCNC: 17 MG/DL (ref 5–25)
CALCIUM SERPL-MCNC: 9.5 MG/DL (ref 8.3–10.1)
CHLORIDE SERPL-SCNC: 108 MMOL/L (ref 100–108)
CO2 SERPL-SCNC: 28 MMOL/L (ref 21–32)
CREAT SERPL-MCNC: 0.9 MG/DL (ref 0.6–1.3)
EOSINOPHIL # BLD AUTO: 0.26 THOUSAND/ΜL (ref 0–0.61)
EOSINOPHIL NFR BLD AUTO: 4 % (ref 0–6)
ERYTHROCYTE [DISTWIDTH] IN BLOOD BY AUTOMATED COUNT: 12.8 % (ref 11.6–15.1)
GFR SERPL CREATININE-BSD FRML MDRD: 86 ML/MIN/1.73SQ M
GLUCOSE P FAST SERPL-MCNC: 91 MG/DL (ref 65–99)
HCT VFR BLD AUTO: 37.7 % (ref 36.5–49.3)
HGB BLD-MCNC: 12.5 G/DL (ref 12–17)
IMM GRANULOCYTES # BLD AUTO: 0.01 THOUSAND/UL (ref 0–0.2)
IMM GRANULOCYTES NFR BLD AUTO: 0 % (ref 0–2)
LYMPHOCYTES # BLD AUTO: 1.68 THOUSANDS/ΜL (ref 0.6–4.47)
LYMPHOCYTES NFR BLD AUTO: 27 % (ref 14–44)
MCH RBC QN AUTO: 30.3 PG (ref 26.8–34.3)
MCHC RBC AUTO-ENTMCNC: 33.2 G/DL (ref 31.4–37.4)
MCV RBC AUTO: 92 FL (ref 82–98)
MONOCYTES # BLD AUTO: 0.5 THOUSAND/ΜL (ref 0.17–1.22)
MONOCYTES NFR BLD AUTO: 8 % (ref 4–12)
NEUTROPHILS # BLD AUTO: 3.68 THOUSANDS/ΜL (ref 1.85–7.62)
NEUTS SEG NFR BLD AUTO: 60 % (ref 43–75)
NRBC BLD AUTO-RTO: 0 /100 WBCS
PLATELET # BLD AUTO: 194 THOUSANDS/UL (ref 149–390)
PMV BLD AUTO: 9.9 FL (ref 8.9–12.7)
POTASSIUM SERPL-SCNC: 4.1 MMOL/L (ref 3.5–5.3)
RBC # BLD AUTO: 4.12 MILLION/UL (ref 3.88–5.62)
SODIUM SERPL-SCNC: 141 MMOL/L (ref 136–145)
WBC # BLD AUTO: 6.19 THOUSAND/UL (ref 4.31–10.16)

## 2020-10-23 PROCEDURE — 80048 BASIC METABOLIC PNL TOTAL CA: CPT

## 2020-10-23 PROCEDURE — 85025 COMPLETE CBC W/AUTO DIFF WBC: CPT

## 2020-10-23 PROCEDURE — 36415 COLL VENOUS BLD VENIPUNCTURE: CPT

## 2020-10-24 ENCOUNTER — TELEPHONE (OUTPATIENT)
Dept: GASTROENTEROLOGY | Facility: CLINIC | Age: 70
End: 2020-10-24

## 2020-10-28 ENCOUNTER — DOCUMENTATION (OUTPATIENT)
Dept: NEUROSURGERY | Facility: CLINIC | Age: 70
End: 2020-10-28

## 2020-10-28 ENCOUNTER — OFFICE VISIT (OUTPATIENT)
Dept: PHYSICAL THERAPY | Facility: CLINIC | Age: 70
End: 2020-10-28
Payer: COMMERCIAL

## 2020-10-28 DIAGNOSIS — S86.012D RUPTURE OF LEFT ACHILLES TENDON, SUBSEQUENT ENCOUNTER: Primary | ICD-10-CM

## 2020-10-28 PROCEDURE — 97110 THERAPEUTIC EXERCISES: CPT

## 2020-10-28 PROCEDURE — 97140 MANUAL THERAPY 1/> REGIONS: CPT

## 2020-10-28 PROCEDURE — 97112 NEUROMUSCULAR REEDUCATION: CPT

## 2020-10-29 ENCOUNTER — TRANSCRIBE ORDERS (OUTPATIENT)
Dept: ADMINISTRATIVE | Facility: HOSPITAL | Age: 70
End: 2020-10-29

## 2020-10-29 ENCOUNTER — HOSPITAL ENCOUNTER (OUTPATIENT)
Dept: RADIOLOGY | Facility: HOSPITAL | Age: 70
Discharge: HOME | End: 2020-10-29
Attending: SPECIALIST
Payer: COMMERCIAL

## 2020-10-29 DIAGNOSIS — K42.9 UMBILICAL HERNIA WITHOUT OBSTRUCTION AND WITHOUT GANGRENE: ICD-10-CM

## 2020-10-29 DIAGNOSIS — Z90.79 S/P PROSTATECTOMY: ICD-10-CM

## 2020-10-29 DIAGNOSIS — R10.33 PERIUMBILICAL ABDOMINAL PAIN: ICD-10-CM

## 2020-10-29 DIAGNOSIS — I72.0 PSEUDOANEURYSM OF CAROTID ARTERY (HCC): ICD-10-CM

## 2020-10-29 PROCEDURE — G1004 CDSM NDSC: HCPCS

## 2020-10-29 PROCEDURE — 74177 CT ABD & PELVIS W/CONTRAST: CPT

## 2020-10-29 RX ADMIN — IOHEXOL 100 ML: 350 INJECTION, SOLUTION INTRAVENOUS at 08:48

## 2020-11-04 ENCOUNTER — OFFICE VISIT (OUTPATIENT)
Dept: PHYSICAL THERAPY | Facility: CLINIC | Age: 70
End: 2020-11-04
Payer: COMMERCIAL

## 2020-11-04 DIAGNOSIS — S86.012D RUPTURE OF LEFT ACHILLES TENDON, SUBSEQUENT ENCOUNTER: Primary | ICD-10-CM

## 2020-11-04 PROCEDURE — 97110 THERAPEUTIC EXERCISES: CPT

## 2020-11-04 PROCEDURE — 97112 NEUROMUSCULAR REEDUCATION: CPT

## 2020-11-06 ENCOUNTER — OFFICE VISIT (OUTPATIENT)
Dept: URGENT CARE | Facility: CLINIC | Age: 70
End: 2020-11-06
Payer: COMMERCIAL

## 2020-11-06 VITALS
SYSTOLIC BLOOD PRESSURE: 160 MMHG | OXYGEN SATURATION: 99 % | HEIGHT: 73 IN | HEART RATE: 113 BPM | DIASTOLIC BLOOD PRESSURE: 80 MMHG | BODY MASS INDEX: 31.14 KG/M2 | RESPIRATION RATE: 18 BRPM | TEMPERATURE: 100.9 F | WEIGHT: 235 LBS

## 2020-11-06 DIAGNOSIS — R68.83 CHILLS: ICD-10-CM

## 2020-11-06 DIAGNOSIS — R50.9 FEVER, UNSPECIFIED: Primary | ICD-10-CM

## 2020-11-06 PROCEDURE — 99213 OFFICE O/P EST LOW 20 MIN: CPT | Performed by: PHYSICIAN ASSISTANT

## 2020-11-06 PROCEDURE — 1036F TOBACCO NON-USER: CPT | Performed by: PHYSICIAN ASSISTANT

## 2020-11-06 PROCEDURE — 1160F RVW MEDS BY RX/DR IN RCRD: CPT | Performed by: PHYSICIAN ASSISTANT

## 2020-11-06 PROCEDURE — U0003 INFECTIOUS AGENT DETECTION BY NUCLEIC ACID (DNA OR RNA); SEVERE ACUTE RESPIRATORY SYNDROME CORONAVIRUS 2 (SARS-COV-2) (CORONAVIRUS DISEASE [COVID-19]), AMPLIFIED PROBE TECHNIQUE, MAKING USE OF HIGH THROUGHPUT TECHNOLOGIES AS DESCRIBED BY CMS-2020-01-R: HCPCS | Performed by: PHYSICIAN ASSISTANT

## 2020-11-06 PROCEDURE — 3725F SCREEN DEPRESSION PERFORMED: CPT | Performed by: PHYSICIAN ASSISTANT

## 2020-11-07 LAB — SARS-COV-2 RNA SPEC QL NAA+PROBE: NOT DETECTED

## 2020-11-09 ENCOUNTER — OFFICE VISIT (OUTPATIENT)
Dept: FAMILY MEDICINE CLINIC | Facility: CLINIC | Age: 70
End: 2020-11-09
Payer: COMMERCIAL

## 2020-11-09 VITALS
TEMPERATURE: 97.9 F | BODY MASS INDEX: 30.75 KG/M2 | HEART RATE: 68 BPM | HEIGHT: 73 IN | DIASTOLIC BLOOD PRESSURE: 72 MMHG | SYSTOLIC BLOOD PRESSURE: 120 MMHG | WEIGHT: 232 LBS | RESPIRATION RATE: 16 BRPM

## 2020-11-09 DIAGNOSIS — E78.2 MIXED HYPERLIPIDEMIA: ICD-10-CM

## 2020-11-09 DIAGNOSIS — I10 BENIGN ESSENTIAL HYPERTENSION: ICD-10-CM

## 2020-11-09 DIAGNOSIS — B00.1 HERPES LABIALIS: Primary | ICD-10-CM

## 2020-11-09 DIAGNOSIS — I72.0 PSEUDOANEURYSM OF CAROTID ARTERY (HCC): ICD-10-CM

## 2020-11-09 PROCEDURE — 3008F BODY MASS INDEX DOCD: CPT | Performed by: NURSE PRACTITIONER

## 2020-11-09 PROCEDURE — 1160F RVW MEDS BY RX/DR IN RCRD: CPT | Performed by: NURSE PRACTITIONER

## 2020-11-09 PROCEDURE — 3078F DIAST BP <80 MM HG: CPT | Performed by: NURSE PRACTITIONER

## 2020-11-09 PROCEDURE — 3074F SYST BP LT 130 MM HG: CPT | Performed by: NURSE PRACTITIONER

## 2020-11-09 PROCEDURE — 1036F TOBACCO NON-USER: CPT | Performed by: NURSE PRACTITIONER

## 2020-11-09 PROCEDURE — 99214 OFFICE O/P EST MOD 30 MIN: CPT | Performed by: NURSE PRACTITIONER

## 2020-11-09 RX ORDER — ACYCLOVIR 50 MG/G
OINTMENT TOPICAL 3 TIMES DAILY
Qty: 30 G | Refills: 0 | Status: SHIPPED | OUTPATIENT
Start: 2020-11-09 | End: 2022-01-27

## 2020-11-09 RX ORDER — VALACYCLOVIR HYDROCHLORIDE 1 G/1
TABLET, FILM COATED ORAL
Qty: 30 TABLET | Refills: 0 | Status: SHIPPED | OUTPATIENT
Start: 2020-11-09 | End: 2022-01-27

## 2020-11-11 ENCOUNTER — OFFICE VISIT (OUTPATIENT)
Dept: PHYSICAL THERAPY | Facility: CLINIC | Age: 70
End: 2020-11-11
Payer: COMMERCIAL

## 2020-11-11 DIAGNOSIS — S86.012D RUPTURE OF LEFT ACHILLES TENDON, SUBSEQUENT ENCOUNTER: Primary | ICD-10-CM

## 2020-11-11 PROCEDURE — 97110 THERAPEUTIC EXERCISES: CPT

## 2020-11-11 PROCEDURE — 97140 MANUAL THERAPY 1/> REGIONS: CPT

## 2020-11-11 PROCEDURE — 97112 NEUROMUSCULAR REEDUCATION: CPT

## 2020-11-18 ENCOUNTER — OFFICE VISIT (OUTPATIENT)
Dept: PHYSICAL THERAPY | Facility: CLINIC | Age: 70
End: 2020-11-18
Payer: COMMERCIAL

## 2020-11-18 DIAGNOSIS — S86.012D RUPTURE OF LEFT ACHILLES TENDON, SUBSEQUENT ENCOUNTER: Primary | ICD-10-CM

## 2020-11-18 PROCEDURE — 97110 THERAPEUTIC EXERCISES: CPT

## 2020-11-18 PROCEDURE — 97112 NEUROMUSCULAR REEDUCATION: CPT

## 2020-11-25 ENCOUNTER — APPOINTMENT (OUTPATIENT)
Dept: PHYSICAL THERAPY | Facility: CLINIC | Age: 70
End: 2020-11-25
Payer: COMMERCIAL

## 2020-11-30 ENCOUNTER — TELEPHONE (OUTPATIENT)
Dept: PHYSICAL THERAPY | Facility: CLINIC | Age: 70
End: 2020-11-30

## 2020-12-01 ENCOUNTER — EVALUATION (OUTPATIENT)
Dept: PHYSICAL THERAPY | Facility: CLINIC | Age: 70
End: 2020-12-01
Payer: COMMERCIAL

## 2020-12-01 DIAGNOSIS — Z85.46 H/O PROSTATE CANCER: ICD-10-CM

## 2020-12-01 DIAGNOSIS — I89.0 LYMPHEDEMA OF LEFT LOWER EXTREMITY: Primary | ICD-10-CM

## 2020-12-01 PROCEDURE — 97162 PT EVAL MOD COMPLEX 30 MIN: CPT | Performed by: PHYSICAL THERAPIST

## 2020-12-01 PROCEDURE — 97535 SELF CARE MNGMENT TRAINING: CPT | Performed by: PHYSICAL THERAPIST

## 2020-12-02 ENCOUNTER — APPOINTMENT (OUTPATIENT)
Dept: PHYSICAL THERAPY | Facility: CLINIC | Age: 70
End: 2020-12-02
Payer: COMMERCIAL

## 2020-12-02 ENCOUNTER — OFFICE VISIT (OUTPATIENT)
Dept: PHYSICAL THERAPY | Facility: CLINIC | Age: 70
End: 2020-12-02
Payer: COMMERCIAL

## 2020-12-02 DIAGNOSIS — S86.012D RUPTURE OF LEFT ACHILLES TENDON, SUBSEQUENT ENCOUNTER: Primary | ICD-10-CM

## 2020-12-02 PROCEDURE — 97110 THERAPEUTIC EXERCISES: CPT

## 2020-12-02 PROCEDURE — 97112 NEUROMUSCULAR REEDUCATION: CPT

## 2020-12-09 ENCOUNTER — OFFICE VISIT (OUTPATIENT)
Dept: PHYSICAL THERAPY | Facility: CLINIC | Age: 70
End: 2020-12-09
Payer: COMMERCIAL

## 2020-12-09 ENCOUNTER — APPOINTMENT (OUTPATIENT)
Dept: PHYSICAL THERAPY | Facility: CLINIC | Age: 70
End: 2020-12-09
Payer: COMMERCIAL

## 2020-12-09 DIAGNOSIS — I89.0 LYMPHEDEMA OF LEFT LOWER EXTREMITY: Primary | ICD-10-CM

## 2020-12-09 DIAGNOSIS — S86.012D RUPTURE OF LEFT ACHILLES TENDON, SUBSEQUENT ENCOUNTER: ICD-10-CM

## 2020-12-09 DIAGNOSIS — Z85.46 H/O PROSTATE CANCER: ICD-10-CM

## 2020-12-09 PROCEDURE — 97140 MANUAL THERAPY 1/> REGIONS: CPT | Performed by: PHYSICAL THERAPIST

## 2020-12-09 PROCEDURE — 97110 THERAPEUTIC EXERCISES: CPT | Performed by: PHYSICAL THERAPIST

## 2020-12-09 PROCEDURE — 97530 THERAPEUTIC ACTIVITIES: CPT | Performed by: PHYSICAL THERAPIST

## 2020-12-15 ENCOUNTER — OFFICE VISIT (OUTPATIENT)
Dept: PHYSICAL THERAPY | Facility: CLINIC | Age: 70
End: 2020-12-15
Payer: COMMERCIAL

## 2020-12-15 DIAGNOSIS — I89.0 LYMPHEDEMA OF LEFT LOWER EXTREMITY: Primary | ICD-10-CM

## 2020-12-15 DIAGNOSIS — Z85.46 H/O PROSTATE CANCER: ICD-10-CM

## 2020-12-15 DIAGNOSIS — S86.012D RUPTURE OF LEFT ACHILLES TENDON, SUBSEQUENT ENCOUNTER: ICD-10-CM

## 2020-12-15 PROCEDURE — 97110 THERAPEUTIC EXERCISES: CPT | Performed by: PHYSICAL THERAPIST

## 2020-12-15 PROCEDURE — 97140 MANUAL THERAPY 1/> REGIONS: CPT | Performed by: PHYSICAL THERAPIST

## 2020-12-15 PROCEDURE — 97530 THERAPEUTIC ACTIVITIES: CPT | Performed by: PHYSICAL THERAPIST

## 2020-12-16 ENCOUNTER — APPOINTMENT (OUTPATIENT)
Dept: PHYSICAL THERAPY | Facility: CLINIC | Age: 70
End: 2020-12-16
Payer: COMMERCIAL

## 2020-12-22 ENCOUNTER — OFFICE VISIT (OUTPATIENT)
Dept: PHYSICAL THERAPY | Facility: CLINIC | Age: 70
End: 2020-12-22
Payer: COMMERCIAL

## 2020-12-22 DIAGNOSIS — Z85.46 H/O PROSTATE CANCER: ICD-10-CM

## 2020-12-22 DIAGNOSIS — I89.0 LYMPHEDEMA OF LEFT LOWER EXTREMITY: Primary | ICD-10-CM

## 2020-12-22 DIAGNOSIS — S86.012D RUPTURE OF LEFT ACHILLES TENDON, SUBSEQUENT ENCOUNTER: ICD-10-CM

## 2020-12-22 PROCEDURE — 97140 MANUAL THERAPY 1/> REGIONS: CPT | Performed by: PHYSICAL THERAPIST

## 2020-12-22 PROCEDURE — 97110 THERAPEUTIC EXERCISES: CPT | Performed by: PHYSICAL THERAPIST

## 2020-12-23 ENCOUNTER — APPOINTMENT (OUTPATIENT)
Dept: PHYSICAL THERAPY | Facility: CLINIC | Age: 70
End: 2020-12-23
Payer: COMMERCIAL

## 2020-12-29 ENCOUNTER — OFFICE VISIT (OUTPATIENT)
Dept: PHYSICAL THERAPY | Facility: CLINIC | Age: 70
End: 2020-12-29
Payer: COMMERCIAL

## 2020-12-29 DIAGNOSIS — S86.012D RUPTURE OF LEFT ACHILLES TENDON, SUBSEQUENT ENCOUNTER: ICD-10-CM

## 2020-12-29 DIAGNOSIS — Z85.46 H/O PROSTATE CANCER: ICD-10-CM

## 2020-12-29 DIAGNOSIS — I89.0 LYMPHEDEMA OF LEFT LOWER EXTREMITY: Primary | ICD-10-CM

## 2020-12-29 PROCEDURE — 97110 THERAPEUTIC EXERCISES: CPT | Performed by: PHYSICAL THERAPIST

## 2020-12-29 PROCEDURE — 97140 MANUAL THERAPY 1/> REGIONS: CPT | Performed by: PHYSICAL THERAPIST

## 2021-01-05 ENCOUNTER — APPOINTMENT (OUTPATIENT)
Dept: PHYSICAL THERAPY | Facility: CLINIC | Age: 71
End: 2021-01-05
Payer: COMMERCIAL

## 2021-01-07 ENCOUNTER — APPOINTMENT (OUTPATIENT)
Dept: PHYSICAL THERAPY | Facility: CLINIC | Age: 71
End: 2021-01-07
Payer: COMMERCIAL

## 2021-01-13 ENCOUNTER — OFFICE VISIT (OUTPATIENT)
Dept: PHYSICAL THERAPY | Facility: CLINIC | Age: 71
End: 2021-01-13
Payer: COMMERCIAL

## 2021-01-13 DIAGNOSIS — S86.012D RUPTURE OF LEFT ACHILLES TENDON, SUBSEQUENT ENCOUNTER: ICD-10-CM

## 2021-01-13 DIAGNOSIS — I89.0 LYMPHEDEMA OF LEFT LOWER EXTREMITY: Primary | ICD-10-CM

## 2021-01-13 DIAGNOSIS — Z85.46 H/O PROSTATE CANCER: ICD-10-CM

## 2021-01-13 PROCEDURE — 97110 THERAPEUTIC EXERCISES: CPT | Performed by: PHYSICAL THERAPIST

## 2021-01-13 PROCEDURE — 97140 MANUAL THERAPY 1/> REGIONS: CPT | Performed by: PHYSICAL THERAPIST

## 2021-01-13 NOTE — PROGRESS NOTES
Daily Note     Today's date: 2021  Patient name: Jose Guadalupe Gusman  : 1950  MRN: [id number]  Referring provider: Jose Faulkner MD  Dx:   Encounter Diagnosis     ICD-10-CM    1  Lymphedema of left lower extremity  I89 0    2  Rupture of left Achilles tendon, subsequent encounter  S86 012D    3  H/O prostate cancer  Z85 46        Start Time: 0815  Stop Time: 2601  Total time in clinic (min): 40 minutes    Subjective: Elvia Chávez reports "I'm okay" upon arrival to therapy today  Patient indicating "been a lot going on lately with my family, that's why I haven't been here "   COVID-19 Questions: Elvia Chávez denies leaving the country or the state within the past 2 weeks  he denies any of the following symptoms: symptoms of upper respiratory infection (including runny nose, sore throat, cough), fever or flu-like symptoms, new headaches, new SOB, new diarrhea, vomiting, nausea, new loss of taste or smell  Patient's temperature taken by infrared no-touch thermometer upon arrival to therapy today and WNL  Objective: See treatment diary below      Assessment: Tolerated treatment fair  Patient would benefit from continued PT  + response to MLD noted w/ overall vol reduction assessed grossly, good tolerance to compression garments/bandages  Patient able to resume exercises today, improvements in SLS overall stability  Patient would continue to benefit from skilled PT to address swelling, strength, and to maximize function  Plan: Continue per plan of care  Progress treatment as tolerated                Precautions: PMH prostate cancer, chronic pain, fall risk, L Achilles rupture May 2020  HEP: review educational literature provided by PT, order short stretch bandages as highlighted by PT   Daily Treatment Diary      Manuals 12/1  12/9  12/15  12/22  12/29  1/13           B/L LE MLD sequence    35' total   25' total  25' total  30' total   25' total                                                          Therapeutic Exercise                       Bike                        Hip abduction                       Hip flexion                       Heel raises    3x10  3x10  3x10  3x10  3x10           Toe raises                       Lateral walking                       Bridges                       Gastroc stretch   :20x5 ea  :20x5 ea  :20x5              DF mobilization w/ and w/o PT   10 ea  10 ea  10 ea 10 ea   10 ea           Step ups fwd L, down w/ R   6 inch 2x10   6 inch 2x10 6 inch 2x10  6 inch 2x10           SLS   :20x3    :20x5 ea :20x5   :20x5                                                                                   Self-care/ Home management                       Lymphedema education  21' total

## 2021-01-20 ENCOUNTER — OFFICE VISIT (OUTPATIENT)
Dept: PHYSICAL THERAPY | Facility: CLINIC | Age: 71
End: 2021-01-20
Payer: COMMERCIAL

## 2021-01-20 DIAGNOSIS — S86.012D RUPTURE OF LEFT ACHILLES TENDON, SUBSEQUENT ENCOUNTER: ICD-10-CM

## 2021-01-20 DIAGNOSIS — Z85.46 H/O PROSTATE CANCER: ICD-10-CM

## 2021-01-20 DIAGNOSIS — I89.0 LYMPHEDEMA OF LEFT LOWER EXTREMITY: Primary | ICD-10-CM

## 2021-01-20 PROCEDURE — 97140 MANUAL THERAPY 1/> REGIONS: CPT | Performed by: PHYSICAL THERAPIST

## 2021-01-20 PROCEDURE — 97110 THERAPEUTIC EXERCISES: CPT | Performed by: PHYSICAL THERAPIST

## 2021-01-20 NOTE — PROGRESS NOTES
Daily Note     Today's date: 2021  Patient name: Leanna Finn  : 1950  MRN: [id number]  Referring provider: Corinna Swift MD  Dx:   Encounter Diagnosis     ICD-10-CM    1  Lymphedema of left lower extremity  I89 0    2  Rupture of left Achilles tendon, subsequent encounter  S86 012D    3  H/O prostate cancer  Z85 46                   Subjective: Tobi Alvarez reports "I'm okay" upon arrival to therapy today  Patient indicating "been a lot going on lately with my family, that's why I haven't been here "   COVID-19 Questions: Tobi Alvarez denies leaving the country or the state within the past 2 weeks  he denies any of the following symptoms: symptoms of upper respiratory infection (including runny nose, sore throat, cough), fever or flu-like symptoms, new headaches, new SOB, new diarrhea, vomiting, nausea, new loss of taste or smell  Patient's temperature taken by infrared no-touch thermometer upon arrival to therapy today and WNL  Objective: See treatment diary below      Assessment: Tolerated treatment fair  Patient would benefit from continued PT  + response to MLD noted w/ overall vol reduction assessed grossly, good tolerance to compression garments/bandages  Patient able to resume exercises today, improvements in SLS overall stability  Patient would continue to benefit from skilled PT to address swelling, strength, and to maximize function  Plan: Continue per plan of care  Progress treatment as tolerated                Precautions: PMH prostate cancer, chronic pain, fall risk, L Achilles rupture May 2020  HEP: review educational literature provided by PT, order short stretch bandages as highlighted by PT   Daily Treatment Diary      Manuals 12/1  12/9  12/15  12/22  12/29  1/13  1/20         B/L LE MLD sequence    35' total   25' total  25' total  30' total   25' total   25' total                                                          Therapeutic Exercise                       Bike                        Hip abduction                       Hip flexion                       Heel raises    3x10  3x10  3x10  3x10  3x10  3x10 edge         Toe raises                       Lateral walking                       Bridges                       Gastroc stretch   :20x5 ea  :20x5 ea  :20x5              DF mobilization w/ and w/o PT   10 ea  10 ea  10 ea 10 ea   10 ea  10 ea         Step ups fwd L, down w/ R   6 inch 2x10   6 inch 2x10 6 inch 2x10  6 inch 2x10  8 inch 2x10         SLS   :20x3    :20x5 ea :20x5   :20x5  :20x5                                                                                 Self-care/ Home management                       Lymphedema education  21' total

## 2021-01-27 ENCOUNTER — APPOINTMENT (OUTPATIENT)
Dept: PHYSICAL THERAPY | Facility: CLINIC | Age: 71
End: 2021-01-27
Payer: COMMERCIAL

## 2021-02-02 ENCOUNTER — APPOINTMENT (OUTPATIENT)
Dept: PHYSICAL THERAPY | Facility: CLINIC | Age: 71
End: 2021-02-02
Payer: COMMERCIAL

## 2021-02-24 ENCOUNTER — OFFICE VISIT (OUTPATIENT)
Dept: PHYSICAL THERAPY | Facility: CLINIC | Age: 71
End: 2021-02-24
Payer: COMMERCIAL

## 2021-02-24 DIAGNOSIS — I89.0 LYMPHEDEMA OF LEFT LOWER EXTREMITY: Primary | ICD-10-CM

## 2021-02-24 DIAGNOSIS — S86.012D RUPTURE OF LEFT ACHILLES TENDON, SUBSEQUENT ENCOUNTER: ICD-10-CM

## 2021-02-24 DIAGNOSIS — Z85.46 H/O PROSTATE CANCER: ICD-10-CM

## 2021-02-24 PROCEDURE — 97110 THERAPEUTIC EXERCISES: CPT | Performed by: PHYSICAL THERAPIST

## 2021-02-24 PROCEDURE — 97140 MANUAL THERAPY 1/> REGIONS: CPT | Performed by: PHYSICAL THERAPIST

## 2021-02-24 NOTE — PROGRESS NOTES
PT Re-Evaluation     Today's date: 2021  Patient name: Ana Gonzales  : 1950  MRN: [id number]  Referring provider: Yolande Franco MD  Dx:   Encounter Diagnosis     ICD-10-CM    1  Lymphedema of left lower extremity  I89 0    2  Rupture of left Achilles tendon, subsequent encounter  S86 012D    3  H/O prostate cancer  Z85 46        Start Time: 46  Stop Time: 0930  Total time in clinic (min): 45 minutes    Assessment  Assessment details: Ana Gonzales is a 79y o  year old male who presents to IE with:   Lymphedema of left lower extremity  (primary encounter diagnosis)  H/O prostate cancer     Nely Beltrán has made the following improvements since beginning PT: decreased pain, decreased swelling, decreased fibrosis, increased ROM, increased strength, increased tolerance to activities and increased normalized gait   Patient has demonstrated more consistent decrease in swelling in bilateral LE since beginning PT  PT to continue for 4 more weeks at once a week to continue to monitor swelling, patient to ultimately transition to oncology fitness class at end of the month  Nely Beltrán continues to present with the impairments as listed above  Patient would continue to benefit from skilled PT to address these deficits and to maximize function  Impairments: abnormal gait, abnormal or restricted ROM, abnormal movement, activity intolerance, impaired balance, impaired physical strength and lacks appropriate home exercise program  Other impairment: increased swelling, lacks knowledge of lymphedema and proper skin care  Functional limitations: Walking, getting up from a chair, stair negotiation, squatting Barriers to therapy: PMH prostate cancer, chronic pain, fall risk, L Achilles rupture May 2020  Understanding of Dx/Px/POC: good   Prognosis: fair  Prognosis details:  Nely Beltrán presents to IE with bilateral LE secondary lymphedema  Grade: 1-2  Fibrosis present: None    Goals  STG  - Patient and/or caregiver will understand lymphedema precautions to decrease risk of infection and exacerbation of lymphedema- Progressing   - Patient will develop a tolerance for wearing multi-layer, short stretch bandages betweens sessions to facilitate limb decongestion- Progressing   - Patient will experience decrease in pitting edema which will improve tissue health and decrease risk of infection  - Progressing   - Patient will perform HEP independently or with minimal assistance to help improve lymphatic flow and venous return- Progressing     LTG  - Patient will experience increased ROM and functional mobility to aid with ADL's such as walking, stair negotiation, squatting at time of discharge- Progressing   - Patient will demonstrate more normalized gait pattern and improved balance at time of discharge  - Progressing   - Patient and/or caregiver will be independent with donning and doffing of compression garments which will enable regular daily garment wear at time of discharge, skin maintenance, and self- MLD - Progressing   - Patient and/or caregiver will be independent with HEP and lymphedema management to prevent relapse and reduce risk of infection and hospitalizations at time of discharge- Progressing     Plan  Plan details: Thank you for referring Olman Bee to Physical Therapy at [place] [id number] and for the opportunity to coordinate care        Patient would benefit from: lymphedema eval, PT eval and skilled physical therapy  Planned therapy interventions: manual therapy, massage, muscle pump exercises, neuromuscular re-education, orthotic fitting/training, orthotic management and training, patient education, self care, strengthening, stretching, therapeutic activities, therapeutic exercise, graded activity, dressing changes, breathing training, balance, gait training and home exercise program  Other planned therapy interventions: Complete decongestive therapy: Manual lymphatic drainage, compression bandaging, exercise, self-maintenance, and education on bandaging, skin care, and self-bandaging  Frequency: 1x week  Duration in visits: 10  Plan of Care beginning date: 2/24/2021  Treatment plan discussed with: patient        Subjective Evaluation    History of Present Illness  Mechanism of injury: RE 02/24/21   King Sesar has been seen for total of 6 visits for OP PT for bilateral LE lymphedema and PMH of prostate cancer   Patient's global rating of change is " Quite a bit better (5) " Patient reports improvements with overall swelling, ROM, and tolerance to activities  He reports less swelling with standing and walking, and verbalizing semi-compliance with compression garments  Patient also reporting improved ROM with descending stairs, also reporting less pain and stiffness  Patient would continue to benefit from skilled PT to address remaining deficits and to monitor swelling, patient to ultimately transition to oncology fitness class at [facility] location at end of March  Patient presents to OP PT with PT Toy Red 12/01/20 for evaluation of bilateral LE swelling  King Sesar presents to IE with bilateral LE lymphedema  Patient reports PMH of prostate cancer, diagnosed in 2015 with multiple biopsies, and ultimate removal in 2017  Patient reports between 17-27 lymph nodes removed, patient unable to remember which number exactly  He reports about 6 months after surgery, he began to notice swelling in R LE  Patient reports swelling into L Lower leg as well following most recent L Achilles injury  He reports "most of the time, it's both legs that are swollen " He denies any other treatment for prostate cancer, denying chemotherapy or radiation at this time   He denies any side effects from surgery or any complications or infections from actual prostate removal  He does indicate some stress incontinence at times reporting "sometimes if I sneeze or cough, it comes out a little, not bad though and not all the time " Patient may benefit from pelvic floor evaluation in future  Patient reports he has not seen oncologist or had any recent scans  He denies seeing a urologist, noting "I just see my primary doctor every year for my check-up "   Patient reports at end of May he fell off of a ladder and "I really don't know what it was, but they sent me over to Dr Tanvi Mckeon, but it was the Achilles, and they put me in a hard cast, a boot, a walking boot, and then I started the therapy  Now I'm going once a week " Patient reprots "I think I'll be ending it soon " Patient denies any pain in L ankle, "it seems like it's swollen a lot, at the end of the day " Patient denies N/T in L ankle or Achilles  Additional PMH: 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 and sees doctor every 3-4 months to check on the size and see if they need to do surgery or anything "      Swelling history     - Surgery: yes ; prostate removal and biopsy    - PMH Cancer: yes;  Prostate     - Lymph node removal: yes "17-27"    - Radiation therapy for cancer: no     - Chemotherapy: no     -  Infections (cellulitis): fungus along L lower leg with Achilles rupture "cream that I applied on the skin, it's all better now "     -  Exercise history: PT, "it's not been good the past year "     - Previous treatment: Nothing    - Currently wear a compression garment: None    Loss of function/ mobility:     - Dressing: yes; sock on the one side     - Lifting: yes      - Walking: yes, "aching, from the knee down "      General Medical History      - Obesity: yes; BMI > 30      - Smoking: no       - Hyperthyroidism: no       - Orthopedic history: Patient reports back pain "always been bad since I was a  for 28 years, it's always been bad;" Patient reports he sees chiropractor every 2 weeks; Patient reports bilateral knee pain "just old age" increased pain with walking, stair negotiation     Gastrointestinal:      - Abdominal surgeries: yes; prostate removal, 3-4 hernia surgeries      - Crohn's disease: no      - Diverticulitis: no   Genitourinary       - Kidney dysfunction: no      - Chronic renal insufficiency: no      - Incontinence: no      - Saddle anesthesia: no      - Bowel and bladder changes: no   Cardiovascular/ Respiratory history     - HTN: yes; take medication       - Asthma: no      - Bronchitis: no      - Irregular heartbeat: no      - Sleep apnea: no      - Diabetes: no      - Heart disease: no      - Regular follow-ups with cardiologist: no      - Family history of CVD: no      - Clotting disorders: no      - Varicose veins: no      - PMH wounds: no ;     - PMH DVT: no   Home setting: stairs at home; multi-level colonial home   Support: Wife  Goals: "I don't know what it is and I don't want it to get worse "   Pain  Current pain ratin  At best pain ratin  At worst pain rating: 3  Location: bilateral lower legs   Quality: dull ache  Relieving factors: support and rest  Aggravating factors: walking, standing and stair climbing    Social Support  Lives in: multiple-level home  Lives with: spouse    Employment status: not working  Treatments  Current treatment: physical therapy  Patient Goals  Patient goals for therapy: decreased edema, increased strength, independence with ADLs/IADLs, improved balance, increased motion and return to sport/leisure activities          Objective     Observations   Left Knee   Positive for atrophy (+ atrophy L gastroc secondary L Achilles rupture ) and edema  Right Knee   Positive for edema  Additional Observation Details  Palpation:   Skin Mobility: Minimal restrictions due to swelling  Skin temperature: WNL  Skin color: WNL  Pitting: Present grade 2 at IE; currently 0-1- improvements in bilateral lower legs     Wound presence: no,    Fungal infections: no; previously had, treated with antibiotic cream  Hyperkeratosis: no  Lymphorrhea/ weeping: no  Papillomas: no   Lymph fistulas: no   Lymph cysts: no   Cellulitis: no Lipodermatosclerosis: no     Active Range of Motion     Additional Active Range of Motion Details  Lumbar flexion: 75%  Lumbar extension: 25%  L Lateral flexion: 50%  R lateral flexion: 50%  L lateral rotation: 75%  R lateral rotation: 50%     Strength/Myotome Testing     Left Hip   Planes of Motion   Flexion: 4  Extension: 3  Abduction: 4    Right Hip   Planes of Motion   Flexion: 4  Extension: 3  Abduction: 4    Left Knee   Flexion: WFL  Extension: WFL    Right Knee   Flexion: WFL  Extension: WFL    Left Ankle/Foot   Dorsiflexion: 4+  Plantar flexion: 4+  Inversion: 4+  Eversion: 4+    Right Ankle/Foot   Dorsiflexion: 5  Plantar flexion: 5  Inversion: 5  Eversion: 5    General Comments: Ankle/Foot Comments   Gait: Bilateral hip ER, decreased L plantar flexion at toe off    PT educated Sergio Paz in regards to pathology of lymphedema, proper skin care, and components of complete decongestive therapy including manual lymph drainage, compression, exercise, and proper skin care with verbalized understanding  PT to contact supplier in regards to compression reduction kits and bandaging where consultant will verify patient's benefits to determine if insurance will cover for bandaging  PT reviewed with patient lymphedema education of skin care including: keeping extremity clean and dry, applying moisturizer daily, special attention to nail care, wearing sunscreen avoiding nicks and skin irritation from razors, wearing gloves with activities that may cause skin injury  Also reviewed for patient to avoid excessive constriction such as tight clothing and jewelry, extreme temperature changes, and the importance of compliance for bandaging and garments  Patient also advised to contact physician if experiencing any constitutional symptoms, swelling, redness, pain, rash, itching, or increased skin temperature  Patient given [employer] handout with all precautions and risk reduction practices  Body Part IE: L (cm) IE: R (cm) RE: L (cm) RE: R (cm)   MTP 25 5 25 24 25   Dorsum  28 25 5 26 26   Malleolus 30 28 28 28   4 cm proximal to lateral malleolus  29 5 26 25 26 5   8 cm  26 5 25 24 24   12 cm  27 27 24 25   16 cm  30 5 30 27 27 5   20 cm 33 32 30 31   24 cm 34 38 5 32 5 34 5   28 cm 39 39 5 35 38   32 cm 39 39 36 39   36 cm 38 37 36 37 5   40 cm 36 36 5 35 36                     Precautions: PMH prostate cancer, chronic pain, fall risk, L Achilles rupture May 2020  HEP: review educational literature provided by PT, order short stretch bandages as highlighted by PT   Daily Treatment Diary      Manuals 12/1  12/9  12/15  12/22  12/29  1/13  1/20  2/24       B/L LE MLD sequence    35' total   25' total  25' total  30' total   25' total   25' total   35' total                                                        Therapeutic Exercise                       Bike                        Hip abduction                       Hip flexion                       Heel raises    3x10  3x10  3x10  3x10  3x10  3x10 edge  3x10       Toe raises                       Lateral walking                       Bridges                       Gastroc stretch   :20x5 ea  :20x5 ea  :20x5              DF mobilization w/ and w/o PT   10 ea  10 ea  10 ea 10 ea   10 ea  10 ea  10 ea       Step ups fwd L, down w/ R   6 inch 2x10   6 inch 2x10 6 inch 2x10  6 inch 2x10  8 inch 2x10         SLS   :20x3    :20x5 ea :20x5   :20x5  :20x5                                                                                 Self-care/ Home management                       Lymphedema education  21' total

## 2021-02-24 NOTE — PROGRESS NOTES
Daily Note     Today's date: 2021  Patient name: Jose Guadalupe Gusman  : 1950  MRN: [id number]  Referring provider: Jose Faulkner MD  Dx:   Encounter Diagnosis     ICD-10-CM    1  Lymphedema of left lower extremity  I89 0    2  Rupture of left Achilles tendon, subsequent encounter  S86 012D    3  H/O prostate cancer  Z85 46                   Subjective: Elvia Chávez reports "I'm okay" upon arrival to therapy today  Patient indicating "been a lot going on lately with my family, that's why I haven't been here "   COVID-19 Questions: Elvia Chávez denies leaving the country or the state within the past 2 weeks  he denies any of the following symptoms: symptoms of upper respiratory infection (including runny nose, sore throat, cough), fever or flu-like symptoms, new headaches, new SOB, new diarrhea, vomiting, nausea, new loss of taste or smell  Patient's temperature taken by infrared no-touch thermometer upon arrival to therapy today and WNL  Objective: See treatment diary below      Assessment: Tolerated treatment fair  Patient would benefit from continued PT  + response to MLD noted w/ overall vol reduction assessed grossly, good tolerance to compression garments/bandages  Patient able to resume exercises today, improvements in SLS overall stability  Patient would continue to benefit from skilled PT to address swelling, strength, and to maximize function  Plan: Continue per plan of care  Progress treatment as tolerated                Precautions: PMH prostate cancer, chronic pain, fall risk, L Achilles rupture May 2020  HEP: review educational literature provided by PT, order short stretch bandages as highlighted by PT   Daily Treatment Diary      Manuals 12/1  12/9  12/15  12/22  12/29  1/13  1/20         B/L LE MLD sequence    35' total   25' total  25' total  30' total   25' total   25' total                                                          Therapeutic Exercise                       Bike                        Hip abduction                       Hip flexion                       Heel raises    3x10  3x10  3x10  3x10  3x10  3x10 edge         Toe raises                       Lateral walking                       Bridges                       Gastroc stretch   :20x5 ea  :20x5 ea  :20x5              DF mobilization w/ and w/o PT   10 ea  10 ea  10 ea 10 ea   10 ea  10 ea         Step ups fwd L, down w/ R   6 inch 2x10   6 inch 2x10 6 inch 2x10  6 inch 2x10  8 inch 2x10         SLS   :20x3    :20x5 ea :20x5   :20x5  :20x5                                                                                 Self-care/ Home management                       Lymphedema education  21' total

## 2021-03-01 DIAGNOSIS — Z23 ENCOUNTER FOR IMMUNIZATION: ICD-10-CM

## 2021-03-04 ENCOUNTER — OFFICE VISIT (OUTPATIENT)
Dept: PHYSICAL THERAPY | Facility: CLINIC | Age: 71
End: 2021-03-04
Payer: COMMERCIAL

## 2021-03-04 DIAGNOSIS — S86.012D RUPTURE OF LEFT ACHILLES TENDON, SUBSEQUENT ENCOUNTER: ICD-10-CM

## 2021-03-04 DIAGNOSIS — I89.0 LYMPHEDEMA OF LEFT LOWER EXTREMITY: Primary | ICD-10-CM

## 2021-03-04 DIAGNOSIS — Z85.46 H/O PROSTATE CANCER: ICD-10-CM

## 2021-03-04 PROCEDURE — 97110 THERAPEUTIC EXERCISES: CPT | Performed by: PHYSICAL THERAPIST

## 2021-03-04 PROCEDURE — 97140 MANUAL THERAPY 1/> REGIONS: CPT | Performed by: PHYSICAL THERAPIST

## 2021-03-04 NOTE — PROGRESS NOTES
Daily Note     Today's date: 3/4/2021  Patient name: Zarina Oneal  : 1950  MRN: [id number]  Referring provider: Moe Godinez MD  Dx:   Encounter Diagnosis     ICD-10-CM    1  Lymphedema of left lower extremity  I89 0    2  Rupture of left Achilles tendon, subsequent encounter  S86 012D    3  H/O prostate cancer  Z85 46        Start Time: 12  Stop Time: 09  Total time in clinic (min): 40 minutes    Subjective: Ella Barahona reports "doing alright" upon arrival to therapy today  COVID-19 Questions: Ella Barahona denies leaving the country or the state within the past 2 weeks  he denies any of the following symptoms: symptoms of upper respiratory infection (including runny nose, sore throat, cough), fever or flu-like symptoms, new headaches, new SOB, new diarrhea, vomiting, nausea, new loss of taste or smell  Patient's temperature taken by infrared no-touch thermometer upon arrival to therapy today and WNL  Objective: See treatment diary below      Assessment: Tolerated treatment fair  Patient would benefit from continued PT  Limited TE today secondary to time constraint, resume exercises as able next visit  Plan: Continue per plan of care  Progress treatment as tolerated              Precautions: PMH prostate cancer, chronic pain, fall risk, L Achilles rupture May 2020  HEP: review educational literature provided by PT, order short stretch bandages as highlighted by PT   Daily Treatment Diary      Manuals 12/1  12/9  12/15  12/22  12/29  1/13  1/20  2/24  3/3     B/L LE MLD sequence    35' total   25' total  25' total  30' total   25' total   25' total   35' total   30' total                                                      Therapeutic Exercise                       Bike                        Hip abduction                       Hip flexion                       Heel raises    3x10  3x10  3x10  3x10  3x10  3x10 edge  3x10  3x10 edge     Toe raises                       Lateral walking                       Bridges                       Gastroc stretch   :20x5 ea  :20x5 ea  :20x5              DF mobilization w/ and w/o PT   10 ea  10 ea  10 ea 10 ea   10 ea  10 ea  10 ea  10 ea     Step ups fwd L, down w/ R   6 inch 2x10   6 inch 2x10 6 inch 2x10  6 inch 2x10  8 inch 2x10         SLS   :20x3    :20x5 ea :20x5   :20x5  :20x5    :20x5                                                                              Self-care/ Home management                       Lymphedema education  21' total

## 2021-03-11 ENCOUNTER — OFFICE VISIT (OUTPATIENT)
Dept: PHYSICAL THERAPY | Facility: CLINIC | Age: 71
End: 2021-03-11
Payer: COMMERCIAL

## 2021-03-11 DIAGNOSIS — S86.012D RUPTURE OF LEFT ACHILLES TENDON, SUBSEQUENT ENCOUNTER: ICD-10-CM

## 2021-03-11 DIAGNOSIS — Z85.46 H/O PROSTATE CANCER: ICD-10-CM

## 2021-03-11 DIAGNOSIS — I89.0 LYMPHEDEMA OF LEFT LOWER EXTREMITY: Primary | ICD-10-CM

## 2021-03-11 PROCEDURE — 97140 MANUAL THERAPY 1/> REGIONS: CPT | Performed by: PHYSICAL THERAPIST

## 2021-03-11 PROCEDURE — 97110 THERAPEUTIC EXERCISES: CPT | Performed by: PHYSICAL THERAPIST

## 2021-03-11 NOTE — PROGRESS NOTES
Daily Note   Progress notes 3/25/21    Today's date: 3/11/2021  Patient name: Teresa Corbin  : 1950  MRN: [id number]   Referring provider: Pham Hutton MD  Dx:   Encounter Diagnosis     ICD-10-CM    1  Lymphedema of left lower extremity  I89 0    2  Rupture of left Achilles tendon, subsequent encounter  S86 012D    3  H/O prostate cancer  Z85 46                   Subjective: Halley Stephen reports "doing alright" upon arrival to therapy today  COVID-19 Questions: Halley Stephen denies leaving the country or the state within the past 2 weeks  he denies any of the following symptoms: symptoms of upper respiratory infection (including runny nose, sore throat, cough), fever or flu-like symptoms, new headaches, new SOB, new diarrhea, vomiting, nausea, new loss of taste or smell  Patient's temperature taken by infrared no-touch thermometer upon arrival to therapy today and WNL  Objective: See treatment diary below      Assessment: Tolerated treatment fair  Patient would benefit from continued PT  Limited TE today secondary to time constraint, resume exercises as able next visit  + response to MLD noted w/ overall vol reduction assessed grossly, good tolerance to compression garments/bandages  Plan: Continue per plan of care  Progress treatment as tolerated              Precautions: PMH prostate cancer, chronic pain, fall risk, L Achilles rupture May 2020  HEP: review educational literature provided by PT, order short stretch bandages as highlighted by PT   Daily Treatment Diary      Manuals 12/1  12/9  12/15  12/22  12/29  1/13  1/20  2/24  3/3  3/10   B/L LE MLD sequence    35' total   25' total  25' total  30' total   25' total   25' total   35' total   30' total                                                      Therapeutic Exercise                       Bike                        Hip abduction                       Hip flexion                       Heel raises    3x10  3x10  3x10  3x10  3x10  3x10 edge  3x10  3x10 edge     Toe raises                       Lateral walking                       Bridges                       Gastroc stretch   :20x5 ea  :20x5 ea  :20x5              DF mobilization w/ and w/o PT   10 ea  10 ea  10 ea 10 ea   10 ea  10 ea  10 ea  10 ea     Step ups fwd L, down w/ R   6 inch 2x10   6 inch 2x10 6 inch 2x10  6 inch 2x10  8 inch 2x10         SLS   :20x3    :20x5 ea :20x5   :20x5  :20x5    :20x5                                                                              Self-care/ Home management                       Lymphedema education  21' total

## 2021-03-18 ENCOUNTER — OFFICE VISIT (OUTPATIENT)
Dept: PHYSICAL THERAPY | Facility: CLINIC | Age: 71
End: 2021-03-18
Payer: COMMERCIAL

## 2021-03-18 DIAGNOSIS — I89.0 LYMPHEDEMA OF LEFT LOWER EXTREMITY: Primary | ICD-10-CM

## 2021-03-18 DIAGNOSIS — S86.012D RUPTURE OF LEFT ACHILLES TENDON, SUBSEQUENT ENCOUNTER: ICD-10-CM

## 2021-03-18 DIAGNOSIS — Z85.46 H/O PROSTATE CANCER: ICD-10-CM

## 2021-03-18 PROCEDURE — 97140 MANUAL THERAPY 1/> REGIONS: CPT | Performed by: PHYSICAL THERAPIST

## 2021-03-18 PROCEDURE — 97110 THERAPEUTIC EXERCISES: CPT | Performed by: PHYSICAL THERAPIST

## 2021-03-18 NOTE — PROGRESS NOTES
Daily Note   Progress notes 3/25/21    Today's date: 3/18/2021  Patient name: Ana Gonzales  : 1950  MRN: [id number]   Referring provider: Yolande Franco MD  Dx:   Encounter Diagnosis     ICD-10-CM    1  Lymphedema of left lower extremity  I89 0    2  Rupture of left Achilles tendon, subsequent encounter  S86 012D    3  H/O prostate cancer  Z85 46                   Subjective: Nely Beltrán reports "doing alright, leg has been feeling good "   COVID-19 Questions: Nely Beltrán denies leaving the country or the state within the past 2 weeks  he denies any of the following symptoms: symptoms of upper respiratory infection (including runny nose, sore throat, cough), fever or flu-like symptoms, new headaches, new SOB, new diarrhea, vomiting, nausea, new loss of taste or smell  Patient's temperature taken by infrared no-touch thermometer upon arrival to therapy today and WNL  Objective: See treatment diary below      Assessment: Tolerated treatment fair  Patient would benefit from continued PT  + response to MLD noted w/ overall vol reduction assessed grossly, good tolerance to compression garments/bandages  PT to take measurements of L LE next visit and introduce patient to fitness center to transition to fitness classes going forward  Plan: Continue per plan of care  Potential discharge next visit  Progress treatment as tolerated              Precautions: PMH prostate cancer, chronic pain, fall risk, L Achilles rupture May 2020  HEP: review educational literature provided by PT, order short stretch bandages as highlighted by PT   Daily Treatment Diary      Manuals 3/18  12/9  12/15  12/22  12/29  1/13  1/20  2/24  3/3  3/10   B/L LE MLD sequence 30' total   35' total   25' total  25' total  30' total   25' total   25' total   35' total   30' total                                                    Therapeutic Exercise                      Bike                       Hip abduction                      Hip flexion                      Heel raises 3x10 edge  3x10  3x10  3x10  3x10  3x10  3x10 edge  3x10  3x10 edge     Toe raises                      Lateral walking                      Bridges                      Gastroc stretch :20x5 :20x5 ea  :20x5 ea  :20x5              DF mobilization w/ and w/o PT 10  10 ea  10 ea  10 ea 10 ea   10 ea  10 ea  10 ea  10 ea     Step ups fwd L, down w/ R  6 inch 2x10   6 inch 2x10 6 inch 2x10  6 inch 2x10  8 inch 2x10         SLS :20x5 :20x3    :20x5 ea :20x5   :20x5  :20x5    :20x5                                                                           Self-care/ Home management                      Lymphedema education

## 2021-03-25 ENCOUNTER — OFFICE VISIT (OUTPATIENT)
Dept: PHYSICAL THERAPY | Facility: CLINIC | Age: 71
End: 2021-03-25
Payer: COMMERCIAL

## 2021-03-25 DIAGNOSIS — I89.0 LYMPHEDEMA OF LEFT LOWER EXTREMITY: Primary | ICD-10-CM

## 2021-03-25 DIAGNOSIS — Z85.46 H/O PROSTATE CANCER: ICD-10-CM

## 2021-03-25 DIAGNOSIS — S86.012D RUPTURE OF LEFT ACHILLES TENDON, SUBSEQUENT ENCOUNTER: ICD-10-CM

## 2021-03-25 PROCEDURE — 97535 SELF CARE MNGMENT TRAINING: CPT | Performed by: PHYSICAL THERAPIST

## 2021-03-25 PROCEDURE — 97140 MANUAL THERAPY 1/> REGIONS: CPT | Performed by: PHYSICAL THERAPIST

## 2021-03-25 NOTE — PROGRESS NOTES
PT Discharge    Today's date: 3/25/2021  Patient name: Sharmaine Rodriguez  : 1950  MRN: [id number]  Referring provider: Josselin Garcia MD  Dx:   Encounter Diagnosis     ICD-10-CM    1  Lymphedema of left lower extremity  I89 0    2  Rupture of left Achilles tendon, subsequent encounter  S86 012D    3  H/O prostate cancer  Z85 46        Start Time: 46  Stop Time: 0930  Total time in clinic (min): 45 minutes    Assessment  Assessment details: Sharmaine Rodriguez is a 79y o  year old male who presents to IE with:   Lymphedema of left lower extremity  (primary encounter diagnosis)  H/O prostate cancer     Love Meter has made the following improvements since beginning PT: decreased pain, decreased swelling, decreased fibrosis, increased strength and increased tolerance to activities  Love Meter and PT mutually agree to transition to HEP at this time secondary to gains made in PT  he has been given updated HEP with verbalized understanding and compliance  Love Meter is encouraged to contact PT with any questions or concerns in the future  Other impairment: increased swelling, lacks knowledge of lymphedema and proper skin care  Functional limitations: Walking, getting up from a chair, stair negotiation, squatting Barriers to therapy: PMH prostate cancer, chronic pain, fall risk, L Achilles rupture May 2020  Understanding of Dx/Px/POC: good   Prognosis: fair  Prognosis details:  Love Darden presents to IE with bilateral LE secondary lymphedema  Grade: 1-2  Fibrosis present: None    Goals  STG  - Patient and/or caregiver will understand lymphedema precautions to decrease risk of infection and exacerbation of lymphedema- Met  - Patient will develop a tolerance for wearing multi-layer, short stretch bandages betweens sessions to facilitate limb decongestion- Met   - Patient will experience decrease in pitting edema which will improve tissue health and decrease risk of infection  - Met  - Patient will perform HEP independently or with minimal assistance to help improve lymphatic flow and venous return- Met     LTG  - Patient will experience increased ROM and functional mobility to aid with ADL's such as walking, stair negotiation, squatting at time of discharge- Met  - Patient will demonstrate more normalized gait pattern and improved balance at time of discharge  - Met  - Patient and/or caregiver will be independent with donning and doffing of compression garments which will enable regular daily garment wear at time of discharge, skin maintenance, and self- MLD - Met  - Patient and/or caregiver will be independent with HEP and lymphedema management to prevent relapse and reduce risk of infection and hospitalizations at time of discharge- Met    Plan  Plan details: Thank you for referring Renae Chin to Physical Therapy at [place] [address] and for the opportunity to coordinate care  Other planned therapy interventions: Complete decongestive therapy: Manual lymphatic drainage, compression bandaging, exercise, self-maintenance, and education on bandaging, skin care, and self-bandaging  Frequency: 1x week  Treatment plan discussed with: patient        Subjective Evaluation    History of Present Illness  Mechanism of injury: RE 03/25/21   Meenakshi Loza has been seen for total of 10 visits for OP PT for bilateral LE lymphedema and PMH of prostate cancer   Patient's global rating of change is " Quite a bit better (5) " Patient reports improvements with overall swelling, ROM, and tolerance to activities  Patient to transition to fitness center and [facility] program membership to continue with exercise and incorporate more of a full-body workout  Patient to follow-up with PT LX in 6 months for measurement re-check at that time and will determine course of care  Meenakshi Loza and PT mutually agree to transition to HEP at this time secondary to gains made in PT  Patient given updated HEP with verbalized understanding and compliance   he is encouraged to contact PT with any questions or concerns in the future  Patient presents to OP PT with PT Sherry Baron 12/01/20 for evaluation of bilateral LE swelling  America Smyth presents to IE with bilateral LE lymphedema  Patient reports PMH of prostate cancer, diagnosed in 2015 with multiple biopsies, and ultimate removal in 2017  Patient reports between 17-27 lymph nodes removed, patient unable to remember which number exactly  He reports about 6 months after surgery, he began to notice swelling in R LE  Patient reports swelling into L Lower leg as well following most recent L Achilles injury  He reports "most of the time, it's both legs that are swollen " He denies any other treatment for prostate cancer, denying chemotherapy or radiation at this time  He denies any side effects from surgery or any complications or infections from actual prostate removal  He does indicate some stress incontinence at times reporting "sometimes if I sneeze or cough, it comes out a little, not bad though and not all the time " Patient may benefit from pelvic floor evaluation in future  Patient reports he has not seen oncologist or had any recent scans  He denies seeing a urologist, noting "I just see my primary doctor every year for my check-up "   Patient reports at end of May he fell off of a ladder and "I really don't know what it was, but they sent me over to Dr David Duggan, but it was the Achilles, and they put me in a hard cast, a boot, a walking boot, and then I started the therapy  Now I'm going once a week " Patient reprots "I think I'll be ending it soon " Patient denies any pain in L ankle, "it seems like it's swollen a lot, at the end of the day " Patient denies N/T in L ankle or Achilles    Additional PMH: 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 and sees doctor every 3-4 months to check on the size and see if they need to do surgery or anything "      Swelling history     - Surgery: yes ; prostate removal and biopsy    - PMH Cancer: yes;  Prostate     - Lymph node removal: yes ""    - Radiation therapy for cancer: no     - Chemotherapy: no     -  Infections (cellulitis): fungus along L lower leg with Achilles rupture "cream that I applied on the skin, it's all better now "     -  Exercise history: PT, "it's not been good the past year "     - Previous treatment: Nothing    - Currently wear a compression garment: None    Loss of function/ mobility:     - Dressing: yes; sock on the one side     - Lifting: yes      - Walking: yes, "aching, from the knee down "      General Medical History      - Obesity: yes; BMI > 30      - Smoking: no       - Hyperthyroidism: no       - Orthopedic history: Patient reports back pain "always been bad since I was a  for 28 years, it's always been bad;" Patient reports he sees chiropractor every 2 weeks; Patient reports bilateral knee pain "just old age" increased pain with walking, stair negotiation     Gastrointestinal:      - Abdominal surgeries: yes; prostate removal, 3-4 hernia surgeries      - Crohn's disease: no      - Diverticulitis: no   Genitourinary       - Kidney dysfunction: no      - Chronic renal insufficiency: no      - Incontinence: no      - Saddle anesthesia: no      - Bowel and bladder changes: no   Cardiovascular/ Respiratory history     - HTN: yes; take medication       - Asthma: no      - Bronchitis: no      - Irregular heartbeat: no      - Sleep apnea: no      - Diabetes: no      - Heart disease: no      - Regular follow-ups with cardiologist: no      - Family history of CVD: no      - Clotting disorders: no      - Varicose veins: no      - PMH wounds: no ;     - PMH DVT: no   Home setting: stairs at home; multi-level [place] home   Support: Wife  Goals: "I don't know what it is and I don't want it to get worse "   Pain  Current pain ratin  At best pain ratin  At worst pain rating: 3  Location: bilateral lower legs   Quality: dull ache  Relieving factors: support and rest  Aggravating factors: walking, standing and stair climbing    Social Support  Lives in: multiple-level home  Lives with: spouse    Employment status: not working  Treatments  Current treatment: physical therapy  Patient Goals  Patient goals for therapy: decreased edema, increased strength, independence with ADLs/IADLs, improved balance, increased motion and return to sport/leisure activities          Objective     Observations   Left Knee   Positive for atrophy (+ atrophy L gastroc secondary L Achilles rupture ) and edema  Right Knee   Positive for edema  Additional Observation Details  Palpation:   Skin Mobility: Minimal restrictions due to swelling  Skin temperature: WNL  Skin color: WNL  Pitting: Present grade 2 at IE; currently 0-1- improvements in bilateral lower legs  Wound presence: no,    Fungal infections: no; previously had, treated with antibiotic cream  Hyperkeratosis: no  Lymphorrhea/ weeping: no  Papillomas: no   Lymph fistulas: no   Lymph cysts: no   Cellulitis: no   Lipodermatosclerosis: no     Active Range of Motion     Additional Active Range of Motion Details  Lumbar flexion: 75%  Lumbar extension: 25%  L Lateral flexion: 50%  R lateral flexion: 50%  L lateral rotation: 75%  R lateral rotation: 50%     Strength/Myotome Testing     Left Hip   Planes of Motion   Flexion: 4  Extension: 3  Abduction: 4    Right Hip   Planes of Motion   Flexion: 4  Extension: 3  Abduction: 4    Left Knee   Flexion: WFL  Extension: WFL    Right Knee   Flexion: WFL  Extension: WFL    Left Ankle/Foot   Dorsiflexion: 4+  Plantar flexion: 4+  Inversion: 4+  Eversion: 4+    Right Ankle/Foot   Dorsiflexion: 5  Plantar flexion: 5  Inversion: 5  Eversion: 5    General Comments:       Ankle/Foot Comments   Gait: Bilateral hip ER, decreased L plantar flexion at toe off    PT educated Randall Jennings in regards to pathology of lymphedema, proper skin care, and components of complete decongestive therapy including manual lymph drainage, compression, exercise, and proper skin care with verbalized understanding  PT to contact supplier in regards to compression reduction kits and bandaging where consultant will verify patient's benefits to determine if insurance will cover for bandaging  PT reviewed with patient lymphedema education of skin care including: keeping extremity clean and dry, applying moisturizer daily, special attention to nail care, wearing sunscreen avoiding nicks and skin irritation from razors, wearing gloves with activities that may cause skin injury  Also reviewed for patient to avoid excessive constriction such as tight clothing and jewelry, extreme temperature changes, and the importance of compliance for bandaging and garments  Patient also advised to contact physician if experiencing any constitutional symptoms, swelling, redness, pain, rash, itching, or increased skin temperature  Patient given [employer] handout with all precautions and risk reduction practices                 Body Part IE: L (cm) IE: R (cm) RE: L (cm) RE: R (cm) RE L (cm) 03/25/21  RE R (cm) 03/25/21    MTP 25 5 25 24 25 24 5 25   Dorsum  28 25 5 26 26 26 27   Malleolus 30 28 28 28 29 29   4 cm proximal to lateral malleolus  29 5 26 25 26 5 26 26   8 cm  26 5 25 24 24 24 25   12 cm  27 27 24 25 25 27   16 cm  30 5 30 27 27 5 27 5 29 5   20 cm 33 32 30 31 30 33   24 cm 34 38 5 32 5 34 5 33 37 5   28 cm 39 39 5 35 38 35 39 5   32 cm 39 39 36 39 36 5 39   36 cm 38 37 36 37 5 36 38   40 cm 36 36 5 35 36 35 36                    Precautions: PMH prostate cancer, chronic pain, fall risk, L Achilles rupture May 2020  HEP: review educational literature provided by PT, order short stretch bandages as highlighted by PT   Daily Treatment Diary      Manuals 3/18 3/25  12/15  12/22  12/29  1/13  1/20  2/24  3/3  3/10   B/L LE MLD sequence 30' total  28' total   25' total  25' total  30' total   25' total   25' total   35' total   30' total      Measurements  Done                                         Therapeutic Exercise                     Bike                      Hip abduction                     Hip flexion                     Heel raises 3x10 edge   3x10  3x10  3x10  3x10  3x10 edge  3x10  3x10 edge     Toe raises                     Lateral walking                     Bridges                     Gastroc stretch :20x5   :20x5 ea  :20x5              DF mobilization w/ and w/o PT 10    10 ea  10 ea 10 ea   10 ea  10 ea  10 ea  10 ea     Step ups fwd L, down w/ R     6 inch 2x10 6 inch 2x10  6 inch 2x10  8 inch 2x10         SLS :20x5    :20x5 ea :20x5   :20x5  :20x5    :20x5                                                                        Self-care/ Home management                     Lymphedema education   exercise education/ fitness center 8'

## 2021-05-15 DIAGNOSIS — I10 ESSENTIAL HYPERTENSION: ICD-10-CM

## 2021-05-17 RX ORDER — AMLODIPINE BESYLATE 2.5 MG/1
TABLET ORAL
Qty: 90 TABLET | Refills: 3 | Status: SHIPPED | OUTPATIENT
Start: 2021-05-17 | End: 2022-04-14

## 2021-05-18 ENCOUNTER — HOSPITAL ENCOUNTER (OUTPATIENT)
Dept: RADIOLOGY | Facility: HOSPITAL | Age: 71
Discharge: HOME | End: 2021-05-18
Attending: FAMILY MEDICINE
Payer: COMMERCIAL

## 2021-05-18 ENCOUNTER — TRANSCRIBE ORDERS (OUTPATIENT)
Dept: ADMINISTRATIVE | Facility: HOSPITAL | Age: 71
End: 2021-05-18

## 2021-05-18 ENCOUNTER — OFFICE VISIT (OUTPATIENT)
Dept: FAMILY MEDICINE CLINIC | Facility: CLINIC | Age: 71
End: 2021-05-18
Payer: COMMERCIAL

## 2021-05-18 VITALS
BODY MASS INDEX: 32.98 KG/M2 | HEIGHT: 71 IN | WEIGHT: 235.6 LBS | TEMPERATURE: 98.2 F | HEART RATE: 70 BPM | SYSTOLIC BLOOD PRESSURE: 110 MMHG | RESPIRATION RATE: 18 BRPM | OXYGEN SATURATION: 98 % | DIASTOLIC BLOOD PRESSURE: 72 MMHG

## 2021-05-18 DIAGNOSIS — B35.9 DERMATOPHYTOSIS: ICD-10-CM

## 2021-05-18 DIAGNOSIS — E78.2 MIXED HYPERLIPIDEMIA: ICD-10-CM

## 2021-05-18 DIAGNOSIS — E66.9 OBESITY (BMI 30.0-34.9): ICD-10-CM

## 2021-05-18 DIAGNOSIS — Z90.79 S/P PROSTATECTOMY: ICD-10-CM

## 2021-05-18 DIAGNOSIS — Z12.5 SCREENING FOR PROSTATE CANCER: ICD-10-CM

## 2021-05-18 DIAGNOSIS — Z00.00 WELL ADULT EXAM: Primary | ICD-10-CM

## 2021-05-18 DIAGNOSIS — K76.0 FATTY LIVER DISEASE, NONALCOHOLIC: ICD-10-CM

## 2021-05-18 DIAGNOSIS — Z13.6 SCREENING FOR CARDIOVASCULAR CONDITION: ICD-10-CM

## 2021-05-18 DIAGNOSIS — C61 ADENOCARCINOMA OF PROSTATE (HCC): ICD-10-CM

## 2021-05-18 DIAGNOSIS — M54.2 CERVICALGIA: ICD-10-CM

## 2021-05-18 DIAGNOSIS — I10 BENIGN ESSENTIAL HYPERTENSION: ICD-10-CM

## 2021-05-18 PROCEDURE — 3288F FALL RISK ASSESSMENT DOCD: CPT | Performed by: FAMILY MEDICINE

## 2021-05-18 PROCEDURE — 3074F SYST BP LT 130 MM HG: CPT | Performed by: FAMILY MEDICINE

## 2021-05-18 PROCEDURE — G0439 PPPS, SUBSEQ VISIT: HCPCS | Performed by: FAMILY MEDICINE

## 2021-05-18 PROCEDURE — 1101F PT FALLS ASSESS-DOCD LE1/YR: CPT | Performed by: FAMILY MEDICINE

## 2021-05-18 PROCEDURE — 3725F SCREEN DEPRESSION PERFORMED: CPT | Performed by: FAMILY MEDICINE

## 2021-05-18 PROCEDURE — 3008F BODY MASS INDEX DOCD: CPT | Performed by: FAMILY MEDICINE

## 2021-05-18 PROCEDURE — 1036F TOBACCO NON-USER: CPT | Performed by: FAMILY MEDICINE

## 2021-05-18 PROCEDURE — 72050 X-RAY EXAM NECK SPINE 4/5VWS: CPT

## 2021-05-18 PROCEDURE — 1160F RVW MEDS BY RX/DR IN RCRD: CPT | Performed by: FAMILY MEDICINE

## 2021-05-18 PROCEDURE — 3078F DIAST BP <80 MM HG: CPT | Performed by: FAMILY MEDICINE

## 2021-05-18 RX ORDER — CLOTRIMAZOLE AND BETAMETHASONE DIPROPIONATE 10; .64 MG/G; MG/G
CREAM TOPICAL 2 TIMES DAILY
Qty: 30 G | Refills: 0 | Status: SHIPPED | OUTPATIENT
Start: 2021-05-18 | End: 2022-01-27

## 2021-05-18 NOTE — PATIENT INSTRUCTIONS
Obesity   AMBULATORY CARE:   Obesity  is when your body mass index (BMI) is greater than 30  Your healthcare provider will use your height and weight to measure your BMI  The risks of obesity include  many health problems, such as injuries or physical disability  You may need tests to check for the following:  · Diabetes    · High blood pressure or high cholesterol    · Heart disease    · Gallbladder or liver disease    · Cancer of the colon, breast, prostate, liver, or kidney    · Sleep apnea    · Arthritis or gout    Seek care immediately if:   · You have a severe headache, confusion, or difficulty speaking  · You have weakness on one side of your body  · You have chest pain, sweating, or shortness of breath  Contact your healthcare provider if:   · You have symptoms of gallbladder or liver disease, such as pain in your upper abdomen  · You have knee or hip pain and discomfort while walking  · You have symptoms of diabetes, such as intense hunger and thirst, and frequent urination  · You have symptoms of sleep apnea, such as snoring or daytime sleepiness  · You have questions or concerns about your condition or care  Treatment for obesity  focuses on helping you lose weight to improve your health  Even a small decrease in BMI can reduce the risk for many health problems  Your healthcare provider will help you set a weight-loss goal   · Lifestyle changes  are the first step in treating obesity  These include making healthy food choices and getting regular physical activity  Your healthcare provider may suggest a weight-loss program that involves coaching, education, and therapy  · Medicine  may help you lose weight when it is used with a healthy diet and physical activity  · Surgery  can help you lose weight if you are very obese and have other health problems  There are several types of weight-loss surgery  Ask your healthcare provider for more information      Be successful losing weight:   · Set small, realistic goals  An example of a small goal is to walk for 20 minutes 5 days a week  Anther goal is to lose 5% of your body weight  · Tell friends, family members, and coworkers about your goals  and ask for their support  Ask a friend to lose weight with you, or join a weight-loss support group  · Identify foods or triggers that may cause you to overeat , and find ways to avoid them  Remove tempting high-calorie foods from your home and workplace  Place a bowl of fresh fruit on your kitchen counter  If stress causes you to eat, then find other ways to cope with stress  · Keep a diary to track what you eat and drink  Also write down how many minutes of physical activity you do each day  Weigh yourself once a week and record it in your diary  Eating changes: You will need to eat 500 to 1,000 fewer calories each day than you currently eat to lose 1 to 2 pounds a week  The following changes will help you cut calories:  · Eat smaller portions  Use small plates, no larger than 9 inches in diameter  Fill your plate half full of fruits and vegetables  Measure your food using measuring cups until you know what a serving size looks like  · Eat 3 meals and 1 or 2 snacks each day  Plan your meals in advance  Koki Campos and eat at home most of the time  Eat slowly  Do not skip meals  Skipping meals can lead to overeating later in the day  This can make it harder for you to lose weight  Talk with a dietitian to help you make a meal plan and schedule that is right for you  · Eat fruits and vegetables at every meal   They are low in calories and high in fiber, which makes you feel full  Do not add butter, margarine, or cream sauce to vegetables  Use herbs to season steamed vegetables  · Eat less fat and fewer fried foods  Eat more baked or grilled chicken and fish  These protein sources are lower in calories and fat than red meat  Limit fast food   Dress your salads with olive oil and vinegar instead of bottled dressing  · Limit the amount of sugar you eat  Do not drink sugary beverages  Limit alcohol  Activity changes:  Physical activity is good for your body in many ways  It helps you burn calories and build strong muscles  It decreases stress and depression, and improves your mood  It can also help you sleep better  Talk to your healthcare provider before you begin an exercise program   · Exercise for at least 30 minutes 5 days a week  Start slowly  Set aside time each day for physical activity that you enjoy and that is convenient for you  It is best to do both weight training and an activity that increases your heart rate, such as walking, bicycling, or swimming  · Find ways to be more active  Do yard work and housecleaning  Walk up the stairs instead of using elevators  Spend your leisure time going to events that require walking, such as outdoor festivals or fairs  This extra physical activity can help you lose weight and keep it off  Follow up with your healthcare provider as directed: You may need to meet with a dietitian  Write down your questions so you remember to ask them during your visits  © Copyright [address] [place] [address] Drive Information is for End User's use only and may not be sold, redistributed or otherwise used for commercial purposes  All illustrations and images included in CareNotes® are the copyrighted property of A D A M , Inc  or [facility]   The above information is an  only  It is not intended as medical advice for individual conditions or treatments  Talk to your doctor, nurse or pharmacist before following any medical regimen to see if it is safe and effective for you  Fall Prevention   AMBULATORY CARE:   Fall prevention  includes ways to make your home and other areas safer  It also includes ways you can move more carefully to prevent a fall   Health conditions that cause changes in your blood pressure, vision, or muscle strength and coordination may increase your risk for falls  Medicines may also increase your risk for falls if they make you dizzy, weak, or sleepy  Call 911 or have someone else call if:   · You have fallen and are unconscious  · You have fallen and cannot move part of your body  Contact your healthcare provider if:   · You have fallen and have pain or a headache  · You have questions or concerns about your condition or care  Fall prevention tips:   · Stand or sit up slowly  This may help you keep your balance and prevent falls  · Use assistive devices as directed  Your healthcare provider may suggest that you use a cane or walker to help you keep your balance  You may need to have grab bars put in your bathroom near the toilet or in the shower  · Wear shoes that fit well and have soles that   Wear shoes both inside and outside  Use slippers with good   Do not wear shoes with high heels  · Wear a personal alarm  This is a device that allows you to call 911 if you fall and need help  Ask your healthcare provider for more information  · Stay active  Exercise can help strengthen your muscles and improve your balance  Your healthcare provider may recommend water aerobics or walking  He or she may also recommend physical therapy to improve your coordination  Never start an exercise program without talking to your healthcare provider first          · Manage your medical conditions  Keep all appointments with your healthcare providers  Visit your eye doctor as directed  Home safety tips:       · Add items to prevent falls in the bathroom  Put nonslip strips on your bath or shower floor to prevent you from slipping  Use a bath mat if you do not have carpet in the bathroom  This will prevent you from falling when you step out of the bath or shower  Use a shower seat so you do not need to stand while you shower   Sit on the toilet or a chair in your bathroom to dry yourself and put on clothing  This will prevent you from losing your balance from drying or dressing yourself while you are standing  · Keep paths clear  Remove books, shoes, and other objects from walkways and stairs  Place cords for telephones and lamps out of the way so that you do not need to walk over them  Tape them down if you cannot move them  Remove small rugs  If you cannot remove a rug, secure it with double-sided tape  This will prevent you from tripping  · Install bright lights in your home  Use night lights to help light paths to the bathroom or kitchen  Always turn on the light before you start walking  · Keep items you use often on shelves within reach  Do not use a step stool to help you reach an item  · Paint or place reflective tape on the edges of your stairs  This will help you see the stairs better  Follow up with your healthcare provider as directed:  Write down your questions so you remember to ask them during your visits  © Copyright [address] Information is for End User's use only and may not be sold, redistributed or otherwise used for commercial purposes  All illustrations and images included in CareNotes® are the copyrighted property of A D A M , Inc  or [employer]   The above information is an  only  It is not intended as medical advice for individual conditions or treatments  Talk to your doctor, nurse or pharmacist before following any medical regimen to see if it is safe and effective for you  Fall Prevention   AMBULATORY CARE:   Fall prevention  includes ways to make your home and other areas safer  It also includes ways you can move more carefully to prevent a fall  Health conditions that cause changes in your blood pressure, vision, or muscle strength and coordination may increase your risk for falls  Medicines may also increase your risk for falls if they make you dizzy, weak, or sleepy    Call 911 or have someone else call if:   · You have fallen and are unconscious  · You have fallen and cannot move part of your body  Contact your healthcare provider if:   · You have fallen and have pain or a headache  · You have questions or concerns about your condition or care  Fall prevention tips:   · Stand or sit up slowly  This may help you keep your balance and prevent falls  · Use assistive devices as directed  Your healthcare provider may suggest that you use a cane or walker to help you keep your balance  You may need to have grab bars put in your bathroom near the toilet or in the shower  · Wear shoes that fit well and have soles that   Wear shoes both inside and outside  Use slippers with good   Do not wear shoes with high heels  · Wear a personal alarm  This is a device that allows you to call 911 if you fall and need help  Ask your healthcare provider for more information  · Stay active  Exercise can help strengthen your muscles and improve your balance  Your healthcare provider may recommend water aerobics or walking  He or she may also recommend physical therapy to improve your coordination  Never start an exercise program without talking to your healthcare provider first          · Manage your medical conditions  Keep all appointments with your healthcare providers  Visit your eye doctor as directed  Home safety tips:       · Add items to prevent falls in the bathroom  Put nonslip strips on your bath or shower floor to prevent you from slipping  Use a bath mat if you do not have carpet in the bathroom  This will prevent you from falling when you step out of the bath or shower  Use a shower seat so you do not need to stand while you shower  Sit on the toilet or a chair in your bathroom to dry yourself and put on clothing  This will prevent you from losing your balance from drying or dressing yourself while you are standing  · Keep paths clear    Remove books, shoes, and other objects from walkways and stairs  Place cords for telephones and lamps out of the way so that you do not need to walk over them  Tape them down if you cannot move them  Remove small rugs  If you cannot remove a rug, secure it with double-sided tape  This will prevent you from tripping  · Install bright lights in your home  Use night lights to help light paths to the bathroom or kitchen  Always turn on the light before you start walking  · Keep items you use often on shelves within reach  Do not use a step stool to help you reach an item  · Paint or place reflective tape on the edges of your stairs  This will help you see the stairs better  Follow up with your healthcare provider as directed:  Write down your questions so you remember to ask them during your visits  © Copyright [address] Information is for End User's use only and may not be sold, redistributed or otherwise used for commercial purposes  All illustrations and images included in CareNotes® are the copyrighted property of A D A M , Inc  or [facility] Aleksander De Leon   The above information is an  only  It is not intended as medical advice for individual conditions or treatments  Talk to your doctor, nurse or pharmacist before following any medical regimen to see if it is safe and effective for you

## 2021-05-18 NOTE — PROGRESS NOTES
FAMILY PRACTICE HEALTH MAINTENANCE OFFICE VISIT  [facility] - [address]    NAME: Skipper Kayser  AGE: 70 y o  SEX: male  : 1950     DATE: 2021    Assessment and Plan     1  Well adult exam    2  Screening for cardiovascular condition  -     Comprehensive metabolic panel; Future  -     Lipid Panel with Direct LDL reflex; Future    3  Screening for prostate cancer  -     PSA, Total Screen; Future    4  Benign essential hypertension    5  Fatty liver disease, nonalcoholic    6  Mixed hyperlipidemia    7  S/P prostatectomy  -     PSA, Total Screen; Future    8  Adenocarcinoma of prostate ([address] )  -     PSA, Total Screen; Future    9  Obesity (BMI 30 0-34 9)    10  BMI 32 0-32 9,adult    11  Cervicalgia  Assessment & Plan:  Suspect arthritis - pt failed therapy  Will gets xray of c-spine - if it shows arthrituis - will consider dr samuel for an injection  Orders:  -     XR spine cervical complete 4 or 5 vw non injury; Future; Expected date: 2021    12  Dermatophytosis  -     clotrimazole-betamethasone (LOTRISONE) 1-0 05 % cream; Apply topically 2 (two) times a day      · Patient Counseling:   · Nutrition: Stressed importance of a well balanced diet, moderation of sodium/saturated fat, caloric balance and sufficient intake of fiber  · Exercise: Stressed the importance of regular exercise with a goal of 150 minutes per week  · Dental Health: Discussed daily flossing and brushing and regular dental visits     · Immunizations reviewed: Risks and Benefits discussed  · Discussed benefits of:  Colon Cancer Screening, Cervical Cancer screening, Prostate Cancer Screening  and Screening labs   BMI Counseling: Body mass index is 32 63 kg/m²  Discussed with patient's BMI with him  The BMI is above normal  Nutrition recommendations include reducing portion sizes  No follow-ups on file          Chief Complaint     Chief Complaint   Patient presents with    Physical Exam     Fahad Wilson History of Present Illness     Pt is sched today for a physical  Pt feels well  No CP, nO SOB    Pt has pain in the pst aspect of the left side of his neck, makes it hard to sometimes turn his head  Pt states I sent him to therapy - he did not think it helped he went to 5-6 sessions  Neck always cracking      Well Adult Physical   Patient here for a comprehensive physical exam       Diet and Physical Activity  Diet: well balanced diet  Exercise: intermittently      Depression Screen  PHQ-9 Depression Screening    PHQ-9:   Frequency of the following problems over the past two weeks:      Little interest or pleasure in doing things: 0 - not at all  Feeling down, depressed, or hopeless: 0 - not at all  PHQ-2 Score: 0          General Health  Hearing: Normal:  bilateral  Vision: no vision problems  Dental: regular dental visits    Reproductive Health  No issues       The following portions of the patient's history were reviewed and updated as appropriate: allergies, current medications, past family history, past medical history, past social history, past surgical history and problem list     Review of Systems     Review of Systems   Constitutional: Negative for activity change, appetite change, chills, diaphoresis, fatigue, fever and unexpected weight change  HENT: Negative for congestion, dental problem, ear pain, mouth sores, sinus pressure, sinus pain, sore throat and trouble swallowing  Eyes: Negative for photophobia, discharge and itching  Respiratory: Negative for apnea, chest tightness and shortness of breath  Cardiovascular: Negative for chest pain, palpitations and leg swelling  Gastrointestinal: Negative for abdominal distention, abdominal pain, blood in stool, nausea and vomiting  Endocrine: Negative for cold intolerance, heat intolerance, polydipsia, polyphagia and polyuria  Genitourinary: Negative for difficulty urinating  Musculoskeletal: Negative for arthralgias     Skin: Negative for color change and wound  Neurological: Negative for dizziness, syncope, speech difficulty and headaches  Hematological: Negative for adenopathy  Psychiatric/Behavioral: Negative for agitation and behavioral problems         Past Medical History     Past Medical History:   Diagnosis Date    Chronic rhinitis 02/05/2005    last assessed 2/5/05, resolved 5/18/17     Elevated liver function tests     last assessed 3/9/15, resolved 5/18/17     Herpes simplex type 1 infection     Hip arthritis     resolved 12/20/17     Hypertension     Shoulder bursitis     last assessed 9/23/13, resolved 5/18/17        Past Surgical History     Past Surgical History:   Procedure Laterality Date    HERNIA REPAIR      Inguinal sliding  10/2018    PROSTATOTOMY      last assessed 1/11/18       Social History     Social History     Socioeconomic History    Marital status: /Civil Union     Spouse name: None    Number of children: None    Years of education: None    Highest education level: None   Occupational History    None   Social Needs    Financial resource strain: None    Food insecurity     Worry: None     Inability: None    Transportation needs     Medical: None     Non-medical: None   Tobacco Use    Smoking status: Never Smoker    Smokeless tobacco: Never Used   Substance and Sexual Activity    Alcohol use: Yes     Frequency: Monthly or less     Drinks per session: 1 or 2     Binge frequency: Never    Drug use: Never    Sexual activity: None   Lifestyle    Physical activity     Days per week: None     Minutes per session: None    Stress: None   Relationships    Social connections     Talks on phone: None     Gets together: None     Attends Zoroastrian service: None     Active member of club or organization: None     Attends meetings of clubs or organizations: None     Relationship status: None    Intimate partner violence     Fear of current or ex partner: None     Emotionally abused: None Physically abused: None     Forced sexual activity: None   Other Topics Concern    None   Social History Narrative    None       Family History     Family History   Problem Relation Age of Onset    Hypertension Mother     Lung cancer Family     Prostate cancer Family     Mental illness Neg Hx        Current Medications       Current Outpatient Medications:     acetaminophen (TYLENOL) 500 mg tablet, Take 500 mg by mouth as needed, Disp: , Rfl:     acyclovir (ZOVIRAX) 5 % ointment, Apply topically 3 (three) times a day, Disp: 30 g, Rfl: 0    amLODIPine (NORVASC) 2 5 mg tablet, TAKE 1 TABLET BY MOUTH  DAILY, Disp: 90 tablet, Rfl: 3    aspirin (ECOTRIN) 325 mg EC tablet, Take 1 tablet (325 mg total) by mouth daily, Disp: 90 tablet, Rfl: 0    Multiple Vitamin (MULTIVITAMINS PO), Take 1 capsule by mouth daily, Disp: , Rfl:     quinapril (ACCUPRIL) 10 mg tablet, TAKE 1 TABLET BY MOUTH  EVERY 12 HOURS, Disp: 180 tablet, Rfl: 3    valACYclovir (VALTREX) 1,000 mg tablet, Take 2 tablets every 12 hourly for day first with initial signs of breakout and stop after taking four pills  , Disp: 30 tablet, Rfl: 0    clotrimazole-betamethasone (LOTRISONE) 1-0 05 % cream, Apply topically 2 (two) times a day, Disp: 30 g, Rfl: 0    erythromycin (ILOTYCIN) ophthalmic ointment, , Disp: , Rfl:      Allergies     Allergies   Allergen Reactions    Augmentin [Amoxicillin-Pot Clavulanate] GI Intolerance    Demerol [Meperidine]      Reaction Date: [id number]; Annotation - [id number]: unsure of side effect       Objective     /72   Pulse 70   Temp 98 2 °F (36 8 °C)   Resp 18   Ht 5' 11 25" (1 81 m)   Wt 107 kg (235 lb 9 6 oz)   SpO2 98%   BMI 32 63 kg/m²      Physical Exam  Vitals signs and nursing note reviewed  Constitutional:       General: He is not in acute distress  Appearance: He is well-developed  He is not diaphoretic  HENT:      Head: Normocephalic and atraumatic        Right Ear: External ear normal  Left Ear: External ear normal       Nose: Nose normal       Mouth/Throat:      Pharynx: No oropharyngeal exudate  Eyes:      General: No scleral icterus  Right eye: No discharge  Left eye: No discharge  Pupils: Pupils are equal, round, and reactive to light  Neck:      Thyroid: No thyromegaly  Cardiovascular:      Rate and Rhythm: Normal rate  Heart sounds: Normal heart sounds  No murmur  Pulmonary:      Effort: Pulmonary effort is normal  No respiratory distress  Breath sounds: Normal breath sounds  No wheezing  Abdominal:      General: Bowel sounds are normal  There is no distension  Palpations: Abdomen is soft  There is no mass  Tenderness: There is no abdominal tenderness  There is no guarding or rebound  Musculoskeletal: Normal range of motion  Skin:     General: Skin is warm and dry  Findings: No erythema or rash  Neurological:      Mental Status: He is alert  Coordination: Coordination normal       Deep Tendon Reflexes: Reflexes normal    Psychiatric:         Behavior: Behavior normal             Visual Acuity Screening    Right eye Left eye Both eyes   Without correction: 20/50 20/50 20/40   With correction:              Linn Davila, DO  [address]  BMI Counseling: Body mass index is 32 63 kg/m²  The BMI is above normal  Nutrition recommendations include reducing portion sizes  Falls Plan of Care: Balance, strength, and gait training instructions were provided  Falls Plan of Care: Balance, strength, and gait training instructions were provided

## 2021-05-18 NOTE — ASSESSMENT & PLAN NOTE
Suspect arthritis - pt failed therapy  Will gets xray of c-spine - if it shows arthrituis - will consider dr samuel for an injection

## 2021-05-19 ENCOUNTER — APPOINTMENT (OUTPATIENT)
Dept: LAB | Facility: CLINIC | Age: 71
End: 2021-05-19
Payer: COMMERCIAL

## 2021-05-19 DIAGNOSIS — Z13.6 SCREENING FOR CARDIOVASCULAR CONDITION: ICD-10-CM

## 2021-05-19 DIAGNOSIS — C61 ADENOCARCINOMA OF PROSTATE (HCC): ICD-10-CM

## 2021-05-19 DIAGNOSIS — Z12.5 SCREENING FOR PROSTATE CANCER: ICD-10-CM

## 2021-05-19 DIAGNOSIS — Z90.79 S/P PROSTATECTOMY: ICD-10-CM

## 2021-05-19 LAB
ALBUMIN SERPL BCP-MCNC: 4.1 G/DL (ref 3.5–5)
ALP SERPL-CCNC: 40 U/L (ref 46–116)
ALT SERPL W P-5'-P-CCNC: 32 U/L (ref 12–78)
ANION GAP SERPL CALCULATED.3IONS-SCNC: 4 MMOL/L (ref 4–13)
AST SERPL W P-5'-P-CCNC: 23 U/L (ref 5–45)
BILIRUB SERPL-MCNC: 0.64 MG/DL (ref 0.2–1)
BUN SERPL-MCNC: 13 MG/DL (ref 5–25)
CALCIUM SERPL-MCNC: 9.2 MG/DL (ref 8.3–10.1)
CHLORIDE SERPL-SCNC: 108 MMOL/L (ref 100–108)
CHOLEST SERPL-MCNC: 188 MG/DL (ref 50–200)
CO2 SERPL-SCNC: 29 MMOL/L (ref 21–32)
CREAT SERPL-MCNC: 1.03 MG/DL (ref 0.6–1.3)
GFR SERPL CREATININE-BSD FRML MDRD: 73 ML/MIN/1.73SQ M
GLUCOSE P FAST SERPL-MCNC: 103 MG/DL (ref 65–99)
HDLC SERPL-MCNC: 42 MG/DL
LDLC SERPL CALC-MCNC: 116 MG/DL (ref 0–100)
POTASSIUM SERPL-SCNC: 4.5 MMOL/L (ref 3.5–5.3)
PROT SERPL-MCNC: 7.9 G/DL (ref 6.4–8.2)
PSA SERPL-MCNC: <0.1 NG/ML (ref 0–4)
SODIUM SERPL-SCNC: 141 MMOL/L (ref 136–145)
TRIGL SERPL-MCNC: 151 MG/DL

## 2021-05-19 PROCEDURE — 36415 COLL VENOUS BLD VENIPUNCTURE: CPT

## 2021-05-19 PROCEDURE — 80061 LIPID PANEL: CPT

## 2021-05-19 PROCEDURE — 80053 COMPREHEN METABOLIC PANEL: CPT

## 2021-05-19 PROCEDURE — G0103 PSA SCREENING: HCPCS

## 2021-05-20 ENCOUNTER — TELEPHONE (OUTPATIENT)
Dept: FAMILY MEDICINE CLINIC | Facility: CLINIC | Age: 71
End: 2021-05-20

## 2021-05-27 ENCOUNTER — TELEPHONE (OUTPATIENT)
Dept: FAMILY MEDICINE CLINIC | Facility: CLINIC | Age: 71
End: 2021-05-27

## 2021-05-27 DIAGNOSIS — M54.2 CERVICALGIA: Primary | ICD-10-CM

## 2021-05-27 NOTE — TELEPHONE ENCOUNTER
Pt called today and I gave him the results  He will probably see Dr Bright Hays but has to think about it due to bad reviews he has read online      Jumana Adams

## 2021-05-27 NOTE — TELEPHONE ENCOUNTER
Called pt to discuss results, looks like the x ray of his c spine shows pretty advanced arthritis  This is most certainly is causing his pain    We discussed him seeing DR rojas , he will probably benefit from injections  I will put order in the chart  Left message for pt to call back  [phone] Amy Ch to give message

## 2021-06-11 ENCOUNTER — APPOINTMENT (EMERGENCY)
Dept: RADIOLOGY | Facility: HOSPITAL | Age: 71
End: 2021-06-11
Payer: COMMERCIAL

## 2021-06-11 ENCOUNTER — HOSPITAL ENCOUNTER (EMERGENCY)
Facility: HOSPITAL | Age: 71
Discharge: HOME | End: 2021-06-12
Attending: EMERGENCY MEDICINE
Payer: COMMERCIAL

## 2021-06-11 VITALS
OXYGEN SATURATION: 96 % | SYSTOLIC BLOOD PRESSURE: 140 MMHG | DIASTOLIC BLOOD PRESSURE: 81 MMHG | HEART RATE: 54 BPM | TEMPERATURE: 97.1 F | RESPIRATION RATE: 18 BRPM

## 2021-06-11 DIAGNOSIS — R55 NEAR SYNCOPE: Primary | ICD-10-CM

## 2021-06-11 DIAGNOSIS — R53.1 WEAKNESS: ICD-10-CM

## 2021-06-11 LAB
ALBUMIN SERPL BCP-MCNC: 3.8 G/DL (ref 3.5–5)
ALP SERPL-CCNC: 42 U/L (ref 46–116)
ALT SERPL W P-5'-P-CCNC: 31 U/L (ref 12–78)
ANION GAP SERPL CALCULATED.3IONS-SCNC: 9 MMOL/L (ref 4–13)
APTT PPP: 26 SECONDS (ref 23–37)
AST SERPL W P-5'-P-CCNC: 24 U/L (ref 5–45)
BASOPHILS # BLD AUTO: 0.08 THOUSANDS/ΜL (ref 0–0.1)
BASOPHILS NFR BLD AUTO: 1 % (ref 0–1)
BILIRUB SERPL-MCNC: 0.69 MG/DL (ref 0.2–1)
BILIRUB UR QL STRIP: NEGATIVE
BUN SERPL-MCNC: 13 MG/DL (ref 5–25)
CALCIUM SERPL-MCNC: 8.4 MG/DL (ref 8.3–10.1)
CHLORIDE SERPL-SCNC: 103 MMOL/L (ref 100–108)
CLARITY UR: CLEAR
CO2 SERPL-SCNC: 28 MMOL/L (ref 21–32)
COLOR UR: ABNORMAL
CREAT SERPL-MCNC: 1.03 MG/DL (ref 0.6–1.3)
EOSINOPHIL # BLD AUTO: 0.18 THOUSAND/ΜL (ref 0–0.61)
EOSINOPHIL NFR BLD AUTO: 3 % (ref 0–6)
ERYTHROCYTE [DISTWIDTH] IN BLOOD BY AUTOMATED COUNT: 12.8 % (ref 11.6–15.1)
GFR SERPL CREATININE-BSD FRML MDRD: 73 ML/MIN/1.73SQ M
GLUCOSE SERPL-MCNC: 91 MG/DL (ref 65–140)
GLUCOSE SERPL-MCNC: 98 MG/DL (ref 65–140)
GLUCOSE UR STRIP-MCNC: NEGATIVE MG/DL
HCT VFR BLD AUTO: 38.3 % (ref 36.5–49.3)
HGB BLD-MCNC: 12.9 G/DL (ref 12–17)
HGB UR QL STRIP.AUTO: NEGATIVE
IMM GRANULOCYTES # BLD AUTO: 0.02 THOUSAND/UL (ref 0–0.2)
IMM GRANULOCYTES NFR BLD AUTO: 0 % (ref 0–2)
INR PPP: 1.04 (ref 0.84–1.19)
KETONES UR STRIP-MCNC: ABNORMAL MG/DL
LEUKOCYTE ESTERASE UR QL STRIP: NEGATIVE
LYMPHOCYTES # BLD AUTO: 1.85 THOUSANDS/ΜL (ref 0.6–4.47)
LYMPHOCYTES NFR BLD AUTO: 26 % (ref 14–44)
MCH RBC QN AUTO: 31.2 PG (ref 26.8–34.3)
MCHC RBC AUTO-ENTMCNC: 33.7 G/DL (ref 31.4–37.4)
MCV RBC AUTO: 93 FL (ref 82–98)
MONOCYTES # BLD AUTO: 0.61 THOUSAND/ΜL (ref 0.17–1.22)
MONOCYTES NFR BLD AUTO: 9 % (ref 4–12)
NEUTROPHILS # BLD AUTO: 4.39 THOUSANDS/ΜL (ref 1.85–7.62)
NEUTS SEG NFR BLD AUTO: 61 % (ref 43–75)
NITRITE UR QL STRIP: NEGATIVE
NRBC BLD AUTO-RTO: 0 /100 WBCS
PH UR STRIP.AUTO: 8 [PH]
PLATELET # BLD AUTO: 199 THOUSANDS/UL (ref 149–390)
PMV BLD AUTO: 10 FL (ref 8.9–12.7)
POTASSIUM SERPL-SCNC: 3.7 MMOL/L (ref 3.5–5.3)
PROT SERPL-MCNC: 8.1 G/DL (ref 6.4–8.2)
PROT UR STRIP-MCNC: NEGATIVE MG/DL
PROTHROMBIN TIME: 13.5 SECONDS (ref 11.6–14.5)
RBC # BLD AUTO: 4.13 MILLION/UL (ref 3.88–5.62)
SODIUM SERPL-SCNC: 140 MMOL/L (ref 136–145)
SP GR UR STRIP.AUTO: 1.01 (ref 1–1.03)
TROPONIN I SERPL-MCNC: <0.02 NG/ML
TROPONIN I SERPL-MCNC: <0.02 NG/ML
UROBILINOGEN UR QL STRIP.AUTO: 1 E.U./DL
WBC # BLD AUTO: 7.13 THOUSAND/UL (ref 4.31–10.16)

## 2021-06-11 PROCEDURE — 81003 URINALYSIS AUTO W/O SCOPE: CPT | Performed by: EMERGENCY MEDICINE

## 2021-06-11 PROCEDURE — 93005 ELECTROCARDIOGRAM TRACING: CPT

## 2021-06-11 PROCEDURE — 82948 REAGENT STRIP/BLOOD GLUCOSE: CPT

## 2021-06-11 PROCEDURE — 99285 EMERGENCY DEPT VISIT HI MDM: CPT

## 2021-06-11 PROCEDURE — 84484 ASSAY OF TROPONIN QUANT: CPT | Performed by: EMERGENCY MEDICINE

## 2021-06-11 PROCEDURE — 96360 HYDRATION IV INFUSION INIT: CPT

## 2021-06-11 PROCEDURE — 85730 THROMBOPLASTIN TIME PARTIAL: CPT | Performed by: EMERGENCY MEDICINE

## 2021-06-11 PROCEDURE — 70498 CT ANGIOGRAPHY NECK: CPT

## 2021-06-11 PROCEDURE — 70450 CT HEAD/BRAIN W/O DYE: CPT

## 2021-06-11 PROCEDURE — 99285 EMERGENCY DEPT VISIT HI MDM: CPT | Performed by: EMERGENCY MEDICINE

## 2021-06-11 PROCEDURE — 36415 COLL VENOUS BLD VENIPUNCTURE: CPT | Performed by: EMERGENCY MEDICINE

## 2021-06-11 PROCEDURE — G1004 CDSM NDSC: HCPCS

## 2021-06-11 PROCEDURE — 96361 HYDRATE IV INFUSION ADD-ON: CPT

## 2021-06-11 PROCEDURE — 70496 CT ANGIOGRAPHY HEAD: CPT

## 2021-06-11 PROCEDURE — 80053 COMPREHEN METABOLIC PANEL: CPT | Performed by: EMERGENCY MEDICINE

## 2021-06-11 PROCEDURE — 71045 X-RAY EXAM CHEST 1 VIEW: CPT

## 2021-06-11 PROCEDURE — 85610 PROTHROMBIN TIME: CPT | Performed by: EMERGENCY MEDICINE

## 2021-06-11 PROCEDURE — 74177 CT ABD & PELVIS W/CONTRAST: CPT

## 2021-06-11 PROCEDURE — 71260 CT THORAX DX C+: CPT

## 2021-06-11 PROCEDURE — 85025 COMPLETE CBC W/AUTO DIFF WBC: CPT | Performed by: EMERGENCY MEDICINE

## 2021-06-11 RX ADMIN — IOHEXOL 50 ML: 350 INJECTION, SOLUTION INTRAVENOUS at 22:36

## 2021-06-11 RX ADMIN — SODIUM CHLORIDE 1000 ML: 0.9 INJECTION, SOLUTION INTRAVENOUS at 17:10

## 2021-06-11 RX ADMIN — IOHEXOL 50 ML: 350 INJECTION, SOLUTION INTRAVENOUS at 22:35

## 2021-06-11 NOTE — ED PROVIDER NOTES
History  Chief Complaint   Patient presents with    Weakness - Generalized     pt reports weakness started today around 2     Vomiting     Patient states he was at a local cafe having coffee and some orange juice  He stood up, felt suddenly lightheaded and weak  Patient thought he might pass out  Patient was assisted back down and had his feet elevated  He continue with the same symptoms and then became nauseous and vomited twice  Patient states he has been unusually sleepy since that time  He denies any pain chest abdomen or head  Patient is a long-term hypertensive, is not on beta-blockers  Prior to Admission Medications   Prescriptions Last Dose Informant Patient Reported? Taking? Multiple Vitamin (MULTIVITAMINS PO)   Yes Yes   Sig: Take 1 capsule by mouth daily   acetaminophen (TYLENOL) 500 mg tablet   Yes Yes   Sig: Take 500 mg by mouth as needed   acyclovir (ZOVIRAX) 5 % ointment   No Yes   Sig: Apply topically 3 (three) times a day   amLODIPine (NORVASC) 2 5 mg tablet   No Yes   Sig: TAKE 1 TABLET BY MOUTH  DAILY   aspirin (ECOTRIN) 325 mg EC tablet   No Yes   Sig: Take 1 tablet (325 mg total) by mouth daily   clotrimazole-betamethasone (LOTRISONE) 1-0 05 % cream Not Taking at Unknown time  No No   Sig: Apply topically 2 (two) times a day   Patient not taking: Reported on 6/11/2021   erythromycin (ILOTYCIN) ophthalmic ointment Not Taking at Unknown time  Yes No   quinapril (ACCUPRIL) 10 mg tablet   No Yes   Sig: TAKE 1 TABLET BY MOUTH  EVERY 12 HOURS   valACYclovir (VALTREX) 1,000 mg tablet   No No   Sig: Take 2 tablets every 12 hourly for day first with initial signs of breakout and stop after taking four pills        Facility-Administered Medications: None       Past Medical History:   Diagnosis Date    Chronic rhinitis 02/05/2005    last assessed 2/5/05, resolved 5/18/17     Elevated liver function tests     last assessed 3/9/15, resolved 5/18/17     Herpes simplex type 1 infection  Hip arthritis     resolved 12/20/17     Hypertension     Shoulder bursitis     last assessed 9/23/13, resolved 5/18/17        Past Surgical History:   Procedure Laterality Date    HERNIA REPAIR      Inguinal sliding  10/2018    PROSTATOTOMY      last assessed 1/11/18       Family History   Problem Relation Age of Onset    Hypertension Mother     Lung cancer Family     Prostate cancer Family     Mental illness Neg Hx      I have reviewed and agree with the history as documented  E-Cigarette/Vaping    E-Cigarette Use Never User      E-Cigarette/Vaping Substances    Nicotine No     THC No     CBD No     Flavoring No     Other No     Unknown No      Social History     Tobacco Use    Smoking status: Never Smoker    Smokeless tobacco: Never Used   Substance Use Topics    Alcohol use: Yes     Frequency: Monthly or less     Drinks per session: 1 or 2     Binge frequency: Never    Drug use: Never       Review of Systems   Constitutional: Negative for chills, diaphoresis and fever  HENT: Negative for congestion and sore throat  Eyes: Negative for visual disturbance  Respiratory: Negative for cough and shortness of breath  Cardiovascular: Negative for chest pain  Gastrointestinal: Positive for nausea and vomiting  Negative for abdominal pain  Genitourinary: Negative for decreased urine volume and dysuria  Patient states he urinated least twice today and was clear and normal   Musculoskeletal: Positive for neck pain  Negative for back pain  Skin: Negative for rash  Neurological: Positive for weakness and light-headedness  Negative for dizziness, seizures, syncope, speech difficulty and headaches  Hematological: Does not bruise/bleed easily  Psychiatric/Behavioral: Positive for sleep disturbance  Negative for confusion  All other systems reviewed and are negative  Physical Exam  Physical Exam  Vitals signs and nursing note reviewed     Constitutional:       Appearance: Normal appearance  Comments: Awake but keeps his eyes closed during exam   HENT:      Head: Normocephalic and atraumatic  Right Ear: External ear normal       Left Ear: External ear normal       Nose: Nose normal       Mouth/Throat:      Pharynx: Oropharynx is clear  Eyes:      Extraocular Movements: Extraocular movements intact  Pupils: Pupils are equal, round, and reactive to light  Neck:      Musculoskeletal: Normal range of motion  Cardiovascular:      Rate and Rhythm: Regular rhythm  Bradycardia present  Pulses: Normal pulses  Pulmonary:      Effort: Pulmonary effort is normal       Breath sounds: Normal breath sounds  Abdominal:      Palpations: Abdomen is soft  Tenderness: There is no abdominal tenderness  Musculoskeletal: Normal range of motion  Skin:     General: Skin is warm and dry  Capillary Refill: Capillary refill takes less than 2 seconds  Neurological:      General: No focal deficit present  Mental Status: He is oriented to person, place, and time     Psychiatric:         Behavior: Behavior normal          Vital Signs  ED Triage Vitals   Temperature Pulse Respirations Blood Pressure SpO2   06/11/21 1650 06/11/21 1650 06/11/21 1650 06/11/21 1650 06/11/21 1650   (!) 97 °F (36 1 °C) (!) 50 18 164/77 98 %      Temp Source Heart Rate Source Patient Position - Orthostatic VS BP Location FiO2 (%)   06/11/21 1650 06/11/21 1820 06/11/21 1650 06/11/21 1650 --   Tympanic Monitor Sitting Left arm       Pain Score       --                  Vitals:    06/11/21 1910 06/11/21 1911 06/11/21 1913 06/11/21 1917   BP: (!) 173/79 (!) 184/81 170/79    Pulse:    (!) 51   Patient Position - Orthostatic VS: Lying - Orthostatic VS Sitting - Orthostatic VS Standing - Orthostatic VS Lying         Visual Acuity      ED Medications  Medications   sodium chloride 0 9 % bolus 1,000 mL (0 mL Intravenous Stopped 6/11/21 1916)       Diagnostic Studies  Results Reviewed     Procedure Component Value Units Date/Time    Troponin I repeat in 3hrs [[id number]]  (Normal) Collected: 06/11/21 1959    Lab Status: Final result Specimen: Blood from Arm, Left Updated: 06/11/21 2027     Troponin I <0 02 ng/mL     UA (URINE) with reflex to Scope [[id number]]  (Abnormal) Collected: 06/11/21 1916    Lab Status: Final result Specimen: Urine, Clean Catch Updated: 06/11/21 1924     Color, UA Light Yellow     Clarity, UA Clear     Specific Gravity, UA 1 015     pH, UA 8 0     Leukocytes, UA Negative     Nitrite, UA Negative     Protein, UA Negative mg/dl      Glucose, UA Negative mg/dl      Ketones, UA 15 (1+) mg/dl      Urobilinogen, UA 1 0 E U /dl      Bilirubin, UA Negative     Blood, UA Negative    Troponin I [[id number]]  (Normal) Collected: 06/11/21 1709    Lab Status: Final result Specimen: Blood from Arm, Left Updated: 06/11/21 1741     Troponin I <0 02 ng/mL     Comprehensive metabolic panel [[id number]]  (Abnormal) Collected: 06/11/21 1709    Lab Status: Final result Specimen: Blood from Arm, Left Updated: 06/11/21 1738     Sodium 140 mmol/L      Potassium 3 7 mmol/L      Chloride 103 mmol/L      CO2 28 mmol/L      ANION GAP 9 mmol/L      BUN 13 mg/dL      Creatinine 1 03 mg/dL      Glucose 91 mg/dL      Calcium 8 4 mg/dL      AST 24 U/L      ALT 31 U/L      Alkaline Phosphatase 42 U/L      Total Protein 8 1 g/dL      Albumin 3 8 g/dL      Total Bilirubin 0 69 mg/dL      eGFR 73 ml/min/1 73sq m     Narrative:      Jahaira guidelines for Chronic Kidney Disease (CKD):     Stage 1 with normal or high GFR (GFR > 90 mL/min/1 73 square meters)    Stage 2 Mild CKD (GFR = 60-89 mL/min/1 73 square meters)    Stage 3A Moderate CKD (GFR = 45-59 mL/min/1 73 square meters)    Stage 3B Moderate CKD (GFR = 30-44 mL/min/1 73 square meters)    Stage 4 Severe CKD (GFR = 15-29 mL/min/1 73 square meters)    Stage 5 End Stage CKD (GFR <15 mL/min/1 73 square meters)  Note: GFR calculation is accurate only with a steady state creatinine    Protime-INR [[id number]]  (Normal) Collected: 06/11/21 1709    Lab Status: Final result Specimen: Blood from Arm, Left Updated: 06/11/21 1733     Protime 13 5 seconds      INR 1 04    APTT [[id number]]  (Normal) Collected: 06/11/21 1709    Lab Status: Final result Specimen: Blood from Arm, Left Updated: 06/11/21 1733     PTT 26 seconds     CBC and differential [[id number]] Collected: 06/11/21 1709    Lab Status: Final result Specimen: Blood from Arm, Left Updated: 06/11/21 1717     WBC 7 13 Thousand/uL      RBC 4 13 Million/uL      Hemoglobin 12 9 g/dL      Hematocrit 38 3 %      MCV 93 fL      MCH 31 2 pg      MCHC 33 7 g/dL      RDW 12 8 %      MPV 10 0 fL      Platelets 832 Thousands/uL      nRBC 0 /100 WBCs      Neutrophils Relative 61 %      Immat GRANS % 0 %      Lymphocytes Relative 26 %      Monocytes Relative 9 %      Eosinophils Relative 3 %      Basophils Relative 1 %      Neutrophils Absolute 4 39 Thousands/µL      Immature Grans Absolute 0 02 Thousand/uL      Lymphocytes Absolute 1 85 Thousands/µL      Monocytes Absolute 0 61 Thousand/µL      Eosinophils Absolute 0 18 Thousand/µL      Basophils Absolute 0 08 Thousands/µL     Fingerstick Glucose (POCT) [[id number]]  (Normal) Collected: 06/11/21 1650    Lab Status: Final result Updated: 06/11/21 1651     POC Glucose 98 mg/dl                  CT head without contrast   Final Result by Zehra Quiles MD (06/11 1812)      No acute intracranial abnormality  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                    Workstation performed: [id number]         XR chest 1 view    (Results Pending)              Procedures  ECG 12 Lead Documentation Only    Date/Time: 6/11/2021 4:44 PM  Performed by: Yessi Guillermo MD  Authorized by: Yessi Guillermo MD     Indications / Diagnosis:  Near syncope  ECG reviewed by me, the ED Provider: yes Patient location:  ED  Interpretation:     Interpretation: normal    Rate:     ECG rate:  55    ECG rate assessment: bradycardic    Rhythm:     Rhythm: sinus bradycardia    Ectopy:     Ectopy: none    QRS:     QRS axis:  Normal    QRS intervals:  Normal  Conduction:     Conduction: normal    ST segments:     ST segments:  Normal  T waves:     T waves: normal               ED Course                                           [facility]  Number of Diagnoses or Management Options  Diagnosis management comments: Patient had sudden onset of lightheadedness weakness and near syncope associated with nausea vomiting  Will check cardiac profile and CT of the head      Disposition  Final diagnoses:   Near syncope   Weakness     Time reflects when diagnosis was documented in both MDM as applicable and the Disposition within this note     Time User Action Codes Description Comment    6/11/2021  8:28 PM Maribell Yuan Add [R55] Near syncope     6/11/2021  8:28 PM Maribell Yuan Add [R53 1] Weakness       ED Disposition     ED Disposition Condition Date/Time Comment    Discharge Stable Fri Jun 11, 2021  8:28 PM Dee Dee Chaves discharge to home/self care  Follow-up Information     Follow up With Specialties Details Why Contact Info    Abi Koch DO Family Medicine, Wound Care Schedule an appointment as soon as possible for a visit in 1 day  [address] [facility]  [phone]            Patient's Medications   Discharge Prescriptions    No medications on file     No discharge procedures on file      PDMP Review     None          ED Provider  Electronically Signed by           Carmen Light MD  06/11/21 2029

## 2021-06-12 NOTE — ED CARE HANDOFF
Emergency Department Sign Out Note            Workup Completed:  Lab work    ED Course / Workup Pending (followup): Patient discharge by previous provider however when patient got up to leave be a recurrence of his symptoms initially brought into the ER and when be re-evaluated  States he was feeling just very fatigued and tired like he wants to sleep he also have low bit of dizziness as well as some epigastric pain radiating up into his chest   At this time reviewed the blood work is unremarkable ordered CT scans evaluate for disease abdominal chest pain  CT scans were unremarkable in all findings including incidental lungs are discussed with patient and wife  On repeat exam patient states he suddenly feels better he no longer feels fatigued or tired is ready go home  Procedures  MDM    Disposition  Final diagnoses:   Near syncope   Weakness     Time reflects when diagnosis was documented in both MDM as applicable and the Disposition within this note     Time User Action Codes Description Comment    6/11/2021  8:28 PM Romana Borders Add [R55] Near syncope     6/11/2021  8:28 PM Romana Borders Add [R53 1] Weakness       ED Disposition     ED Disposition Condition Date/Time Comment    Discharge Stable Fri Jun 11, 2021  8:28 PM Neto Lechuga discharge to home/self care              Follow-up Information     Follow up With Specialties Details Why Contact Jadyn Betancourt Come, DO Family Medicine, Wound Care Schedule an appointment as soon as possible for a visit in 1 day  [facility]  [phone]          Discharge Medication List as of 6/11/2021  8:29 PM      CONTINUE these medications which have NOT CHANGED    Details   acetaminophen (TYLENOL) 500 mg tablet Take 500 mg by mouth as needed, Historical Med      acyclovir (ZOVIRAX) 5 % ointment Apply topically 3 (three) times a day, Starting Mon 11/9/2020, Normal      amLODIPine ([address]) 2 5 mg tablet TAKE 1 TABLET BY MOUTH  DAILY, Normal      aspirin (ECOTRIN) 325 mg EC tablet Take 1 tablet (325 mg total) by mouth daily, Starting Wed 4/22/2020, No Print      Multiple Vitamin (MULTIVITAMINS PO) Take 1 capsule by mouth daily, Historical Med      quinapril (ACCUPRIL) 10 mg tablet TAKE 1 TABLET BY MOUTH  EVERY 12 HOURS, Normal      clotrimazole-betamethasone (LOTRISONE) 1-0 05 % cream Apply topically 2 (two) times a day, Starting Tue 5/18/2021, Normal      erythromycin (ILOTYCIN) ophthalmic ointment Starting Tue 6/30/2020, Historical Med      valACYclovir (VALTREX) 1,000 mg tablet Take 2 tablets every 12 hourly for day first with initial signs of breakout and stop after taking four pills  , Normal           No discharge procedures on file         ED Provider  Electronically Signed by     Alan Snider DO  06/12/21 9020

## 2021-06-12 NOTE — ED NOTES
Discharge was pre-empted due to patient's presentation of signs and symptoms of being nauseous, dyspneic, and stating he is having some reflux and continuous to state he started to feel weak again       Antoni Rm RN  06/11/21 0661

## 2021-06-13 LAB
ATRIAL RATE: 55 BPM
P AXIS: 54 DEGREES
PR INTERVAL: 216 MS
QRS AXIS: -3 DEGREES
QRSD INTERVAL: 92 MS
QT INTERVAL: 438 MS
QTC INTERVAL: 419 MS
T WAVE AXIS: 34 DEGREES
VENTRICULAR RATE: 55 BPM

## 2021-06-13 PROCEDURE — 93010 ELECTROCARDIOGRAM REPORT: CPT | Performed by: INTERNAL MEDICINE

## 2021-06-14 ENCOUNTER — VBI (OUTPATIENT)
Dept: FAMILY MEDICINE CLINIC | Facility: CLINIC | Age: 71
End: 2021-06-14

## 2021-06-14 NOTE — TELEPHONE ENCOUNTER
[facility]    ED Visit Information     Ed visit date: 06/11/2021  Diagnosis Description: Near syncope; Weakness  In Network? Yes [address] [facility]  Discharge status: Home  Discharged with meds ? No  Number of ED visits to date: 0  ED Severity:NA     Outreach Information    Outreach successful: Yes 1  Date letter mailed:NA  Date Finalized:06/14/2021    Care Coordination    Follow up appointment with pcp: no will call  Transportation issues ? NA    [employer] Outreach    S/W patient's spouse who states patient not home, she knows he needs to schedule a follow up appointment with [facility]   She will have him call and schedule the appointment  They did not get any diagnosis  Patient's spouse also stated that the nursing staff during their ED visit was wonderful

## 2021-06-25 ENCOUNTER — TELEPHONE (OUTPATIENT)
Dept: FAMILY MEDICINE CLINIC | Facility: CLINIC | Age: 71
End: 2021-06-25

## 2021-06-25 NOTE — TELEPHONE ENCOUNTER
Yelena calling from [facility]  Patient having a procedure done next Friday 7/2- C7 T1 interlamidal epidural steriod injection  Needs to stop Asprin Sunday Saturday would be the last day to take [address] needs verbal  [phone]

## 2021-08-27 ENCOUNTER — TELEPHONE (OUTPATIENT)
Dept: NEUROSURGERY | Facility: CLINIC | Age: 71
End: 2021-08-27

## 2021-08-27 DIAGNOSIS — Z01.812 ENCOUNTER FOR PREPROCEDURAL LABORATORY EXAMINATION: Primary | ICD-10-CM

## 2021-08-27 NOTE — TELEPHONE ENCOUNTER
Patient called requesting for blood work to check kidney function prior to CTA  CTA is scheduled for 10/19/21  Blood orders (BUN and creatinine) were placed  Also scheduled patient a SNPX with Dr Jaret Allison and an AP on 10/25/21 to review CTA results  Patient was very appreciative

## 2021-09-06 DIAGNOSIS — I10 ESSENTIAL HYPERTENSION: ICD-10-CM

## 2021-09-07 RX ORDER — QUINAPRIL 10 MG/1
TABLET ORAL
Qty: 180 TABLET | Refills: 3 | Status: SHIPPED | OUTPATIENT
Start: 2021-09-07 | End: 2022-08-10

## 2021-10-12 ENCOUNTER — APPOINTMENT (OUTPATIENT)
Dept: LAB | Facility: CLINIC | Age: 71
End: 2021-10-12
Payer: COMMERCIAL

## 2021-10-12 DIAGNOSIS — Z01.812 ENCOUNTER FOR PREPROCEDURAL LABORATORY EXAMINATION: ICD-10-CM

## 2021-10-12 LAB
BUN SERPL-MCNC: 15 MG/DL (ref 5–25)
CREAT SERPL-MCNC: 0.98 MG/DL (ref 0.6–1.3)
GFR SERPL CREATININE-BSD FRML MDRD: 77 ML/MIN/1.73SQ M

## 2021-10-12 PROCEDURE — 84520 ASSAY OF UREA NITROGEN: CPT

## 2021-10-12 PROCEDURE — 36415 COLL VENOUS BLD VENIPUNCTURE: CPT

## 2021-10-12 PROCEDURE — 82565 ASSAY OF CREATININE: CPT

## 2021-10-19 ENCOUNTER — HOSPITAL ENCOUNTER (OUTPATIENT)
Dept: RADIOLOGY | Facility: HOSPITAL | Age: 71
Discharge: HOME | End: 2021-10-19
Attending: NEUROLOGICAL SURGERY
Payer: COMMERCIAL

## 2021-10-19 DIAGNOSIS — I72.0 PSEUDOANEURYSM OF CAROTID ARTERY (HCC): ICD-10-CM

## 2021-10-19 PROCEDURE — 70496 CT ANGIOGRAPHY HEAD: CPT

## 2021-10-19 PROCEDURE — 70498 CT ANGIOGRAPHY NECK: CPT

## 2021-10-19 PROCEDURE — G1004 CDSM NDSC: HCPCS

## 2021-10-19 RX ADMIN — IOHEXOL 85 ML: 350 INJECTION, SOLUTION INTRAVENOUS at 07:59

## 2021-10-25 ENCOUNTER — OFFICE VISIT (OUTPATIENT)
Dept: NEUROSURGERY | Facility: CLINIC | Age: 71
End: 2021-10-25
Payer: COMMERCIAL

## 2021-10-25 VITALS
HEIGHT: 73 IN | RESPIRATION RATE: 16 BRPM | SYSTOLIC BLOOD PRESSURE: 134 MMHG | BODY MASS INDEX: 30.48 KG/M2 | DIASTOLIC BLOOD PRESSURE: 81 MMHG | HEART RATE: 76 BPM | TEMPERATURE: 98.3 F | WEIGHT: 230 LBS

## 2021-10-25 DIAGNOSIS — I72.0 PSEUDOANEURYSM OF CAROTID ARTERY (HCC): Primary | ICD-10-CM

## 2021-10-25 PROCEDURE — 1036F TOBACCO NON-USER: CPT | Performed by: NEUROLOGICAL SURGERY

## 2021-10-25 PROCEDURE — 1160F RVW MEDS BY RX/DR IN RCRD: CPT | Performed by: NEUROLOGICAL SURGERY

## 2021-10-25 PROCEDURE — 3008F BODY MASS INDEX DOCD: CPT | Performed by: NEUROLOGICAL SURGERY

## 2021-10-25 PROCEDURE — 3079F DIAST BP 80-89 MM HG: CPT | Performed by: NEUROLOGICAL SURGERY

## 2021-10-25 PROCEDURE — 99213 OFFICE O/P EST LOW 20 MIN: CPT | Performed by: NEUROLOGICAL SURGERY

## 2021-10-25 PROCEDURE — 3075F SYST BP GE 130 - 139MM HG: CPT | Performed by: NEUROLOGICAL SURGERY

## 2021-10-26 ENCOUNTER — TELEPHONE (OUTPATIENT)
Dept: NEUROSURGERY | Facility: CLINIC | Age: 71
End: 2021-10-26

## 2022-01-27 ENCOUNTER — OFFICE VISIT (OUTPATIENT)
Dept: FAMILY MEDICINE CLINIC | Facility: CLINIC | Age: 72
End: 2022-01-27
Payer: COMMERCIAL

## 2022-01-27 VITALS
BODY MASS INDEX: 31.28 KG/M2 | SYSTOLIC BLOOD PRESSURE: 132 MMHG | WEIGHT: 236 LBS | DIASTOLIC BLOOD PRESSURE: 70 MMHG | RESPIRATION RATE: 16 BRPM | HEART RATE: 72 BPM | TEMPERATURE: 99.4 F | HEIGHT: 73 IN

## 2022-01-27 DIAGNOSIS — Z20.822 EXPOSURE TO COVID-19 VIRUS: Primary | ICD-10-CM

## 2022-01-27 PROCEDURE — 87636 SARSCOV2 & INF A&B AMP PRB: CPT | Performed by: FAMILY MEDICINE

## 2022-01-27 PROCEDURE — 1036F TOBACCO NON-USER: CPT | Performed by: FAMILY MEDICINE

## 2022-01-27 PROCEDURE — 99213 OFFICE O/P EST LOW 20 MIN: CPT | Performed by: FAMILY MEDICINE

## 2022-01-27 PROCEDURE — 1160F RVW MEDS BY RX/DR IN RCRD: CPT | Performed by: FAMILY MEDICINE

## 2022-01-27 PROCEDURE — 3725F SCREEN DEPRESSION PERFORMED: CPT | Performed by: FAMILY MEDICINE

## 2022-01-27 PROCEDURE — 3008F BODY MASS INDEX DOCD: CPT | Performed by: FAMILY MEDICINE

## 2022-01-27 NOTE — PROGRESS NOTES
Assessment/Plan:    1  Exposure to COVID-19 virus  -     Covid/Flu- Office Collect            There are no Patient Instructions on file for this visit  No follow-ups on file  Subjective:      Patient ID: Sobeida Javier is a 70 y o  male  Chief Complaint   Patient presents with    Sore Throat     worse at night  started on Monday  ac/lpn    Sneezing     runny nose    Fatigue     started a couple weeks ago       Pt is being seen for a same day appt  Day 1 pt started with a sore throat Monday 24th  Then developed a cough  Has improved some  Fatigued  Sneezing  Congested  Sense of taste and smell is ok        The following portions of the patient's history were reviewed and updated as appropriate: allergies, current medications, past family history, past medical history, past social history, past surgical history and problem list     Review of Systems   HENT: Positive for congestion and sore throat  Respiratory: Positive for cough  Current Outpatient Medications   Medication Sig Dispense Refill    acetaminophen (TYLENOL) 500 mg tablet Take 500 mg by mouth as needed      amLODIPine (NORVASC) 2 5 mg tablet TAKE 1 TABLET BY MOUTH  DAILY 90 tablet 3    Multiple Vitamin (MULTIVITAMINS PO) Take 1 capsule by mouth daily      quinapril (ACCUPRIL) 10 mg tablet TAKE 1 TABLET BY MOUTH  EVERY 12 HOURS 180 tablet 3     No current facility-administered medications for this visit  Objective:    /70   Pulse 72   Temp 99 4 °F (37 4 °C)   Resp 16   Ht 6' 1" (1 854 m)   Wt 107 kg (236 lb)   BMI 31 14 kg/m²        Physical Exam  HENT:      Nose: Congestion and rhinorrhea present                  Debo Luis DO

## 2022-01-28 ENCOUNTER — TELEPHONE (OUTPATIENT)
Dept: FAMILY MEDICINE CLINIC | Facility: CLINIC | Age: 72
End: 2022-01-28

## 2022-01-28 DIAGNOSIS — J06.9 UPPER RESPIRATORY TRACT INFECTION, UNSPECIFIED TYPE: Primary | ICD-10-CM

## 2022-01-28 LAB
FLUAV RNA RESP QL NAA+PROBE: NEGATIVE
FLUBV RNA RESP QL NAA+PROBE: NEGATIVE
SARS-COV-2 RNA RESP QL NAA+PROBE: NEGATIVE

## 2022-01-28 RX ORDER — AZITHROMYCIN 250 MG/1
TABLET, FILM COATED ORAL
Qty: 6 TABLET | Refills: 0 | Status: SHIPPED | OUTPATIENT
Start: 2022-01-28 | End: 2022-02-02

## 2022-02-07 NOTE — ED NOTES
After RN took IV access out and patient has changed to his regular clothes and attempted to sit up from semi fowlers to a sitting position he immediately express that he was feeling some reflux on his chest but denies it as a chest pain  Also he expressed being tired and I noticed that he keeps his eyes closed and is dyspneic on exertion  Charge Zehra CAAL was made aware and Dr Opal Alaniz was also informed since Dr Deep Simmons already left         Agustina Ortega RN  06/11/21 2119 No

## 2022-04-05 ENCOUNTER — OFFICE VISIT (OUTPATIENT)
Dept: FAMILY MEDICINE CLINIC | Facility: CLINIC | Age: 72
End: 2022-04-05
Payer: COMMERCIAL

## 2022-04-05 VITALS
BODY MASS INDEX: 30.48 KG/M2 | TEMPERATURE: 97.7 F | RESPIRATION RATE: 20 BRPM | HEIGHT: 73 IN | WEIGHT: 230 LBS | HEART RATE: 76 BPM | SYSTOLIC BLOOD PRESSURE: 142 MMHG | DIASTOLIC BLOOD PRESSURE: 80 MMHG

## 2022-04-05 DIAGNOSIS — J01.90 ACUTE SINUSITIS, RECURRENCE NOT SPECIFIED, UNSPECIFIED LOCATION: Primary | ICD-10-CM

## 2022-04-05 PROBLEM — L98.499: Status: ACTIVE | Noted: 2022-04-05

## 2022-04-05 PROCEDURE — 3008F BODY MASS INDEX DOCD: CPT | Performed by: NURSE PRACTITIONER

## 2022-04-05 PROCEDURE — 1160F RVW MEDS BY RX/DR IN RCRD: CPT | Performed by: NURSE PRACTITIONER

## 2022-04-05 PROCEDURE — 1036F TOBACCO NON-USER: CPT | Performed by: NURSE PRACTITIONER

## 2022-04-05 PROCEDURE — 99213 OFFICE O/P EST LOW 20 MIN: CPT | Performed by: NURSE PRACTITIONER

## 2022-04-05 RX ORDER — ASPIRIN 81 MG/1
81 TABLET, CHEWABLE ORAL DAILY
COMMUNITY

## 2022-04-05 RX ORDER — AZITHROMYCIN 250 MG/1
TABLET, FILM COATED ORAL
Qty: 6 TABLET | Refills: 0 | Status: SHIPPED | OUTPATIENT
Start: 2022-04-05 | End: 2022-04-10

## 2022-04-05 RX ORDER — METHYLPREDNISOLONE 4 MG/1
TABLET ORAL
Qty: 21 TABLET | Refills: 0 | Status: SHIPPED | OUTPATIENT
Start: 2022-04-05

## 2022-04-05 NOTE — PATIENT INSTRUCTIONS
Take medication with food  It is important that you take the entire course of antibiotics prescribed  May also take a probiotic of your choice to maintain healthy GI mary jane  Can take some probiotic and yogurt with the medication  Take prednisone with food in morning and do not take any NSAID's while taking prednisone  Increase fluid intake, saline nasal rinses, and hot tea with honey and lemon  Cool air humidification can be helpful as well  May take Tylenol as needed for pain or fevers  Mucinex D for sinus congestion or Coricidin HBP if you have high blood pressure or a heart condition  Mucinex or Robitussin DM are effective for cough and chest congestion  Supportive care discussed and advised  Advised to RTO for any worsening and no improvement  Follow up for no improvement and worsening of conditions  Patient advised and educated when to see immediate medical care

## 2022-04-05 NOTE — PROGRESS NOTES
Assessment/Plan:    1  Acute sinusitis, recurrence not specified, unspecified location  -     azithromycin (ZITHROMAX) 250 mg tablet; Take 500mg on day 1, 250mg on days 2-5  -     methylPREDNISolone 4 MG tablet therapy pack; Use as directed on package          Patient Instructions: Take medication with food  It is important that you take the entire course of antibiotics prescribed  May also take a probiotic of your choice to maintain healthy GI mary jane  Can take some probiotic and yogurt with the medication  Take prednisone with food in morning and do not take any NSAID's while taking prednisone  Increase fluid intake, saline nasal rinses, and hot tea with honey and lemon  Cool air humidification can be helpful as well  May take Tylenol as needed for pain or fevers  Mucinex D for sinus congestion or Coricidin HBP if you have high blood pressure or a heart condition  Mucinex or Robitussin DM are effective for cough and chest congestion  Supportive care discussed and advised  Advised to RTO for any worsening and no improvement  Follow up for no improvement and worsening of conditions  Patient advised and educated when to see immediate medical care  Return if symptoms worsen or fail to improve  No future appointments  Subjective:      Patient ID: Rickey Rouse is a 67 y o  male  Chief Complaint   Patient presents with    Cough     Started 1 week ago [facility]    Sore Throat    Nasal Congestion         Vitals:  /80   Pulse 76   Temp 97 7 °F (36 5 °C)   Resp 20   Ht 6' 1" (1 854 m)   Wt 104 kg (230 lb)   BMI 30 34 kg/m²     HPI  Patient stated that started with sore throat, congestion and cough on 3/28/2022  Tried OTC but no relief  Denies fever, chills and sob         The following portions of the patient's history were reviewed and updated as appropriate: allergies, current medications, past family history, past medical history, past social history, past surgical history and problem list       Review of Systems   Constitutional: Negative for chills, diaphoresis, fatigue, fever and unexpected weight change  HENT: Positive for congestion and sore throat  Negative for dental problem, drooling, ear discharge, ear pain, facial swelling, hearing loss, mouth sores, nosebleeds, postnasal drip, rhinorrhea, sinus pressure, sinus pain, sneezing, tinnitus, trouble swallowing and voice change  Respiratory: Positive for cough  Negative for chest tightness, shortness of breath and wheezing  Cardiovascular: Negative  Gastrointestinal: Negative for abdominal pain, constipation, diarrhea, nausea and vomiting  Musculoskeletal: Negative  Skin: Negative  Neurological: Negative for dizziness, light-headedness and headaches  Objective:    Social History     Tobacco Use   Smoking Status Never Smoker   Smokeless Tobacco Never Used       Allergies: Allergies   Allergen Reactions    Augmentin [Amoxicillin-Pot Clavulanate] GI Intolerance    Meperidine Other (See Comments)     Reaction Date: [id number]; Annotation - [id number]: unsure of side effect  Reaction Date: [id number]; Annotation - [id number]: unsure of side effect         Current Outpatient Medications   Medication Sig Dispense Refill    acetaminophen (TYLENOL) 500 mg tablet Take 500 mg by mouth as needed      amLODIPine (NORVASC) 2 5 mg tablet TAKE 1 TABLET BY MOUTH  DAILY 90 tablet 3    aspirin 81 mg chewable tablet Chew 81 mg daily      Multiple Vitamin (MULTIVITAMINS PO) Take 1 capsule by mouth daily      quinapril (ACCUPRIL) 10 mg tablet TAKE 1 TABLET BY MOUTH  EVERY 12 HOURS 180 tablet 3    azithromycin (ZITHROMAX) 250 mg tablet Take 500mg on day 1, 250mg on days 2-5 6 tablet 0    methylPREDNISolone 4 MG tablet therapy pack Use as directed on package 21 tablet 0     No current facility-administered medications for this visit  Physical Exam  Vitals reviewed     Constitutional:       Appearance: Normal appearance  He is well-developed  HENT:      Head: Normocephalic  Right Ear: Tympanic membrane, ear canal and external ear normal       Left Ear: Tympanic membrane, ear canal and external ear normal       Nose: Mucosal edema present  Right Sinus: Maxillary sinus tenderness present  No frontal sinus tenderness  Left Sinus: Maxillary sinus tenderness present  No frontal sinus tenderness  Mouth/Throat:      Mouth: No oral lesions  Pharynx: No oropharyngeal exudate or posterior oropharyngeal erythema  Cardiovascular:      Rate and Rhythm: Normal rate and regular rhythm  Heart sounds: Normal heart sounds  Pulmonary:      Effort: Pulmonary effort is normal       Breath sounds: Normal breath sounds  Musculoskeletal:         General: Normal range of motion  Cervical back: Neck supple  Lymphadenopathy:      Cervical:      Right cervical: No superficial or posterior cervical adenopathy  Left cervical: No superficial or posterior cervical adenopathy  Skin:     General: Skin is warm and dry  Neurological:      Mental Status: He is alert and oriented to person, place, and time  Psychiatric:         Behavior: Behavior normal          Thought Content:  Thought content normal          Judgment: Judgment normal                      NEHAL Iverson

## 2022-04-13 DIAGNOSIS — I10 ESSENTIAL HYPERTENSION: ICD-10-CM

## 2022-04-14 RX ORDER — AMLODIPINE BESYLATE 2.5 MG/1
TABLET ORAL
Qty: 90 TABLET | Refills: 3 | Status: SHIPPED | OUTPATIENT
Start: 2022-04-14

## 2022-06-15 ENCOUNTER — TELEPHONE (OUTPATIENT)
Dept: FAMILY MEDICINE CLINIC | Facility: CLINIC | Age: 72
End: 2022-06-15

## 2022-07-05 ENCOUNTER — RA CDI HCC (OUTPATIENT)
Dept: OTHER | Facility: HOSPITAL | Age: 72
End: 2022-07-05

## 2022-07-05 NOTE — PROGRESS NOTES
[facility]ks[facility]  coding opportunities       Chart reviewed, no opportunity found:   [address]        Patients Insurance     Medicare Insurance: [employer]

## 2022-07-07 ENCOUNTER — TELEPHONE (OUTPATIENT)
Dept: OTHER | Facility: OTHER | Age: 72
End: 2022-07-07

## 2022-08-09 DIAGNOSIS — I10 ESSENTIAL HYPERTENSION: ICD-10-CM

## 2022-08-10 RX ORDER — QUINAPRIL 10 MG/1
TABLET ORAL
Qty: 180 TABLET | Refills: 1 | Status: SHIPPED | OUTPATIENT
Start: 2022-08-10

## 2022-08-31 ENCOUNTER — OFFICE VISIT (OUTPATIENT)
Dept: FAMILY MEDICINE CLINIC | Facility: CLINIC | Age: 72
End: 2022-08-31
Payer: COMMERCIAL

## 2022-08-31 VITALS
SYSTOLIC BLOOD PRESSURE: 116 MMHG | OXYGEN SATURATION: 99 % | BODY MASS INDEX: 30.48 KG/M2 | HEART RATE: 84 BPM | TEMPERATURE: 97.8 F | WEIGHT: 230 LBS | RESPIRATION RATE: 18 BRPM | DIASTOLIC BLOOD PRESSURE: 70 MMHG | HEIGHT: 73 IN

## 2022-08-31 DIAGNOSIS — I10 BENIGN ESSENTIAL HYPERTENSION: ICD-10-CM

## 2022-08-31 DIAGNOSIS — E55.9 VITAMIN D INSUFFICIENCY: ICD-10-CM

## 2022-08-31 DIAGNOSIS — I72.0 PSEUDOANEURYSM OF CAROTID ARTERY (HCC): ICD-10-CM

## 2022-08-31 DIAGNOSIS — R53.83 OTHER FATIGUE: ICD-10-CM

## 2022-08-31 DIAGNOSIS — E78.2 MIXED HYPERLIPIDEMIA: ICD-10-CM

## 2022-08-31 DIAGNOSIS — K21.9 GASTROESOPHAGEAL REFLUX DISEASE, UNSPECIFIED WHETHER ESOPHAGITIS PRESENT: Primary | ICD-10-CM

## 2022-08-31 DIAGNOSIS — Z85.46 HISTORY OF PROSTATE CANCER: ICD-10-CM

## 2022-08-31 DIAGNOSIS — Z13.0 SCREENING FOR DEFICIENCY ANEMIA: ICD-10-CM

## 2022-08-31 PROBLEM — M54.12 CERVICAL RADICULOPATHY: Status: ACTIVE | Noted: 2021-07-02

## 2022-08-31 PROBLEM — L98.499: Status: RESOLVED | Noted: 2022-04-05 | Resolved: 2022-08-31

## 2022-08-31 PROCEDURE — 3288F FALL RISK ASSESSMENT DOCD: CPT | Performed by: NURSE PRACTITIONER

## 2022-08-31 PROCEDURE — 99214 OFFICE O/P EST MOD 30 MIN: CPT | Performed by: NURSE PRACTITIONER

## 2022-08-31 PROCEDURE — 1101F PT FALLS ASSESS-DOCD LE1/YR: CPT | Performed by: NURSE PRACTITIONER

## 2022-08-31 PROCEDURE — 1160F RVW MEDS BY RX/DR IN RCRD: CPT | Performed by: NURSE PRACTITIONER

## 2022-08-31 PROCEDURE — 3725F SCREEN DEPRESSION PERFORMED: CPT | Performed by: NURSE PRACTITIONER

## 2022-08-31 RX ORDER — FAMOTIDINE 20 MG/1
20 TABLET, FILM COATED ORAL 2 TIMES DAILY
Qty: 60 TABLET | Refills: 0 | Status: SHIPPED | OUTPATIENT
Start: 2022-08-31 | End: 2022-09-30

## 2022-08-31 NOTE — PROGRESS NOTES
Assessment/Plan:    1  Gastroesophageal reflux disease, unspecified whether esophagitis present  Comments:  symptoms seems due to GERD, will start pepcid & advised on life style & dietary modifications and if no improvement then will follow back and will refer to ENT  Orders:  -     famotidine (PEPCID) 20 mg tablet; Take 1 tablet (20 mg total) by mouth 2 (two) times a day    2  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(HCC)  Comments:  stated that work up done by vascular and was cleared and was advised to take baby aspirin daily and no follow up was suggested    3  History of prostate cancer  -     PSA, Total Screen; Future    4  Benign essential hypertension  Comments:  stable with current regimen  Orders:  -     Comprehensive metabolic panel; Future    5  Vitamin D insufficiency  -     Vitamin D 25 hydroxy; Future    6  Other fatigue  -     TSH, 3rd generation with Free T4 reflex; Future    7  Mixed hyperlipidemia  -     Comprehensive metabolic panel; Future  -     Lipid Panel with Direct LDL reflex; Future    8  Screening for deficiency anemia  -     CBC; Future        BMI Counseling: Body mass index is 30 34 kg/m²  Discussed the patient's BMI with him  The BMI is above normal  Nutrition recommendations include reducing portion sizes, decreasing overall calorie intake, 3-5 servings of fruits/vegetables daily, reducing fast food intake, consuming healthier snacks, decreasing soda and/or juice intake and moderation in carbohydrate intake  Patient Instructions:  Supportive care discussed and advised  Advised to RTO for any worsening and no improvement  Follow up for no improvement and worsening of conditions  Patient advised and educated when to see immediate medical care  Return in about 3 weeks (around 9/21/2022), or if symptoms worsen or fail to improve        Future Appointments   Date Time Provider Tracy Garay   9/23/2022 10:30 AM NEHAL Hoang [facility]           Subjective: Patient ID: Prince Cope is a 67 y o  male  Chief Complaint   Patient presents with    Sore Throat     Symptoms started 1 month ago  [id number]    Fatigue         Vitals:  /70   Pulse 84   Temp 97 8 °F (36 6 °C)   Resp 18   Ht 6' 1" (1 854 m)   Wt 104 kg (230 lb)   SpO2 99%   BMI 30 34 kg/m²     HPI  Patient stated that feels tickle in his throat from a month and worse after eating dinner and feels phlegm in back of throat and cough to clear this but denies any sob, chest pain, other ENT symptoms  Also feeling fatigue from couple of  Weeks  Complaint with medications and tolerating it well  PHQ-2/9 Depression Screening    Little interest or pleasure in doing things: 0 - not at all  Feeling down, depressed, or hopeless: 0 - not at all  PHQ-2 Score: 0  PHQ-2 Interpretation: Negative depression screen             The following portions of the patient's history were reviewed and updated as appropriate: allergies, current medications, past family history, past medical history, past social history, past surgical history and problem list       Review of Systems   Constitutional: Negative for chills, diaphoresis, fatigue, fever and unexpected weight change  HENT: Positive for sore throat  Negative for congestion, dental problem, drooling, ear discharge, ear pain, facial swelling, hearing loss, mouth sores, nosebleeds, postnasal drip, rhinorrhea, sinus pressure, sinus pain, sneezing, tinnitus, trouble swallowing and voice change  As noted in HPI     Respiratory: Negative for cough, chest tightness, shortness of breath and wheezing  Cardiovascular: Negative  Gastrointestinal: Negative for abdominal pain, constipation, diarrhea, nausea and vomiting  Genitourinary: Negative  Skin: Negative  Neurological: Negative for dizziness, light-headedness and headaches  Psychiatric/Behavioral: Negative            Objective:    Social History     Tobacco Use   Smoking Status Never Smoker Smokeless Tobacco Never Used       Allergies: Allergies   Allergen Reactions    Augmentin [Amoxicillin-Pot Clavulanate] GI Intolerance    Meperidine Other (See Comments)     Reaction Date: [id number]; Annotation - [id number]: unsure of side effect  Reaction Date: [id number]; Annotation - [id number]: unsure of side effect         Current Outpatient Medications   Medication Sig Dispense Refill    acetaminophen (TYLENOL) 500 mg tablet Take 500 mg by mouth as needed      amLODIPine (NORVASC) 2 5 mg tablet TAKE 1 TABLET BY MOUTH  DAILY 90 tablet 3    aspirin 81 mg chewable tablet Chew 81 mg daily      famotidine (PEPCID) 20 mg tablet Take 1 tablet (20 mg total) by mouth 2 (two) times a day 60 tablet 0    Multiple Vitamin (MULTIVITAMINS PO) Take 1 capsule by mouth daily      quinapril (ACCUPRIL) 10 mg tablet TAKE 1 TABLET BY MOUTH  EVERY 12 HOURS 180 tablet 1     No current facility-administered medications for this visit  Physical Exam  Vitals reviewed  Constitutional:       Appearance: Normal appearance  He is well-developed  HENT:      Head: Normocephalic  Right Ear: Tympanic membrane, ear canal and external ear normal       Left Ear: Tympanic membrane, ear canal and external ear normal       Nose: Nose normal       Right Sinus: No maxillary sinus tenderness or frontal sinus tenderness  Left Sinus: No maxillary sinus tenderness or frontal sinus tenderness  Mouth/Throat:      Mouth: No oral lesions  Pharynx: No oropharyngeal exudate or posterior oropharyngeal erythema  Cardiovascular:      Rate and Rhythm: Normal rate and regular rhythm  Heart sounds: Normal heart sounds  Pulmonary:      Effort: Pulmonary effort is normal       Breath sounds: Normal breath sounds  Musculoskeletal:         General: Normal range of motion  Cervical back: Neck supple  Lymphadenopathy:      Cervical:      Right cervical: No superficial or posterior cervical adenopathy       Left cervical: No superficial or posterior cervical adenopathy  Skin:     General: Skin is warm and dry  Neurological:      Mental Status: He is alert and oriented to person, place, and time  Psychiatric:         Behavior: Behavior normal          Thought Content:  Thought content normal          Judgment: Judgment normal                      NEHAL Scott

## 2022-08-31 NOTE — PATIENT INSTRUCTIONS
GERD (Gastroesophageal Reflux Disease)   AMBULATORY CARE:   Gastroesophageal reflux disease (GERD)  is reflux that happens more than 2 times a week for a few weeks  Reflux means acid and food in your stomach back up into your esophagus  GERD can cause other health problems over time if it is not treated  Common causes of GERD:  GERD often happens because the lower muscle (sphincter) of the esophagus does not close properly  The sphincter normally opens to let food into the stomach  It then closes to keep food and stomach acid in the stomach  If the sphincter does not close properly, stomach acid and food back up (reflux) into the esophagus  The following may increase your risk for GERD:  Certain foods such as spicy foods, chocolate, foods that contain caffeine, peppermint, and fried foods    Hiatal hernia    Certain medicines such as calcium channel blockers (used to treat high blood pressure), allergy medicines, sedatives, or antidepressants    Pregnancy, obesity, or scleroderma    Lying down after a meal    Drinking alcohol or smoking cigarettes    Signs and symptoms:   Heartburn (burning pain in your chest)    Pain after meals that spreads to your neck, jaw, or shoulder    Pain that gets better when you change positions    Bitter or acid taste in your mouth    A dry cough    Trouble swallowing or pain with swallowing    Hoarseness or a sore throat    Burping or hiccups    Feeling full soon after you start eating    Call your local emergency number (911 in the [address] [facility]) if:   You have severe chest pain and sudden trouble breathing  Seek care immediately if:   You have trouble breathing after you vomit  You have trouble swallowing, or pain with swallowing  Your bowel movements are black, bloody, or tarry-looking  Your vomit looks like coffee grounds or has blood in it  Call your doctor or gastroenterologist if:   You feel full and cannot burp or vomit      You vomit large amounts, or you vomit often     You are losing weight without trying  Your symptoms get worse or do not improve with treatment  You have questions or concerns about your condition or care  Treatment for GERD:   Medicines  are used to decrease stomach acid  Medicine may also be used to help your lower esophageal sphincter and stomach contract (tighten) more  Surgery  is done to wrap the upper part of the stomach around the esophageal sphincter  This will strengthen the sphincter and prevent reflux  Manage GERD:       Do not have foods or drinks that may increase heartburn  These include chocolate, peppermint, fried or fatty foods, drinks that contain caffeine, or carbonated drinks (soda)  Other foods include spicy foods, onions, tomatoes, and tomato-based foods  Do not have foods or drinks that can irritate your esophagus, such as citrus fruits, juices, and alcohol  Do not eat large meals  When you eat a lot of food at one time, your stomach needs more acid to digest it  Eat 6 small meals each day instead of 3 large meals, and eat slowly  Do not eat meals 2 to 3 hours before bedtime  Elevate the head of your bed  Place 6-inch blocks under the head of your bed frame  You may also use more than one pillow under your head and shoulders while you sleep  Maintain a healthy weight  If you are overweight, weight loss may help relieve symptoms of GERD  Do not smoke  Smoking weakens the lower esophageal sphincter and increases the risk of GERD  Ask your healthcare provider for information if you currently smoke and need help to quit  E-cigarettes or smokeless tobacco still contain nicotine  Talk to your healthcare provider before you use these products  Do not put pressure on your abdomen  Pressure pushes acid up into your esophagus  Do not wear clothing that is tight around your waist  Do not bend over  Bend at the knees if you need to pick something up      Follow up with your doctor or gastroenterologist as directed:  Write down your questions so you remember to ask them during your visits  © Copyright [employer] 2022 Information is for End User's use only and may not be sold, redistributed or otherwise used for commercial purposes  All illustrations and images included in CareNotes® are the copyrighted property of A D A M [employer] Inc  or Abrahan Bush  The above information is an  only  It is not intended as medical advice for individual conditions or treatments  Talk to your doctor, nurse or pharmacist before following any medical regimen to see if it is safe and effective for you

## 2022-09-19 ENCOUNTER — APPOINTMENT (OUTPATIENT)
Dept: LAB | Facility: CLINIC | Age: 72
End: 2022-09-19
Payer: COMMERCIAL

## 2022-09-19 DIAGNOSIS — R53.83 OTHER FATIGUE: ICD-10-CM

## 2022-09-19 DIAGNOSIS — E78.2 MIXED HYPERLIPIDEMIA: ICD-10-CM

## 2022-09-19 DIAGNOSIS — I10 BENIGN ESSENTIAL HYPERTENSION: ICD-10-CM

## 2022-09-19 DIAGNOSIS — E55.9 VITAMIN D INSUFFICIENCY: ICD-10-CM

## 2022-09-19 DIAGNOSIS — Z85.46 HISTORY OF PROSTATE CANCER: ICD-10-CM

## 2022-09-19 DIAGNOSIS — Z13.0 SCREENING FOR DEFICIENCY ANEMIA: ICD-10-CM

## 2022-09-19 LAB
25(OH)D3 SERPL-MCNC: 37.7 NG/ML (ref 30–100)
ALBUMIN SERPL BCP-MCNC: 3.8 G/DL (ref 3.5–5)
ALP SERPL-CCNC: 46 U/L (ref 46–116)
ALT SERPL W P-5'-P-CCNC: 30 U/L (ref 12–78)
ANION GAP SERPL CALCULATED.3IONS-SCNC: 3 MMOL/L (ref 4–13)
AST SERPL W P-5'-P-CCNC: 22 U/L (ref 5–45)
BILIRUB SERPL-MCNC: 0.67 MG/DL (ref 0.2–1)
BUN SERPL-MCNC: 14 MG/DL (ref 5–25)
CALCIUM SERPL-MCNC: 9.1 MG/DL (ref 8.3–10.1)
CHLORIDE SERPL-SCNC: 106 MMOL/L (ref 96–108)
CHOLEST SERPL-MCNC: 195 MG/DL
CO2 SERPL-SCNC: 29 MMOL/L (ref 21–32)
CREAT SERPL-MCNC: 0.99 MG/DL (ref 0.6–1.3)
ERYTHROCYTE [DISTWIDTH] IN BLOOD BY AUTOMATED COUNT: 12.4 % (ref 11.6–15.1)
GFR SERPL CREATININE-BSD FRML MDRD: 75 ML/MIN/1.73SQ M
GLUCOSE P FAST SERPL-MCNC: 101 MG/DL (ref 65–99)
HCT VFR BLD AUTO: 40.7 % (ref 36.5–49.3)
HDLC SERPL-MCNC: 39 MG/DL
HGB BLD-MCNC: 13.3 G/DL (ref 12–17)
LDLC SERPL CALC-MCNC: 133 MG/DL (ref 0–100)
MCH RBC QN AUTO: 30.1 PG (ref 26.8–34.3)
MCHC RBC AUTO-ENTMCNC: 32.7 G/DL (ref 31.4–37.4)
MCV RBC AUTO: 92 FL (ref 82–98)
PLATELET # BLD AUTO: 168 THOUSANDS/UL (ref 149–390)
PMV BLD AUTO: 10.5 FL (ref 8.9–12.7)
POTASSIUM SERPL-SCNC: 4.2 MMOL/L (ref 3.5–5.3)
PROT SERPL-MCNC: 8.3 G/DL (ref 6.4–8.4)
PSA SERPL-MCNC: <0.1 NG/ML (ref 0–4)
RBC # BLD AUTO: 4.42 MILLION/UL (ref 3.88–5.62)
SODIUM SERPL-SCNC: 138 MMOL/L (ref 135–147)
TRIGL SERPL-MCNC: 114 MG/DL
TSH SERPL DL<=0.05 MIU/L-ACNC: 1.58 UIU/ML (ref 0.45–4.5)
WBC # BLD AUTO: 5.29 THOUSAND/UL (ref 4.31–10.16)

## 2022-09-19 PROCEDURE — 80061 LIPID PANEL: CPT

## 2022-09-19 PROCEDURE — G0103 PSA SCREENING: HCPCS

## 2022-09-19 PROCEDURE — 85027 COMPLETE CBC AUTOMATED: CPT

## 2022-09-19 PROCEDURE — 82306 VITAMIN D 25 HYDROXY: CPT

## 2022-09-19 PROCEDURE — 80053 COMPREHEN METABOLIC PANEL: CPT

## 2022-09-19 PROCEDURE — 84443 ASSAY THYROID STIM HORMONE: CPT

## 2022-09-19 PROCEDURE — 36415 COLL VENOUS BLD VENIPUNCTURE: CPT

## 2022-09-23 ENCOUNTER — OFFICE VISIT (OUTPATIENT)
Dept: FAMILY MEDICINE CLINIC | Facility: CLINIC | Age: 72
End: 2022-09-23
Payer: COMMERCIAL

## 2022-09-23 VITALS
HEART RATE: 64 BPM | WEIGHT: 228 LBS | HEIGHT: 73 IN | BODY MASS INDEX: 30.22 KG/M2 | DIASTOLIC BLOOD PRESSURE: 64 MMHG | OXYGEN SATURATION: 99 % | TEMPERATURE: 97.8 F | SYSTOLIC BLOOD PRESSURE: 104 MMHG | RESPIRATION RATE: 18 BRPM

## 2022-09-23 DIAGNOSIS — K21.9 GASTROESOPHAGEAL REFLUX DISEASE, UNSPECIFIED WHETHER ESOPHAGITIS PRESENT: ICD-10-CM

## 2022-09-23 DIAGNOSIS — E78.2 MIXED HYPERLIPIDEMIA: Primary | ICD-10-CM

## 2022-09-23 DIAGNOSIS — C61 ADENOCARCINOMA OF PROSTATE (HCC): ICD-10-CM

## 2022-09-23 DIAGNOSIS — I10 BENIGN ESSENTIAL HYPERTENSION: ICD-10-CM

## 2022-09-23 PROCEDURE — 3078F DIAST BP <80 MM HG: CPT | Performed by: NURSE PRACTITIONER

## 2022-09-23 PROCEDURE — 1160F RVW MEDS BY RX/DR IN RCRD: CPT | Performed by: NURSE PRACTITIONER

## 2022-09-23 PROCEDURE — 3074F SYST BP LT 130 MM HG: CPT | Performed by: NURSE PRACTITIONER

## 2022-09-23 PROCEDURE — 99214 OFFICE O/P EST MOD 30 MIN: CPT | Performed by: NURSE PRACTITIONER

## 2022-09-23 RX ORDER — ATORVASTATIN CALCIUM 10 MG/1
10 TABLET, FILM COATED ORAL DAILY
Qty: 90 TABLET | Refills: 1 | Status: SHIPPED | OUTPATIENT
Start: 2022-09-23 | End: 2022-09-23 | Stop reason: SDUPTHER

## 2022-09-23 RX ORDER — ATORVASTATIN CALCIUM 10 MG/1
10 TABLET, FILM COATED ORAL DAILY
Qty: 90 TABLET | Refills: 1 | Status: SHIPPED | OUTPATIENT
Start: 2022-09-23

## 2022-09-23 NOTE — PROGRESS NOTES
Assessment/Plan:    1  Mixed hyperlipidemia  Comments:  will start lipitor and will follow back in 3 months  Orders:  -     atorvastatin (LIPITOR) 10 mg tablet; Take 1 tablet (10 mg total) by mouth daily  -     Lipid Panel with Direct LDL reflex; Future    2  Adenocarcinoma of prostate ([facility] [address] )  Comments:  PSA done and is <0 1 and denies any concerns    3  Benign essential hypertension  Comments:  stable with current regimen  Orders:  -     Comprehensive metabolic panel; Future    4  Gastroesophageal reflux disease, unspecified whether esophagitis present  Comments:  symptoms improved and will continue with pepcid at this time        Patient Instructions:  Supportive care discussed and advised  Advised to RTO for any worsening and no improvement  Follow up for no improvement and worsening of conditions  Patient advised and educated when to see immediate medical care  Return if symptoms worsen or fail to improve  No future appointments  Subjective:      Patient ID: Yoel Pal is a 67 y o  male  Chief Complaint   Patient presents with    Follow-up     Labs mz cma         Vitals:  /64   Pulse 64   Temp 97 8 °F (36 6 °C)   Resp 18   Ht 6' 1" (1 854 m)   Wt 103 kg (228 lb)   SpO2 99%   BMI 30 08 kg/m²     HPI  Patient is here for follow up  Had blood work done and discussed results  Lipid profiled in detail and CVD risk calculated is 22% and recommended statin  After discussion of the treatment of hyperlipidemia, a statin-drug is chosen; prescription for Lipitor (atorvastatin) once daily is written  The patient is informed that this type of drug is usually highly effective to lower LDL cholesterol and is usually very well tolerated  However, statin-type drugs can potentially injure the liver  Blood tests will be required at regular intervals for the duration of therapy  Damage to the liver, if detected early, can be reversed by stopping the drug    Be alert for persistent nausea, abdominal pain, or yellow jaundice, and report such to me directly  Statin drugs may also cause skeletal muscle injury in rare cases  Be alert for pronounced persistent diffuse muscle pain and discontinue the drug immediately should such symptoms develop  Grapefruit juice may increase the blood levels and side effects of some HMG Co-A reductase inhibitors (Zocor, Lipitor and Mevacor but not Pravachol or Lescol)  Patient is counseled in this regard  Will recheck blood work in 6 to 8 weeks  Stated that since taking pepcid, tickle in his throat has resolved and feeling much better  Denies chest pain, sob, headache and dizziness                  The following portions of the patient's history were reviewed and updated as appropriate: allergies, current medications, past family history, past medical history, past social history, past surgical history and problem list       Review of Systems   Constitutional: Negative  HENT: Negative  Respiratory: Negative  Cardiovascular: Negative  Gastrointestinal: Negative  Neurological: Negative  Psychiatric/Behavioral: Negative  Objective:    Social History     Tobacco Use   Smoking Status Never Smoker   Smokeless Tobacco Never Used       Allergies: Allergies   Allergen Reactions    Augmentin [Amoxicillin-Pot Clavulanate] GI Intolerance    Meperidine Other (See Comments)     Reaction Date: [id number]; Annotation - [id number]: unsure of side effect  Reaction Date: [id number];  Annotation - [id number]: unsure of side effect         Current Outpatient Medications   Medication Sig Dispense Refill    acetaminophen (TYLENOL) 500 mg tablet Take 500 mg by mouth as needed      amLODIPine (NORVASC) 2 5 mg tablet TAKE 1 TABLET BY MOUTH  DAILY 90 tablet 3    aspirin 81 mg chewable tablet Chew 81 mg daily      atorvastatin (LIPITOR) 10 mg tablet Take 1 tablet (10 mg total) by mouth daily 90 tablet 1    famotidine (PEPCID) 20 mg tablet Take 1 tablet (20 mg total) by mouth 2 (two) times a day 60 tablet 0    Multiple Vitamin (MULTIVITAMINS PO) Take 1 capsule by mouth daily      quinapril (ACCUPRIL) 10 mg tablet TAKE 1 TABLET BY MOUTH  EVERY 12 HOURS 180 tablet 1     No current facility-administered medications for this visit  Physical Exam  Constitutional:       Appearance: Normal appearance  HENT:      Head: Normocephalic  Nose: Nose normal    Eyes:      Conjunctiva/sclera: Conjunctivae normal    Cardiovascular:      Rate and Rhythm: Normal rate and regular rhythm  Heart sounds: Normal heart sounds  Pulmonary:      Effort: Pulmonary effort is normal       Breath sounds: Normal breath sounds  Musculoskeletal:         General: No swelling or tenderness  Normal range of motion  Skin:     General: Skin is warm and dry  Findings: No rash  Neurological:      Mental Status: He is alert and oriented to person, place, and time  Psychiatric:         Mood and Affect: Mood normal          Behavior: Behavior normal          Thought Content:  Thought content normal          Judgment: Judgment normal                      NEHAL Garcia

## 2022-11-04 ENCOUNTER — OFFICE VISIT (OUTPATIENT)
Dept: URGENT CARE | Facility: CLINIC | Age: 72
End: 2022-11-04

## 2022-11-04 VITALS
TEMPERATURE: 98.1 F | RESPIRATION RATE: 20 BRPM | HEIGHT: 73 IN | WEIGHT: 232 LBS | BODY MASS INDEX: 30.75 KG/M2 | OXYGEN SATURATION: 97 % | HEART RATE: 77 BPM

## 2022-11-04 DIAGNOSIS — R05.1 ACUTE COUGH: Primary | ICD-10-CM

## 2022-11-04 LAB
SARS-COV-2 AG UPPER RESP QL IA: NEGATIVE
VALID CONTROL: NORMAL

## 2022-11-04 NOTE — PATIENT INSTRUCTIONS
Upper Respiratory Infection   WHAT YOU NEED TO KNOW:   An upper respiratory infection is also called a cold  It can affect your nose, throat, ears, and sinuses  Cold symptoms are usually worst for the first 3 to 5 days  Most people get better in 7 to 14 days  You may continue to cough for 2 to 3 weeks  Colds are caused by viruses and do not get better with antibiotics  DISCHARGE INSTRUCTIONS:   Call your local emergency number (911 in the [address] [place] [address],[address]) if:   You have chest pain or trouble breathing  Return to the emergency department if:   You have a fever over 102ºF (39ºC)  Call your doctor if:   You have a low fever  Your sore throat gets worse or you see white or yellow spots in your throat  Your symptoms get worse after 3 to 5 days or are not better in 14 days  You have a rash anywhere on your skin  You have large, tender lumps in your neck  You have thick, green, or yellow drainage from your nose  You cough up thick yellow, green, or bloody mucus  You have a bad earache  You have questions or concerns about your condition or care  Medicines: You may need any of the following:  Decongestants  help reduce nasal congestion and help you breathe more easily  If you take decongestant pills, they may make you feel restless or cause problems with your sleep  Do not use decongestant sprays for more than a few days  Cough suppressants  help reduce coughing  Ask your healthcare provider which type of cough medicine is best for you  NSAIDs , such as ibuprofen, help decrease swelling, pain, and fever  NSAIDs can cause stomach bleeding or kidney problems in certain people  If you take blood thinner medicine, always ask your healthcare provider if NSAIDs are safe for you  Always read the medicine label and follow directions  Acetaminophen  decreases pain and fever  It is available without a doctor's order  Ask how much to take and how often to take it  Follow directions   Read the labels of all other medicines you are using to see if they also contain acetaminophen, or ask your doctor or pharmacist  Acetaminophen can cause liver damage if not taken correctly  Do not use more than 4 grams (4,000 milligrams) total of acetaminophen in one day  Take your medicine as directed  Contact your healthcare provider if you think your medicine is not helping or if you have side effects  Tell him or her if you are allergic to any medicine  Keep a list of the medicines, vitamins, and herbs you take  Include the amounts, and when and why you take them  Bring the list or the pill bottles to follow-up visits  Carry your medicine list with you in case of an emergency  Self-care:   Rest as much as possible  Slowly start to do more each day  Drink more liquids as directed  Liquids will help thin and loosen mucus so you can cough it up  Liquids will also help prevent dehydration  Liquids that help prevent dehydration include water, fruit juice, and broth  Do not drink liquids that contain caffeine  Caffeine can increase your risk for dehydration  Ask your healthcare provider how much liquid to drink each day  Soothe a sore throat  Gargle with warm salt water  Make salt water by dissolving ¼ teaspoon salt in 1 cup warm water  You may also suck on hard candy or throat lozenges  You may use a sore throat spray  Use a humidifier or vaporizer  Use a cool mist humidifier or a vaporizer to increase air moisture in your home  This may make it easier for you to breathe and help decrease your cough  Use saline nasal drops as directed  These help relieve congestion  Apply petroleum-based jelly around the outside of your nostrils  This can decrease irritation from blowing your nose  Do not smoke  Nicotine and other chemicals in cigarettes and cigars can make your symptoms worse  They can also cause infections such as bronchitis or pneumonia   Ask your healthcare provider for information if you currently smoke and need help to quit  E-cigarettes or smokeless tobacco still contain nicotine  Talk to your healthcare provider before you use these products  Prevent a cold: Wash your hands often  Use soap and water every time you wash your hands  Rub your soapy hands together, lacing your fingers  Use the fingers of one hand to scrub under the nails of the other hand  Wash for at least 20 seconds  Rinse with warm, running water for several seconds  Then dry your hands  Use germ-killing gel if soap and water are not available  Do not touch your eyes or mouth without washing your hands first          Cover a sneeze or cough  Use a tissue that covers your mouth and nose  Put the used tissue in the trash right away  Use the bend of your arm if a tissue is not available  Wash your hands well with soap and water or use a hand   Do not stand close to anyone who is sneezing or coughing  Try to stay away from others while you are sick  This is especially important during the first 2 to 3 days when the virus is more easily spread  Wait until a fever, cough, or other symptoms are gone before you return to work or other regular activities  Do not share items while you are sick  This includes food, drinks, eating utensils, and dishes  Follow up with your doctor as directed:  Write down your questions so you remember to ask them during your visits  © Copyright [employer] 2022 Information is for End User's use only and may not be sold, redistributed or otherwise used for commercial purposes  All illustrations and images included in CareNotes® are the copyrighted property of [employer]  or [facility] Aleksander De Leon   The above information is an  only  It is not intended as medical advice for individual conditions or treatments  Talk to your doctor, nurse or pharmacist before following any medical regimen to see if it is safe and effective for you    Acute Upper Respiratory Tract Infection:   -Rapid COVID was negative  -Based on the patients hx and presentation they are likely suffering from a Viral illness  No sign of bacterial infection at this time    -Stay very well hydrated and push fluids  -Run a humidifier by your bed and take steam showers to clear mucus   -You can use flonase once daily for two weeks   -Advil or Tylenol for fever or pain  -Mucinex OTC or Zyrtec or Claritin  Drink plenty of water with the mucinex    -Honey or throat lozenges for cough may be helpful  -Warm salt water gargles and tea with honey   -Obtain a pulse ox device and check your O2 1-2 times a day  You want your oxygen levels to be >93%  If they go below 93% go to the ED immediately  -Vitamin C and D daily   You can attempt to use zinc or Zicam    -If your sx worsen or persist follow up with your PCP for recheck

## 2022-11-04 NOTE — PROGRESS NOTES
[facility] [employer] [facility]        NAME: Vannessa Maya is a 67 y o  male  : 1950    MRN: [id number]  DATE: 2022  TIME: 5:14 PM    Assessment and Plan   Acute cough [R05 1]  1  Acute cough  Poct Covid 19 Rapid Antigen Test         Patient Instructions   Acute Upper Respiratory Tract Infection:   -Rapid COVID was negative  -Based on the patients hx and presentation they are likely suffering from a Viral illness  No sign of bacterial infection at this time    -Stay very well hydrated and push fluids  -Run a humidifier by your bed and take steam showers to clear mucus   -You can use flonase once daily for two weeks   -Advil or Tylenol for fever or pain  -Mucinex OTC or Zyrtec or Claritin  Drink plenty of water with the mucinex    -Honey or throat lozenges for cough may be helpful  -Warm salt water gargles and tea with honey   -Vitamin C and D daily  You can attempt to use zinc or Zicam    -If your sx worsen or persist follow up with your PCP for recheck            Follow up with PCP in 3-5 days  Proceed to  ER if symptoms worsen  Chief Complaint     Chief Complaint   Patient presents with   • Sore Throat     Sore throat, dry cough, nasal dryness, tired, voice change x 3 days         History of Present Illness       The patient is a 66-year-old male who presents today for sore throat, dry coughing, fatigue and hoarseness x 3 days  The patient states that his sx began with sore throat that progressed to congestion, fatigue and dry coughing  He states that his worst presenting symptom today is the hoarseness  No fever, chills, body aches  No dyspnea, wheezing, cp, palpitations  No GI sx  No has good PO intake  No known sick contacts or recent travel  He took a Claritin for the nasal congestion today  The patient is fully vaccinated and boosted and had his influenza vaccine as well  Review of Systems   Review of Systems   Constitutional: Positive for fatigue   Negative for activity change, appetite change, chills, diaphoresis and fever  HENT: Positive for congestion, rhinorrhea and sore throat  Negative for ear discharge, ear pain, facial swelling, hearing loss, postnasal drip, sinus pressure, sinus pain, tinnitus, trouble swallowing and voice change  Eyes: Negative for visual disturbance  Respiratory: Positive for cough  Negative for apnea, chest tightness, shortness of breath, wheezing and stridor  Cardiovascular: Negative for chest pain, palpitations and leg swelling  Gastrointestinal: Negative for abdominal distention and abdominal pain  Genitourinary: Negative for decreased urine volume  Musculoskeletal: Negative for arthralgias, joint swelling, myalgias, neck pain and neck stiffness  Skin: Negative for rash  Allergic/Immunologic: Negative for immunocompromised state  Neurological: Negative for dizziness, weakness, light-headedness, numbness and headaches  Hematological: Negative for adenopathy           Current Medications       Current Outpatient Medications:   •  acetaminophen (TYLENOL) 500 mg tablet, Take 500 mg by mouth as needed, Disp: , Rfl:   •  amLODIPine (NORVASC) 2 5 mg tablet, TAKE 1 TABLET BY MOUTH  DAILY, Disp: 90 tablet, Rfl: 3  •  aspirin 81 mg chewable tablet, Chew 81 mg daily, Disp: , Rfl:   •  atorvastatin (LIPITOR) 10 mg tablet, Take 1 tablet (10 mg total) by mouth daily, Disp: 90 tablet, Rfl: 1  •  Multiple Vitamin (MULTIVITAMINS PO), Take 1 capsule by mouth daily, Disp: , Rfl:   •  quinapril (ACCUPRIL) 10 mg tablet, TAKE 1 TABLET BY MOUTH  EVERY 12 HOURS, Disp: 180 tablet, Rfl: 1  •  famotidine (PEPCID) 20 mg tablet, Take 1 tablet (20 mg total) by mouth 2 (two) times a day, Disp: 60 tablet, Rfl: 0    Current Allergies     Allergies as of 11/04/2022 - Reviewed 11/04/2022   Allergen Reaction Noted   • Augmentin [amoxicillin-pot clavulanate] GI Intolerance 04/03/2020   • Meperidine Other (See Comments) 02/05/2005            The following portions of the patient's history were reviewed and updated as appropriate: allergies, current medications, past family history, past medical history, past social history, past surgical history and problem list      Past Medical History:   Diagnosis Date   • Chronic rhinitis 02/05/2005    last assessed 2/5/05, resolved 5/18/17    • Elevated liver function tests     last assessed 3/9/15, resolved 5/18/17    • Herpes simplex type 1 infection    • Hip arthritis     resolved 12/20/17    • Hypertension    • Shoulder bursitis     last assessed 9/23/13, resolved 5/18/17        Past Surgical History:   Procedure Laterality Date   • HERNIA REPAIR      Inguinal sliding  10/2018   • PROSTATOTOMY      last assessed 1/11/18       Family History   Problem Relation Age of Onset   • Hypertension Mother    • Lung cancer Family    • Prostate cancer Family    • Mental illness Neg Hx          Medications have been verified  Objective   Pulse 77   Temp 98 1 °F (36 7 °C)   Resp 20   Ht 6' 1" (1 854 m)   Wt 105 kg (232 lb)   SpO2 97%   BMI 30 61 kg/m²   No LMP for male patient  Physical Exam     Physical Exam  Vitals and nursing note reviewed  Constitutional:       General: He is not in acute distress  Appearance: He is well-developed  He is not diaphoretic  HENT:      Head: Normocephalic and atraumatic  Right Ear: Hearing, tympanic membrane, ear canal and external ear normal       Left Ear: Hearing, tympanic membrane, ear canal and external ear normal       Nose: Nose normal  No mucosal edema or rhinorrhea  Right Sinus: No maxillary sinus tenderness or frontal sinus tenderness  Left Sinus: No maxillary sinus tenderness or frontal sinus tenderness  Mouth/Throat:      Lips: Pink  Pharynx: Uvula midline  No pharyngeal swelling, oropharyngeal exudate, posterior oropharyngeal erythema or uvula swelling  Tonsils: No tonsillar exudate or tonsillar abscesses  1+ on the right  1+ on the left     Cardiovascular: Rate and Rhythm: Normal rate and regular rhythm  Heart sounds: S1 normal and S2 normal  Heart sounds not distant  No murmur heard  No friction rub  No gallop  No S3 or S4 sounds  Pulmonary:      Effort: No tachypnea, bradypnea, accessory muscle usage or respiratory distress  Breath sounds: No decreased breath sounds, wheezing, rhonchi or rales  Musculoskeletal:      Cervical back: Normal range of motion and neck supple  Lymphadenopathy:      Cervical: No cervical adenopathy  Neurological:      Mental Status: He is alert and oriented to person, place, and time     Psychiatric:         Behavior: Behavior normal

## 2022-11-21 ENCOUNTER — APPOINTMENT (OUTPATIENT)
Dept: RADIOLOGY | Facility: CLINIC | Age: 72
End: 2022-11-21

## 2022-11-21 ENCOUNTER — OFFICE VISIT (OUTPATIENT)
Dept: FAMILY MEDICINE CLINIC | Facility: CLINIC | Age: 72
End: 2022-11-21

## 2022-11-21 VITALS
OXYGEN SATURATION: 98 % | BODY MASS INDEX: 29.21 KG/M2 | RESPIRATION RATE: 18 BRPM | DIASTOLIC BLOOD PRESSURE: 60 MMHG | HEART RATE: 56 BPM | WEIGHT: 220.4 LBS | TEMPERATURE: 97.3 F | SYSTOLIC BLOOD PRESSURE: 108 MMHG | HEIGHT: 73 IN

## 2022-11-21 DIAGNOSIS — J20.9 ACUTE BRONCHITIS, UNSPECIFIED ORGANISM: ICD-10-CM

## 2022-11-21 DIAGNOSIS — I72.0 PSEUDOANEURYSM OF CAROTID ARTERY (HCC): ICD-10-CM

## 2022-11-21 DIAGNOSIS — E78.2 MIXED HYPERLIPIDEMIA: ICD-10-CM

## 2022-11-21 DIAGNOSIS — Z00.00 MEDICARE ANNUAL WELLNESS VISIT, SUBSEQUENT: ICD-10-CM

## 2022-11-21 DIAGNOSIS — I10 BENIGN ESSENTIAL HYPERTENSION: ICD-10-CM

## 2022-11-21 DIAGNOSIS — H10.32 ACUTE CONJUNCTIVITIS OF LEFT EYE, UNSPECIFIED ACUTE CONJUNCTIVITIS TYPE: Primary | ICD-10-CM

## 2022-11-21 RX ORDER — AZITHROMYCIN 250 MG/1
TABLET, FILM COATED ORAL
Qty: 6 TABLET | Refills: 0 | Status: SHIPPED | OUTPATIENT
Start: 2022-11-21 | End: 2022-11-26

## 2022-11-21 RX ORDER — MOXIFLOXACIN 5 MG/ML
1 SOLUTION/ DROPS OPHTHALMIC 3 TIMES DAILY
Qty: 3 ML | Refills: 0 | Status: SHIPPED | OUTPATIENT
Start: 2022-11-21 | End: 2022-11-28

## 2022-11-21 RX ORDER — METHYLPREDNISOLONE 4 MG/1
TABLET ORAL
Qty: 21 TABLET | Refills: 0 | Status: SHIPPED | OUTPATIENT
Start: 2022-11-21

## 2022-11-21 NOTE — PROGRESS NOTES
Assessment and Plan:     Problem List Items Addressed This Visit        Cardiovascular and Mediastinum    Benign essential hypertension     Stable with current regimen         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(HCC)     Denies any concerns and due for follow up with neurosurgery and counseled to make that appointment            Other    Mixed hyperlipidemia     Complaint with statin and tolerating it well        Other Visit Diagnoses     Acute conjunctivitis of left eye, unspecified acute conjunctivitis type    -  Primary    Relevant Medications    moxifloxacin (VIGAMOX) 0 5 % ophthalmic solution    Acute bronchitis, unspecified organism        Relevant Medications    azithromycin (ZITHROMAX) 250 mg tablet    methylPREDNISolone 4 MG tablet therapy pack    Other Relevant Orders    XR chest pa & lateral    Medicare annual wellness visit, subsequent              Depression Screening and Follow-up Plan: Patient was screened for depression during today's encounter  They screened negative with a PHQ-2 score of 1  Preventive health issues were discussed with patient, and age appropriate screening tests were ordered as noted in patient's After Visit Summary  Personalized health advice and appropriate referrals for health education or preventive services given if needed, as noted in patient's After Visit Summary  History of Present Illness:     Patient presents for a Medicare Wellness Visit    HPI   Patient stated that started with cough about  2 weeks ago and progressed to fatigue and does not feel good at all  Cough is worse  covid-19 test is been negative  Now having redness and discharge from left eye  Denies fever, chills and sob  Complaint with medications for chronic illnesses and tolerating it well    Due for follow up with neurosurgery and will schedule trhat  Patient Care Team:  Lisy Wan as PCP - General (Family Medicine)  Carolyn Valenzuela MD as Consulting Physician (Ophthalmology)  [facility] as Consulting Physician (Gastroenterology)  Rob Mcdaniels MD as Consulting Physician (Neurosurgery)     Review of Systems:     Review of Systems   Constitutional: Positive for fatigue  Negative for chills, diaphoresis, fever and unexpected weight change  HENT: Negative for congestion, dental problem, drooling, ear discharge, ear pain, facial swelling, hearing loss, mouth sores, nosebleeds, postnasal drip, rhinorrhea, sinus pressure, sinus pain, sneezing, sore throat, tinnitus, trouble swallowing and voice change  Eyes: Positive for discharge  Respiratory: Positive for cough  Negative for chest tightness, shortness of breath and wheezing  Cardiovascular: Negative  Gastrointestinal: Negative for abdominal pain, constipation, diarrhea, nausea and vomiting  Musculoskeletal: Negative  Skin: Negative  Neurological: Negative for dizziness, light-headedness and headaches          Problem List:     Patient Active Problem List   Diagnosis   • Actinic keratosis   • History of prostate cancer   • Anemia   • Benign essential hypertension   • Disc degeneration, lumbar   • Fatty liver disease, nonalcoholic   • Herpes simplex infection   • Mixed hyperlipidemia   • Organic impotence   • Lower extremity edema   • Groin mass   • S/P prostatectomy   • 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(HCC)   • Periumbilical abdominal pain   • Cervicalgia   • Cervical radiculopathy   • Adenocarcinoma of prostate ([facility] [address] )   • Acute cough      Past Medical and Surgical History:     Past Medical History:   Diagnosis Date   • Chronic rhinitis 02/05/2005    last assessed 2/5/05, resolved 5/18/17    • Elevated liver function tests     last assessed 3/9/15, resolved 5/18/17    • Herpes simplex type 1 infection    • Hip arthritis     resolved 12/20/17    • Hypertension    • Shoulder bursitis     last assessed 9/23/13, resolved 5/18/17      Past Surgical History:   Procedure Laterality Date   • HERNIA REPAIR      Inguinal sliding  10/2018 • PROSTATOTOMY      last assessed 1/11/18      Family History:     Family History   Problem Relation Age of Onset   • Hypertension Mother    • Lung cancer Family    • Prostate cancer Family    • Mental illness Neg Hx       Social History:     Social History     Socioeconomic History   • Marital status: /Civil Union     Spouse name: None   • Number of children: None   • Years of education: None   • Highest education level: None   Occupational History   • None   Tobacco Use   • Smoking status: Never   • Smokeless tobacco: Never   Vaping Use   • Vaping Use: Never used   Substance and Sexual Activity   • Alcohol use: Yes     Comment: very rare   • Drug use: Never   • Sexual activity: None   Other Topics Concern   • None   Social History Narrative   • None     Social Determinants of Health     Financial Resource Strain: Low Risk    • Difficulty of Paying Living Expenses: Not very hard   Food Insecurity: Not on file   Transportation Needs: No Transportation Needs   • Lack of Transportation (Medical): No   • Lack of Transportation (Non-Medical):  No   Physical Activity: Not on file   Stress: Not on file   Social Connections: Not on file   Intimate Partner Violence: Not on file   Housing Stability: Not on file      Medications and Allergies:     Current Outpatient Medications   Medication Sig Dispense Refill   • acetaminophen (TYLENOL) 500 mg tablet Take 500 mg by mouth as needed     • amLODIPine (NORVASC) 2 5 mg tablet TAKE 1 TABLET BY MOUTH  DAILY 90 tablet 3   • atorvastatin (LIPITOR) 10 mg tablet Take 1 tablet (10 mg total) by mouth daily 90 tablet 1   • azithromycin (ZITHROMAX) 250 mg tablet Take 500mg on day 1, 250mg on days 2-5 6 tablet 0   • methylPREDNISolone 4 MG tablet therapy pack Use as directed on package 21 tablet 0   • moxifloxacin (VIGAMOX) 0 5 % ophthalmic solution Administer 1 drop to both eyes 3 (three) times a day for 7 days 3 mL 0   • Multiple Vitamin (MULTIVITAMINS PO) Take 1 capsule by mouth daily     • quinapril (ACCUPRIL) 10 mg tablet TAKE 1 TABLET BY MOUTH  EVERY 12 HOURS 180 tablet 1   • aspirin 81 mg chewable tablet Chew 81 mg daily     • famotidine (PEPCID) 20 mg tablet Take 1 tablet (20 mg total) by mouth 2 (two) times a day 60 tablet 0     No current facility-administered medications for this visit  Allergies   Allergen Reactions   • Augmentin [Amoxicillin-Pot Clavulanate] GI Intolerance   • Meperidine Other (See Comments)     Reaction Date: [id number]; Annotation - [id number]: unsure of side effect  Reaction Date: [id number]; Annotation - [id number]: unsure of side effect      Immunizations:     Immunization History   Administered Date(s) Administered   • H1N1, All Formulations 10/01/2018   • INFLUENZA 10/03/2022   • Influenza Quadrivalent Preservative Free 3 years and older IM 10/23/2014   • Influenza Quadrivalent, 6-35 Months IM 10/11/2016, 10/11/2016   • Influenza Split High Dose Preservative Free IM 10/29/2015, 09/21/2017   • Influenza, high dose seasonal 0 7 mL 10/24/2019, 09/21/2020   • Influenza, seasonal, injectable 10/25/2007, 10/13/2008, 09/09/2009, 10/04/2010, 09/28/2011, 10/25/2012, 10/29/2013   • Pneumococcal Conjugate 13-Valent 06/15/2015   • Pneumococcal Polysaccharide PPV23 07/19/2016   • Tdap 07/21/2008   • Zoster 04/15/2015   • influenza, trivalent, adjuvanted 10/24/2019, 09/21/2020      Health Maintenance:         Topic Date Due   • Colorectal Cancer Screening  10/16/2023   • Hepatitis C Screening  Completed         Topic Date Due   • Hepatitis B Vaccine (1 of 3 - 3-dose series) Never done   • COVID-19 Vaccine (1) Never done      Medicare Screening Tests and Risk Assessments: Mary Palma is here for his Subsequent Wellness visit  Health Risk Assessment:   Patient rates overall health as good  Patient feels that their physical health rating is slightly worse  Patient is dissatisfied with their life  Eyesight was rated as slightly worse  Hearing was rated as same   Patient feels that their emotional and mental health rating is slightly worse  Patients states they are never, rarely angry  Patient states they are often unusually tired/fatigued  Pain experienced in the last 7 days has been some  Patient's pain rating has been 5/10  Patient states that he has experienced no weight loss or gain in last 6 months  Depression Screening:   PHQ-2 Score: 1      Fall Risk Screening: In the past year, patient has experienced: no history of falling in past year      Home Safety:  Patient does not have trouble with stairs inside or outside of their home  Patient has working smoke alarms and has no working carbon monoxide detector  Home safety hazards include: none  Nutrition:   Current diet is Regular  Medications:   Patient is not currently taking any over-the-counter supplements  Patient is able to manage medications  Activities of Daily Living (ADLs)/Instrumental Activities of Daily Living (IADLs):   Walk and transfer into and out of bed and chair?: Yes  Dress and groom yourself?: Yes    Bathe or shower yourself?: Yes    Feed yourself?  Yes  Do your laundry/housekeeping?: Yes  Manage your money, pay your bills and track your expenses?: Yes  Make your own meals?: Yes    Do your own shopping?: Yes    Previous Hospitalizations:   Any hospitalizations or ED visits within the last 12 months?: No      Advance Care Planning:   Living will: Yes    Durable POA for healthcare: No    Advanced directive: Yes    Advanced directive counseling given: Yes    Five wishes given: Yes    Patient declined ACP directive: No      Cognitive Screening:   Provider or family/friend/caregiver concerned regarding cognition?: No    PREVENTIVE SCREENINGS      Cardiovascular Screening:    General: History Lipid Disorder and Screening Current      Diabetes Screening:     General: Screening Current      Colorectal Cancer Screening:     General: Screening Current      Prostate Cancer Screening:    General: History Prostate Cancer and Screening Current      Osteoporosis Screening:    General: Risks and Benefits Discussed and Patient Declines      Abdominal Aortic Aneurysm (AAA) Screening:    Risk factors include: age between 73-69 yo        Lung Cancer Screening:     General: Screening Not Indicated      Hepatitis C Screening:    General: Screening Current    Screening, Brief Intervention, and Referral to Treatment (SBIRT)    Screening      AUDIT-C Screenin) How often did you have a drink containing alcohol in the past year? monthly or less  2) How many drinks did you have on a typical day when you were drinking in the past year? 1 to 2  3) How often did you have 6 or more drinks on one occasion in the past year? never    AUDIT-C Score: 1  Interpretation: Score 0-3 (male): Negative screen for alcohol misuse    Brief Intervention  Alcohol & drug use screenings were reviewed  No concerns regarding substance use disorder identified  Other Counseling Topics:   Car/seat belt/driving safety, skin self-exam, sunscreen and calcium and vitamin D intake and regular weightbearing exercise  No results found  Physical Exam:     /60   Pulse 56   Temp (!) 97 3 °F (36 3 °C)   Resp 18   Ht 6' 1" (1 854 m)   Wt 100 kg (220 lb 6 4 oz)   SpO2 98%   BMI 29 08 kg/m²     Physical Exam  Vitals and nursing note reviewed  Constitutional:       Appearance: He is well-developed  HENT:      Head: Normocephalic and atraumatic  Right Ear: Tympanic membrane, ear canal and external ear normal       Left Ear: Tympanic membrane, ear canal and external ear normal       Mouth/Throat:      Lips: Pink  Eyes:      General: Lids are normal          Right eye: No hordeolum  Left eye: No hordeolum  Conjunctiva/sclera: Conjunctivae normal    Neck:      Thyroid: No thyromegaly or thyroid tenderness  Cardiovascular:      Rate and Rhythm: Normal rate and regular rhythm  Pulses: Normal pulses        Heart sounds: No murmur heard  Pulmonary:      Effort: Pulmonary effort is normal  No respiratory distress  Breath sounds: Decreased air movement present  Chest:      Chest wall: No swelling, tenderness or edema  Abdominal:      General: Abdomen is flat  Palpations: Abdomen is soft  Tenderness: There is no abdominal tenderness  Hernia: There is no hernia in the umbilical area, ventral area, left inguinal area or right inguinal area  Musculoskeletal:         General: Normal range of motion  Cervical back: Full passive range of motion without pain and neck supple  Right lower leg: No edema  Left lower leg: No edema  Skin:     General: Skin is warm and dry  Neurological:      General: No focal deficit present  Mental Status: He is alert  GCS: GCS eye subscore is 4  GCS verbal subscore is 5  GCS motor subscore is 6  Cranial Nerves: Cranial nerves are intact  Sensory: Sensation is intact  Motor: Motor function is intact  Coordination: Coordination is intact  Gait: Gait is intact  Deep Tendon Reflexes: Reflexes are normal and symmetric            NEHAL Flowers

## 2022-11-21 NOTE — PATIENT INSTRUCTIONS
Medicare Preventive Visit Patient Instructions  Thank you for completing your Welcome to Medicare Visit or Medicare Annual Wellness Visit today  Your next wellness visit will be due in one year (11/22/2023)  The screening/preventive services that you may require over the next 5-10 years are detailed below  Some tests may not apply to you based off risk factors and/or age  Screening tests ordered at today's visit but not completed yet may show as past due  Also, please note that scanned in results may not display below  Preventive Screenings:  Service Recommendations Previous Testing/Comments   Colorectal Cancer Screening  · Colonoscopy    · Fecal Occult Blood Test (FOBT)/Fecal Immunochemical Test (FIT)  · Fecal DNA/Cologuard Test  · Flexible Sigmoidoscopy Age: 39-70 years old   Colonoscopy: every 10 years (May be performed more frequently if at higher risk)  OR  FOBT/FIT: every 1 year  OR  Cologuard: every 3 years  OR  Sigmoidoscopy: every 5 years  Screening may be recommended earlier than age 39 if at higher risk for colorectal cancer  Also, an individualized decision between you and your healthcare provider will decide whether screening between the ages of 74-80 would be appropriate   Colonoscopy: 10/16/2020  FOBT/FIT: Not on file  Cologuard: Not on file  Sigmoidoscopy: Not on file    Screening Current     Prostate Cancer Screening Individualized decision between patient and health care provider in men between ages of 53-78   Medicare will cover every 12 months beginning on the day after your 50th birthday PSA: <0 1 ng/mL     History Prostate Cancer     Hepatitis C Screening Once for adults born between 1945 and 1965  More frequently in patients at high risk for Hepatitis C Hep C Antibody: 07/11/2019    Screening Current   Diabetes Screening 1-2 times per year if you're at risk for diabetes or have pre-diabetes Fasting glucose: 101 mg/dL (9/19/2022)  A1C: No results in last 5 years (No results in last 5 years)  Screening Current   Cholesterol Screening Once every 5 years if you don't have a lipid disorder  May order more often based on risk factors  Lipid panel: 09/19/2022  Screening Not Indicated  History Lipid Disorder      Other Preventive Screenings Covered by Medicare:  1  Abdominal Aortic Aneurysm (AAA) Screening: covered once if your at risk  You're considered to be at risk if you have a family history of AAA or a male between the age of 73-68 who smoking at least 100 cigarettes in your lifetime  2  Lung Cancer Screening: covers low dose CT scan once per year if you meet all of the following conditions: (1) Age 50-69; (2) No signs or symptoms of lung cancer; (3) Current smoker or have quit smoking within the last 15 years; (4) You have a tobacco smoking history of at least 20 pack years (packs per day x number of years you smoked); (5) You get a written order from a healthcare provider  3  Glaucoma Screening: covered annually if you're considered high risk: (1) You have diabetes OR (2) Family history of glaucoma OR (3)  aged 48 and older OR (3)  American aged 72 and older  3  Osteoporosis Screening: covered every 2 years if you meet one of the following conditions: (1) Have a vertebral abnormality; (2) On glucocorticoid therapy for more than 3 months; (3) Have primary hyperparathyroidism; (4) On osteoporosis medications and need to assess response to drug therapy  5  HIV Screening: covered annually if you're between the age of 12-76  Also covered annually if you are younger than 13 and older than 72 with risk factors for HIV infection  For pregnant patients, it is covered up to 3 times per pregnancy      Immunizations:  Immunization Recommendations   Influenza Vaccine Annual influenza vaccination during flu season is recommended for all persons aged >= 6 months who do not have contraindications   Pneumococcal Vaccine   * Pneumococcal conjugate vaccine = PCV13 (Prevnar 13), PCV15 (Vaxneuvance), PCV20 (Prevnar 20)  * Pneumococcal polysaccharide vaccine = PPSV23 (Pneumovax) Adults 2364 years old: 1-3 doses may be recommended based on certain risk factors  Adults 72 years old: 1-2 doses may be recommended based off what pneumonia vaccine you previously received   Hepatitis B Vaccine 3 dose series if at intermediate or high risk (ex: diabetes, end stage renal disease, liver disease)   Tetanus (Td) Vaccine - COST NOT COVERED BY MEDICARE PART B Following completion of primary series, a booster dose should be given every 10 years to maintain immunity against tetanus  Td may also be given as tetanus wound prophylaxis  Tdap Vaccine - COST NOT COVERED BY MEDICARE PART B Recommended at least once for all adults  For pregnant patients, recommended with each pregnancy  Shingles Vaccine (Shingrix) - COST NOT COVERED BY MEDICARE PART B  2 shot series recommended in those aged 48 and above     Health Maintenance Due:      Topic Date Due   • Colorectal Cancer Screening  10/16/2023   • Hepatitis C Screening  Completed     Immunizations Due:      Topic Date Due   • Hepatitis B Vaccine (1 of 3 - 3-dose series) Never done   • COVID-19 Vaccine (1) Never done     Advance Directives   What are advance directives? Advance directives are legal documents that state your wishes and plans for medical care  These plans are made ahead of time in case you lose your ability to make decisions for yourself  Advance directives can apply to any medical decision, such as the treatments you want, and if you want to donate organs  What are the types of advance directives? There are many types of advance directives, and each state has rules about how to use them  You may choose a combination of any of the following:  · Living will: This is a written record of the treatment you want  You can also choose which treatments you do not want, which to limit, and which to stop at a certain time   This includes surgery, medicine, IV fluid, and tube feedings  · Durable power of  for healthcare [place] [facility]): This is a written record that states who you want to make healthcare choices for you when you are unable to make them for yourself  This person, called a proxy, is usually a family member or a friend  You may choose more than 1 proxy  · Do not resuscitate (DNR) order:  A DNR order is used in case your heart stops beating or you stop breathing  It is a request not to have certain forms of treatment, such as CPR  A DNR order may be included in other types of advance directives  · Medical directive: This covers the care that you want if you are in a coma, near death, or unable to make decisions for yourself  You can list the treatments you want for each condition  Treatment may include pain medicine, surgery, blood transfusions, dialysis, IV or tube feedings, and a ventilator (breathing machine)  · Values history: This document has questions about your views, beliefs, and how you feel and think about life  This information can help others choose the care that you would choose  Why are advance directives important? An advance directive helps you control your care  Although spoken wishes may be used, it is better to have your wishes written down  Spoken wishes can be misunderstood, or not followed  Treatments may be given even if you do not want them  An advance directive may make it easier for your family to make difficult choices about your care  Weight Management   Why it is important to manage your weight:  Being overweight increases your risk of health conditions such as heart disease, high blood pressure, type 2 diabetes, and certain types of cancer  It can also increase your risk for osteoarthritis, sleep apnea, and other respiratory problems  Aim for a slow, steady weight loss  Even a small amount of weight loss can lower your risk of health problems    How to lose weight safely:  A safe and healthy way to lose weight is to eat fewer calories and get regular exercise  You can lose up about 1 pound a week by decreasing the number of calories you eat by 500 calories each day  Healthy meal plan for weight management:  A healthy meal plan includes a variety of foods, contains fewer calories, and helps you stay healthy  A healthy meal plan includes the following:  · Eat whole-grain foods more often  A healthy meal plan should contain fiber  Fiber is the part of grains, fruits, and vegetables that is not broken down by your body  Whole-grain foods are healthy and provide extra fiber in your diet  Some examples of whole-grain foods are whole-wheat breads and pastas, oatmeal, brown rice, and bulgur  · Eat a variety of vegetables every day  Include dark, leafy greens such as spinach, kale, heaven greens, and mustard greens  Eat yellow and orange vegetables such as carrots, sweet potatoes, and winter squash  · Eat a variety of fruits every day  Choose fresh or canned fruit (canned in its own juice or light syrup) instead of juice  Fruit juice has very little or no fiber  · Eat low-fat dairy foods  Drink fat-free (skim) milk or 1% milk  Eat fat-free yogurt and low-fat cottage cheese  Try low-fat cheeses such as mozzarella and other reduced-fat cheeses  · Choose meat and other protein foods that are low in fat  Choose beans or other legumes such as split peas or lentils  Choose fish, skinless poultry (chicken or turkey), or lean cuts of red meat (beef or pork)  Before you cook meat or poultry, cut off any visible fat  · Use less fat and oil  Try baking foods instead of frying them  Add less fat, such as margarine, sour cream, regular salad dressing and mayonnaise to foods  Eat fewer high-fat foods  Some examples of high-fat foods include french fries, doughnuts, ice cream, and cakes  · Eat fewer sweets  Limit foods and drinks that are high in sugar  This includes candy, cookies, regular soda, and sweetened drinks    Exercise: Exercise at least 30 minutes per day on most days of the week  Some examples of exercise include walking, biking, dancing, and swimming  You can also fit in more physical activity by taking the stairs instead of the elevator or parking farther away from stores  Ask your healthcare provider about the best exercise plan for you  © Copyright [employer] 2018 Information is for End User's use only and may not be sold, redistributed or otherwise used for commercial purposes   All illustrations and images included in CareNotes® are the copyrighted property of [employer]  or [address] [place] [address]

## 2023-01-03 PROBLEM — R05.1 ACUTE COUGH: Status: RESOLVED | Noted: 2022-11-04 | Resolved: 2023-01-03

## 2023-01-24 DIAGNOSIS — I10 ESSENTIAL HYPERTENSION: ICD-10-CM

## 2023-01-25 RX ORDER — QUINAPRIL 10 MG/1
TABLET ORAL
Qty: 180 TABLET | Refills: 3 | Status: SHIPPED | OUTPATIENT
Start: 2023-01-25

## 2023-02-10 ENCOUNTER — OFFICE VISIT (OUTPATIENT)
Dept: OBGYN CLINIC | Facility: CLINIC | Age: 73
End: 2023-02-10

## 2023-02-10 VITALS
HEART RATE: 66 BPM | SYSTOLIC BLOOD PRESSURE: 146 MMHG | HEIGHT: 73 IN | DIASTOLIC BLOOD PRESSURE: 83 MMHG | WEIGHT: 234.6 LBS | TEMPERATURE: 98.1 F | BODY MASS INDEX: 31.09 KG/M2

## 2023-02-10 DIAGNOSIS — M48.02 FORAMINAL STENOSIS OF CERVICAL REGION: Primary | ICD-10-CM

## 2023-02-10 NOTE — PROGRESS NOTES
Assessment/Plan:  1  Foraminal stenosis of cervical region  Ambulatory Referral to Physical Therapy        Scribe Attestation    I,:  Batoolclark Dakins am acting as a scribe while in the presence of the attending physician :       I,:  Digna Barboza MD personally performed the services described in this documentation    as scribed in my presence :         Anna Angel upon exam today does demonstrate signs symptoms consistent with  foraminal stenosis of the cervical spine  Did review an MRI report from June 2021 that discussed moderate to severe foraminal stenosis from C3-C7 with more severity on the left side  This is consistent with his symptoms today  He does not have numbness and tingling today as intact baseline sensation in the C5-T1 nerve distributions  He also has great strength in the deltoids, biceps, triceps, wrist flexors and extensors as well as hand intrinsics today  I did discuss with him various treatment options which could include physical therapy, anti-inflammatory medications, or steroid injections  I do not really recommend any surgical procedure at this point  Anna Stanley verbalized understanding and would like to try physical therapy for a few weeks  If his symptoms worsen or fail to improve over the next 4 to 6 weeks he may return to my office for repeat clinical evaluation  Subjective: Yazan Feldman is a 67 y o  male who presents for initial evaluation of his cervical cervical spine pain  He states he has had cervical spine pain in the left C-spine and upper trap for about 3 to 4 years now  He states when he rotates his neck to the right he will experience a sharp pain  He denies numbness or tingling in the right or left upper extremities  His treatments for his symptoms have involved chiropractic sessions which have provided only temporary relief  He has had prior injections from another surgeon   He states that while driving he is unable to turn his head and has to use his whole body to turn  He will take Tylenol occasionally when his symptoms are exacerbated  Review of Systems   Constitutional: Negative for chills, fever and unexpected weight change  HENT: Negative for nosebleeds and sore throat  Eyes: Negative for pain, redness and visual disturbance  Respiratory: Negative for cough, shortness of breath and wheezing  Cardiovascular: Negative for chest pain, palpitations and leg swelling  Gastrointestinal: Negative for nausea and vomiting  Endocrine: Negative for polydipsia and polyuria  Genitourinary: Negative for dysuria and hematuria  Skin: Negative for rash and wound  Neurological: Negative for dizziness, numbness and headaches  Psychiatric/Behavioral: Negative for decreased concentration  The patient is not nervous/anxious            Past Medical History:   Diagnosis Date   • Chronic rhinitis 02/05/2005    last assessed 2/5/05, resolved 5/18/17    • Elevated liver function tests     last assessed 3/9/15, resolved 5/18/17    • Herpes simplex type 1 infection    • Hip arthritis     resolved 12/20/17    • Hypertension    • Shoulder bursitis     last assessed 9/23/13, resolved 5/18/17        Past Surgical History:   Procedure Laterality Date   • HERNIA REPAIR      Inguinal sliding  10/2018   • PROSTATOTOMY      last assessed 1/11/18       Family History   Problem Relation Age of Onset   • Hypertension Mother    • Lung cancer Family    • Prostate cancer Family    • Mental illness Neg Hx        Social History     Occupational History   • Not on file   Tobacco Use   • Smoking status: Never   • Smokeless tobacco: Never   Vaping Use   • Vaping Use: Never used   Substance and Sexual Activity   • Alcohol use: Yes     Comment: very rare   • Drug use: Never   • Sexual activity: Not on file         Current Outpatient Medications:   •  acetaminophen (TYLENOL) 500 mg tablet, Take 500 mg by mouth as needed, Disp: , Rfl:   •  amLODIPine (NORVASC) 2 5 mg tablet, TAKE 1 TABLET BY MOUTH  DAILY, Disp: 90 tablet, Rfl: 3  •  aspirin 81 mg chewable tablet, Chew 81 mg daily, Disp: , Rfl:   •  atorvastatin (LIPITOR) 10 mg tablet, Take 1 tablet (10 mg total) by mouth daily, Disp: 90 tablet, Rfl: 1  •  famotidine (PEPCID) 20 mg tablet, Take 1 tablet (20 mg total) by mouth 2 (two) times a day, Disp: 60 tablet, Rfl: 0  •  Multiple Vitamin (MULTIVITAMINS PO), Take 1 capsule by mouth daily, Disp: , Rfl:   •  quinapril (ACCUPRIL) 10 mg tablet, TAKE 1 TABLET BY MOUTH  EVERY 12 HOURS, Disp: 180 tablet, Rfl: 3  •  methylPREDNISolone 4 MG tablet therapy pack, Use as directed on package (Patient not taking: Reported on 2/10/2023), Disp: 21 tablet, Rfl: 0    Allergies   Allergen Reactions   • Augmentin [Amoxicillin-Pot Clavulanate] GI Intolerance   • Meperidine Other (See Comments)     Reaction Date: [id number]; Annotation - [id number]: unsure of side effect  Reaction Date: [id number]; Annotation - [id number]: unsure of side effect       Objective:  Vitals:    02/10/23 1107   BP: 146/83   Pulse: 66   Temp: 98 1 °F (36 7 °C)       Back Exam     Comments:  Nontender in the left upper trapezius  5/5 deltoids, biceps, triceps, wrist flexors, wrist extensors, hand intrinsics  Baseline sensation is intact in the C5-T1 nerve distributions  Pain and decreases ROM in right c-spine rotation and extension            Physical Exam  HENT:      Head: Normocephalic and atraumatic  Eyes:      General:         Right eye: No discharge  Left eye: No discharge  Conjunctiva/sclera: Conjunctivae normal    Cardiovascular:      Rate and Rhythm: Normal rate  Pulmonary:      Effort: Pulmonary effort is normal  No respiratory distress  Musculoskeletal:      Cervical back: Normal range of motion and neck supple  Comments: See Ortho   Skin:     General: Skin is warm and dry  Neurological:      Mental Status: He is alert and oriented to person, place, and time           I have personally reviewed pertinent films in PACS and my interpretation is as follows: No imaging reviewed today  This document was created using speech voice recognition software  Grammatical errors, random word insertions, pronoun errors, and incomplete sentences are an occasional consequence of this system due to software limitations, ambient noise, and hardware issues  Any formal questions or concerns about content, text, or information contained within the body of this dictation should be directly addressed to the provider for clarification

## 2023-02-21 ENCOUNTER — EVALUATION (OUTPATIENT)
Dept: PHYSICAL THERAPY | Facility: CLINIC | Age: 73
End: 2023-02-21

## 2023-02-21 DIAGNOSIS — M48.02 FORAMINAL STENOSIS OF CERVICAL REGION: Primary | ICD-10-CM

## 2023-02-21 NOTE — PROGRESS NOTES
PT Evaluation     Today's date: 2023  Patient name: Darren Mcmillan  : 1950  MRN: [id number]  Referring provider: Marilou Elias*  Dx:   Encounter Diagnosis     ICD-10-CM    1  Foraminal stenosis of cervical region  M48 02 Ambulatory Referral to Physical Therapy                     Assessment  Assessment details: Pt is a pleasant 67 y o  male who presents to [address] with chronic L>R neck pain  Today, he presents with decreased and painful cervical and thoracic ROM and joint mobility, decreased B UE and postural strength, high self reports of pain, and poor quality of movement  Functionally, he is limited in his ability to turn to look over shoulder (driving), lift and carry, bend forward, look up, and perform normal recreation  He is motivated to improve and has benefited from skilled PT in the past  Pt will benefit from skilled PT to address the aforementioned deficits and limitations in an effort to maximize pain free functional mobility and overall quality of life  Progress as able with these goals in mind  Impairments: abnormal coordination, abnormal gait, abnormal muscle firing, abnormal muscle tone, abnormal or restricted ROM, abnormal movement, activity intolerance, impaired physical strength and pain with function  Understanding of Dx/Px/POC: good   Prognosis: good    Goals  Short term goals (3 weeks):  1) Pt will improve cervical ROM restrictions by 25% pain free  2) Pt will improve B UE and postural strength deficits by 1/3 grade MMT pain free  3) Pt will improve pain at worse to <4/10  4) Pt will initiate and progress HEP w/ special emphasis on functional postural control/awareness and strength  Long term goals (6 weeks)  1) Pt will improve FOTO to at least 56    2) Pt will sleep through the night in positioning of choosing 6/7 nights a week w/o waking due to pain    3) Pt will perform two full weeks of household activities, work, and normal recreation w/ pain in neck < 3/10 throughout  4) Pt will be independent and compliant w/ HEP in order to maximize functional benefit of skilled PT following d/c        Plan  Plan details: POC to include: 2x/week to start    HEP to start: see code    Patient would benefit from: skilled PT  Planned modality interventions: cryotherapy and thermotherapy: hydrocollator packs  Planned therapy interventions: abdominal trunk stabilization, activity modification, ADL retraining, manual therapy, massage, motor coordination training, neuromuscular re-education, patient education, therapeutic training, therapeutic exercise, therapeutic activities, stretching, strengthening, home exercise program, functional ROM exercises, gait training, balance, balance/weight bearing training and body mechanics training  Frequency: 2x week  Duration in visits: 12  Duration in weeks: 6  Treatment plan discussed with: patient        Subjective Evaluation    History of Present Illness  Mechanism of injury: Pt has had neck pain for years, began about 5 years ago w/o specific KENNA  Woke up at that time w/ intense pain, notes that he went to ER and was given muscle relaxers  Recent imaging showed moderate to severe foraminal stenosis at mid to lower cspine, worse on L  No other structural damage  He notes that he has a hard time turning neck to side to side  Gets pain up to side of head  Hard to look over shoulder while driving  Has a hard time falling asleep and staying asleep s/t pain  Notes that he gets no weakness or tinging into arms   Functional status below:   Quality of life: good    Pain  Current pain ratin  At best pain ratin  At worst pain ratin  Location: L side posterior and lateral shoulder, neck, into L side head   Quality: sharp, dull ache and tight  Relieving factors: rest, change in position and heat  Aggravating factors: lifting and running  Progression: no change    Social Support  Steps to enter house: yes  Stairs in house: yes   Lives in: multiple-level home  Lives with: spouse    Employment status: not working (retired)  Hand dominance: right    Treatments  Previous treatment: physical therapy  Patient Goals  Patient goals for therapy: increased strength, decreased pain, increased motion and return to sport/leisure activities  Patient goal: be able to move without pain! Objective     Postural Observations  Seated posture: poor  Standing posture: poor  Correction of posture: makes symptoms better        Palpation     Additional Palpation Details  TTP through L SO and proximal UT and LS, through MT and LT L>R    Neurological Testing     Sensation   Cervical/Thoracic   Left   Intact: light touch    Right   Intact: light touch    Active Range of Motion     Additional Active Range of Motion Details  Flex: 25-33%  Ext: 25%*  SB R: 10%*  SB L: 10%*  Rot R: 28 deg *  Rot L: 22 deg*    *indicates pain    Passive Range of Motion     Additional Passive Range of Motion Details  Grossly at least 50% of rotations and SB, 25-50% of ext and flexion     Joint Play     Comments: Grossly hypo throughout w/o pain    Strength/Myotome Testing     Additional Strength Details  UE Strength (R/L)    Shoulder   Flex 4/5 , 4/5  Ext 4/5 , 4/5  Abd 4/5 , 4/5  Add 4/5 , 4/5    Elbow  Flex 4/5 , 4/5  Ext 4/5 , 4/5    Postural   MT 3+/5 , 3+/5  LT 3/5, 3/5     Core Strength: at least 1+/5     *Indicates pain    Tests     Additional Tests Details  Cervical Traction: good relief in supine     SNAGs: improves L cervical rotation to at least 40 deg w/ min pain, ext to at least 50% w/o pain - well maintained by end     DNF iso lift: TBA in future       Flowsheet Rows    Flowsheet Row Most Recent Value   PT/OT G-Codes    Current Score 46   Projected Score 55   FOTO information reviewed Yes              POC expires Auth Status Total   Visits  Start date  Expiration date PT/OT + Visit Limit?  Co-Insurance         No                                             Visit/Unit Tracking  AUTH Status:  Date               Visits  Authed:  Used                Remaining                      Dummy Auth Tracking  1 2 3 4 5 6   7 8 9 10 11 12   13 14 15 16 17 18   19 20 21 22 23 24   25 26 27 28 29 30   31 32 33 34 35 36         Precautions: standard       Date (Visit #) IE 2/21 (1)  (2) (3) (4) (5)   Manual              DTM and TPR              Cervical traction  x2 min holds            Cervical mobs  tested            SNAGs    below                             Exercise Diary              Ther Ex        UT and LS stretching  x30 sec B upper quarter            Rows/Ext        Cervical isometrics                                                        Neuro Re Ed        Chin tuck progressions Against wall x10           DNF progressions              Posture progressions   wall posture x2-3 mins            Scap stability progressions              Nerve gliding             Core stability progressions              B ER w/ scap sq GTB x20        GTB rows x 20         HEP and POC review x4-5 mins         SNAGs for L rotation and cervical ext at C3/4 L side x10-15 each - demo and practice of self                                 Ther Act             Functional lifting                                                                                                                                             Modalities             heat             Ice                                  Access Code: [email or web address]  URL: [email or web address]  Date: 02/21/2023  Prepared by: Shauna Caruso    Exercises  • Otto Pian - 1 x daily - 7 x weekly - 3 sets - 10 reps  • Shoulder External Rotation and Scapular Retraction with Resistance - 1 x daily - 7 x weekly - 3 sets - 10 reps  • Standing Bilateral Low Shoulder Row with Anchored Resistance - 1 x daily - 7 x weekly - 3 sets - 10 reps  • Seated Assisted Cervical Rotation with Towel - 1 x daily - 7 x weekly - 3 sets - 10 reps  • Upper Cervical Extension SNAG with Strap - 1 x daily - 7 x weekly - 3 sets - 10 reps

## 2023-03-02 ENCOUNTER — OFFICE VISIT (OUTPATIENT)
Dept: PHYSICAL THERAPY | Facility: CLINIC | Age: 73
End: 2023-03-02

## 2023-03-02 DIAGNOSIS — M48.02 FORAMINAL STENOSIS OF CERVICAL REGION: Primary | ICD-10-CM

## 2023-03-02 NOTE — PROGRESS NOTES
Daily Note     Today's date: 3/2/2023  Patient name: Amee Sepulveda  : 1950  MRN: [id number]  Referring provider: Yadi Prakash*  Dx:   Encounter Diagnosis     ICD-10-CM    1  Foraminal stenosis of cervical region  M48 02                      Subjective: Pt reports that neck is sore today  Reports that he did some of the exercises over the weekend but that he woke up sore today  No new complaints overall  Objective: SNAGs for L rotation again increase motion and maintain by end  Significant tissue density noted through L suboccipitals, good response to manual work  Assessment: Tolerated treatment well  Patient demonstrated fatigue post treatment and would benefit from continued PT  Does well w/ exercise program today, responds very well to posture work and soft tissue progressions  Will increase intensity as sx allow, pt asked to focus on postural stab exercises over weekend  Plan: Continue per plan of care  DNF work if able, standing exercises - has to leave early next visit             POC expires Auth Status Total   Visits  Start date  Expiration date PT/OT + Visit Limit?  Co-Insurance    $10 Copay   100% Covered  No Auth Req     No                                             Visit/Unit Tracking  AUTH Status:  Date               Visits  Authed:  Used                Remaining                      Dummy Auth Tracking  1 2 3 4 5 6   7 8 9 10 11 12   13 14 15 16 17 18   19 20 21 22 23 24   25 26 27 28 29 30   31 32 33 34 35 36         Precautions: standard       Date (Visit #) IE  (1)  3/2 (2) (3) (4) (5)   Manual              DTM and TPR              Cervical traction  x2 min holds   2x2 min holds          Cervical mobs  tested            SNAGs    below   for L rotation C3/4 2x8               SO release x3-4 mins             Exercise Diary              Ther Ex        UT and LS stretching  x30 sec B upper quarter   x4-5 mins          Rows/Ext        Cervical isometrics Seated posture correct w/ cerv rotation x15 B        Self LS rev and practice x3-4 mins         Back buddy demo x2-3 mins                       Neuro Re Ed        Chin tuck progressions Against wall x10 Supine 2x10, 3 sec hold          DNF progressions              Posture progressions   wall posture x2-3 mins  x2-3 mins          Scap stability progressions              Nerve gliding             Core stability progressions              B ER w/ scap sq GTB x20 B ER w/ scap sq green 2x10        GTB rows x 20  rev       HEP and POC review x4-5 mins  Rev and update x 2-3 mins        SNAGs for L rotation and cervical ext at C3/4 L side x10-15 each - demo and practice of self Above                                 Ther Act             Functional lifting                                                                                                                                             Modalities             heat             Ice                                  Access Code: [email or web address]  URL: [email or web address]  Date: 02/21/2023  Prepared by: Felton Fung    Exercises  • Earma Zulema - 1 x daily - 7 x weekly - 3 sets - 10 reps  • Shoulder External Rotation and Scapular Retraction with Resistance - 1 x daily - 7 x weekly - 3 sets - 10 reps  • Standing Bilateral Low Shoulder Row with Anchored Resistance - 1 x daily - 7 x weekly - 3 sets - 10 reps  • Seated Assisted Cervical Rotation with Towel - 1 x daily - 7 x weekly - 3 sets - 10 reps  • Upper Cervical Extension SNAG with Strap - 1 x daily - 7 x weekly - 3 sets - 10 reps

## 2023-03-07 ENCOUNTER — OFFICE VISIT (OUTPATIENT)
Dept: PODIATRY | Facility: CLINIC | Age: 73
End: 2023-03-07

## 2023-03-07 ENCOUNTER — OFFICE VISIT (OUTPATIENT)
Dept: PHYSICAL THERAPY | Facility: CLINIC | Age: 73
End: 2023-03-07

## 2023-03-07 VITALS
RESPIRATION RATE: 17 BRPM | SYSTOLIC BLOOD PRESSURE: 146 MMHG | DIASTOLIC BLOOD PRESSURE: 83 MMHG | HEIGHT: 73 IN | WEIGHT: 234 LBS | BODY MASS INDEX: 31.01 KG/M2

## 2023-03-07 DIAGNOSIS — M79.672 LEFT FOOT PAIN: ICD-10-CM

## 2023-03-07 DIAGNOSIS — M48.02 FORAMINAL STENOSIS OF CERVICAL REGION: Primary | ICD-10-CM

## 2023-03-07 DIAGNOSIS — B35.1 ONYCHOMYCOSIS: ICD-10-CM

## 2023-03-07 DIAGNOSIS — M72.2 PLANTAR FASCIITIS: Primary | ICD-10-CM

## 2023-03-07 DIAGNOSIS — M76.62 ACHILLES TENDINITIS OF LEFT LOWER EXTREMITY: ICD-10-CM

## 2023-03-07 NOTE — PROGRESS NOTES
Assessment/Plan: Achilles tendinitis left lower extremity  Pain  Planter fasciitis  Heel spur syndrome  Mycosis of nail  Plan  Foot exam performed  Chart reviewed  X-rays taken and reviewed with patient  At this time physical therapy ordered to help with patient's symptoms and condition  Today all nails debrided without pain or complication  Return for follow-up  In addition patient will take Aleve as needed         Diagnoses and all orders for this visit:    Plantar fasciitis    Left foot pain    Achilles tendinitis of left lower extremity    Onychomycosis          Subjective: Patient has pain in his left heel  Patient has history of Achilles tendinitis and partial tear that was treated 2 or 3 years ago by immobilization  He now has return of pain in the left heel  No history of trauma  He also gets pain in his toes with ambulation and shoewear    Allergies   Allergen Reactions   • Augmentin [Amoxicillin-Pot Clavulanate] GI Intolerance   • Meperidine Other (See Comments)     Reaction Date: [id number]; Annotation - [id number]: unsure of side effect  Reaction Date: [id number];  Annotation - [id number]: unsure of side effect         Current Outpatient Medications:   •  acetaminophen (TYLENOL) 500 mg tablet, Take 500 mg by mouth as needed, Disp: , Rfl:   •  amLODIPine (NORVASC) 2 5 mg tablet, TAKE 1 TABLET BY MOUTH  DAILY, Disp: 90 tablet, Rfl: 3  •  aspirin 81 mg chewable tablet, Chew 81 mg daily, Disp: , Rfl:   •  atorvastatin (LIPITOR) 10 mg tablet, Take 1 tablet (10 mg total) by mouth daily, Disp: 90 tablet, Rfl: 1  •  famotidine (PEPCID) 20 mg tablet, Take 1 tablet (20 mg total) by mouth 2 (two) times a day, Disp: 60 tablet, Rfl: 0  •  methylPREDNISolone 4 MG tablet therapy pack, Use as directed on package (Patient not taking: Reported on 2/10/2023), Disp: 21 tablet, Rfl: 0  •  Multiple Vitamin (MULTIVITAMINS PO), Take 1 capsule by mouth daily, Disp: , Rfl:   •  quinapril (ACCUPRIL) 10 mg tablet, TAKE 1 TABLET BY MOUTH  EVERY 12 HOURS, Disp: 180 tablet, Rfl: 3    Patient Active Problem List   Diagnosis   • Actinic keratosis   • History of prostate cancer   • Anemia   • Benign essential hypertension   • Disc degeneration, lumbar   • Fatty liver disease, nonalcoholic   • Herpes simplex infection   • Mixed hyperlipidemia   • Organic impotence   • Lower extremity edema   • Groin mass   • S/P prostatectomy   • 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(HCC)   • Periumbilical abdominal pain   • Cervicalgia   • Cervical radiculopathy   • Adenocarcinoma of prostate [facility])          Patient ID: Chandrakant Moore is a 67 y o  male  HPI    The following portions of the patient's history were reviewed and updated as appropriate:     family history includes Hypertension in his mother; Lung cancer in his family; Prostate cancer in his family  reports that he has never smoked  He has never used smokeless tobacco  He reports current alcohol use  He reports that he does not use drugs  Vitals:    03/07/23 1016   BP: 146/83   Resp: 17       Review of Systems      Objective:  Patient's shoes and socks removed  Foot Exam    General  General Appearance: appears stated age and healthy   Orientation: alert and oriented to person, place, and time   Affect: appropriate   Gait: antalgic       Right Foot/Ankle     Inspection and Palpation  Tenderness: bony tenderness   Swelling: dorsum   Arch: pes planus  Hammertoes: fifth toe  Skin Exam: dry skin;     Neurovascular  Dorsalis pedis: 2+  Posterior tibial: 2+      Left Foot/Ankle      Inspection and Palpation  Tenderness: bony tenderness, plantar fascia and calcaneus tenderness   Swelling: dorsum   Arch: pes planus  Hammertoes: fifth toe  Skin Exam: dry skin;     Neurovascular  Dorsalis pedis: 2+  Posterior tibial: 2+        Physical Exam  Vitals and nursing note reviewed  Constitutional:       Appearance: Normal appearance     Cardiovascular:      Rate and Rhythm: Normal rate and regular rhythm  Pulses:           Dorsalis pedis pulses are 2+ on the right side and 2+ on the left side  Posterior tibial pulses are 2+ on the right side and 2+ on the left side  Musculoskeletal:      Right foot: Bony tenderness present  Left foot: Bony tenderness present  Feet:      Right foot:      Skin integrity: Dry skin present  Left foot:      Skin integrity: Dry skin present  Comments: Patient is pronated in stance and gait  Pain with palpation plantar fashion insertion left foot  Patient has equinus deformity bilateral   There is palpable thickening of the left Achilles tendon insertion  X-ray demonstrates plantar calcaneal spur as well as early retrocalcaneal spurring  Skin:     Capillary Refill: Capillary refill takes less than 2 seconds  Neurological:      Mental Status: He is alert  Psychiatric:         Mood and Affect: Mood normal          Behavior: Behavior normal          Thought Content:  Thought content normal          Judgment: Judgment normal

## 2023-03-07 NOTE — PROGRESS NOTES
Daily Note     Today's date: 3/7/2023  Patient name: Keenan Horton  : 1950  MRN: [id number]  Referring provider: Sherman Watkins*  Dx:   Encounter Diagnosis     ICD-10-CM    1  Foraminal stenosis of cervical region  M48 02                      Subjective: Pt reports that neck is still sore, interested in exploring outside options for pain management  Feels a little better after last session but still in pain overall  Objective: requires cueing for scap retraction and chin tuck prior to all cerv ext and rotation motions, improves w/ this cueing - better form w/ self SNAGs today as compared to last session  Assessment: Tolerated treatment well  Patient demonstrated fatigue post treatment and would benefit from continued PT  Pt reports some pain relief to end session  He was given info for Dr Cindy Bee at [facility] to assist w/ pain management  He will call his office later today to possibly schedule  Will continue w/ postural progressions at next visit, focus on techniques that can be replicated at home  Plan: Continue per plan of care  loaded progressions as able            POC expires Auth Status Total   Visits  Start date  Expiration date PT/OT + Visit Limit?  Co-Insurance    $10 Copay   100% Covered  No Auth Req     No                                             Visit/Unit Tracking  AUTH Status:  Date               Visits  Authed:  Used                Remaining                      Dummy Auth Tracking  1 2 3 4 5 6   7 8 9 10 11 12   13 14 15 16 17 18   19 20 21 22 23 24   25 26 27 28 29 30   31 32 33 34 35 36         Precautions: standard       Date (Visit #) IE  (1)  3/ (2) 3/7 (3) (4) (5)   Manual              DTM and TPR              Cervical traction  x2 min holds   2x2 min holds   3x2 min holds        Cervical mobs  tested            SNAGs    below   for L rotation C3/4 2x8   same for B rotation on corresponding pillar 2x8, ext w/ B pressure 2x8            SO release x3-4 mins   x2 mins          Exercise Diary              Ther Ex        UT and LS stretching  x30 sec B upper quarter   x4-5 mins   x3-4 mins       Rows/Ext        Cervical isometrics                  Seated posture correct w/ cerv rotation x15 B rev       Self LS rev and practice x3-4 mins         Back buddy demo x2-3 mins                       Neuro Re Ed        Chin tuck progressions Against wall x10 Supine 2x10, 3 sec hold   supine x20, sitting x20       DNF progressions              Posture progressions   wall posture x2-3 mins  x2-3 mins   rev       Scap stability progressions              Nerve gliding             Core stability progressions              B ER w/ scap sq GTB x20 B ER w/ scap sq green 2x10  x20 in sitting       GTB rows x 20  rev 2x10-15 each       HEP and POC review x4-5 mins  Rev and update x 2-3 mins  Rev x 2-3 mins       SNAGs for L rotation and cervical ext at C3/4 L side x10-15 each - demo and practice of self Above  Rev and practice x2-3 mins                               Ther Act             Functional lifting                                                                                                                                             Modalities             heat             Ice                                  Access Code: [email or web address]  URL: [email or web address]  Date: 02/21/2023  Prepared by: Vijay Andrews    Exercises  • [place] - 1 x daily - 7 x weekly - 3 sets - 10 reps  • Shoulder External Rotation and Scapular Retraction with Resistance - 1 x daily - 7 x weekly - 3 sets - 10 reps  • Standing Bilateral Low Shoulder Row with Anchored Resistance - 1 x daily - 7 x weekly - 3 sets - 10 reps  • Seated Assisted Cervical Rotation with Towel - 1 x daily - 7 x weekly - 3 sets - 10 reps  • Upper Cervical Extension SNAG with Strap - 1 x daily - 7 x weekly - 3 sets - 10 reps

## 2023-03-09 ENCOUNTER — OFFICE VISIT (OUTPATIENT)
Dept: PHYSICAL THERAPY | Facility: CLINIC | Age: 73
End: 2023-03-09

## 2023-03-09 DIAGNOSIS — M48.02 FORAMINAL STENOSIS OF CERVICAL REGION: Primary | ICD-10-CM

## 2023-03-09 NOTE — PROGRESS NOTES
Daily Note     Today's date: 3/9/2023  Patient name: Emmanuel Tovar  : 1950  MRN: [id number]  Referring provider: She Simmons*  Dx:   Encounter Diagnosis     ICD-10-CM    1  Foraminal stenosis of cervical region  M48 02                      Subjective: Pt reports that his neck feels a little better overall, still sore  Reports that he is trying to get going on exercises on own  He notes that he has script for L achilles and plantar fascia, wants to add this in later  Continue to focus on neck for now  Plans to call pain management either today or tomorrow  Objective: requires cueing for pacing and form w/ self chin tuck and cervical motions, corrects and is able to maintain  Assessment: Tolerated treatment well  Patient demonstrated fatigue post treatment and would benefit from continued PT  Does well w/ exercise progressions today, improved cervical ext and rotation ROM by end is well maintained when leaving clinic  Pt got phone call and had to leave w/ 12 mins left in session  Will continue w/ postural stab program over the next few visits  Increase intensity as sx allow  Plan: Continue per plan of care  carries, rows and ext progressions, wall slides if able             POC expires Auth Status Total   Visits  Start date  Expiration date PT/OT + Visit Limit?  Co-Insurance    $10 Copay   100% Covered  No Auth Req     No                                             Visit/Unit Tracking  AUTH Status:  Date               Visits  Authed:  Used                Remaining                      Dummy Auth Tracking  1 2 3 4 5 6   7 8 9 10 11 12   13 14 15 16 17 18   19 20 21 22 23 24   25 26 27 28 29 30   31 32 33 34 35 36         Precautions: standard       Date (Visit #) IE  (1)  3/2 (2) 3/7 (3) 3/9 (4) (5)   Manual              DTM and TPR              Cervical traction  x2 min holds   2x2 min holds   3x2 min holds        Cervical mobs  tested            SNAGs    below   for L rotation C3/4 2x8   same for B rotation on corresponding pillar 2x8, ext w/ B pressure 2x8            SO release x3-4 mins   x2 mins          Exercise Diary              Ther Ex        UT and LS stretching  x30 sec B upper quarter   x4-5 mins   x3-4 mins  x5-6 mins total, cerv distraction 2x2 mins, MWM for cerv ext 2x6 total      Rows/Ext        Cervical isometrics                  Seated posture correct w/ cerv rotation x15 B rev Seated posture rev x 2-3 mins       Self LS rev and practice x3-4 mins   Rev       Back buddy demo x2-3 mins                       Neuro Re Ed        Chin tuck progressions Against wall x10 Supine 2x10, 3 sec hold   supine x20, sitting x20 Supine 3x10, sitting x20     DNF progressions              Posture progressions   wall posture x2-3 mins  x2-3 mins   rev x2 mins      Scap stability progressions              Nerve gliding             Core stability progressions              B ER w/ scap sq GTB x20 B ER w/ scap sq green 2x10  x20 in sitting  GTB x20     GTB rows x 20  rev 2x10-15 each  Rev    scap sq x20     HEP and POC review x4-5 mins  Rev and update x 2-3 mins  Rev x 2-3 mins  Rev x 2-3 mins     Discussion on plan for ankle and foot x 2-3 mins      SNAGs for L rotation and cervical ext at C3/4 L side x10-15 each - demo and practice of self Above  Rev and practice x2-3 mins Rev of self x 2 mins                               Ther Act             Functional lifting                                                                                                                                             Modalities             heat             Ice                                  Access Code: [email or web address]  URL: [email or web address]  Date: 02/21/2023  Prepared by: Azucena Conrad    Exercises  • Wall [place] - 1 x daily - 7 x weekly - 3 sets - 10 reps  • Shoulder External Rotation and Scapular Retraction with Resistance - 1 x daily - 7 x weekly - 3 sets - 10 reps  • Standing Bilateral Low Shoulder Row with Anchored Resistance - 1 x daily - 7 x weekly - 3 sets - 10 reps  • Seated Assisted Cervical Rotation with Towel - 1 x daily - 7 x weekly - 3 sets - 10 reps  • Upper Cervical Extension SNAG with Strap - 1 x daily - 7 x weekly - 3 sets - 10 reps

## 2023-03-14 ENCOUNTER — OFFICE VISIT (OUTPATIENT)
Dept: PHYSICAL THERAPY | Facility: CLINIC | Age: 73
End: 2023-03-14

## 2023-03-14 DIAGNOSIS — M48.02 FORAMINAL STENOSIS OF CERVICAL REGION: Primary | ICD-10-CM

## 2023-03-14 NOTE — PROGRESS NOTES
Daily Note     Today's date: 3/14/2023  Patient name: Keenan Horton  : 1950  MRN: [id number]  Referring provider: Sherman Watkins*  Dx:   Encounter Diagnosis     ICD-10-CM    1  Foraminal stenosis of cervical region  M48 02                      Subjective: Pt reports that neck feels a little looser today  Had stressful morning helping wife with her flat tire  Notes that he has been doing self SNAGs on own  Objective: cervical rotation and ext ROM improves w/ MWM, but also w/ cueing for improved upright posture and proper cspine positioning throughout - well maintained by end  Assessment: Tolerated treatment well  Patient demonstrated fatigue post treatment and would benefit from continued PT  Pt reports fatigue but overall improvement in mobility post session  Declines to increase intensity of exercise today as he had to leave to help wife with tire again  Noted to have improved motion throughout, both before and after manual work  Will continue to increase intensity as sx allow  He is waiting for return call from Dr Clint Copeland office regarding possible consult  Plan: Continue per plan of care  loaded progressions in sitting or standing for posture and posterior chain  POC expires Auth Status Total   Visits  Start date  Expiration date PT/OT + Visit Limit?  Co-Insurance    $10 Copay   100% Covered  No Auth Req     No                                             Visit/Unit Tracking  AUTH Status:  Date               Visits  Authed:  Used                Remaining                      Dummy Auth Tracking  1 2 3 4 5 6   7 8 9 10 11 12   13 14 15 16 17 18   19 20 21 22 23 24   25 26 27 28 29 30   31 32 33 34 35 36         Precautions: standard       Date (Visit #) IE  (1)  3 (2) 3/7 (3) 3/9 (4) 3/14 (5)   Manual              DTM and TPR              Cervical traction  x2 min holds   2x2 min holds   3x2 min holds     3x1-2 min holds    Cervical mobs  tested            SNAGs below   for L rotation C3/4 2x8   same for B rotation on corresponding pillar 2x8, ext w/ B pressure 2x8    in supine and sitting for L rotation and ext 3x8-10 total         SO release x3-4 mins   x2 mins    x3-4 mins total       Exercise Diary              Ther Ex        UT and LS stretching  x30 sec B upper quarter   x4-5 mins   x3-4 mins  x5-6 mins total, cerv distraction 2x2 mins, MWM for cerv ext 2x6 total  x5-6 mins        Rows/Ext        Cervical isometrics                  Seated posture correct w/ cerv rotation x15 B rev Seated posture rev x 2-3 mins  x2-3 mins      Self LS rev and practice x3-4 mins   Rev       Back buddy demo x2-3 mins                       Neuro Re Ed        Chin tuck progressions Against wall x10 Supine 2x10, 3 sec hold   supine x20, sitting x20 Supine 3x10, sitting x20  x20-25 supine and sitting    DNF progressions              Posture progressions   wall posture x2-3 mins  x2-3 mins   rev x2 mins   x2-3 mins    Scap stability progressions              Nerve gliding             Core stability progressions              B ER w/ scap sq GTB x20 B ER w/ scap sq green 2x10  x20 in sitting  GTB x20 GTB x30     GTB rows x 20  rev 2x10-15 each  Rev    scap sq x20 x20 total     HEP and POC review x4-5 mins  Rev and update x 2-3 mins  Rev x 2-3 mins  Rev x 2-3 mins     Discussion on plan for ankle and foot x 2-3 mins  Rev x 2-3 mins     SNAGs for L rotation and cervical ext at C3/4 L side x10-15 each - demo and practice of self Above  Rev and practice x2-3 mins Rev of self x 2 mins                               Ther Act             Functional lifting                                                                                                                                             Modalities             heat             Ice                                  Access Code: [email or web address]  URL: [email or web address]  Date: 02/21/2023  Prepared by: Jannette Malik    Exercises  • Wall [place] - 1 x daily - 7 x weekly - 3 sets - 10 reps  • Shoulder External Rotation and Scapular Retraction with Resistance - 1 x daily - 7 x weekly - 3 sets - 10 reps  • Standing Bilateral Low Shoulder Row with Anchored Resistance - 1 x daily - 7 x weekly - 3 sets - 10 reps  • Seated Assisted Cervical Rotation with Towel - 1 x daily - 7 x weekly - 3 sets - 10 reps  • Upper Cervical Extension SNAG with Strap - 1 x daily - 7 x weekly - 3 sets - 10 reps

## 2023-03-16 ENCOUNTER — OFFICE VISIT (OUTPATIENT)
Dept: PHYSICAL THERAPY | Facility: CLINIC | Age: 73
End: 2023-03-16

## 2023-03-16 DIAGNOSIS — M48.02 FORAMINAL STENOSIS OF CERVICAL REGION: Primary | ICD-10-CM

## 2023-03-16 NOTE — PROGRESS NOTES
Daily Note     Today's date: 3/16/2023  Patient name: Keenan Horton  : 1950  MRN: [id number]  Referring provider: Sherman Watkins*  Dx:   Encounter Diagnosis     ICD-10-CM    1  Foraminal stenosis of cervical region  M48 02                      Subjective: Pt reports no new complaints, reports that his neck is starting to loosen up and that he feels a little better  Will be seeing Dr Cindy Bee next week  Objective: requires cueing for form w/ self HA SNAG but is able to complete and gain relief on own  Assessment: Tolerated treatment well  Patient demonstrated fatigue post treatment and would benefit from continued PT  Does well w/ exercise progressions today, good response to mobility work and strength review  Will review MD notes following next visit and use this to inform future services  Pt encouraged by progress today  Plan: Continue per plan of care  standing progressions as able             POC expires Auth Status Total   Visits  Start date  Expiration date PT/OT + Visit Limit?  Co-Insurance    $10 Copay   100% Covered  No Auth Req     No                                             Visit/Unit Tracking  AUTH Status:  Date               Visits  Authed:  Used                Remaining                      Dummy Auth Tracking  1 2 3 4 5 6   7 8 9 10 11 12   13 14 15 16 17 18   19 20 21 22 23 24   25 26 27 28 29 30   31 32 33 34 35 36         Precautions: standard       Date (Visit #) 3/16 (6)   3 (3) 3/9 (4) 3 (5)   Manual              DTM and TPR              Cervical traction   2x2 min holds   3x2 min holds     3x1-2 min holds    Cervical mobs            SNAGs     for L rotation C3/4 2x8   same for B rotation on corresponding pillar 2x8, ext w/ B pressure 2x8    in supine and sitting for L rotation and ext 3x8-10 total         SO release x3-4 mins   x2 mins    x3-4 mins total       Exercise Diary              Ther Ex        UT and LS stretching B upper quarter w/ intermittent cerv distraction, SOR x 12 mins   x4-5 mins   x3-4 mins  x5-6 mins total, cerv distraction 2x2 mins, MWM for cerv ext 2x6 total  x5-6 mins        Rows/Ext        Cervical isometrics                 Rev of seated stretching  Seated posture correct w/ cerv rotation x15 B rev Seated posture rev x 2-3 mins  x2-3 mins      Self LS rev and practice x3-4 mins   Rev       Back buddy demo x2-3 mins                       Neuro Re Ed        Chin tuck progressions Supine and sitting x 20 each  Supine 2x10, 3 sec hold   supine x20, sitting x20 Supine 3x10, sitting x20  x20-25 supine and sitting    DNF progressions             Posture progressions  Posture rev x 2-3 mins  x2-3 mins   rev x2 mins   x2-3 mins    Scap stability progressions             Nerve gliding             Core stability progressions              B ER w/ scap sq GTB x20  B ER w/ scap sq green 2x10  x20 in sitting  GTB x20 GTB x30      rev 2x10-15 each  Rev    scap sq x20 x20 total     HEP and POC review x2-3 mins  Rev and update x 2-3 mins  Rev x 2-3 mins  Rev x 2-3 mins     Discussion on plan for ankle and foot x 2-3 mins  Rev x 2-3 mins     Self and man SNAGs for cerv L rotation and ext at upper cspine x 10-15 each Above  Rev and practice x2-3 mins Rev of self x 2 mins      Self HA snag rev and practice x 2-3 mins                        Ther Act             Functional lifting                                                                                                                                             Modalities             heat             Ice                                  Access Code: [email or web address]  URL: [email or web address]  Date: 02/21/2023  Prepared by: Jennifer Ralph    Exercises  • Wall [place] - 1 x daily - 7 x weekly - 3 sets - 10 reps  • Shoulder External Rotation and Scapular Retraction with Resistance - 1 x daily - 7 x weekly - 3 sets - 10 reps  • Standing Bilateral Low Shoulder Row with Anchored Resistance - 1 x daily - 7 x weekly - 3 sets - 10 reps  • Seated Assisted Cervical Rotation with Towel - 1 x daily - 7 x weekly - 3 sets - 10 reps  • Upper Cervical Extension SNAG with Strap - 1 x daily - 7 x weekly - 3 sets - 10 reps

## 2023-03-20 DIAGNOSIS — I10 ESSENTIAL HYPERTENSION: ICD-10-CM

## 2023-03-20 RX ORDER — AMLODIPINE BESYLATE 2.5 MG/1
2.5 TABLET ORAL DAILY
Qty: 90 TABLET | Refills: 1 | Status: SHIPPED | OUTPATIENT
Start: 2023-03-20

## 2023-03-21 ENCOUNTER — EVALUATION (OUTPATIENT)
Dept: PHYSICAL THERAPY | Facility: CLINIC | Age: 73
End: 2023-03-21

## 2023-03-21 DIAGNOSIS — M48.02 FORAMINAL STENOSIS OF CERVICAL REGION: Primary | ICD-10-CM

## 2023-03-21 NOTE — PROGRESS NOTES
Progress Note     Today's date: 3/21/2023  Patient name: Diogo Contreras  : 1950  MRN: [id number]  Referring provider: Jennifer Mcclain*  Dx:   Encounter Diagnosis     ICD-10-CM    1  Foraminal stenosis of cervical region  M48 02           Start Time: 930  Stop Time:   Total time in clinic (min): 32 minutes    Subjective: Pt reports no new complaints, neck is a little sore today  Feels good when leaving sessions but notes that pain returns in between  Feels that exercises help but is looking forward to MD visit next week to see if their are other pain relief options  Functional status below:     Goals  Short term goals (3 weeks): ALL PROGRESSED   1) Pt will improve cervical ROM restrictions by 25% pain free  2) Pt will improve B UE and postural strength deficits by 1/3 grade MMT pain free  3) Pt will improve pain at worse to <4/10  4) Pt will initiate and progress HEP w/ special emphasis on functional postural control/awareness and strength  Long term goals (6 weeks) ALL PROGRESSED   1) Pt will improve FOTO to at least 56  - achieved 3/21 - goal d/c  2) Pt will sleep through the night in positioning of choosing 6/7 nights a week w/o waking due to pain  3) Pt will perform two full weeks of household activities, work, and normal recreation w/ pain in neck < 3/10 throughout  4) Pt will be independent and compliant w/ HEP in order to maximize functional benefit of skilled PT following d/c       *unachieved goals extended by 2 and 4 weeks, respectively    Pain  Current pain ratin-6  At best pain ratin  At worst pain ratin-9  Location: L side posterior and lateral shoulder, neck, into L side head   Quality: sharp, dull ache and tight  Relieving factors: rest, change in position and heat  Aggravating factors: lifting and running  Progression: similar to IE     Social Support  Steps to enter house: yes  Stairs in house: yes   Lives in: multiple-level home  Lives with: spouse    Employment status: not working (retired)  Hand dominance: right    Treatments  Previous treatment: physical therapy  Patient Goals  Patient goals for therapy: increased strength, decreased pain, increased motion and return to sport/leisure activities  Patient goal: be able to move without pain!         Objective     Postural Observations  Seated posture: poor  Standing posture: fair - corrects well w/ cueing   Correction of posture: makes symptoms better        Palpation     Additional Palpation Details  TTP through L SO and proximal UT and LS, through MT and LT L>R   -3/21: similar to lesser extent     Neurological Testing     Sensation   Cervical/Thoracic   Left   Intact: light touch    Right   Intact: light touch    Active Range of Motion     Additional Active Range of Motion Details  Flex: 50%  Ext: 50%*  SB R: 25%*  SB L: 25%*  Rot R: 42 deg   Rot L: 44 deg*    *indicates pain    3/21: above measurements from today     Passive Range of Motion     Additional Passive Range of Motion Details  Grossly at least 50% of rotations and SB, 25-50% of ext and flexion    -3/21: at least 70% today, improves w/ SNAGs in supine and sitting     Joint Play     Comments: Grossly hypo throughout w/o pain   -3/21: similar     Strength/Myotome Testing     Additional Strength Details  UE Strength (R/L)    Shoulder   Flex 4/5 , 4/5  Ext 4/5 , 4/5  Abd 4/5 , 4/5  Add 4/5 , 4/5    Elbow  Flex 4/5 , 4/5  Ext 4/5 , 4/5    Postural   MT 4-/5 , 4-/5 - changed 3/21  LT 3+/5, 3+/5 - changed 3/21    Core Strength: at least 1+/5     *Indicates pain    Tests     Additional Tests Details  Cervical Traction: good relief in supine    -3/21: continues to help and improves throughout     SNAGs: improves L cervical rotation to at least 40 deg w/ min pain, ext to at least 50% w/o pain - well maintained by end    -3/21: continues to help and improves throughout     DNF iso lift: TBA in future    -3/21: not assess today     Assessment: Tolerated treatment well  Patient demonstrated fatigue post treatment and would benefit from continued PT  Pt has made slow progress over his first 7 visits in skilled PT  ROM is most marked improvement, along w/ postural awareness, FOTO score, and overall function  Turning to look over shoulder while driving, as well as standing for long periods has improved  He will see MD next week and we will proceed accordingly  We will continue on 2x/week basis for next 3-4 weeks, pt in agreement w/ plan  He will possibly benefit from further pain relief measures  Plan: Continue per plan of care  prepare for MD, standing postural stab as able             POC expires Auth Status Total   Visits  Start date  Expiration date PT/OT + Visit Limit?  Co-Insurance    $10 Copay   100% Covered  No Auth Req     No                                             Visit/Unit Tracking  AUTH Status:  Date               Visits  Authed:  Used                Remaining                      Dummy Auth Tracking  1 2 3 4 5 6   7 8 9 10 11 12   13 14 15 16 17 18   19 20 21 22 23 24   25 26 27 28 29 30   31 32 33 34 35 36         Precautions: standard       Date (Visit #) 3/16 (6)  3/21 (7) PN  3/9 (4) 3/14 (5)   Manual              DTM and TPR              Cervical traction   2x2 min holds   3x2 min holds     3x1-2 min holds    Cervical mobs            SNAGs     for L rotation C3/4 2x8 in sitting, x 8 in supine    same for B rotation on corresponding pillar 2x8, ext w/ B pressure 2x8    in supine and sitting for L rotation and ext 3x8-10 total         SO release x3-4 mins in supine and sitting    x2 mins    x3-4 mins total       Exercise Diary              Ther Ex        UT and LS stretching B upper quarter w/ intermittent cerv distraction, SOR x 12 mins   x6-7 mins   x3-4 mins  x5-6 mins total, cerv distraction 2x2 mins, MWM for cerv ext 2x6 total  x5-6 mins        Rows/Ext        Cervical isometrics                 Rev of seated stretching  Seated posture correct x2 mins rev Seated posture rev x 2-3 mins  x2-3 mins      Self LS stretch rev  Rev       Seated chin tucks w/ scap sq x 20                      Neuro Re Ed        Chin tuck progressions Supine and sitting x 20 each    supine x20, sitting x20 Supine 3x10, sitting x20  x20-25 supine and sitting    DNF progressions            Posture progressions  Posture rev x 2-3 mins    rev x2 mins   x2-3 mins    Scap stability progressions            Nerve gliding            Core stability progressions             B ER w/ scap sq GTB x20   x20 in sitting  GTB x20 GTB x30       2x10-15 each  Rev    scap sq x20 x20 total     HEP and POC review x2-3 mins  Rev and update x 2-3 mins  Rev x 2-3 mins  Rev x 2-3 mins     Discussion on plan for ankle and foot x 2-3 mins  Rev x 2-3 mins     Self and man SNAGs for cerv L rotation and ext at upper cspine x 10-15 each Above  Rev and practice x2-3 mins Rev of self x 2 mins      Self HA snag rev and practice x 2-3 mins                        Ther Act             Functional lifting                                                                                                                                             Modalities             heat             Ice                                  Access Code: [email or web address]  URL: [email or web address]  Date: 02/21/2023  Prepared by: Aldo Doing    Exercises  • Murleen Coral - 1 x daily - 7 x weekly - 3 sets - 10 reps  • Shoulder External Rotation and Scapular Retraction with Resistance - 1 x daily - 7 x weekly - 3 sets - 10 reps  • Standing Bilateral Low Shoulder Row with Anchored Resistance - 1 x daily - 7 x weekly - 3 sets - 10 reps  • Seated Assisted Cervical Rotation with Towel - 1 x daily - 7 x weekly - 3 sets - 10 reps  • Upper Cervical Extension SNAG with Strap - 1 x daily - 7 x weekly - 3 sets - 10 reps

## 2023-03-22 NOTE — PROGRESS NOTES
"  Portions of the record may have been created with voice recognition software  Occasional wrong word or \"sound a like\" substitutions may have occurred due to the inherent limitations of voice recognition software  Read the chart carefully and recognize, using context, where substitutions have occurred  Assessment  1  Neck pain    2  Cervical spondylosis    3  Cervical radiculitis        Plan  Orders Placed This Encounter   Procedures   • MRI cervical spine wo contrast     Standing Status:   Future     Standing Expiration Date:   3/27/2027     Scheduling Instructions: There is no preparation for this test  Please leave your jewelry and valuables at home, wedding rings are the exception  All patients will be required to change into a hospital gown and pants  Street clothes are not permitted in the MRI  Magnetic nail polish must be removed prior to arrival for your test  Please bring your insurance cards, a form of photo ID and a list of your medications with you  Arrive 15 minutes prior to your appointment time in order to register  Please bring any prior CT or MRI studies of this area that were not performed at a [facility] facility  To schedule this appointment, please contact Central Scheduling at [phone]  Prior to your appointment, please make sure you complete the MRI Screening Form when you e-Check in for your appointment  This will be available starting 7 days before your appointment in [address]  You may receive an e-mail with an activation code if you do not have a [employer] account  If you do not have access to a device, we will complete your screening at your appointment  Order Specific Question:   What is the patient's sedation requirement? Answer:   No Sedation     Order Specific Question:   Release to patient through [employer]     Answer:   Immediate     Order Specific Question:   Is order priority selected as STAT?      Answer:   No     Order Specific Question:   " Reason for Exam (FREE TEXT)     Answer:   left sided occipital and aricular regfion pain with rotation to the left     New Medications Ordered This Visit   Medications   • gabapentin (NEURONTIN) 100 mg capsule     Sig: Take 1 capsule (100 mg total) by mouth daily at bedtime     Dispense:  30 capsule     Refill:  [de-identified]      35-year-old gentleman presenting with more than 3-year history of left periauricular and occipital region pain with rotation of the neck to the left  Minimal radiating symptoms into the left upper extremity or any associated motor or sensory deficit  On physical exam there was no tenderness to palpation in the cervical facet on the left, trapezius  No gross motor or sensory deficits noted  Patient did have a history of prior BARB C7-T1 in 2021 only 1 day relief  Has been in physical therapy with temporary relief   -At this time we will obtain repeat cervical MRI for further evaluation  We will also start the patient on low-dose gabapentin 100 mg nightly to help with symptoms which are worse at night and laying down  Patient advised to sedative side effects and to not operate heavy machinery or drive  We will follow-up in 2 to 4 weeks for MRI review  We discussed starting gabapentin  The patient understands that this is a seizure medication that may be used for pain  Risks were discussed and included but were not limited to drowsiness, dizziness, loss of coordination, and blurred/double vision  The patient understands that if they have these side effects they should not operate heavy machinery or drive  The patient verbalizes understanding of the risks, benefits, and alternatives to care, and wishes to proceed  My impressions and treatment recommendations were discussed in detail with the patient who verbalized understanding and had no further questions  Discharge instructions were provided   I personally saw and examined the patient and I agree with the above discussed plan of care       History of Present Illness    Casandra Vargas is a 67 y o  male Pt is referred to [facility] by Amadou Easleyer, PT     This is a 67year-old gentleman with more than 3-year history of left sided periauricular occipital region pain that is intermittent without any radiating symptoms in the left upper extremity  The pain comes and goes and is moderate 7 out of 10 and sharp  The pain is rotation of the neck to the left  Standing, bending, sitting  Decreased by walking and laying down  No radiating pain or associated motor or sensory symptoms bilateral upper extremity     In 2021, patient underwent BARB C7-T1 with Dr Pam Carmona, [employer] [facility] but states that it only provided 1 to 2 days of relief  Denies ever spine surgery  In terms of management reports moderate relief with heat PT but is short-lived  Currently taking as needed Tylenol which provides some relief  Last cervical MRI 2021 results noting   C3-4 with intact central canal/cord  Moderate to severe foraminal stenosis on   the left, moderate stenosis on the right  C4-5 with mild central canal narrowing, cord intact  Moderate to severe   foraminal stenosis on the right greater than left  C5-6 with mild central canal stenosis, cord intact  Severe foraminal stenosis on   the right, mild to moderate narrowing on the left  C6-7 with intact cord  Moderate to severe foraminal stenosis, left greater than   right  Patient denies any bowel bladder incontinence or saddle anesthesia  Denies any recent fevers chills or weight changes    Lab Results   Component Value Date    CREATININE 0 99 09/19/2022     Lab Results   Component Value Date    ALT 30 09/19/2022    ALT 18 10/02/2020         Imaging:    MRI CERVICAL SPINE WO CONTRAST   6-    Anatomical Region Laterality Modality   C-spine -- Magnetic Resonance   Neck -- --   MRSPINE -- --     Impression    IMPRESSION:     Mild levoscoliosis with disc space narrowing, protrusion, and hypertrophy in the   posterior elements resulting in central canal and foraminal stenosis as   summarized below:     C3-4 with intact central canal/cord  Moderate to severe foraminal stenosis on   the left, moderate stenosis on the right  C4-5 with mild central canal narrowing, cord intact  Moderate to severe   foraminal stenosis on the right greater than left  C5-6 with mild central canal stenosis, cord intact  Severe foraminal stenosis on   the right, mild to moderate narrowing on the left  C6-7 with intact cord  Moderate to severe foraminal stenosis, left greater than   right  CERVICAL SPINE   5-     INDICATION:   M54 2: Cervicalgia      COMPARISON:  CTA 9/6/2019     VIEWS:  XR SPINE CERVICAL COMPLETE 4 OR 5 VW NON INJURY         FINDINGS:     No fracture       Normal alignment without subluxation      Degenerative spondylosis is seen at multiple levels with osteophytes  There is bony foraminal narrowing at C4-5, C5-6 and C6-7 on the right as well as C3-4 on the left       The prevertebral soft tissues are within normal limits        The lung apices are clear      IMPRESSION:     Degenerative spondylosis as above          No MRI cervical spine results found in the past 2 years   No MRI lumbar spine results found in the past 2 years   No MRI thoracic spine results found in the past 2 years   XR spine cervical complete 4 or 5 vw non injury    Result Date: 5/26/2021  Impression     Degenerative spondylosis as above  Workstation performed: [id number]          No X-ray lumbar spine results found in the past 2 years   No X-ray hip/pelvis results found in the past 2 years   No X-ray knee results found in the past 2 years         I have personally reviewed and/or updated the patient's past medical history, past surgical history, family history, social history, current medications, allergies, and vital signs today       Review of Systems   Constitutional: Positive for appetite change  Musculoskeletal: Positive for neck pain and neck stiffness          Head left side        Patient Active Problem List   Diagnosis   • Actinic keratosis   • History of prostate cancer   • Anemia   • Benign essential hypertension   • Disc degeneration, lumbar   • Fatty liver disease, nonalcoholic   • Herpes simplex infection   • Mixed hyperlipidemia   • Organic impotence   • Lower extremity edema   • Groin mass   • S/P prostatectomy   • 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(HCC)   • Periumbilical abdominal pain   • Cervicalgia   • Cervical radiculopathy   • Adenocarcinoma of prostate ([facility] [address] )       Past Medical History:   Diagnosis Date   • Chronic rhinitis 02/05/2005    last assessed 2/5/05, resolved 5/18/17    • Elevated liver function tests     last assessed 3/9/15, resolved 5/18/17    • Herpes simplex type 1 infection    • Hip arthritis     resolved 12/20/17    • Hypertension    • Shoulder bursitis     last assessed 9/23/13, resolved 5/18/17        Past Surgical History:   Procedure Laterality Date   • HERNIA REPAIR      Inguinal sliding  10/2018   • PROSTATOTOMY      last assessed 1/11/18       Family History   Problem Relation Age of Onset   • Hypertension Mother    • Lung cancer Family    • Prostate cancer Family    • Mental illness Neg Hx        Social History     Occupational History   • Not on file   Tobacco Use   • Smoking status: Never   • Smokeless tobacco: Never   Vaping Use   • Vaping Use: Never used   Substance and Sexual Activity   • Alcohol use: Yes     Comment: very rare   • Drug use: Never   • Sexual activity: Not Currently       Current Outpatient Medications on File Prior to Visit   Medication Sig   • acetaminophen (TYLENOL) 500 mg tablet Take 500 mg by mouth as needed   • amLODIPine (NORVASC) 2 5 mg tablet Take 1 tablet (2 5 mg total) by mouth daily   • aspirin 81 mg chewable tablet Chew 81 mg daily   • atorvastatin (LIPITOR) 10 mg tablet Take 1 tablet (10 mg total) by mouth daily   • "Multiple Vitamin (MULTIVITAMINS PO) Take 1 capsule by mouth daily   • quinapril (ACCUPRIL) 10 mg tablet TAKE 1 TABLET BY MOUTH  EVERY 12 HOURS   • famotidine (PEPCID) 20 mg tablet Take 1 tablet (20 mg total) by mouth 2 (two) times a day   • [DISCONTINUED] methylPREDNISolone 4 MG tablet therapy pack Use as directed on package (Patient not taking: Reported on 2/10/2023)     No current facility-administered medications on file prior to visit  Allergies   Allergen Reactions   • Augmentin [Amoxicillin-Pot Clavulanate] GI Intolerance   • Meperidine Other (See Comments)     Reaction Date: [id number]; Annotation - [id number]: unsure of side effect  Reaction Date: 23TRH5941; Annotation - [id number]: unsure of side effect       Physical Exam    BP (!) 172/112   Pulse 76   Ht 6' 1\" (1 854 m) Comment: verbal  Wt 107 kg (235 lb 6 4 oz)   BMI 31 06 kg/m²     Constitutional: normal, well developed, well nourished, alert, in no distress and non-toxic and no overt pain behavior  Eyes: anicteric  HEENT: grossly intact  Neck: supple, symmetric, trachea midline and no masses   Pulmonary:even and unlabored  Cardiovascular:No edema or pitting edema present  Skin:Normal without rashes or lesions and well hydrated  Psychiatric:Mood and affect appropriate  Neurologic:Cranial Nerves II-XII grossly intact  Musculoskeletal:normal        Palpation:     No tenderness over the spinous processes in the cervical region  No tenderness to palpation bilateral cervical paraspinal region    Sensory exam:     Intact to light touch grossly in the left upper extremity   Intact to light touch grossly in the right upper extremity      Motor Exam: limited cervical rotation to the left with onset of periauricular and occipital pain     Upper Ext -      Shoulder abduction Biceps flexion Triceps extension Wrist flexion Wrist extension Finger abduction Hand squeeze   L 5/5 5/5 5/5 5/5 5/5 5/5 5/5   R 5/5 5/5 5/5 5/5 5/5 5/5 5/5     DTRs:   Biceps 2+ left, " 2+ right and symmetric  Triceps 2+ left, 2+ right and symmetric  Brachioradialis 2+left, 2+ right and symmetric    No upper motor neuron lesion signs (negative Sepulveda's, absent ankle clonus)      Special Tests:     TTP over cervical facet Spurling's   L Negative Negative   R Negative Negative

## 2023-03-23 ENCOUNTER — OFFICE VISIT (OUTPATIENT)
Dept: PHYSICAL THERAPY | Facility: CLINIC | Age: 73
End: 2023-03-23

## 2023-03-23 DIAGNOSIS — M48.02 FORAMINAL STENOSIS OF CERVICAL REGION: Primary | ICD-10-CM

## 2023-03-23 NOTE — PROGRESS NOTES
Daily Note     Today's date: 3/23/2023  Patient name: Elmer Baker  : 1950  MRN: [id number]  Referring provider: Zach Caldera*  Dx:   Encounter Diagnosis     ICD-10-CM    1  Foraminal stenosis of cervical region  M48 02                      Subjective: Pt reports no new complaints, felt good after last visit  Feels he is improving slightly after sessions, but not finding lasting relief from PT  Sees pain management early next week, looking forward to this visit  Objective: continues to respond well to SNAGs for L cervical rotation and ext - motion at least 75% of normal limits to finish session  Assessment: Tolerated treatment well  Patient demonstrated fatigue post treatment and would benefit from continued PT  Does well w/ exercise progressions combined w/ manual work today  He does well between sessions, however his status is largely unchanged when coming in after weekend  He may benefit from further pain management options - will discuss MD appt at next visit here  Plan: Continue per plan of care  check MD visit            POC expires Auth Status Total   Visits  Start date  Expiration date PT/OT + Visit Limit?  Co-Insurance    $10 Copay   100% Covered  No Auth Req     No                                             Visit/Unit Tracking  AUTH Status:  Date               Visits  Authed:  Used                Remaining                      Dummy Auth Tracking  1 2 3 4 5 6   7 8 9 10 11 12   13 14 15 16 17 18   19 20 21 22 23 24   25 26 27 28 29 30   31 32 33 34 35 36         Precautions: standard       Date (Visit #) 3/16 (6)  3/21 (7) PN 3/23 (8)   3/14 (5)   Manual              DTM and TPR              Cervical traction   2x2 min holds   3x2 min holds     3x1-2 min holds    Cervical mobs            SNAGs     for L rotation C3/4 2x8 in sitting, x 8 in supine   For L rotation in supine and sitting 2x8, ext in sitting 2x8     in supine and sitting for L rotation and ext 3x8-10 total SO release x3-4 mins in supine and sitting   x3-4 mins     x3-4 mins total       Exercise Diary              Ther Ex        UT and LS stretching B upper quarter w/ intermittent cerv distraction, SOR x 12 mins   x6-7 mins   x3-4 mins  x5-6 mins total, cerv distraction 2x2 mins, MWM for cerv ext 2x6 total  x5-6 mins        Rows/Ext        Cervical isometrics                 Rev of seated stretching  Seated posture correct x2 mins  Seated posture rev x 2-3 mins  x2-3 mins      Self LS stretch rev rev Rev       Seated chin tucks w/ scap sq x 20 x20                      Neuro Re Ed        Chin tuck progressions Supine and sitting x 20 each    supine x20, sitting x20 Supine 3x10, sitting x20  x20-25 supine and sitting    DNF progressions            Posture progressions  Posture rev x 2-3 mins    x2 mins   x2-3 mins    Scap stability progressions           Nerve gliding           Core stability progressions            B ER w/ scap sq GTB x20    GTB x20 GTB x30        Rev    scap sq x20 x20 total     HEP and POC review x2-3 mins  Rev and update x 2-3 mins  Rev x 2-3 mins  Rev x 2-3 mins     Discussion on plan for ankle and foot x 2-3 mins  Rev x 2-3 mins     Self and man SNAGs for cerv L rotation and ext at upper cspine x 10-15 each Above   Rev of self x 2 mins      Self HA snag rev and practice x 2-3 mins                        Ther Act             Functional lifting                                                                                                                                             Modalities             heat             Ice                                  Access Code: [email or web address]  URL: [email or web address]  Date: 02/21/2023  Prepared by: Felicia Kearney  • Wall Arnold - 1 x daily - 7 x weekly - 3 sets - 10 reps  • Shoulder External Rotation and Scapular Retraction with Resistance - 1 x daily - 7 x weekly - 3 sets - 10 reps  • Standing Bilateral Low Shoulder Row with Anchored Resistance - 1 x daily - 7 x weekly - 3 sets - 10 reps  • Seated Assisted Cervical Rotation with Towel - 1 x daily - 7 x weekly - 3 sets - 10 reps  • Upper Cervical Extension SNAG with Strap - 1 x daily - 7 x weekly - 3 sets - 10 reps

## 2023-03-27 ENCOUNTER — CONSULT (OUTPATIENT)
Dept: PAIN MEDICINE | Facility: CLINIC | Age: 73
End: 2023-03-27

## 2023-03-27 VITALS
DIASTOLIC BLOOD PRESSURE: 112 MMHG | HEART RATE: 76 BPM | WEIGHT: 235.4 LBS | BODY MASS INDEX: 31.2 KG/M2 | HEIGHT: 73 IN | SYSTOLIC BLOOD PRESSURE: 172 MMHG

## 2023-03-27 DIAGNOSIS — M47.812 CERVICAL SPONDYLOSIS: ICD-10-CM

## 2023-03-27 DIAGNOSIS — M54.12 CERVICAL RADICULITIS: ICD-10-CM

## 2023-03-27 DIAGNOSIS — M54.2 NECK PAIN: Primary | ICD-10-CM

## 2023-03-27 RX ORDER — GABAPENTIN 100 MG/1
100 CAPSULE ORAL
Qty: 30 CAPSULE | Refills: 0 | Status: SHIPPED | OUTPATIENT
Start: 2023-03-27

## 2023-03-28 ENCOUNTER — OFFICE VISIT (OUTPATIENT)
Dept: PHYSICAL THERAPY | Facility: CLINIC | Age: 73
End: 2023-03-28

## 2023-03-28 DIAGNOSIS — M48.02 FORAMINAL STENOSIS OF CERVICAL REGION: Primary | ICD-10-CM

## 2023-03-28 NOTE — PROGRESS NOTES
Daily Note     Today's date: 3/28/2023  Patient name: King Vazquez  : 1950  MRN: [id number]  Referring provider: Lennox Andrews*  Dx:   Encounter Diagnosis     ICD-10-CM    1  Foraminal stenosis of cervical region  M48 02                      Subjective: Pt enjoyed MD visit, reports that he will undergo MRI on neck and will start low dose gabapentin at night  Reports that he wants to continue w/ PT in the interm as he does get relief  Objective: Lateral skull pain dissipates on L w/ upper cspine ext and L rotation motions - well maintained by end  Assessment: Tolerated treatment well  Patient demonstrated fatigue post treatment and would benefit from continued PT  Does well w/ mobility progressions today, fatigue but no increase in pain by end of session  Continues to show improving tolerance to graded mobility work  Will continue w/ this and increase intensity of postural stab work as sx allow  Pt aware of and complaint w/ HEP, continue as sx allow  Plan: Continue per plan of care  review band work, self upper cspine mob progressions             POC expires Auth Status Total   Visits  Start date  Expiration date PT/OT + Visit Limit?  Co-Insurance    $10 Copay   100% Covered  No Auth Req     No                                             Visit/Unit Tracking  AUTH Status:  Date               Visits  Authed:  Used                Remaining                      Dummy Auth Tracking  1 2 3 4 5 6   7 8 9 10 11 12   13 14 15 16 17 18   19 20 21 22 23 24   25 26 27 28 29 30   31 32 33 34 35 36         Precautions: standard       Date (Visit #) 3/16 (6)  3/21 (7) PN 3/23 (8)  3/28 (9)     Manual              DTM and TPR              Cervical traction   2x2 min holds   3x2 min holds   4x2 min holds total   3x1-2 min holds    Cervical mobs            SNAGs     for L rotation C3/4 2x8 in sitting, x 8 in supine   For L rotation in supine and sitting 2x8, ext in sitting 2x8   for L rotation at C2/3 2x8, ext 2x8 in supine and sitting   in supine and sitting for L rotation and ext 3x8-10 total         SO release x3-4 mins in supine and sitting   x3-4 mins   x3-4 mins   x3-4 mins total       Exercise Diary              Ther Ex        UT and LS stretching B upper quarter w/ intermittent cerv distraction, SOR x 12 mins   x6-7 mins   x3-4 mins Upper quarter stretching x 3-4 mins  x5-6 mins        Rows/Ext        Cervical isometrics                 Rev of seated stretching  Seated posture correct x2 mins   x2-3 mins      Self LS stretch rev rev       Seated chin tucks w/ scap sq x 20 x20  x20        Supine chin tucks x20, sitting x20        DNF lift 3 sec hold x10    Neuro Re Ed        Chin tuck progressions Supine and sitting x 20 each    supine x20, sitting x20   x20-25 supine and sitting    DNF progressions           Posture progressions  Posture rev x 2-3 mins      x2-3 mins    Scap stability progressions          Nerve gliding          Core stability progressions           B ER w/ scap sq GTB x20     GTB x30         x20 total     HEP and POC review x2-3 mins  Rev and update x 2-3 mins  Rev x 2-3 mins  Rev x 2 mins Rev x 2-3 mins     Self and man SNAGs for cerv L rotation and ext at upper cspine x 10-15 each Above        Self HA snag rev and practice x 2-3 mins                      Ther Act             Functional lifting                                                                                                                                             Modalities             heat             Ice                                  Access Code: [email or web address]  URL: [email or web address]  Date: 02/21/2023  Prepared by: Kimberli Elizabeth    Exercises  • Wall [place] - 1 x daily - 7 x weekly - 3 sets - 10 reps  • Shoulder External Rotation and Scapular Retraction with Resistance - 1 x daily - 7 x weekly - 3 sets - 10 reps  • Standing Bilateral Low Shoulder Row with Anchored Resistance - 1 x daily - 7 x weekly - 3 sets - 10 reps  • Seated Assisted Cervical Rotation with Towel - 1 x daily - 7 x weekly - 3 sets - 10 reps  • Upper Cervical Extension SNAG with Strap - 1 x daily - 7 x weekly - 3 sets - 10 reps

## 2023-04-03 DIAGNOSIS — E78.2 MIXED HYPERLIPIDEMIA: ICD-10-CM

## 2023-04-03 DIAGNOSIS — M54.12 CERVICAL RADICULITIS: ICD-10-CM

## 2023-04-03 DIAGNOSIS — M54.2 NECK PAIN: Primary | ICD-10-CM

## 2023-04-03 RX ORDER — ALPRAZOLAM 1 MG/1
1 TABLET ORAL ONCE
Qty: 1 TABLET | Refills: 0 | Status: SHIPPED | OUTPATIENT
Start: 2023-04-20 | End: 2023-04-20

## 2023-04-04 ENCOUNTER — OFFICE VISIT (OUTPATIENT)
Dept: PHYSICAL THERAPY | Facility: CLINIC | Age: 73
End: 2023-04-04

## 2023-04-04 DIAGNOSIS — M48.02 FORAMINAL STENOSIS OF CERVICAL REGION: Primary | ICD-10-CM

## 2023-04-04 RX ORDER — ATORVASTATIN CALCIUM 10 MG/1
TABLET, FILM COATED ORAL
Qty: 90 TABLET | Refills: 3 | Status: SHIPPED | OUTPATIENT
Start: 2023-04-04

## 2023-04-04 NOTE — PROGRESS NOTES
Daily Note     Today's date: 2023  Patient name: Corbin Nguyen  : 1950  MRN: [id number]  Referring provider: Vikash Quan  Dx:   Encounter Diagnosis     ICD-10-CM    1  Foraminal stenosis of cervical region  M48 02           Start Time: 1012  Stop Time: 1045  Total time in clinic (min): 33 minutes    Subjective: Pt reports that he is feeling better overall  Thinks that combo of PT and medications is starting to help  Encouraged by progress today  Objective: HA sx on L side of skull partially dissipate and remain low following SNAGs for L cervical rotation at upper cspine  Assessment: Tolerated treatment well  Patient demonstrated fatigue post treatment and would benefit from continued PT  Does well w/ exercise and mobility work today  Shows best tolerance to date w/ mobility progressions, lowest amount of pain to finish session  Continue to increase intensity as sx allow  Achilles program was reviewed w/ good understanding  Handout provided which detailed this  Plan: Continue per plan of care  check Achilles work             POC expires Auth Status Total   Visits  Start date  Expiration date PT/OT + Visit Limit?  Co-Insurance    $10 Copay   100% Covered  No Auth Req     No                                             Visit/Unit Tracking  AUTH Status:  Date               Visits  Authed:  Used                Remaining                      Dummy Auth Tracking  1 2 3 4 5 6   7 8 9 10 11 12   13 14 15 16 17 18   19 20 21 22 23 24   25 26 27 28 29 30   31 32 33 34 35 36         Precautions: standard       Date (Visit #) 3/16 (6)  3/21 (7) PN 3/23 (8)  3/28 (9)   (10)    Manual              DTM and TPR              Cervical traction   2x2 min holds   3x2 min holds   4x2 min holds total   4x1-2 min holds    Cervical mobs            SNAGs     for L rotation C3/4 2x8 in sitting, x 8 in supine   For L rotation in supine and sitting 2x8, ext in sitting 2x8   for L rotation at C2/3 2x8, ext 2x8 in supine and sitting   in supine and sitting for L rotation and ext 2x8-10 total         SO release x3-4 mins in supine and sitting   x3-4 mins   x3-4 mins   x4 mins total       Exercise Diary              Ther Ex        UT and LS stretching B upper quarter w/ intermittent cerv distraction, SOR x 12 mins   x6-7 mins   x3-4 mins Upper quarter stretching x 3-4 mins  x5-6 mins        Rows/Ext        Cervical isometrics                 Rev of seated stretching  Seated posture correct x2 mins   rev     Self LS stretch rev rev  Heel raises x20, rev of single leg ecc lowering x 3 mins     Seated chin tucks w/ scap sq x 20 x20  x20        Supine chin tucks x20, sitting x20 x20 supine        DNF lift 3 sec hold x10 rev   Neuro Re Ed        Chin tuck progressions Supine and sitting x 20 each    supine x20, sitting x20     DNF progressions          Posture progressions  Posture rev x 2-3 mins        Scap stability progressions         Nerve gliding         Core stability progressions          B ER w/ scap sq GTB x20                 HEP and POC review x2-3 mins  Rev and update x 2-3 mins  Rev x 2-3 mins  Rev x 2 mins Rev x 2-3 mins    Self and man SNAGs for cerv L rotation and ext at upper cspine x 10-15 each Above        Self HA snag rev and practice x 2-3 mins                    Ther Act            Functional lifting                                                                                                                                   Modalities             heat             Ice                                  Access Code: [email or web address]  URL: [email or web address]  Date: 02/21/2023  Prepared by: Hayden Alvarez    Exercises  • Wall [place] - 1 x daily - 7 x weekly - 3 sets - 10 reps  • Shoulder External Rotation and Scapular Retraction with Resistance - 1 x daily - 7 x weekly - 3 sets - 10 reps  • Standing Bilateral Low Shoulder Row with Anchored Resistance - 1 x daily - 7 x weekly - 3 sets - 10 reps  • Seated Assisted Cervical Rotation with Towel - 1 x daily - 7 x weekly - 3 sets - 10 reps  • Upper Cervical Extension SNAG with Strap - 1 x daily - 7 x weekly - 3 sets - 10 reps

## 2023-04-19 NOTE — PROGRESS NOTES
"Portions of the record may have been created with voice recognition software  Occasional wrong word or \"sound a like\" substitutions may have occurred due to the inherent limitations of voice recognition software  Read the chart carefully and recognize, using context, where substitutions have occurred  Assessment:  1  Myofascial pain syndrome    2  Neck pain    3  Chronic pain syndrome    4  Cervical spondylosis        Plan:  No orders of the defined types were placed in this encounter  New Medications Ordered This Visit   Medications   • methocarbamol (ROBAXIN) 500 mg tablet     Sig: Take 0 5 tablets (250 mg total) by mouth 2 (two) times a day as needed for muscle spasms for up to 10 days     Dispense:  10 tablet     Refill:  [de-identified]    77-year-old gentleman presenting with more than 3-year history of left periauricular and occipital region pain with rotation of the neck to the left  Minimal radiating symptoms into the left upper extremity or any associated motor or sensory deficit  On physical exam there was no tenderness to palpation in the cervical facet on the left, trapezius  No gross motor or sensory deficits noted  Patient did have a history of prior BARB C7-T1 in 2021 only 1 day relief  Has been in physical therapy with temporary relief  Patient has a pending cervical MRI  Likely etiology multifactorial in setting of myofascial pain, cervical spondylosis but less likely but possible radiculitis  At this time we will start patient on low-dose muscle relaxant Robaxin 250 mg twice daily as needed to help with the myofascial component  We will follow-up on cervical MRI results and discussed briefly treatment options including cervical medial branch block vs cervical epidural steroid injection to address for any facet agenic vs foraminal narrowing and nerve root impingement pain contributing   Discussed the risk and benefits of interventions including infection, bleeding, nerve injury, headaches, " soreness patient wishes to proceed if indicated  My impressions and treatment recommendations were discussed in detail with the patient who verbalized understanding and had no further questions  Discharge instructions were provided  I personally saw and examined the patient and I agree with the above discussed plan of care  History of Present Illness: Jese Daugherty is a 68 y o  male who presents for a follow up office visit in regards to Follow-up, Neck Pain, and Shoulder Pain (Neck , head and left shoulder pain  Pain is intermittent with dull and numbness )  Patient presents for follow-up today with continued discomfort in the left periauricular occipital and cervical paraspinal region tightness and pain with rotation and flexion extension of the cervical spine  The symptoms are intermittent dull achy pressure-like that he has had for years  He recently underwent cervical MRI imaging with results pending    He continues to take gabapentin 100 mg nightly without any side effects or drowsiness  Other Symptoms:  The patient does not have numbness  The patient does not have weakness  The patient does not have bladder dysfunction  The patient does not have bowel dysfunction  The patient does not have chills and fever, night sweats or unintentional weight loss  Prior Appts:      Specialists Seen:      Current Pain Medications:      Pain Medications Trialed:     Interventional Techniques Employed:    Imaging:      No MRI cervical spine results found in the past 2 years   No MRI lumbar spine results found in the past 2 years   No MRI thoracic spine results found in the past 2 years   XR spine cervical complete 4 or 5 vw non injury    Result Date: 5/26/2021  Impression     Degenerative spondylosis as above           Workstation performed: [id number]          No X-ray lumbar spine results found in the past 2 years   No X-ray hip/pelvis results found in the past 2 years   No X-ray knee results found in the past 2 years     Lab Results   Component Value Date    CREATININE 0 99 09/19/2022     Lab Results   Component Value Date    ALT 30 09/19/2022    ALT 18 10/02/2020       I have personally reviewed and/or updated the patient's past medical history, past surgical history, family history, social history, current medications, allergies, and vital signs today  Review of Systems   Musculoskeletal: Positive for neck pain and neck stiffness  Left shoulder, head   Neurological: Positive for numbness         Patient Active Problem List   Diagnosis   • Actinic keratosis   • History of prostate cancer   • Anemia   • Benign essential hypertension   • Disc degeneration, lumbar   • Fatty liver disease, nonalcoholic   • Herpes simplex infection   • Mixed hyperlipidemia   • Organic impotence   • Lower extremity edema   • Groin mass   • S/P prostatectomy   • 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(HCC)   • Periumbilical abdominal pain   • Cervicalgia   • Cervical radiculopathy   • Adenocarcinoma of prostate ([facility] [address] )       Past Medical History:   Diagnosis Date   • Chronic rhinitis 02/05/2005    last assessed 2/5/05, resolved 5/18/17    • Elevated liver function tests     last assessed 3/9/15, resolved 5/18/17    • Herpes simplex type 1 infection    • Hip arthritis     resolved 12/20/17    • Hypertension    • Shoulder bursitis     last assessed 9/23/13, resolved 5/18/17        Past Surgical History:   Procedure Laterality Date   • HERNIA REPAIR      Inguinal sliding  10/2018   • PROSTATOTOMY      last assessed 1/11/18       Family History   Problem Relation Age of Onset   • Hypertension Mother    • Lung cancer Family    • Prostate cancer Family    • Mental illness Neg Hx        Social History     Occupational History   • Not on file   Tobacco Use   • Smoking status: Never   • Smokeless tobacco: Never   Vaping Use   • Vaping Use: Never used   Substance and Sexual Activity   • Alcohol use: Yes     Comment: very rare   • "Drug use: Never   • Sexual activity: Not Currently       Current Outpatient Medications on File Prior to Visit   Medication Sig   • acetaminophen (TYLENOL) 500 mg tablet Take 500 mg by mouth as needed   • amLODIPine (NORVASC) 2 5 mg tablet Take 1 tablet (2 5 mg total) by mouth daily   • aspirin 81 mg chewable tablet Chew 81 mg daily   • atorvastatin (LIPITOR) 10 mg tablet TAKE 1 TABLET BY MOUTH  DAILY   • gabapentin (NEURONTIN) 100 mg capsule Take 1 capsule (100 mg total) by mouth daily at bedtime   • Multiple Vitamin (MULTIVITAMINS PO) Take 1 capsule by mouth daily   • quinapril (ACCUPRIL) 10 mg tablet TAKE 1 TABLET BY MOUTH  EVERY 12 HOURS   • ALPRAZolam (XANAX) 1 mg tablet Take 1 tablet (1 mg total) by mouth 1 (one) time for 1 dose Take 1 hour prior to cervical MRI imaging Do not start before April 20, 2023  • famotidine (PEPCID) 20 mg tablet Take 1 tablet (20 mg total) by mouth 2 (two) times a day     No current facility-administered medications on file prior to visit  Allergies   Allergen Reactions   • Augmentin [Amoxicillin-Pot Clavulanate] GI Intolerance   • Meperidine Other (See Comments)     Reaction Date: [id number]; Annotation - [id number]: unsure of side effect  Reaction Date: [id number]; Annotation - [id number]: unsure of side effect       Physical Exam:    /91   Pulse 60   Ht 6' 1\" (1 854 m) Comment: verbal  Wt 107 kg (235 lb) Comment: verbal  BMI 31 00 kg/m²       General: Well-developed, well-nourished male in NAD  HEENT: Head is normocephalic/atraumatic  Pulmonary: Non-labored breathing  CV: No pretibial edema  Skin: Without any cyanosis, rash, or lesions  Neuromuscular:   Awake and alert  The patient is seated in a neutral position  Rises from a seated position without difficulty and ambulates unassisted; gait is not antalgic     Inspection: No evidence of scoliosis and muscle bulk appears normal      Palpation:     No tenderness over the spinous processes in the cervical " region  No tenderness to palpation bilateral cervical paraspinal region    Sensory exam:     Intact to light touch grossly in the left upper extremity  Intact to light touch grossly in the right upper extremity    Pain with rotation of the neck to the right and left localized to the left cervical paraspinal occipital region  Upper Ext -      Shoulder abduction Biceps flexion Triceps extension Wrist flexion Wrist extension Finger abduction Hand squeeze   L 5/5 5/5 5/5 5/5 5/5 5/5 5/5   R 5/5 5/5 5/5 5/5 5/5 5/5 5/5     DTRs:     Biceps 2+ left, 2+ right and symmetric  Triceps 2+ left, 2+ right and symmetric  Brachioradialis 2+left, 2+ right and symmetric      No upper motor neuron lesion signs (negative Sepulveda's, absent ankle clonus)      Special Tests:     TTP over cervical facet Spurling's   L  negative  negative   R  negative  negative

## 2023-04-24 ENCOUNTER — OFFICE VISIT (OUTPATIENT)
Dept: PAIN MEDICINE | Facility: CLINIC | Age: 73
End: 2023-04-24

## 2023-04-24 ENCOUNTER — TRANSCRIBE ORDERS (OUTPATIENT)
Dept: OBGYN CLINIC | Facility: CLINIC | Age: 73
End: 2023-04-24

## 2023-04-24 VITALS
HEART RATE: 60 BPM | HEIGHT: 73 IN | DIASTOLIC BLOOD PRESSURE: 91 MMHG | BODY MASS INDEX: 31.14 KG/M2 | WEIGHT: 235 LBS | SYSTOLIC BLOOD PRESSURE: 159 MMHG

## 2023-04-24 DIAGNOSIS — M54.2 NECK PAIN: ICD-10-CM

## 2023-04-24 DIAGNOSIS — M79.18 MYOFASCIAL PAIN SYNDROME: Primary | ICD-10-CM

## 2023-04-24 DIAGNOSIS — G89.4 CHRONIC PAIN SYNDROME: ICD-10-CM

## 2023-04-24 DIAGNOSIS — M47.812 CERVICAL SPONDYLOSIS: ICD-10-CM

## 2023-04-24 RX ORDER — METHOCARBAMOL 500 MG/1
250 TABLET, FILM COATED ORAL 2 TIMES DAILY PRN
Qty: 10 TABLET | Refills: 0 | Status: SHIPPED | OUTPATIENT
Start: 2023-04-24 | End: 2023-05-04

## 2023-04-25 ENCOUNTER — APPOINTMENT (OUTPATIENT)
Dept: LAB | Facility: CLINIC | Age: 73
End: 2023-04-25

## 2023-04-25 ENCOUNTER — TELEPHONE (OUTPATIENT)
Dept: PAIN MEDICINE | Facility: CLINIC | Age: 73
End: 2023-04-25

## 2023-04-25 ENCOUNTER — OFFICE VISIT (OUTPATIENT)
Dept: FAMILY MEDICINE CLINIC | Facility: CLINIC | Age: 73
End: 2023-04-25

## 2023-04-25 ENCOUNTER — OFFICE VISIT (OUTPATIENT)
Dept: PHYSICAL THERAPY | Facility: CLINIC | Age: 73
End: 2023-04-25

## 2023-04-25 VITALS
TEMPERATURE: 98.5 F | OXYGEN SATURATION: 95 % | HEART RATE: 80 BPM | RESPIRATION RATE: 18 BRPM | DIASTOLIC BLOOD PRESSURE: 68 MMHG | SYSTOLIC BLOOD PRESSURE: 110 MMHG | WEIGHT: 234.2 LBS | BODY MASS INDEX: 31.04 KG/M2 | HEIGHT: 73 IN

## 2023-04-25 DIAGNOSIS — I10 BENIGN ESSENTIAL HYPERTENSION: ICD-10-CM

## 2023-04-25 DIAGNOSIS — Z85.46 HISTORY OF PROSTATE CANCER: ICD-10-CM

## 2023-04-25 DIAGNOSIS — R53.83 OTHER FATIGUE: ICD-10-CM

## 2023-04-25 DIAGNOSIS — M48.02 FORAMINAL STENOSIS OF CERVICAL REGION: Primary | ICD-10-CM

## 2023-04-25 DIAGNOSIS — I72.0 PSEUDOANEURYSM OF CAROTID ARTERY (HCC): ICD-10-CM

## 2023-04-25 DIAGNOSIS — R10.2 SUPRAPUBIC PAIN: ICD-10-CM

## 2023-04-25 DIAGNOSIS — R53.83 OTHER FATIGUE: Primary | ICD-10-CM

## 2023-04-25 LAB
ERYTHROCYTE [DISTWIDTH] IN BLOOD BY AUTOMATED COUNT: 12.8 % (ref 11.6–15.1)
HCT VFR BLD AUTO: 38 % (ref 36.5–49.3)
HGB BLD-MCNC: 12.9 G/DL (ref 12–17)
MCH RBC QN AUTO: 31.2 PG (ref 26.8–34.3)
MCHC RBC AUTO-ENTMCNC: 33.9 G/DL (ref 31.4–37.4)
MCV RBC AUTO: 92 FL (ref 82–98)
PLATELET # BLD AUTO: 210 THOUSANDS/UL (ref 149–390)
PMV BLD AUTO: 10.3 FL (ref 8.9–12.7)
RBC # BLD AUTO: 4.14 MILLION/UL (ref 3.88–5.62)
SL AMB  POCT GLUCOSE, UA: 0
SL AMB LEUKOCYTE ESTERASE,UA: 0
SL AMB POCT BILIRUBIN,UA: ABNORMAL
SL AMB POCT BLOOD,UA: 0
SL AMB POCT CLARITY,UA: CLEAR
SL AMB POCT COLOR,UA: ABNORMAL
SL AMB POCT KETONES,UA: 0
SL AMB POCT NITRITE,UA: 0
SL AMB POCT PH,UA: 5.5
SL AMB POCT SPECIFIC GRAVITY,UA: 1.03
SL AMB POCT URINE PROTEIN: 30
SL AMB POCT UROBILINOGEN: 0.2
WBC # BLD AUTO: 10.41 THOUSAND/UL (ref 4.31–10.16)

## 2023-04-25 NOTE — TELEPHONE ENCOUNTER
Hold approval received  NEHAL Hyaes  You 8 minutes ago (8:54 AM)     Yes  That will be fine     NEHAL Hayes

## 2023-04-25 NOTE — PATIENT INSTRUCTIONS
Fatigue   WHAT YOU NEED TO KNOW:   Fatigue is mental and physical exhaustion that does not get better with rest  Fatigue may make daily activities difficult or cause extreme sleepiness  It is normal to feel tired sometimes, but long-term fatigue may be a sign of serious illness  DISCHARGE INSTRUCTIONS:   Return to the emergency department if:   You have chest pain  You have difficulty breathing  Contact your healthcare provider if:   You have a cough that gets worse, or does not go away  You see blood in your urine or bowel movement  You have numbness or tingling around your mouth or in an arm or leg  You faint, feel dizzy, or have vision changes  You have swelling in your lymph nodes  You are a woman and have vaginal bleeding that is not normal for you, or is not expected  You lose weight without trying, or you have trouble eating  You feel weak or have muscle pain  You have pain or swelling in your joints  You have questions or concerns about your condition or care  Follow up with your healthcare provider as directed: You may need more tests  Your healthcare provider may also refer you to a specialist  Write down your questions so you remember to ask them during your visits  Manage fatigue:   Keep a fatigue diary  Include anything that makes you feel more tired or less tired  Bring the diary with you to follow-up visits with your provider  Exercise as directed  Exercise can help you feel more alert  Exercise can also help you manage stress or relieve depression  Try to get at least 30 minutes of exercise most days of the week  Keep a regular sleep schedule  Go to bed and wake up at the same times every day  Limit naps to 1 hour each day  A nap can improve fatigue, but a long nap may make it harder to go to sleep at night  Plan and limit your activities  Limit the number of activities such as shopping and cleaning you do each day   If possible, try to Progress Note  Psychotherapy Provided [facility]: Individual Psychotherapy 45 mins  minutes provided today  Goals addressed in session:   (G# 2) She noted that her daughter's  recently had a heart attack (this couple had lost their only child, Virginia's granddaughter, to an alleged heroin overdose several years ago)  She noted her efforts to be supportive to her daughter while being mindful of her "space " She expressed concern about reported workplace developments  "I don't know what I would do if I didn't have my activities " She states is looking forward to a local high school alumni band concert within the week  discussed/reviewed her ongoing efforts to process the death of her granddaughter and her efforts to maintain self-care strategies (ex , spending time with her friends and participating in her activities); A: presents as making a concerted effort to cope with the loss of her granddaughter including being respectful of her daughter's "space;" P: (G#2) will continue to participate in activities including those supporting her bereavement process;   Pain Scale and Suicide Risk [facility]: On a scale of 0 to 10, the patient rates current pain at 0   Current suicide risk is low   Behavioral Health Treatment Plan [address] [place] [address] [address]: Diagnosis and Treatment Plan explained to patient, patient relates understanding diagnosis and is agreeable to Treatment Plan  Assessment    1   Adjustment disorder with depressed mood (309 0) (F43 21)    Signatures   Electronically signed by : NITZA Dick,DANIELLE; Mar 15 2016 11:49AM EST                       (Author) spread out your trips throughout the week  Plan ahead so you are not rushing to get something done  Only do activities that you have the energy to complete  Take breaks between activities  Ask for help if you need it  Another person may be able to drive you or help with daily activities  Eat a variety of healthy foods  Healthy foods include fruits, vegetables, whole-grain breads, low-fat dairy products, beans, lean meats, and fish  Good nutrition can help manage fatigue  Limit caffeine and alcohol  These can make it difficult to fall or stay asleep  Women should limit alcohol to 1 drink a day  Men should limit alcohol to 2 drinks a day  A drink of alcohol is 12 ounces of beer, 5 ounces of wine, or 1½ ounces of liquor  Ask our healthcare provider how much caffeine is safe for you  Do not smoke  Nicotine and other chemicals in cigarettes and cigars can cause lung damage and increase fatigue  Ask your healthcare provider for information if you currently smoke and need help to quit  E-cigarettes or smokeless tobacco still contain nicotine  Talk to your healthcare provider before you use these products  © Copyright [employer] 2022 Information is for End User's use only and may not be sold, redistributed or otherwise used for commercial purposes  The above information is an  only  It is not intended as medical advice for individual conditions or treatments  Talk to your doctor, nurse or pharmacist before following any medical regimen to see if it is safe and effective for you

## 2023-04-25 NOTE — TELEPHONE ENCOUNTER
This patient is being considered for a neuraxial injection, such as an epidural steroid injection, nerve root block,etc, for the management of their pain  However, the patient is currently on an anticoagulant 81 mg ASA per your orders  The American Society of Regional Anest[employer] Guideline on neuraxial procedures and anti-coagulation indicates that medication should be held as follows:    Jenifer Obrien, do you give permission for pt to hold 81mg ASA for cervical epidural injection for 6 days prior to procedure?

## 2023-04-25 NOTE — PROGRESS NOTES
Daily Note     Today's date: 2023  Patient name: Vola Bernheim  : 1950  MRN: [id number]  Referring provider: Sravan Pal*  Dx:   Encounter Diagnosis     ICD-10-CM    1  Foraminal stenosis of cervical region  M48 02                      Subjective: Pt reports no new complaints, reports that he generally doesn't feel well and will be seeing MD later today  Has decided to go through w/ epidural for neck pain w/ Dr Silvia Hayes  Objective: L side upper cervical SNAGs continue to improve L side cervical rotation - carryover between sessions is minimal w/ range gains  Assessment: Tolerated treatment well  Patient demonstrated fatigue post treatment and would benefit from continued PT  Does well w/ gentle mobility work today  Did not want to push progressions so as not to wear himself out before MD visit  Pt will call or email following injection to establish POC  Plan: Continue per plan of care  check MD note for injection             POC expires Auth Status Total   Visits  Start date  Expiration date PT/OT + Visit Limit?  Co-Insurance    $10 Copay   100% Covered  No Auth Req     No                                           Dummy Auth Tracking  1 2 3 4 5 6   7 8 9 10 11 12   13 14 15 16 17 18   19 20 21 22 23 24   25 26 27 28 29 30   31 32 33 34 35 36         Precautions: standard       Date (Visit #)  (11)  (12)  (13)   (14)     Manual              DTM and TPR             Cervical traction 4x1-2 min holds   3x2 min holds   3x2 min holds    4x1-2 min holds    Cervical mobs           SNAGs   For L rotation SNAGs C3/4 2x8-10  for B rotation C3/4 2x8 in sitting, for L rotation x 8 in supine   For L rotation in sitting at C3/4 2x8   in supine and sitting for L rotation and ext 2x8-10 total       SO release x 3-4 mins total   SO release x3-4 mins in supine and sitting   x2 mins supine, x2 mins sitting    x4 mins total       Exercise Diary              Ther Ex        UT and LS stretching B upper quarter w/ w/ L bias x 5-6 mins   x5 mins   x3-4 mins w/ L side bias x12 min w/ intermittent cervical distraction and SOR  x5-6 mins        Rows/Ext    Cervical SNAG L upper cspine x15     Cervical isometrics                  Alt isos for cerv SB and rotation x20 each - mod res   rev        Heel raises x20, rev of single leg ecc lowering x 3 mins     Supine chin tucks x20, DNF lifts 3 sec hold x 10 Supine chin tucks x20, w/ DNF lift 2x10      Supine chin tucks x20    x20 supine     Rev of self SNAGs, HEP rev, POC rev x 5 mins Rev and update x 2-3 mins  Rev and update of HEP x 2-3 mins  rev   Neuro Re Ed        Chin tuck progressions        DNF progressions         Posture progressions         Scap stability progressions         Nerve gliding         Core stability progressions                            Rev x 2-3 mins  Rev x 2 mins Rev x 2-3 mins                               Ther Act            Functional lifting                                                                                                                                   Modalities             heat             Ice                                  Access Code: [email or web address]  URL: [email or web address]  Date: 02/21/2023  Prepared by: Patsy Montoya    Exercises  • Yomaira Lama - 1 x daily - 7 x weekly - 3 sets - 10 reps  • Shoulder External Rotation and Scapular Retraction with Resistance - 1 x daily - 7 x weekly - 3 sets - 10 reps  • Standing Bilateral Low Shoulder Row with Anchored Resistance - 1 x daily - 7 x weekly - 3 sets - 10 reps  • Seated Assisted Cervical Rotation with Towel - 1 x daily - 7 x weekly - 3 sets - 10 reps  • Upper Cervical Extension SNAG with Strap - 1 x daily - 7 x weekly - 3 sets - 10 reps

## 2023-04-25 NOTE — TELEPHONE ENCOUNTER
S/W pt and advised of the same, pt verbalized understanding and would like to proceed with BARB  Pt reports he takes 81mg ASA, advised pt our office will need to get permission to hold 81mg ASA from provider, pt reports Katherin Richards is provider at [facility]      Nurse advised pt procedure  will reach out to pt to schedule, pt verbalized understanding and appreciative of call

## 2023-04-25 NOTE — PROGRESS NOTES
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[facility])  Assessment & Plan:  Managed by neurosurgery      4  History of prostate cancer    5  Benign essential hypertension  Assessment & Plan:  Stable with current regimen            Patient Instructions:  Supportive care discussed and advised  Advised to RTO for any worsening and no improvement  Follow up for no improvement and worsening of conditions  Patient advised and educated when to see immediate medical care  Return if symptoms worsen or fail to improve  Future Appointments   Date Time Provider Tracy Garay   5/22/2023  9:30 AM NEHAL Mcallister [facility] [place]           Subjective:      Patient ID: Maryuri Choi is a 68 y o  male  Chief Complaint   Patient presents with   • lower abdominal pain     Started yesterday morning  L English/LPN   • Fatigue     Patient feels extremely tired x 2 months, L English/LPN         Vitals:  /68   Pulse 80   Temp 98 5 °F (36 9 °C) (Tympanic)   Resp 18   Ht 6' 1\" (1 854 m)   Wt 106 kg (234 lb 3 2 oz)   SpO2 95%   BMI 30 90 kg/m²     HPI  Patient stated that started with lower abdominal pain yesterday and denies any urinary symptoms, diarrhea, constipation, nausea and vomiting and rectal bleeding, fever and chills  Stated that been feeling fatigue from couple of weeks  Denies dizziness and vertigo  Has h/o prostate cancer and surgery and has not followed with urologist from while    Complaint with medications " for chronic illnesses and tolerating it well  The following portions of the patient's history were reviewed and updated as appropriate: allergies, current medications, past family history, past medical history, past social history, past surgical history and problem list       Review of Systems   Constitutional: Negative for chills, diaphoresis, fatigue, fever and unexpected weight change  Respiratory: Negative  Cardiovascular: Negative  Gastrointestinal: Positive for abdominal pain  Negative for abdominal distention, anal bleeding, blood in stool, constipation, diarrhea, nausea, rectal pain and vomiting  As noted in HPI     Genitourinary: Negative for decreased urine volume, difficulty urinating, dysuria, flank pain, frequency, genital sores, hematuria and urgency  Musculoskeletal: Negative  Skin: Negative  Neurological: Negative for dizziness and headaches  Objective:    Social History     Tobacco Use   Smoking Status Never   Smokeless Tobacco Never       Allergies: Allergies   Allergen Reactions   • Augmentin [Amoxicillin-Pot Clavulanate] GI Intolerance   • Meperidine Other (See Comments)     Reaction Date: [id number]; Annotation - [id number]: unsure of side effect  Reaction Date: [id number];  Annotation - [id number]: unsure of side effect         Current Outpatient Medications   Medication Sig Dispense Refill   • acetaminophen (TYLENOL) 500 mg tablet Take 500 mg by mouth as needed     • ALPRAZolam (XANAX) 1 mg tablet Take 1 tablet (1 mg total) by mouth 1 (one) time for 1 dose Take 1 hour prior to cervical MRI imaging Do not start before April 20, 2023  1 tablet 0   • amLODIPine (NORVASC) 2 5 mg tablet Take 1 tablet (2 5 mg total) by mouth daily 90 tablet 1   • aspirin 81 mg chewable tablet Chew 81 mg daily     • atorvastatin (LIPITOR) 10 mg tablet TAKE 1 TABLET BY MOUTH  DAILY 90 tablet 3   • gabapentin (NEURONTIN) 100 mg capsule Take 1 capsule (100 mg total) by mouth daily at bedtime 30 capsule 0   • Multiple Vitamin (MULTIVITAMINS PO) Take 1 capsule by mouth daily     • quinapril (ACCUPRIL) 10 mg tablet TAKE 1 TABLET BY MOUTH  EVERY 12 HOURS 180 tablet 3   • famotidine (PEPCID) 20 mg tablet Take 1 tablet (20 mg total) by mouth 2 (two) times a day (Patient not taking: Reported on 4/25/2023) 60 tablet 0   • methocarbamol (ROBAXIN) 500 mg tablet Take 0 5 tablets (250 mg total) by mouth 2 (two) times a day as needed for muscle spasms for up to 10 days (Patient not taking: Reported on 4/25/2023) 10 tablet 0     No current facility-administered medications for this visit  Physical Exam  Constitutional:       Appearance: Normal appearance  He is well-developed  Cardiovascular:      Rate and Rhythm: Normal rate and regular rhythm  Heart sounds: Normal heart sounds  Pulmonary:      Effort: Pulmonary effort is normal       Breath sounds: Normal breath sounds  Abdominal:      General: Bowel sounds are normal  There is no distension  Palpations: Abdomen is soft  Tenderness: There is abdominal tenderness in the suprapubic area  There is no rebound  Skin:     General: Skin is warm and dry  Neurological:      Mental Status: He is alert and oriented to person, place, and time  Psychiatric:         Behavior: Behavior normal          Thought Content:  Thought content normal          Judgment: Judgment normal                      NEHAL Chavez

## 2023-04-25 NOTE — TELEPHONE ENCOUNTER
----- Message from Nory Lima DO sent at 4/24/2023  7:55 PM EDT -----  Please inform pt cervical MRI shows few levels of nerve impingement and narrowing on the left side that can explain the left sided neck and back of the head pain  It also shows arthritic changes as expected  I believe at this time, we should proceed with BARB C7-T1  We will plan to address the degenerative arthritic component of the pain afterwards if needed  Please schedule pt for BARB C7-T1   Thank you    ----- Message -----  From: Jeremy, Radiology Results In  Sent: 4/24/2023  12:43 PM EDT  To: Nory Lima DO

## 2023-04-26 ENCOUNTER — HOSPITAL ENCOUNTER (OUTPATIENT)
Dept: RADIOLOGY | Facility: HOSPITAL | Age: 73
Discharge: HOME | End: 2023-04-26

## 2023-04-26 DIAGNOSIS — R10.2 SUPRAPUBIC PAIN: ICD-10-CM

## 2023-04-26 DIAGNOSIS — R53.83 OTHER FATIGUE: ICD-10-CM

## 2023-04-26 DIAGNOSIS — E78.2 MIXED HYPERLIPIDEMIA: ICD-10-CM

## 2023-04-26 DIAGNOSIS — R17 ELEVATED BILIRUBIN: Primary | ICD-10-CM

## 2023-04-26 LAB
ALBUMIN SERPL BCP-MCNC: 4 G/DL (ref 3.5–5)
ALP SERPL-CCNC: 49 U/L (ref 46–116)
ALT SERPL W P-5'-P-CCNC: 29 U/L (ref 12–78)
ANION GAP SERPL CALCULATED.3IONS-SCNC: 5 MMOL/L (ref 4–13)
AST SERPL W P-5'-P-CCNC: 22 U/L (ref 5–45)
B BURGDOR IGG+IGM SER-ACNC: 0.3 AI
BACTERIA UR CULT: NORMAL
BILIRUB SERPL-MCNC: 1.24 MG/DL (ref 0.2–1)
BUN SERPL-MCNC: 16 MG/DL (ref 5–25)
CALCIUM SERPL-MCNC: 9.7 MG/DL (ref 8.3–10.1)
CHLORIDE SERPL-SCNC: 104 MMOL/L (ref 96–108)
CO2 SERPL-SCNC: 27 MMOL/L (ref 21–32)
CREAT SERPL-MCNC: 1.11 MG/DL (ref 0.6–1.3)
FERRITIN SERPL-MCNC: 226 NG/ML (ref 8–388)
GFR SERPL CREATININE-BSD FRML MDRD: 65 ML/MIN/1.73SQ M
GLUCOSE SERPL-MCNC: 105 MG/DL (ref 65–140)
IRON SATN MFR SERPL: 10 % (ref 20–50)
IRON SERPL-MCNC: 24 UG/DL (ref 65–175)
POTASSIUM SERPL-SCNC: 3.8 MMOL/L (ref 3.5–5.3)
PROT SERPL-MCNC: 8.1 G/DL (ref 6.4–8.4)
SODIUM SERPL-SCNC: 136 MMOL/L (ref 135–147)
TIBC SERPL-MCNC: 242 UG/DL (ref 250–450)
TSH SERPL DL<=0.05 MIU/L-ACNC: 0.81 UIU/ML (ref 0.45–4.5)

## 2023-04-27 ENCOUNTER — TELEPHONE (OUTPATIENT)
Dept: OBGYN CLINIC | Facility: CLINIC | Age: 73
End: 2023-04-27

## 2023-04-27 ENCOUNTER — TELEPHONE (OUTPATIENT)
Dept: FAMILY MEDICINE CLINIC | Facility: CLINIC | Age: 73
End: 2023-04-27

## 2023-04-27 DIAGNOSIS — Z85.46 HISTORY OF PROSTATE CANCER: Primary | ICD-10-CM

## 2023-04-27 NOTE — TELEPHONE ENCOUNTER
----- Message from Kushal Cruz DO sent at 4/24/2023  7:55 PM EDT -----  Please inform pt cervical MRI shows few levels of nerve impingement and narrowing on the left side that can explain the left sided neck and back of the head pain  It also shows arthritic changes as expected  I believe at this time, we should proceed with BARB C7-T1  We will plan to address the degenerative arthritic component of the pain afterwards if needed  Please schedule pt for BARB C7-T1   Thank you    ----- Message -----  From: Jeremy, Radiology Results In  Sent: 4/24/2023  12:43 PM EDT  To: Kushal Cruz DO

## 2023-04-27 NOTE — TELEPHONE ENCOUNTER
Pharmacy told patient they do not make the iron sulfate and the provider would be able to send something else

## 2023-04-27 NOTE — TELEPHONE ENCOUNTER
Claus Sepulveda,    Your patient is being considered for a neuraxial injection, such as an epidural steroid injection, nerve root block, etc, for the management of their pain  However, the patient is currently on an anticoagulant per your orders  The [employer] Society [employer] Guideline on neuraxial procedures and anti-coagulation indicates that medication should be held as follows:    Asparin 81mg -Hold for 6 days prior to procedure    Your permission to discontinue this anti-coagulant is necessary in order to proceed with this procedure  We will instruct the patient to restart their anti-coagulant immediately after the procedure, unless otherwise specified by you       Thank you,   Sarahy  [facility]

## 2023-04-28 NOTE — TELEPHONE ENCOUNTER
Advise patient that has to buy from pharmacy over the counter and can buy Alvin brand from [facility] ferrous sulfate 325 mg(65 mg) over the counter   NEHAL Johnson

## 2023-05-01 ENCOUNTER — TELEPHONE (OUTPATIENT)
Dept: PAIN MEDICINE | Facility: CLINIC | Age: 73
End: 2023-05-01

## 2023-05-01 DIAGNOSIS — M47.812 CERVICAL SPONDYLOSIS: ICD-10-CM

## 2023-05-01 DIAGNOSIS — M54.2 NECK PAIN: ICD-10-CM

## 2023-05-01 DIAGNOSIS — M54.12 CERVICAL RADICULITIS: ICD-10-CM

## 2023-05-01 RX ORDER — GABAPENTIN 300 MG/1
300 CAPSULE ORAL
Qty: 30 CAPSULE | Refills: 0 | Status: SHIPPED | OUTPATIENT
Start: 2023-05-01

## 2023-05-01 NOTE — TELEPHONE ENCOUNTER
S/W pt  He took his last dose of Gabapentin on Saturday  Does not have refills  He is taking one at bedtime as ordered but feels there is no change    Please advise on refill and possible dose change

## 2023-05-01 NOTE — TELEPHONE ENCOUNTER
Caller: Yovanny Campos    Doctor: Zoe Gill    Reason for call: patient is not sure if Gabapentin is working , he is not sure how to take it, and if he should continue taking it?  Please advise     Call back#: [phone]

## 2023-05-02 NOTE — TELEPHONE ENCOUNTER
S/w pt, advised of above  Pt verbalized understanding and appreciation  Will cb if questions / issues arise

## 2023-05-02 NOTE — TELEPHONE ENCOUNTER
Caller: bobby rubi    Doctor: meghan    Reason for call: pt was calling back      Call back#: [phone]

## 2023-05-02 NOTE — TELEPHONE ENCOUNTER
Bennett Fair,   [facility] Clinical 21 hours ago (5:00 PM)     We will increase to gabapentin 300 mg nightly to see if we get additional relief  Order placed           LMOM to cb  Provided cb number and office hours

## 2023-05-04 NOTE — TELEPHONE ENCOUNTER
Called pt to let him know that auths were approved and to confirm he has been holding the aspirin  Per pt, he forgot and took it yesterday and today       Pt has been rescheduled to 5/23 and reminded to start holding his aspirin on 5/17

## 2023-05-16 ENCOUNTER — RA CDI HCC (OUTPATIENT)
Dept: OTHER | Facility: HOSPITAL | Age: 73
End: 2023-05-16

## 2023-05-22 ENCOUNTER — OFFICE VISIT (OUTPATIENT)
Dept: FAMILY MEDICINE CLINIC | Facility: CLINIC | Age: 73
End: 2023-05-22

## 2023-05-22 VITALS
BODY MASS INDEX: 31.29 KG/M2 | HEART RATE: 82 BPM | DIASTOLIC BLOOD PRESSURE: 64 MMHG | SYSTOLIC BLOOD PRESSURE: 120 MMHG | WEIGHT: 231 LBS | TEMPERATURE: 99 F | HEIGHT: 72 IN | RESPIRATION RATE: 18 BRPM

## 2023-05-22 DIAGNOSIS — K42.9 UMBILICAL HERNIA WITHOUT OBSTRUCTION AND WITHOUT GANGRENE: ICD-10-CM

## 2023-05-22 DIAGNOSIS — Z85.46 HISTORY OF PROSTATE CANCER: ICD-10-CM

## 2023-05-22 DIAGNOSIS — Z00.00 MEDICARE ANNUAL WELLNESS VISIT, SUBSEQUENT: Primary | ICD-10-CM

## 2023-05-22 NOTE — PATIENT INSTRUCTIONS
Medicare Preventive Visit Patient Instructions  Thank you for completing your Welcome to Medicare Visit or Medicare Annual Wellness Visit today  Your next wellness visit will be due in one year (5/22/2024)  The screening/preventive services that you may require over the next 5-10 years are detailed below  Some tests may not apply to you based off risk factors and/or age  Screening tests ordered at today's visit but not completed yet may show as past due  Also, please note that scanned in results may not display below  Preventive Screenings:  Service Recommendations Previous Testing/Comments   Colorectal Cancer Screening  Colonoscopy    Fecal Occult Blood Test (FOBT)/Fecal Immunochemical Test (FIT)  Fecal DNA/Cologuard Test  Flexible Sigmoidoscopy Age: 39-70 years old   Colonoscopy: every 10 years (May be performed more frequently if at higher risk)  OR  FOBT/FIT: every 1 year  OR  Cologuard: every 3 years  OR  Sigmoidoscopy: every 5 years  Screening may be recommended earlier than age 39 if at higher risk for colorectal cancer  Also, an individualized decision between you and your healthcare provider will decide whether screening between the ages of 74-80 would be appropriate   Colonoscopy: 10/16/2020  FOBT/FIT: Not on file  Cologuard: Not on file  Sigmoidoscopy: Not on file    Screening Current     Prostate Cancer Screening Individualized decision between patient and health care provider in men between ages of 53-78   Medicare will cover every 12 months beginning on the day after your 50th birthday PSA: <0 1 ng/mL     History Prostate Cancer     Hepatitis C Screening Once for adults born between 1945 and 1965  More frequently in patients at high risk for Hepatitis C Hep C Antibody: 07/11/2019    Screening Current   Diabetes Screening 1-2 times per year if you're at risk for diabetes or have pre-diabetes Fasting glucose: 101 mg/dL (9/19/2022)  A1C: No results in last 5 years (No results in last 5 years)  Screening Current   Cholesterol Screening Once every 5 years if you don't have a lipid disorder  May order more often based on risk factors  Lipid panel: 09/19/2022  History Lipid Disorder  Risks and Benefits Discussed  Screening Current      Other Preventive Screenings Covered by Medicare:  Abdominal Aortic Aneurysm (AAA) Screening: covered once if your at risk  You're considered to be at risk if you have a family history of AAA or a male between the age of 73-68 who smoking at least 100 cigarettes in your lifetime  Lung Cancer Screening: covers low dose CT scan once per year if you meet all of the following conditions: (1) Age 50-69; (2) No signs or symptoms of lung cancer; (3) Current smoker or have quit smoking within the last 15 years; (4) You have a tobacco smoking history of at least 20 pack years (packs per day x number of years you smoked); (5) You get a written order from a healthcare provider  Glaucoma Screening: covered annually if you're considered high risk: (1) You have diabetes OR (2) Family history of glaucoma OR (3)  aged 48 and older OR (3)  American aged 72 and older  Osteoporosis Screening: covered every 2 years if you meet one of the following conditions: (1) Have a vertebral abnormality; (2) On glucocorticoid therapy for more than 3 months; (3) Have primary hyperparathyroidism; (4) On osteoporosis medications and need to assess response to drug therapy  HIV Screening: covered annually if you're between the age of 12-76  Also covered annually if you are younger than 13 and older than 72 with risk factors for HIV infection  For pregnant patients, it is covered up to 3 times per pregnancy      Immunizations:  Immunization Recommendations   Influenza Vaccine Annual influenza vaccination during flu season is recommended for all persons aged >= 6 months who do not have contraindications   Pneumococcal Vaccine   * Pneumococcal conjugate vaccine = PCV13 (Prevnar 13), PCV15 (Vaxneuvance), PCV20 (Prevnar 20)  * Pneumococcal polysaccharide vaccine = PPSV23 (Pneumovax) Adults 2364 years old: 1-3 doses may be recommended based on certain risk factors  Adults 72 years old: 1-2 doses may be recommended based off what pneumonia vaccine you previously received   Hepatitis B Vaccine 3 dose series if at intermediate or high risk (ex: diabetes, end stage renal disease, liver disease)   Tetanus (Td) Vaccine - COST NOT COVERED BY MEDICARE PART B Following completion of primary series, a booster dose should be given every 10 years to maintain immunity against tetanus  Td may also be given as tetanus wound prophylaxis  Tdap Vaccine - COST NOT COVERED BY MEDICARE PART B Recommended at least once for all adults  For pregnant patients, recommended with each pregnancy  Shingles Vaccine (Shingrix) - COST NOT COVERED BY MEDICARE PART B  2 shot series recommended in those aged 48 and above     Health Maintenance Due:      Topic Date Due    Colorectal Cancer Screening  10/16/2023    Hepatitis C Screening  Completed     Immunizations Due:      Topic Date Due    COVID-19 Vaccine (1) Never done     Advance Directives   What are advance directives? Advance directives are legal documents that state your wishes and plans for medical care  These plans are made ahead of time in case you lose your ability to make decisions for yourself  Advance directives can apply to any medical decision, such as the treatments you want, and if you want to donate organs  What are the types of advance directives? There are many types of advance directives, and each state has rules about how to use them  You may choose a combination of any of the following:  Living will: This is a written record of the treatment you want  You can also choose which treatments you do not want, which to limit, and which to stop at a certain time  This includes surgery, medicine, IV fluid, and tube feedings     Durable power of  for [facility]): This is a written record that states who you want to make healthcare choices for you when you are unable to make them for yourself  This person, called a proxy, is usually a family member or a friend  You may choose more than 1 proxy  Do not resuscitate (DNR) order:  A DNR order is used in case your heart stops beating or you stop breathing  It is a request not to have certain forms of treatment, such as CPR  A DNR order may be included in other types of advance directives  Medical directive: This covers the care that you want if you are in a coma, near death, or unable to make decisions for yourself  You can list the treatments you want for each condition  Treatment may include pain medicine, surgery, blood transfusions, dialysis, IV or tube feedings, and a ventilator (breathing machine)  Values history: This document has questions about your views, beliefs, and how you feel and think about life  This information can help others choose the care that you would choose  Why are advance directives important? An advance directive helps you control your care  Although spoken wishes may be used, it is better to have your wishes written down  Spoken wishes can be misunderstood, or not followed  Treatments may be given even if you do not want them  An advance directive may make it easier for your family to make difficult choices about your care  Depression   Depression  is a medical condition that causes feelings of sadness or hopelessness that do not go away  Depression may cause you to lose interest in things you used to enjoy  These feelings may interfere with your daily life  Call your local emergency number (911 in the [address] [place] [address],[address]) if:   You think about harming yourself or someone else  You have done something on purpose to hurt yourself    The following resources are available at any time to help you, if needed:   [facility]: 5-107.230.4464 (1-125-628-CYCO)   Suicide Hotline: 2-166.811.3138 (5-604-UWOSXBD)   For a list of international numbers: https://save org/find-help/international-resources/  Treatment for depression may include medicine to relieve depression  Medicine is often used together with therapy  Therapy is a way for you to talk about your feelings and anything that may be causing depression  Therapy can be done alone or in a group  It may also be done with family members or a significant other  Get regular physical activity  Create a regular sleep schedule  Eat a variety of healthy foods  Do not drink alcohol or use drugs  Weight Management   Why it is important to manage your weight:  Being overweight increases your risk of health conditions such as heart disease, high blood pressure, type 2 diabetes, and certain types of cancer  It can also increase your risk for osteoarthritis, sleep apnea, and other respiratory problems  Aim for a slow, steady weight loss  Even a small amount of weight loss can lower your risk of health problems  How to lose weight safely:  A safe and healthy way to lose weight is to eat fewer calories and get regular exercise  You can lose up about 1 pound a week by decreasing the number of calories you eat by 500 calories each day  Healthy meal plan for weight management:  A healthy meal plan includes a variety of foods, contains fewer calories, and helps you stay healthy  A healthy meal plan includes the following:  Eat whole-grain foods more often  A healthy meal plan should contain fiber  Fiber is the part of grains, fruits, and vegetables that is not broken down by your body  Whole-grain foods are healthy and provide extra fiber in your diet  Some examples of whole-grain foods are whole-wheat breads and pastas, oatmeal, brown rice, and bulgur  Eat a variety of vegetables every day  Include dark, leafy greens such as spinach, kale, heaven greens, and mustard greens   Eat yellow and orange vegetables such as carrots, sweet potatoes, and winter squash  Eat a variety of fruits every day  Choose fresh or canned fruit (canned in its own juice or light syrup) instead of juice  Fruit juice has very little or no fiber  Eat low-fat dairy foods  Drink fat-free (skim) milk or 1% milk  Eat fat-free yogurt and low-fat cottage cheese  Try low-fat cheeses such as mozzarella and other reduced-fat cheeses  Choose meat and other protein foods that are low in fat  Choose beans or other legumes such as split peas or lentils  Choose fish, skinless poultry (chicken or turkey), or lean cuts of red meat (beef or pork)  Before you cook meat or poultry, cut off any visible fat  Use less fat and oil  Try baking foods instead of frying them  Add less fat, such as margarine, sour cream, regular salad dressing and mayonnaise to foods  Eat fewer high-fat foods  Some examples of high-fat foods include french fries, doughnuts, ice cream, and cakes  Eat fewer sweets  Limit foods and drinks that are high in sugar  This includes candy, cookies, regular soda, and sweetened drinks  Exercise:  Exercise at least 30 minutes per day on most days of the week  Some examples of exercise include walking, biking, dancing, and swimming  You can also fit in more physical activity by taking the stairs instead of the elevator or parking farther away from stores  Ask your healthcare provider about the best exercise plan for you  © Copyright [employer] 2018 Information is for End User's use only and may not be sold, redistributed or otherwise used for commercial purposes   All illustrations and images included in CareNotes® are the copyrighted property of [employer]  or [address] [place] [address] [employer]

## 2023-05-23 ENCOUNTER — HOSPITAL ENCOUNTER (OUTPATIENT)
Facility: HOSPITAL | Age: 73
Setting detail: OUTPATIENT SURGERY
Discharge: HOME | End: 2023-05-23
Attending: STUDENT IN AN ORGANIZED HEALTH CARE EDUCATION/TRAINING PROGRAM | Admitting: STUDENT IN AN ORGANIZED HEALTH CARE EDUCATION/TRAINING PROGRAM

## 2023-05-23 ENCOUNTER — APPOINTMENT (OUTPATIENT)
Dept: RADIOLOGY | Facility: HOSPITAL | Age: 73
End: 2023-05-23

## 2023-05-23 VITALS
TEMPERATURE: 97.1 F | HEART RATE: 52 BPM | DIASTOLIC BLOOD PRESSURE: 68 MMHG | RESPIRATION RATE: 18 BRPM | OXYGEN SATURATION: 98 % | SYSTOLIC BLOOD PRESSURE: 140 MMHG

## 2023-05-23 RX ORDER — LIDOCAINE HYDROCHLORIDE 10 MG/ML
INJECTION, SOLUTION EPIDURAL; INFILTRATION; INTRACAUDAL; PERINEURAL AS NEEDED
Status: DISCONTINUED | OUTPATIENT
Start: 2023-05-23 | End: 2023-05-23 | Stop reason: HOSPADM

## 2023-05-23 RX ORDER — DEXAMETHASONE SODIUM PHOSPHATE 10 MG/ML
INJECTION, SOLUTION INTRAMUSCULAR; INTRAVENOUS AS NEEDED
Status: DISCONTINUED | OUTPATIENT
Start: 2023-05-23 | End: 2023-05-23 | Stop reason: HOSPADM

## 2023-05-23 RX ORDER — SODIUM CHLORIDE 9 MG/ML
INJECTION INTRAVENOUS AS NEEDED
Status: DISCONTINUED | OUTPATIENT
Start: 2023-05-23 | End: 2023-05-23 | Stop reason: HOSPADM

## 2023-05-23 NOTE — DISCHARGE INSTRUCTIONS
Epidural Steroid Injection   WHAT YOU NEED TO KNOW:   An epidural steroid injection (CAROLEE) is a procedure to inject steroid medicine into the epidural space  The epidural space is between your spinal cord and vertebrae  Steroids reduce inflammation and fluid buildup in your spine that may be causing pain  You may be given pain medicine along with the steroids  ACTIVITY  Do not drive or operate machinery today  No strenuous activity today - bending, lifting, etc   You may resume normal activites starting tomorrow - start slowly and as tolerated  You may shower today, but no tub baths or hot tubs  You may have numbness for several hours from the local anesthetic  Please use caution and common sense, especially with weight-bearing activities  CARE OF THE INJECTION SITE  If you have soreness or pain, apply ice to the area today (20 minutes on/20 minutes off)  Starting tomorrow, you may use warm, moist heat or ice if needed  You may have an increase or change in your discomfort for 36-48 hours after your treatment  Apply ice and continue with any pain medication you have been prescribed  Notify the [facility] if you have any of the following: redness, drainage, swelling, headache, stiff neck or fever above 100°F     SPECIAL INSTRUCTIONS  Our office will contact you in approximately 7 days for a progress report  MEDICATIONS  Continue to take all routine medications  Our office may have instructed you to hold some medications  As no general anesthesia was used in today's procedure, you should not experience any side effects related to anesthesia  If you are diabetic, the steroids used in today's injection may temporarily increase your blood sugar levels after the first few days after your injection  Please keep a close eye on your sugars and alert the doctor who manages your diabetes if your sugars are significantly high from your baseline or you are symptomatic       If you have a problem specifically related to your procedure, please call our office at [phone]  Problems not related to your procedure should be directed to your primary care physician

## 2023-05-23 NOTE — OP NOTE
OPERATIVE REPORT  PATIENT NAME: Abby Whitlock    :  1950  MRN: [id number]  Pt Location: [facility] OR ROOM 01    SURGERY DATE: 2023    Surgeon(s) and Role:     * Bib Jennings DO - Primary    Preop Diagnosis:  Cervical spondylosis without myelopathy [M47 812]  Cervicalgia [M54 2]    Post-Op Diagnosis Codes:     * Cervical spondylosis without myelopathy [M47 812]     * Cervicalgia [M54 2]    Procedure(s):  BLOCK / INJECTION EPIDURAL STEROID CERVICAL C7-T1  (21060)    Specimen(s):  * No specimens in log *    Estimated Blood Loss:   Minimal    Drains:  * No LDAs found *    Anesthesia Type:   Local    Operative Indications:  Cervical spondylosis without myelopathy [M47 812]  Cervicalgia [M54 2]      Operative Findings:      Complications:   None    Procedure and Technique:  DATE: May 23, 2023  PROCEDURE: C7-T1 Interlaminar Cervical Epidural Steroid Injection under Fluoroscopy  Physician: Hattie Donahue DO  PRE-OPERATIVE DIAGNOSIS: Cervical spinal stenosis, cervical radiculopathy  POST-OPERATIVE DIAGNOSIS: Same  ANESTHESIA: Local  EBL:  Zero  COMPLICATIONS: None    Indications  Abby Whitlock is a 68 y o  male with a history of left sided neck pain who has failed conservative therapy and presents today for epidural steroid injection for symptom relief     Informed Consent  The risks, benefits and alternatives of the procedure were discussed with the patient  The patient was given opportunity to ask questions regarding the procedure, its indications and the associated risks  The risks of the procedure discussed include but are not limited to infection, bleeding, headache, worsened pain, allergic reactions, nerve injuries, paralysis, susceptibility to COVID from steroids, and side effects  The patient showed understanding and wished to proceed  Informed consent was signed  Preparation  After obtaining informed consent, the patient's chart was reviewed, and the patient's identification was confirmed   The patient was brought to the operating room and placed in the prone position on the operating room table  Routine monitors were applied  A surgical timeout was performed per hospital policy  No skin lesions or signs of cellulitis were noted  Strict sterile technique was used  Skin was prepped with chloraprep solution and draped in sterile manner  Using an entry point in the space identified via fluoroscopy, the overlying skin was anesthetized with 1% Lidocaine using a 25 gauge needle  Using a Midline approach, a 20 gauge touhy needle was inserted through anesthetized skin, and was advanced under fluoroscopic guidance  Next, the fluoroscopy was positioned in a contralateral oblique position and the needle was redirected until the ligamentum flavum was engaged at the C7-T1 interspace, afterwhich the epidural space was entered by loss of resistance to air  There were no noted parasthesias, and aspiration was negative for heme and CSF  Position of the needle in the epidural space was confirmed by fluoroscopy in both the AP and BRIDGET Positions  Omnipaque 300 contrast dye was then injected, and an appropriate epidurogram was observed with no vascular uptake  2 preservative free normal saline and 10 mg dexamethasone, was injected intermittently after negative heme and csf aspirations  There was no pain on injection  A Band-Aid dressing was applied  The post-operative exam did not reveal any new neurological deficits  Patient was sent to the recovery room in a stable condition  The patient was informed when to follow up post procedure  The patient was instructed to call with fever, chills, increased pain, redness or swelling at the injection site, or numbness or weakness in the arm or hand  I was present for the entire procedure      Patient Disposition:  PACU         SIGNATURE: Marisela Goff DO  DATE: May 23, 2023  TIME: 9:56 AM

## 2023-05-24 ENCOUNTER — APPOINTMENT (OUTPATIENT)
Dept: LAB | Facility: CLINIC | Age: 73
End: 2023-05-24

## 2023-05-24 DIAGNOSIS — E78.2 MIXED HYPERLIPIDEMIA: ICD-10-CM

## 2023-05-24 DIAGNOSIS — R17 ELEVATED BILIRUBIN: ICD-10-CM

## 2023-05-24 LAB
ALBUMIN SERPL BCP-MCNC: 3.9 G/DL (ref 3.5–5)
ALP SERPL-CCNC: 51 U/L (ref 46–116)
ALT SERPL W P-5'-P-CCNC: 28 U/L (ref 12–78)
ANION GAP SERPL CALCULATED.3IONS-SCNC: 2 MMOL/L (ref 4–13)
AST SERPL W P-5'-P-CCNC: 23 U/L (ref 5–45)
BILIRUB SERPL-MCNC: 0.6 MG/DL (ref 0.2–1)
BUN SERPL-MCNC: 17 MG/DL (ref 5–25)
CALCIUM SERPL-MCNC: 9.5 MG/DL (ref 8.3–10.1)
CHLORIDE SERPL-SCNC: 107 MMOL/L (ref 96–108)
CHOLEST SERPL-MCNC: 129 MG/DL
CO2 SERPL-SCNC: 25 MMOL/L (ref 21–32)
CREAT SERPL-MCNC: 0.99 MG/DL (ref 0.6–1.3)
GFR SERPL CREATININE-BSD FRML MDRD: 75 ML/MIN/1.73SQ M
GLUCOSE P FAST SERPL-MCNC: 109 MG/DL (ref 65–99)
HDLC SERPL-MCNC: 54 MG/DL
LDLC SERPL CALC-MCNC: 66 MG/DL (ref 0–100)
POTASSIUM SERPL-SCNC: 3.9 MMOL/L (ref 3.5–5.3)
PROT SERPL-MCNC: 8.4 G/DL (ref 6.4–8.4)
SODIUM SERPL-SCNC: 134 MMOL/L (ref 135–147)
TRIGL SERPL-MCNC: 43 MG/DL

## 2023-05-30 ENCOUNTER — TELEPHONE (OUTPATIENT)
Dept: RADIOLOGY | Facility: MEDICAL CENTER | Age: 73
End: 2023-05-30

## 2023-05-30 DIAGNOSIS — M54.2 NECK PAIN: ICD-10-CM

## 2023-05-30 DIAGNOSIS — M54.12 CERVICAL RADICULITIS: ICD-10-CM

## 2023-05-30 DIAGNOSIS — M47.812 CERVICAL SPONDYLOSIS: ICD-10-CM

## 2023-05-30 RX ORDER — GABAPENTIN 300 MG/1
300 CAPSULE ORAL
Qty: 30 CAPSULE | Refills: 0 | Status: SHIPPED | OUTPATIENT
Start: 2023-05-30

## 2023-05-30 NOTE — TELEPHONE ENCOUNTER
Patient Reports       60-70  %     improvement post injection    Pain Level    0 head /10                       4-5 neck

## 2023-06-21 ENCOUNTER — CONSULT (OUTPATIENT)
Dept: SURGERY | Facility: CLINIC | Age: 73
End: 2023-06-21
Payer: COMMERCIAL

## 2023-06-21 VITALS
WEIGHT: 231 LBS | HEART RATE: 55 BPM | HEIGHT: 72 IN | SYSTOLIC BLOOD PRESSURE: 125 MMHG | BODY MASS INDEX: 31.29 KG/M2 | DIASTOLIC BLOOD PRESSURE: 75 MMHG | TEMPERATURE: 96.8 F

## 2023-06-21 DIAGNOSIS — R10.33 PERIUMBILICAL ABDOMINAL PAIN: ICD-10-CM

## 2023-06-21 DIAGNOSIS — Z90.79 S/P PROSTATECTOMY: ICD-10-CM

## 2023-06-21 DIAGNOSIS — K42.9 UMBILICAL HERNIA WITHOUT OBSTRUCTION AND WITHOUT GANGRENE: ICD-10-CM

## 2023-06-21 DIAGNOSIS — I10 BENIGN ESSENTIAL HYPERTENSION: ICD-10-CM

## 2023-06-21 DIAGNOSIS — K91.89 PORT-SITE HERNIA: Primary | ICD-10-CM

## 2023-06-21 DIAGNOSIS — K43.2 PORT-SITE HERNIA: Primary | ICD-10-CM

## 2023-06-21 DIAGNOSIS — I72.0 PSEUDOANEURYSM OF CAROTID ARTERY (HCC): ICD-10-CM

## 2023-06-21 PROCEDURE — 99204 OFFICE O/P NEW MOD 45 MIN: CPT | Performed by: SPECIALIST

## 2023-06-21 RX ORDER — METRONIDAZOLE 500 MG/100ML
500 INJECTION, SOLUTION INTRAVENOUS ONCE
OUTPATIENT
Start: 2023-06-21 | End: 2023-06-21

## 2023-06-21 RX ORDER — LEVOFLOXACIN 5 MG/ML
500 INJECTION, SOLUTION INTRAVENOUS ONCE
OUTPATIENT
Start: 2023-06-21 | End: 2023-06-21

## 2023-06-21 RX ORDER — POLYETHYLENE GLYCOL 3350 17 G/17G
17 POWDER, FOR SOLUTION ORAL DAILY
Qty: 119 G | Refills: 0 | Status: SHIPPED | OUTPATIENT
Start: 2023-06-21 | End: 2023-06-28

## 2023-06-21 NOTE — PROGRESS NOTES
"Portions of the record may have been created with voice recognition software  Occasional wrong word or \"sound a like\" substitutions may have occurred due to the inherent limitations of voice recognition software  Read the chart carefully and recognize, using context, where substitutions have occurred  Assessment:  1  Neck pain    2  Myofascial pain syndrome    3  Cervical spondylosis        Plan:  No orders of the defined types were placed in this encounter  New Medications Ordered This Visit   Medications   • acetaminophen (TYLENOL) 650 mg CR tablet     Sig: Take 1 tablet (650 mg total) by mouth every 8 (eight) hours as needed for mild pain or moderate pain     Dispense:  90 tablet     Refill:  0   • gabapentin (NEURONTIN) 100 mg capsule     Sig: Take 1 capsule (100 mg total) by mouth daily at bedtime     Dispense:  30 capsule     Refill:  3    55-year-old gentleman presenting with more than 3-year history of left periauricular and occipital region pain with rotation of the neck to the left status post cervical epidural steroid injection with 65% improvement     Minimal radiating symptoms into the left upper extremity or any associated motor or sensory deficit  On physical exam there was no tenderness to palpation in the cervical facet on the left, trapezius  No erythema hematoma  No gross motor or sensory deficits noted  Has been in physical therapy with temporary relief       -We discussed the option of repeating epidural steroid injection however patient would like to hold off and continue conservative management  We will provide home exercises  Advised patient take Tylenol 650 mg 3 times daily as well as continue with 250 mg Robaxin as needed for muscle spasms  We will decrease gabapentin to 100 mg nightly as patient does not want to be on medication long-term  We will follow-up in 2 months      My impressions and treatment recommendations were discussed in detail with the patient who verbalized " understanding and had no further questions  Discharge instructions were provided  I personally saw and examined the patient and I agree with the above discussed plan of care  History of Present Illness: Thais Medel is a 68 y o  male who presents for a follow up office visit in regards to Follow-up, Neck Pain, and Shoulder Pain (Neck , head and shoulder pain is intermittent  Pain is dull and pressure-like when turning head  )  Patient presents today for follow-up status post cervical epidural steroid injection in setting of left periauricular occipital region pain and limited range of motion of rotation to the left  Overall reporting 65% improvement of symptoms  Continues to deny any radiating pain in bilateral upper extremity or any numbness or weakness  No falls  No headaches, fevers, chills, injection site looks clean dry intact with no erythema hematoma  Patient continues on gabapentin 300 mg nightly as well as Robaxin 250 mg as needed nightly which does provide some relief  Does not take Tylenol or NSAIDs regularly  Other Symptoms:  The patient does not have numbness  The patient does not have weakness  The patient does not have bladder dysfunction  The patient does not have bowel dysfunction  The patient does not have chills and fever, night sweats or unintentional weight loss  PT: Completed, Home exercise    Current Pain Medications:  Gabapentin, Robaxin    Pain Medications Trialed:     Interventional Techniques Employed:  BARB C7-T1  Imaging:      No MRI cervical spine results found in the past 2 years   No MRI lumbar spine results found in the past 2 years   No MRI thoracic spine results found in the past 2 years   XR spine cervical complete 4 or 5 vw non injury    Result Date: 5/26/2021  Impression     Degenerative spondylosis as above           Workstation performed: [id number]          No X-ray lumbar spine results found in the past 2 years   No X-ray hip/pelvis results found in the past 2 years   No X-ray knee results found in the past 2 years     Lab Results   Component Value Date    CREATININE 0 99 05/24/2023     Lab Results   Component Value Date    ALT 28 05/24/2023    ALT 18 10/02/2020       I have personally reviewed and/or updated the patient's past medical history, past surgical history, family history, social history, current medications, allergies, and vital signs today  Review of Systems   Musculoskeletal: Positive for neck pain and neck stiffness  Left shoulder, head   Neurological: Positive for numbness  Patient Active Problem List   Diagnosis   • Actinic keratosis   • History of prostate cancer   • Anemia   • Benign essential hypertension   • Disc degeneration, lumbar   • Fatty liver disease, nonalcoholic   • Herpes simplex infection   • Mixed hyperlipidemia   • Organic impotence   • Lower extremity edema   • Groin mass   • S/P prostatectomy   • 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(HCC)   • Periumbilical abdominal pain   • Cervicalgia   • Cervical radiculopathy   • Adenocarcinoma of prostate ([facility] [address] )   • Port-site hernia   • Umbilical hernia without obstruction and without gangrene       Past Medical History:   Diagnosis Date   • Chronic rhinitis 02/05/2005    last assessed 2/5/05, resolved 5/18/17    • Elevated liver function tests     last assessed 3/9/15, resolved 5/18/17    • Herpes simplex type 1 infection    • Hip arthritis     resolved 12/20/17    • Hypertension    • Shoulder bursitis     last assessed 9/23/13, resolved 5/18/17        Past Surgical History:   Procedure Laterality Date   • EPIDURAL BLOCK INJECTION N/A 5/23/2023    Procedure: BLOCK / INJECTION EPIDURAL STEROID CERVICAL C7-T1  (78795);   Surgeon: Elise Tovar DO;  Location: [facility];  Service: Pain Management    • HERNIA REPAIR      Inguinal sliding  10/2018   • PROSTATOTOMY      last assessed 1/11/18       Family History   Problem Relation Age of Onset   • Hypertension Mother    • Lung cancer Family    • Prostate cancer Family    • Mental illness Neg Hx        Social History     Occupational History   • Not on file   Tobacco Use   • Smoking status: Never   • Smokeless tobacco: Never   Vaping Use   • Vaping Use: Never used   Substance and Sexual Activity   • Alcohol use: Yes     Comment: very rare   • Drug use: Never   • Sexual activity: Not Currently       Current Outpatient Medications on File Prior to Visit   Medication Sig   • amLODIPine (NORVASC) 2 5 mg tablet Take 1 tablet (2 5 mg total) by mouth daily   • aspirin 81 mg chewable tablet Chew 81 mg daily   • atorvastatin (LIPITOR) 10 mg tablet TAKE 1 TABLET BY MOUTH  DAILY   • Ferrous Gluconate-C-Folic Acid (IRON-C PO) Take 1 Dose by mouth daily   • Multiple Vitamin (MULTIVITAMINS PO) Take 1 capsule by mouth daily   • polyethylene glycol (MIRALAX) 17 g packet Take 17 g by mouth daily for 7 days   • quinapril (ACCUPRIL) 10 mg tablet TAKE 1 TABLET BY MOUTH  EVERY 12 HOURS   • [DISCONTINUED] acetaminophen (TYLENOL) 500 mg tablet Take 500 mg by mouth as needed   • [DISCONTINUED] gabapentin (NEURONTIN) 300 mg capsule Take 1 capsule (300 mg total) by mouth daily at bedtime   • ALPRAZolam (XANAX) 1 mg tablet Take 1 tablet (1 mg total) by mouth 1 (one) time for 1 dose Take 1 hour prior to cervical MRI imaging Do not start before April 20, 2023  • famotidine (PEPCID) 20 mg tablet Take 1 tablet (20 mg total) by mouth 2 (two) times a day   • methocarbamol (ROBAXIN) 500 mg tablet Take 0 5 tablets (250 mg total) by mouth 2 (two) times a day as needed for muscle spasms for up to 10 days     No current facility-administered medications on file prior to visit  Allergies   Allergen Reactions   • Augmentin [Amoxicillin-Pot Clavulanate] GI Intolerance   • Meperidine Other (See Comments)     Reaction Date: [id number]; Annotation - [id number]: unsure of side effect  Reaction Date: [id number];  Annotation - [id number]: unsure of side effect       Physical Exam:    /72   Pulse 62   Ht 6' (1 829 m) Comment: verbal  Wt 104 kg (230 lb)   BMI 31 19 kg/m²       General: Well-developed, well-nourished male in NAD  HEENT: Head is normocephalic/atraumatic  Pulmonary: Non-labored breathing  CV: No pretibial edema  Skin: Without any cyanosis, rash, or lesions  Neuromuscular:   Awake and alert  The patient is seated in a neutral position  Rises from a seated position without difficulty and ambulates unassisted; gait is not antalgic  Inspection: No evidence of scoliosis and muscle bulk appears normal      Palpation:     No tenderness over the spinous processes in the cervical region  No tenderness to palpation bilateral cervical paraspinal region  Injection site looks clean dry intact without any erythema, hematoma or swelling noted  Sensory exam:     Intact to light touch grossly in the left upper extremity  Intact to light touch grossly in the right upper extremity    Pain with rotation of the neck to the left, localized to the left cervical paraspinal occipital region  Upper Ext -      Shoulder abduction Biceps flexion Triceps extension Wrist flexion Wrist extension Finger abduction Hand squeeze   L 5/5 5/5 5/5 5/5 5/5 5/5 5/5   R 5/5 5/5 5/5 5/5 5/5 5/5 5/5         No upper motor neuron lesion signs (negative Sepulveda's, absent ankle clonus)      Special Tests:     TTP over cervical facet Spurling's   L  negative  negative   R  negative  negative

## 2023-06-21 NOTE — PROGRESS NOTES
History and Physical Examination - General Surgery   [facility]  Anell Saint 68 y o  male MRN: [id number]  : [id number] PCP: NEHAL Carrillo    History of Present Illness   Chief Complaint:   Umbilical pain  Recurrent umbilical hernia following radical prostatectomy and hernia repair    HPI:  Anell Saint is a 68 y o  male who presents to office with 3 of umbilical hernia which was repaired by Dr Derrick Tabor many years ago and recently patient had prostatectomy and repair of umbilical hernia has recurred  Causing him some discomfort and pain    Denies any nausea vomiting diarrhea const  Denies any history of incarceration  Denies any urinary complaint  Patient has a pseudoaneurysm of carotid artery which is under observation with neurosurgeon    Wants to schedule his surgery in August 2023  Historical Information   The following portions of the patient's history were reviewed and updated as appropriate  Past Medical History:   Diagnosis Date   • Chronic rhinitis 02/05/2005    last assessed 2/5/05, resolved 5/18/17    • Elevated liver function tests     last assessed 3/9/15, resolved 5/18/17    • Herpes simplex type 1 infection    • Hip arthritis     resolved 12/20/17    • Hypertension    • Shoulder bursitis     last assessed 9/23/13, resolved 5/18/17      Past Surgical History:   Procedure Laterality Date   • EPIDURAL BLOCK INJECTION N/A 5/23/2023    Procedure: BLOCK / INJECTION EPIDURAL STEROID CERVICAL C7-T1  (78770);   Surgeon: Kalpana Chew DO;  Location:  MAIN OR;  Service: Pain Management    • HERNIA REPAIR      Inguinal sliding  10/2018   • PROSTATOTOMY      last assessed 1/11/18     Social History   Social History     Substance and Sexual Activity   Alcohol Use Yes    Comment: very rare     Social History     Substance and Sexual Activity   Drug Use Never     Social History     Tobacco Use   Smoking Status Never   Smokeless Tobacco Never     Family History:   Family History Problem Relation Age of Onset   • Hypertension Mother    • Lung cancer Family    • Prostate cancer Family    • Mental illness Neg Hx        Meds/Allergies   Allergies   Allergen Reactions   • Augmentin [Amoxicillin-Pot Clavulanate] GI Intolerance   • Meperidine Other (See Comments)     Reaction Date: [id number]; Annotation - [id number]: unsure of side effect  Reaction Date: [id number];  Annotation - [id number]: unsure of side effect       Current Outpatient Medications:   •  acetaminophen (TYLENOL) 500 mg tablet, Take 500 mg by mouth as needed, Disp: , Rfl:   •  amLODIPine (NORVASC) 2 5 mg tablet, Take 1 tablet (2 5 mg total) by mouth daily, Disp: 90 tablet, Rfl: 1  •  aspirin 81 mg chewable tablet, Chew 81 mg daily, Disp: , Rfl:   •  atorvastatin (LIPITOR) 10 mg tablet, TAKE 1 TABLET BY MOUTH  DAILY, Disp: 90 tablet, Rfl: 3  •  Ferrous Gluconate-C-Folic Acid (IRON-C PO), Take 1 Dose by mouth daily, Disp: , Rfl:   •  gabapentin (NEURONTIN) 300 mg capsule, Take 1 capsule (300 mg total) by mouth daily at bedtime, Disp: 30 capsule, Rfl: 0  •  Multiple Vitamin (MULTIVITAMINS PO), Take 1 capsule by mouth daily, Disp: , Rfl:   •  ALPRAZolam (XANAX) 1 mg tablet, Take 1 tablet (1 mg total) by mouth 1 (one) time for 1 dose Take 1 hour prior to cervical MRI imaging Do not start before April 20, 2023 , Disp: 1 tablet, Rfl: 0  •  famotidine (PEPCID) 20 mg tablet, Take 1 tablet (20 mg total) by mouth 2 (two) times a day, Disp: 60 tablet, Rfl: 0  •  methocarbamol (ROBAXIN) 500 mg tablet, Take 0 5 tablets (250 mg total) by mouth 2 (two) times a day as needed for muscle spasms for up to 10 days, Disp: 10 tablet, Rfl: 0  •  quinapril (ACCUPRIL) 10 mg tablet, TAKE 1 TABLET BY MOUTH  EVERY 12 HOURS, Disp: 180 tablet, Rfl: 3     REVIEW OF SYSTEMS  Constitutional:  Denies fever or chills   Eyes:  Denies change in visual acuity   HENT:  Denies nasal congestion or sore throat   Respiratory:  Denies cough or shortness of breath Cardiovascular:  Denies chest pain or edema   History of hypertension under control  GI:  Denies abdominal pain, nausea, vomiting, bloody stools or diarrhea   :  Denies dysuria, frequency, difficulty in micturition and nocturia  Musculoskeletal:  Denies back pain or joint pain   Neurologic:  Denies headache, focal weakness or sensory changes of pseudoaneurysm of the carotid artery right patient follows neurosurgeons and they have kept him under observation patient is on aspirin  Endocrine:  Denies polyuria or polydipsia   Lymphatic:  Denies swollen glands   Psychiatric:  Denies depression or anxiety     Objective   Current Vitals:   /75 (BP Location: Left arm, Patient Position: Sitting, Cuff Size: Large)   Pulse 55   Temp (!) 96 8 °F (36 °C) (Core)   Ht 6' (1 829 m) Comment: per patient  Wt 105 kg (231 lb)   BMI 31 33 kg/m²   Body mass index is 31 33 kg/m²  PHYSICAL EXAMS  General:  Patient is not in acute distress, laying in the bed comfortably, awake, alert responding to commands,   HEENT:  Both pupils normal-size atraumatic, normocephalic, nonicteric  Neck:  JVP not raised  Trachea central  Respiratory:  normal Breath sounds clear to auscultation,  Cardiovascular:  S1-S2 normal without any murmur   GI:  Abdomen soft nontender   Liver and spleen normal size, no free fluid, large supraumbilical incision for port placement for prostate surgery defect on the left side of the incision defect is approximately 2 5 cm  Musculoskeletal:  No back pain or neck pain and was recently injected by pain management  Integument:  No skin rashes or ulceration  Lymphatic:  No cervical or inguinal lymphadenopathy  Neurologic:  Patient is awake alert, responding to command, well-oriented to time and place and person moving all extremities ambulating well    Visit Diagnosis:   Diagnoses and all orders for this visit:    Port-site hernia    Umbilical hernia without obstruction and without gangrene  -     Ambulatory Referral to General Surgery    S/P prostatectomy    Periumbilical abdominal pain    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(HCC)    Benign essential hypertension    Other orders  -     Ferrous Gluconate-C-Folic Acid (IRON-C PO); Take 1 Dose by mouth daily       Plan of care was discussed with patient in detail    Pertinent labs reviewed  Pertinent images and available reads personally reviewed  Procedure: FL spine and pain procedure    Result Date: 5/23/2023  Narrative: A spine and pain study was performed by the Department of Spine and Pain  Please refer to the report for the official interpretation  The images are stored for archival purposes only  Study images were not formally reviewed by the Radiology Department  Procedure: US kidney and bladder    Result Date: 4/27/2023  Narrative: RENAL ULTRASOUND INDICATION:   R53 83: Other fatigue R10 2: Pelvic and perineal pain  COMPARISON: Comparison is made to the kidneys and urinary bladder and CT of chest, abdomen, and pelvis dated 6/11/2021 TECHNIQUE:   Ultrasound of the retroperitoneum was performed with a curvilinear transducer utilizing volumetric sweeps and still imaging techniques  FINDINGS: KIDNEYS: Symmetric and normal size  Right kidney: 10 4 x 6 0 x 6 2 cm  Volume 202 mL Left kidney: 10 5 x 4 7 x 6 2 cm  Volume 161 mL Right kidney Normal echogenicity and contour  1 4 cm upper pole anechoic simple cyst  0 8 cm upper pole anechoic simple cyst  No hydronephrosis  No shadowing calculi  No perinephric fluid collections  Left kidney Normal echogenicity and contour  4 4 cm upper pole anechoic simple cyst  No hydronephrosis  No shadowing calculi  No perinephric fluid collections  URETERS: Nonvisualized  BLADDER: Incompletely distended which limits evaluation  No definite focal thickening or mass lesions, although evaluation is limited secondary to under distention  Bilateral ureteral jets detected  Impression: 1   Bilateral renal cysts, otherwise unremarkable kidneys without hydronephrosis  2  Limited evaluation of the urinary bladder secondary to under distention  3   The etiology of the patient's clinical symptomatology is not definitively identified on ultrasound  Workstation performed: [id number]     Pertinent notes reviewed    Assessment/Plan   Assessment:  Recurrent port site hernia/ventral hernia  Symptomatic   Encounter Diagnoses   Name Primary? • Umbilical hernia without obstruction and without gangrene    • Port-site hernia Yes   • S/P prostatectomy    • Periumbilical abdominal pain    • 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(HCC)    • Benign essential hypertension       Plan:  Proper consent was obtained  The risks, benefits, alternatives,and probabilities of success were discussed in detail with no guarantee made as to outcome  All questions were answered to the patient's satisfaction  Patient understands that some of patient's symptoms or all symptoms may persist even after surgery and wished to proceed with surgery    Preoperative prep was explained to the patient  Will repeat the blood work CBC CMP EKG prior to surgery    He was advised to call his neurosurgeon to check prior to surgery if he is cleared for surgical procedure in view of pseudoaneurysm of carotid artery makes him more susceptible and a high risk for stroke  Patient was advised to continue taking aspirin prior to surgery    Counseling / Coordination of Care  Expained patient  in detailed about coordination of care  A description of the counseling / coordination of care:  I performed an interim history, pertinent images and labs, performed a physical examination to arrive at the plan delineated above with associated thought processes  MD Ady Gonzalez    Office   Tel  [phone]  Fax   [phone]

## 2023-06-26 ENCOUNTER — OFFICE VISIT (OUTPATIENT)
Dept: PAIN MEDICINE | Facility: CLINIC | Age: 73
End: 2023-06-26
Payer: COMMERCIAL

## 2023-06-26 VITALS
HEIGHT: 72 IN | BODY MASS INDEX: 31.15 KG/M2 | SYSTOLIC BLOOD PRESSURE: 135 MMHG | WEIGHT: 230 LBS | HEART RATE: 62 BPM | DIASTOLIC BLOOD PRESSURE: 72 MMHG

## 2023-06-26 DIAGNOSIS — M47.812 CERVICAL SPONDYLOSIS: ICD-10-CM

## 2023-06-26 DIAGNOSIS — M79.18 MYOFASCIAL PAIN SYNDROME: ICD-10-CM

## 2023-06-26 DIAGNOSIS — M54.2 NECK PAIN: Primary | ICD-10-CM

## 2023-06-26 PROCEDURE — 99214 OFFICE O/P EST MOD 30 MIN: CPT | Performed by: STUDENT IN AN ORGANIZED HEALTH CARE EDUCATION/TRAINING PROGRAM

## 2023-06-26 RX ORDER — SENNOSIDES 8.6 MG
650 CAPSULE ORAL EVERY 8 HOURS PRN
Qty: 90 TABLET | Refills: 0 | Status: SHIPPED | OUTPATIENT
Start: 2023-06-26 | End: 2023-07-26

## 2023-06-26 RX ORDER — GABAPENTIN 100 MG/1
100 CAPSULE ORAL
Qty: 30 CAPSULE | Refills: 1 | Status: SHIPPED | OUTPATIENT
Start: 2023-06-26

## 2023-07-28 ENCOUNTER — TELEPHONE (OUTPATIENT)
Dept: PAIN MEDICINE | Facility: CLINIC | Age: 73
End: 2023-07-28

## 2023-07-28 DIAGNOSIS — M54.2 NECK PAIN: ICD-10-CM

## 2023-07-28 DIAGNOSIS — G89.4 CHRONIC PAIN SYNDROME: ICD-10-CM

## 2023-07-28 DIAGNOSIS — M47.812 CERVICAL SPONDYLOSIS: ICD-10-CM

## 2023-07-28 DIAGNOSIS — M79.18 MYOFASCIAL PAIN SYNDROME: ICD-10-CM

## 2023-07-28 RX ORDER — METHOCARBAMOL 500 MG/1
250 TABLET, FILM COATED ORAL 2 TIMES DAILY PRN
Qty: 14 TABLET | Refills: 0 | Status: SHIPPED | OUTPATIENT
Start: 2023-07-28 | End: 2023-08-11

## 2023-07-28 NOTE — TELEPHONE ENCOUNTER
Refill request  Gabapentin 100mg  1 capsule at bedtime  Remaining: 3 pills left  [id number]   Call back# [phone]    Methocarbamol 500mg (not sure if he is continue taking this?)  Take 0.5 tablets (250 mg total) by mouth 2 (two) times a day as needed for muscle spasms for up to 10 days  [facility]  Call back# [phone]

## 2023-07-28 NOTE — TELEPHONE ENCOUNTER
S/w pt who states he is weaning off Jose and is currently taking 100mg @ HS w/ 3 tabs left. Pt asking if he can stop taking or if he needs another script? RN stated that if he is taking 100mg nightly he may finish out his 3 remaining pills and no need to fill rf. Pt did ask if HS can rf his Robaxin script so that he has it PRN. Pt currently has no Robaxin avail. Pls advise. [facility] [employer] Thank you!

## 2023-08-10 ENCOUNTER — OFFICE VISIT (OUTPATIENT)
Dept: FAMILY MEDICINE CLINIC | Facility: CLINIC | Age: 73
End: 2023-08-10
Payer: COMMERCIAL

## 2023-08-10 VITALS
BODY MASS INDEX: 30.92 KG/M2 | TEMPERATURE: 97.2 F | WEIGHT: 228 LBS | HEART RATE: 70 BPM | SYSTOLIC BLOOD PRESSURE: 124 MMHG | DIASTOLIC BLOOD PRESSURE: 70 MMHG | RESPIRATION RATE: 18 BRPM

## 2023-08-10 DIAGNOSIS — J06.9 ACUTE URI: Primary | ICD-10-CM

## 2023-08-10 DIAGNOSIS — C61 ADENOCARCINOMA OF PROSTATE (HCC): ICD-10-CM

## 2023-08-10 DIAGNOSIS — F33.9 DEPRESSION, RECURRENT (HCC): ICD-10-CM

## 2023-08-10 DIAGNOSIS — I10 BENIGN ESSENTIAL HYPERTENSION: ICD-10-CM

## 2023-08-10 PROCEDURE — 99214 OFFICE O/P EST MOD 30 MIN: CPT | Performed by: FAMILY MEDICINE

## 2023-08-10 RX ORDER — AZITHROMYCIN 250 MG/1
TABLET, FILM COATED ORAL
Qty: 6 TABLET | Refills: 0 | Status: SHIPPED | OUTPATIENT
Start: 2023-08-10 | End: 2023-08-16

## 2023-08-10 NOTE — PROGRESS NOTES
Name: Zari Yeager      : 1950      MRN: [id number]  Encounter Provider: Magali Baker MD  Encounter Date: 8/10/2023   Encounter department: [facility]     1. Acute URI  Comments:  Discussed supportive care. Orders:  -     azithromycin (Zithromax) 250 mg tablet; Take 2 tablets (500 mg total) by mouth daily for 1 day, THEN 1 tablet (250 mg total) daily for 4 days. 2. Depression, recurrent ([address])  Comments:  Stable. He has good days and bad days. Not on any medication at this time. He will think about it. 3. Adenocarcinoma of prostate ([address])  Comments:  Diagnosed 5-6 years ago. S/p surgery. [facility]. Stable. 4. Benign essential hypertension  Comments:  Controlled. Depression Screening and Follow-up Plan: Patient was screened for depression during today's encounter. They screened negative with a PHQ-2 score of 0. Subjective      URI   This is a new problem. Episode onset: 2 weeks. The problem has been gradually worsening. There has been no fever. Associated symptoms include coughing and a sore throat. Pertinent negatives include no abdominal pain, chest pain, congestion, diarrhea, dysuria, ear pain, headaches, joint pain, joint swelling, nausea, neck pain, plugged ear sensation, rash, rhinorrhea, sinus pain, sneezing, swollen glands, vomiting or wheezing. Treatments tried: mucinex. The treatment provided mild relief. Review of Systems   HENT: Positive for sore throat. Negative for congestion, ear pain, rhinorrhea, sinus pain and sneezing. Respiratory: Positive for cough. Negative for wheezing. Cardiovascular: Negative for chest pain. Gastrointestinal: Negative for abdominal pain, diarrhea, nausea and vomiting. Genitourinary: Negative for dysuria. Musculoskeletal: Negative for joint pain and neck pain. Skin: Negative for rash. Neurological: Negative for headaches.        Current Outpatient Medications on File Prior to Visit   Medication Sig   • amLODIPine (NORVASC) 2.5 mg tablet Take 1 tablet (2.5 mg total) by mouth daily   • aspirin 81 mg chewable tablet Chew 81 mg daily   • atorvastatin (LIPITOR) 10 mg tablet TAKE 1 TABLET BY MOUTH  DAILY   • Ferrous Gluconate-C-Folic Acid (IRON-C PO) Take 1 Dose by mouth daily   • methocarbamol (ROBAXIN) 500 mg tablet Take 0.5 tablets (250 mg total) by mouth 2 (two) times a day as needed for muscle spasms for up to 14 days   • Multiple Vitamin (MULTIVITAMINS PO) Take 1 capsule by mouth daily   • quinapril (ACCUPRIL) 10 mg tablet TAKE 1 TABLET BY MOUTH  EVERY 12 HOURS   • famotidine (PEPCID) 20 mg tablet Take 1 tablet (20 mg total) by mouth 2 (two) times a day   • [DISCONTINUED] ALPRAZolam (XANAX) 1 mg tablet Take 1 tablet (1 mg total) by mouth 1 (one) time for 1 dose Take 1 hour prior to cervical MRI imaging Do not start before April 20, 2023. (Patient not taking: Reported on 8/10/2023)   • [DISCONTINUED] gabapentin (NEURONTIN) 100 mg capsule Take 1 capsule (100 mg total) by mouth daily at bedtime (Patient not taking: Reported on 8/10/2023)   • [DISCONTINUED] polyethylene glycol (MIRALAX) 17 g packet Take 17 g by mouth daily for 7 days       Objective     /70   Pulse 70   Temp (!) 97.2 °F (36.2 °C)   Resp 18   Wt 103 kg (228 lb)   BMI 30.92 kg/m²     Physical Exam  Constitutional:       General: He is not in acute distress. Appearance: He is well-developed. He is not diaphoretic. HENT:      Head: Normocephalic and atraumatic. Right Ear: Tympanic membrane and ear canal normal. No drainage, swelling or tenderness. No middle ear effusion. Tympanic membrane is not erythematous. Left Ear: Tympanic membrane and ear canal normal. No drainage, swelling or tenderness. No middle ear effusion. Tympanic membrane is not erythematous. Nose: No congestion or rhinorrhea. Mouth/Throat:      Mouth: Mucous membranes are moist. No oral lesions.       Pharynx: No pharyngeal swelling, oropharyngeal exudate, posterior oropharyngeal erythema or uvula swelling. Eyes:      General: No scleral icterus. Right eye: No discharge. Left eye: No discharge. Conjunctiva/sclera: Conjunctivae normal.   Cardiovascular:      Rate and Rhythm: Normal rate and regular rhythm. Pulses: Normal pulses. Pulmonary:      Effort: Pulmonary effort is normal. No respiratory distress. Breath sounds: Normal breath sounds. No stridor. No wheezing, rhonchi or rales. Chest:      Chest wall: No tenderness. Musculoskeletal:         General: Normal range of motion. Cervical back: Normal range of motion. Skin:     General: Skin is warm. Neurological:      Mental Status: He is alert and oriented to person, place, and time. Psychiatric:         Mood and Affect: Mood normal.         Behavior: Behavior normal.         Thought Content:  Thought content normal.         Judgment: Judgment normal.       Irene Baig MD

## 2023-08-14 ENCOUNTER — APPOINTMENT (OUTPATIENT)
Dept: LAB | Facility: HOSPITAL | Age: 73
End: 2023-08-14
Attending: SPECIALIST
Payer: COMMERCIAL

## 2023-08-14 ENCOUNTER — APPOINTMENT (OUTPATIENT)
Dept: LAB | Facility: HOSPITAL | Age: 73
End: 2023-08-14
Payer: COMMERCIAL

## 2023-08-14 DIAGNOSIS — I10 BENIGN ESSENTIAL HYPERTENSION: ICD-10-CM

## 2023-08-14 DIAGNOSIS — K42.9 UMBILICAL HERNIA WITHOUT OBSTRUCTION AND WITHOUT GANGRENE: ICD-10-CM

## 2023-08-14 DIAGNOSIS — Z90.79 S/P PROSTATECTOMY: ICD-10-CM

## 2023-08-14 DIAGNOSIS — K91.89 PORT-SITE HERNIA: ICD-10-CM

## 2023-08-14 DIAGNOSIS — K43.2 PORT-SITE HERNIA: ICD-10-CM

## 2023-08-14 DIAGNOSIS — R10.33 PERIUMBILICAL ABDOMINAL PAIN: ICD-10-CM

## 2023-08-14 DIAGNOSIS — I72.0 PSEUDOANEURYSM OF CAROTID ARTERY (HCC): ICD-10-CM

## 2023-08-14 LAB
ALBUMIN SERPL BCP-MCNC: 4.2 G/DL (ref 3.5–5)
ALP SERPL-CCNC: 41 U/L (ref 34–104)
ALT SERPL W P-5'-P-CCNC: 20 U/L (ref 7–52)
ANION GAP SERPL CALCULATED.3IONS-SCNC: 5 MMOL/L
AST SERPL W P-5'-P-CCNC: 22 U/L (ref 13–39)
ATRIAL RATE: 60 BPM
BILIRUB SERPL-MCNC: 0.78 MG/DL (ref 0.2–1)
BUN SERPL-MCNC: 12 MG/DL (ref 5–25)
CALCIUM SERPL-MCNC: 9.3 MG/DL (ref 8.4–10.2)
CHLORIDE SERPL-SCNC: 106 MMOL/L (ref 96–108)
CO2 SERPL-SCNC: 27 MMOL/L (ref 21–32)
CREAT SERPL-MCNC: 0.89 MG/DL (ref 0.6–1.3)
ERYTHROCYTE [DISTWIDTH] IN BLOOD BY AUTOMATED COUNT: 12.7 % (ref 11.6–15.1)
GFR SERPL CREATININE-BSD FRML MDRD: 84 ML/MIN/1.73SQ M
GLUCOSE P FAST SERPL-MCNC: 92 MG/DL (ref 65–99)
HCT VFR BLD AUTO: 38.2 % (ref 36.5–49.3)
HGB BLD-MCNC: 12.9 G/DL (ref 12–17)
MCH RBC QN AUTO: 31.2 PG (ref 26.8–34.3)
MCHC RBC AUTO-ENTMCNC: 33.8 G/DL (ref 31.4–37.4)
MCV RBC AUTO: 93 FL (ref 82–98)
P AXIS: 61 DEGREES
PLATELET # BLD AUTO: 184 THOUSANDS/UL (ref 149–390)
PMV BLD AUTO: 9.9 FL (ref 8.9–12.7)
POTASSIUM SERPL-SCNC: 4.1 MMOL/L (ref 3.5–5.3)
PR INTERVAL: 218 MS
PROT SERPL-MCNC: 7.5 G/DL (ref 6.4–8.4)
QRS AXIS: 4 DEGREES
QRSD INTERVAL: 86 MS
QT INTERVAL: 402 MS
QTC INTERVAL: 402 MS
RBC # BLD AUTO: 4.13 MILLION/UL (ref 3.88–5.62)
SODIUM SERPL-SCNC: 138 MMOL/L (ref 135–147)
T WAVE AXIS: 42 DEGREES
VENTRICULAR RATE: 60 BPM
WBC # BLD AUTO: 5.76 THOUSAND/UL (ref 4.31–10.16)

## 2023-08-14 PROCEDURE — 80053 COMPREHEN METABOLIC PANEL: CPT

## 2023-08-14 PROCEDURE — 36415 COLL VENOUS BLD VENIPUNCTURE: CPT

## 2023-08-14 PROCEDURE — 85027 COMPLETE CBC AUTOMATED: CPT

## 2023-08-14 PROCEDURE — 93010 ELECTROCARDIOGRAM REPORT: CPT | Performed by: INTERNAL MEDICINE

## 2023-08-16 NOTE — PRE-PROCEDURE INSTRUCTIONS
Pre-Surgery Instructions:   Medication Instructions   • amLODIPine (NORVASC) 2.5 mg tablet Take day of surgery. • aspirin 81 mg chewable tablet Stop taking 7 days prior to surgery. • atorvastatin (LIPITOR) 10 mg tablet Hold day of surgery. • Ferrous Gluconate-C-Folic Acid (IRON-C PO) Hold day of surgery. • methocarbamol (ROBAXIN) 500 mg tablet Hold day of surgery. • Multiple Vitamin (MULTIVITAMINS PO) Hold day of surgery. • quinapril (ACCUPRIL) 10 mg tablet Hold day of surgery. Medication instructions for day surgery reviewed. Please use only a sip of water to take your instructed medications. Avoid all over the counter vitamins, supplements and NSAIDS for one week prior to surgery per anesthesia guidelines. Tylenol is ok to take as needed. You will receive a call one business day prior to surgery with an arrival time and hospital directions. If your surgery is scheduled on a Monday, the hospital will be calling you on the Friday prior to your surgery. If you have not heard from anyone by 8pm, please call the hospital supervisor through the hospital  at [phone]. Pham Myers [phone]). Do not eat or drink anything after midnight the night before your surgery, including candy, mints, lifesavers, or chewing gum. Do not drink alcohol 24hrs before your surgery. Try not to smoke at least 24hrs before your surgery. Follow the pre surgery showering instructions as listed in the [facility] Surgical Experience Booklet” or otherwise provided by your surgeon's office. Do not shave the surgical area 24 hours before surgery. Do not apply any lotions, creams, including makeup, cologne, deodorant, or perfumes after showering on the day of your surgery. No contact lenses, eye make-up, or artificial eyelashes. Remove nail polish, including gel polish, and any artificial, gel, or acrylic nails if possible. Remove all jewelry including rings and body piercing jewelry.      Wear causal clothing that is easy to take on and off. Consider your type of surgery. Keep any valuables, jewelry, piercings at home. Please bring any specially ordered equipment (sling, braces) if indicated. Arrange for a responsible person to drive you to and from the hospital on the day of your surgery. Visitor Guidelines discussed. Call the surgeon's office with any new illnesses, exposures, or additional questions prior to surgery. Please reference your [facility] Surgical Experience Booklet” for additional information to prepare for your upcoming surgery.

## 2023-08-18 ENCOUNTER — ANESTHESIA EVENT (OUTPATIENT)
Dept: PERIOP | Facility: HOSPITAL | Age: 73
End: 2023-08-18
Payer: COMMERCIAL

## 2023-08-22 ENCOUNTER — HOSPITAL ENCOUNTER (OUTPATIENT)
Facility: HOSPITAL | Age: 73
Setting detail: OUTPATIENT SURGERY
Discharge: HOME | End: 2023-08-22
Attending: SPECIALIST | Admitting: SPECIALIST
Payer: COMMERCIAL

## 2023-08-22 ENCOUNTER — ANESTHESIA (OUTPATIENT)
Dept: PERIOP | Facility: HOSPITAL | Age: 73
End: 2023-08-22
Payer: COMMERCIAL

## 2023-08-22 VITALS
OXYGEN SATURATION: 96 % | TEMPERATURE: 97.2 F | BODY MASS INDEX: 30.92 KG/M2 | HEIGHT: 72 IN | DIASTOLIC BLOOD PRESSURE: 70 MMHG | RESPIRATION RATE: 18 BRPM | HEART RATE: 59 BPM | SYSTOLIC BLOOD PRESSURE: 134 MMHG

## 2023-08-22 DIAGNOSIS — K42.9 UMBILICAL HERNIA WITHOUT OBSTRUCTION AND WITHOUT GANGRENE: ICD-10-CM

## 2023-08-22 DIAGNOSIS — K91.89 PORT-SITE HERNIA: ICD-10-CM

## 2023-08-22 DIAGNOSIS — Z87.19 S/P REPAIR OF RECURRENT VENTRAL HERNIA: Primary | ICD-10-CM

## 2023-08-22 DIAGNOSIS — Z98.890 S/P REPAIR OF RECURRENT VENTRAL HERNIA: Primary | ICD-10-CM

## 2023-08-22 DIAGNOSIS — Z90.79 S/P PROSTATECTOMY: ICD-10-CM

## 2023-08-22 DIAGNOSIS — I10 BENIGN ESSENTIAL HYPERTENSION: ICD-10-CM

## 2023-08-22 DIAGNOSIS — I72.0 PSEUDOANEURYSM OF CAROTID ARTERY (HCC): ICD-10-CM

## 2023-08-22 DIAGNOSIS — K43.2 PORT-SITE HERNIA: ICD-10-CM

## 2023-08-22 DIAGNOSIS — R10.33 PERIUMBILICAL ABDOMINAL PAIN: ICD-10-CM

## 2023-08-22 PROCEDURE — 49591 RPR AA HRN 1ST < 3 CM RDC: CPT | Performed by: SPECIALIST

## 2023-08-22 PROCEDURE — C1781 MESH (IMPLANTABLE): HCPCS | Performed by: SPECIALIST

## 2023-08-22 PROCEDURE — 88302 TISSUE EXAM BY PATHOLOGIST: CPT | Performed by: PATHOLOGY

## 2023-08-22 PROCEDURE — 49591 RPR AA HRN 1ST < 3 CM RDC: CPT | Performed by: PHYSICIAN ASSISTANT

## 2023-08-22 DEVICE — BARD VENTRALEX HERNIA PATCH, 4.3 CM (1.7"), SMALL CIRCLE WITH STRAP
Type: IMPLANTABLE DEVICE | Site: ABDOMEN | Status: FUNCTIONAL
Brand: VENTRALEX

## 2023-08-22 RX ORDER — DEXAMETHASONE SODIUM PHOSPHATE 10 MG/ML
INJECTION, SOLUTION INTRAMUSCULAR; INTRAVENOUS AS NEEDED
Status: DISCONTINUED | OUTPATIENT
Start: 2023-08-22 | End: 2023-08-22

## 2023-08-22 RX ORDER — MIDAZOLAM HYDROCHLORIDE 2 MG/2ML
INJECTION, SOLUTION INTRAMUSCULAR; INTRAVENOUS AS NEEDED
Status: DISCONTINUED | OUTPATIENT
Start: 2023-08-22 | End: 2023-08-22

## 2023-08-22 RX ORDER — METRONIDAZOLE 500 MG/100ML
500 INJECTION, SOLUTION INTRAVENOUS ONCE
Status: COMPLETED | OUTPATIENT
Start: 2023-08-22 | End: 2023-08-22

## 2023-08-22 RX ORDER — PROPOFOL 10 MG/ML
INJECTION, EMULSION INTRAVENOUS AS NEEDED
Status: DISCONTINUED | OUTPATIENT
Start: 2023-08-22 | End: 2023-08-22

## 2023-08-22 RX ORDER — FENTANYL CITRATE/PF 50 MCG/ML
50 SYRINGE (ML) INJECTION
Status: DISCONTINUED | OUTPATIENT
Start: 2023-08-22 | End: 2023-08-22 | Stop reason: HOSPADM

## 2023-08-22 RX ORDER — MAGNESIUM HYDROXIDE 1200 MG/15ML
LIQUID ORAL AS NEEDED
Status: DISCONTINUED | OUTPATIENT
Start: 2023-08-22 | End: 2023-08-22 | Stop reason: HOSPADM

## 2023-08-22 RX ORDER — SCOLOPAMINE TRANSDERMAL SYSTEM 1 MG/1
1 PATCH, EXTENDED RELEASE TRANSDERMAL
Status: DISCONTINUED | OUTPATIENT
Start: 2023-08-22 | End: 2023-08-22 | Stop reason: HOSPADM

## 2023-08-22 RX ORDER — BUPIVACAINE HYDROCHLORIDE AND EPINEPHRINE 5; 5 MG/ML; UG/ML
INJECTION, SOLUTION PERINEURAL AS NEEDED
Status: DISCONTINUED | OUTPATIENT
Start: 2023-08-22 | End: 2023-08-22 | Stop reason: HOSPADM

## 2023-08-22 RX ORDER — SODIUM CHLORIDE, SODIUM LACTATE, POTASSIUM CHLORIDE, CALCIUM CHLORIDE 600; 310; 30; 20 MG/100ML; MG/100ML; MG/100ML; MG/100ML
125 INJECTION, SOLUTION INTRAVENOUS CONTINUOUS
Status: DISCONTINUED | OUTPATIENT
Start: 2023-08-22 | End: 2023-08-22 | Stop reason: HOSPADM

## 2023-08-22 RX ORDER — NEOSTIGMINE METHYLSULFATE 1 MG/ML
INJECTION INTRAVENOUS AS NEEDED
Status: DISCONTINUED | OUTPATIENT
Start: 2023-08-22 | End: 2023-08-22

## 2023-08-22 RX ORDER — ONDANSETRON 2 MG/ML
INJECTION INTRAMUSCULAR; INTRAVENOUS AS NEEDED
Status: DISCONTINUED | OUTPATIENT
Start: 2023-08-22 | End: 2023-08-22

## 2023-08-22 RX ORDER — LEVOFLOXACIN 5 MG/ML
500 INJECTION, SOLUTION INTRAVENOUS ONCE
Status: COMPLETED | OUTPATIENT
Start: 2023-08-22 | End: 2023-08-22

## 2023-08-22 RX ORDER — FENTANYL CITRATE 50 UG/ML
INJECTION, SOLUTION INTRAMUSCULAR; INTRAVENOUS AS NEEDED
Status: DISCONTINUED | OUTPATIENT
Start: 2023-08-22 | End: 2023-08-22

## 2023-08-22 RX ORDER — LEVOFLOXACIN 5 MG/ML
INJECTION, SOLUTION INTRAVENOUS CONTINUOUS PRN
Status: DISCONTINUED | OUTPATIENT
Start: 2023-08-22 | End: 2023-08-22

## 2023-08-22 RX ORDER — LIDOCAINE HYDROCHLORIDE 10 MG/ML
INJECTION, SOLUTION EPIDURAL; INFILTRATION; INTRACAUDAL; PERINEURAL AS NEEDED
Status: DISCONTINUED | OUTPATIENT
Start: 2023-08-22 | End: 2023-08-22

## 2023-08-22 RX ORDER — OXYCODONE HYDROCHLORIDE AND ACETAMINOPHEN 5; 325 MG/1; MG/1
1 TABLET ORAL EVERY 8 HOURS PRN
Qty: 10 TABLET | Refills: 0 | Status: SHIPPED | OUTPATIENT
Start: 2023-08-22

## 2023-08-22 RX ORDER — ONDANSETRON 2 MG/ML
4 INJECTION INTRAMUSCULAR; INTRAVENOUS ONCE AS NEEDED
Status: DISCONTINUED | OUTPATIENT
Start: 2023-08-22 | End: 2023-08-22 | Stop reason: HOSPADM

## 2023-08-22 RX ORDER — GLYCOPYRROLATE 0.2 MG/ML
INJECTION INTRAMUSCULAR; INTRAVENOUS AS NEEDED
Status: DISCONTINUED | OUTPATIENT
Start: 2023-08-22 | End: 2023-08-22

## 2023-08-22 RX ADMIN — LIDOCAINE HYDROCHLORIDE 50 MG: 10 INJECTION, SOLUTION EPIDURAL; INFILTRATION; INTRACAUDAL; PERINEURAL at 12:12

## 2023-08-22 RX ADMIN — SODIUM CHLORIDE, SODIUM LACTATE, POTASSIUM CHLORIDE, AND CALCIUM CHLORIDE: .6; .31; .03; .02 INJECTION, SOLUTION INTRAVENOUS at 13:28

## 2023-08-22 RX ADMIN — SCOPALAMINE 1 PATCH: 1 PATCH, EXTENDED RELEASE TRANSDERMAL at 10:36

## 2023-08-22 RX ADMIN — PROPOFOL 150 MG: 10 INJECTION, EMULSION INTRAVENOUS at 12:12

## 2023-08-22 RX ADMIN — FENTANYL CITRATE 50 MCG: 50 INJECTION, SOLUTION INTRAMUSCULAR; INTRAVENOUS at 12:27

## 2023-08-22 RX ADMIN — GLYCOPYRROLATE 0.8 MG: 0.2 INJECTION, SOLUTION INTRAMUSCULAR; INTRAVENOUS at 13:20

## 2023-08-22 RX ADMIN — LEVOFLOXACIN: 5 INJECTION, SOLUTION INTRAVENOUS at 11:54

## 2023-08-22 RX ADMIN — ONDANSETRON 4 MG: 2 INJECTION INTRAMUSCULAR; INTRAVENOUS at 12:30

## 2023-08-22 RX ADMIN — DEXAMETHASONE SODIUM PHOSPHATE 10 MG: 10 INJECTION, SOLUTION INTRAMUSCULAR; INTRAVENOUS at 12:30

## 2023-08-22 RX ADMIN — NEOSTIGMINE METHYLSULFATE 4 MG: 1 INJECTION INTRAVENOUS at 13:20

## 2023-08-22 RX ADMIN — FENTANYL CITRATE 50 MCG: 50 INJECTION, SOLUTION INTRAMUSCULAR; INTRAVENOUS at 14:09

## 2023-08-22 RX ADMIN — FENTANYL CITRATE 50 MCG: 50 INJECTION, SOLUTION INTRAMUSCULAR; INTRAVENOUS at 13:54

## 2023-08-22 RX ADMIN — LEVOFLOXACIN 500 MG: 500 INJECTION, SOLUTION INTRAVENOUS at 11:23

## 2023-08-22 RX ADMIN — FENTANYL CITRATE 50 MCG: 50 INJECTION, SOLUTION INTRAMUSCULAR; INTRAVENOUS at 12:12

## 2023-08-22 RX ADMIN — MIDAZOLAM 2 MG: 1 INJECTION INTRAMUSCULAR; INTRAVENOUS at 12:05

## 2023-08-22 RX ADMIN — SODIUM CHLORIDE, SODIUM LACTATE, POTASSIUM CHLORIDE, AND CALCIUM CHLORIDE 125 ML/HR: .6; .31; .03; .02 INJECTION, SOLUTION INTRAVENOUS at 10:26

## 2023-08-22 RX ADMIN — METRONIDAZOLE: 500 INJECTION, SOLUTION INTRAVENOUS at 12:18

## 2023-08-22 NOTE — PERIOPERATIVE NURSING NOTE
Patient states pain is minimal, #3/10. Belching, dozing at intervals, wife at bedside, IV infusing. Umbilical dressing dry, intact, ice to site.

## 2023-08-22 NOTE — DISCHARGE INSTR - AVS FIRST PAGE
Please follow carefully all instructions checked below. 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 Diet:  Resume your normal diet. Avoid red meat eat lots of Plain natural yogurt or Probiotics   2. Medications:  Home medications reviewed. Resume pre-operative medications unless noted below. Prescription sent home with patient and Prescription sent over to pharmacy  See after visit summary for reconciled discharge medications provided to patient and family. Apply ice to incision area this will numb that area and it will prevent bruising and will minimize pain . 3. Activities:  Do NOT make important personal or business decisions for 24 hours. Do NOT take the anticoagulation medicine like Eliquis Xarelto Coumadin for 24 hours after surgery  Do NOT drive or operate machinery for 7 days  While taking pain medication, do not drive and be careful as you walk or climb stairs. Limit your activities for 3 weeks. Do not engage in sports, heavy work or heavy. lifting until your physician gives you permission. 4. Wound Care:  Change dressings as necessary after 2 days. Keep dressings dry. 5. Special Instructions:  Full weight bearing. 6. Bathing:  May shower after 2 days.   7. When to Call:  Call if fever, Nausea, vomiting, Constipation not feeling well, or wound infection, bleeding,reddness and excessive pain,  8. Follow-up Care: Make an appointment to see Linnea Claude, MD. [address] [facility]. [facility]. in 10 days for Follow up.  Please Call Office  [phone] for appointment,       Linnea Claude, MD [address] [place] [address],[facility]  [facility]:  [address] [place] [address].  [address]  Office - [phone]  Fax - [phone]  01/03/18  2:24 PM

## 2023-08-22 NOTE — ANESTHESIA PREPROCEDURE EVALUATION
Procedure:  REPAIR HERNIA VENTRAL (Abdomen)    Relevant Problems   ANESTHESIA (within normal limits)      CARDIO   (+) Benign essential hypertension   (+) Mixed hyperlipidemia      GI/HEPATIC   (+) Fatty liver disease, nonalcoholic      /RENAL   (+) Adenocarcinoma of prostate (HCC)      HEMATOLOGY   (+) Anemia      MUSCULOSKELETAL   (+) Disc degeneration, lumbar      NEURO/PSYCH   (+) Depression, recurrent (HCC)      PULMONARY (within normal limits)        Physical Exam    Airway    Mallampati score: II  TM Distance: >3 FB  Neck ROM: full     Dental   Comment: Some missing without significant loose.,     Cardiovascular  Rhythm: regular, Rate: normal,     Pulmonary  Breath sounds clear to auscultation,     Other Findings        Anesthesia Plan  ASA Score- 2     Anesthesia Type- general with ASA Monitors. Additional Monitors:   Airway Plan: ETT. Plan Factors-Exercise tolerance (METS): >4 METS. Chart reviewed. EKG reviewed. Existing labs reviewed. Patient summary reviewed. Patient is not a current smoker. Obstructive sleep apnea risk education given perioperatively. Induction- intravenous. Postoperative Plan- Plan for postoperative opioid use. Planned trial extubation    Informed Consent- Anesthetic plan and risks discussed with patient. I personally reviewed this patient with the CRNA. Discussed and agreed on the Anesthesia Plan with the CRNA. Sukumar Alcaraz

## 2023-08-22 NOTE — OP NOTE
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]  Benign essential hypertension [I10]  Patient Active Problem List   Diagnosis   • Actinic keratosis   • History of prostate cancer   • Anemia   • Benign essential hypertension   • Disc degeneration, lumbar   • Fatty liver disease, nonalcoholic   • Herpes simplex infection   • Mixed hyperlipidemia   • Organic impotence   • Lower extremity edema   • Groin mass   • S/P prostatectomy   • 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(HCC)   • Periumbilical abdominal pain   • Cervicalgia   • Cervical radiculopathy   • Adenocarcinoma of prostate ([address])   • Port-site hernia   • Umbilical hernia without obstruction and without gangrene   • Depression, recurrent (HCC)     Post-op Diagnosis:    Post-Op Diagnosis Codes:     * Umbilical hernia without obstruction and without gangrene [K42.9]     * Port-site hernia [K91.89, K43.2]     * S/P prostatectomy [Y03.92]     * Periumbilical abdominal pain [R10.33]     * 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([address]) [I72.0]     * Benign essential hypertension [I10]  Past Medical History:   Diagnosis Date   • Chronic rhinitis 02/05/2005    last assessed 2/5/05, resolved 5/18/17    • Elevated liver function tests     last assessed 3/9/15, resolved 5/18/17    • Herpes simplex type 1 infection    • Hip arthritis     resolved 12/20/17    • Hypertension    • Shoulder bursitis     last assessed 9/23/13, resolved 5/18/17        Procedure(s):  REPAIR HERNIA VENTRAL    Surgeon(s):  MD Cole Brar PA-C    Anesthesia:  General    Assistant:  Cole Melissa PA-C  Services of [employer] was utilized as a first assistant as there is no surgical residency program at [facility]    Staff: Circulator: Jessica Cartwright RN; Clifford Hennessy RN  Scrub Person: David Abreu; Prudence Birch RN    Operative Findings:  2.5 cm defect 4.5 cm circular Ventralex mesh retrorectus placement    OPERATIVE TECHNIQUE    Gr[employer] Sims was identified by me. Proper detailed consent was obtained from patient risk and benefits were explained to patient and family in detail prior to coming to Operating Room. Site was marked. Gr[employer] Sims was given pre-operative antibiotics. Body mass index is 30.92 kg/m². Gr[employer] Sims is ASA 2 and Fire risk- 3. Gr[employer] Sims was re-identified in the OR. Site was identified and detailed timeout was performed with Anesthesia and OR staff. Supraumbilical transverse incision was made through previously healed incision for radical prostatectomy port   skin And subcutaneous tissue was cut along the line of incision Hernia under the Umbilicus was  from the umbilicus and sac was carefully dissected up to the external oblique aponeurosis and opened Content of hernia was extraperitoneal fat sac was laid open and excess sac was excised and sent for pathology. Sac was closed with Vicryl 0  No other defect was identified. Space was created under the rectus sheath size of the defect was measured    Bard ventral ex hernia patch reference number [id number] lot number [id number] and expiry date 28/6/2026    This mesh was laid the flat the over the Sac of the rectus 3 and the was attached to the anterior abdominal wall with 4 anchoring stitches at 12:00 6:00 to 3:00 and 9:00 position with PDS 2-0 The mesh and the area was thoroughly lavaged her for irrigation mixed. Defect under sheet of the defect were approximated with PDS figure-of-eight interrupted suture in a horizontal fashion    Umbilicus was reattached to the anterior abdominal wall with the Vicryl 2-0 suture. Subcutaneous tissue was approximated with the Vicryl 2-0 sutures The skin was approximated With Monocryl 4-0    All the layers were thoroughly up injected with the Marcaine with epinephrine total 30 cc was used. Griselda Flattery tolerated the procedure well, remain stable during and after the procedure. At the end of the procedure No active bleeding was noted and all the instruments, needle and sponge counts were noted to be correct. Patient was transfered to recovery area in stable condition. I was present for the entire procedure No qualified resident was available. A physician's assistant was required due to the intensity of the surgery. This no residency program in this hospital no resident was available to assist. Physician assistant was required for hemostasis retraction and the closure of the surgical wound. Physician assistant was present during the entire procedure    Estimated Blood Loss:  Minimal    Specimens:  Order Name Source Comment Collection Info Order Time   TISSUE EXAM Abdominal  Collected By: Garcia Hernandez MD 8/22/2023 12:33 PM     Release to patient through Mychart   Immediate              Drains:  * No LDAs found *    Complications:  None    Total OR Time  * Missing case tracking time(s) * . or    I was present for the entire procedure    Patient Disposition:  PACU       Garcia Hernandez MD MS Gertrude Simmons    Date: 8/22/2023  Time: 1:19 PM    Copy to PCP: NEHAL Camacho

## 2023-08-22 NOTE — ANESTHESIA POSTPROCEDURE EVALUATION
Post-Op Assessment Note    CV Status:  Stable    Pain management: adequate     Mental Status:  Sleepy and arousable   Hydration Status:  Stable   PONV Controlled:  None   Airway Patency:  Patent      Post Op Vitals Reviewed: Yes      Staff: CRNA         No notable events documented.     BP   126/73   Temp      Pulse  70   Resp   14   SpO2   99

## 2023-08-22 NOTE — PERIOPERATIVE NURSING NOTE
Assisted to bathroom, sitting on toilet. Voided small ,medium amount in toilet, gait steady with assistance. Dressing on abdomen remain dry, intact, ice remains. Complete discharge instructions reviewed with wife and patient, verbalized understanding of all instructions.

## 2023-08-22 NOTE — INTERVAL H&P NOTE
H&P reviewed. After examining the patient I find no changes in the patients condition since the H&P had been written.     Vitals:    08/22/23 1016   BP: 138/78   Pulse: 62   Resp: 20   Temp: 98.1 °F (36.7 °C)   SpO2: 95%   Patient examined and seen by Dr. Kel Mueller MD along with surgical team  Patient did not have a bowel movement for 2 days  No nausea no vomiting  + Flatus took some MiraLAX Dulcolax  On examination  Normal soft nontender  From previous prostate surgery  Site was marked  Postop care was discussed with patient and patient's wife at bedside all question answered to the satisfaction  They agreed with the present plan of care

## 2023-08-25 ENCOUNTER — OFFICE VISIT (OUTPATIENT)
Dept: SURGERY | Facility: CLINIC | Age: 73
End: 2023-08-25
Payer: COMMERCIAL

## 2023-08-25 DIAGNOSIS — Z09 FOLLOW-UP EXAM: Primary | ICD-10-CM

## 2023-08-25 PROCEDURE — 88302 TISSUE EXAM BY PATHOLOGIST: CPT | Performed by: PATHOLOGY

## 2023-08-25 PROCEDURE — 99212 OFFICE O/P EST SF 10 MIN: CPT | Performed by: SPECIALIST

## 2023-08-25 NOTE — PROGRESS NOTES
General Surgery Office Visit Follow up   [facility]  Patient: Randal Gordon   : 1950 Sex: male MRN: [id number]   CSN: [id number] PCP: NEHAL Welch    Assessment/ Plan:  Randal Gordon is a 68 y.o. male  day(s) POD #   3 days s/p umbilical/ventral hernia repair port site hernia after radical robotic  prostatectomy  Follow-up exam [Z09]    Plan  Stable postop   Reassured though that bruising is localized secondary to subcu heparin injection will resolve in due course of time  Follow-up 2 weeks    SUBJECTIVE:   Noticed after 2 days area of bruising in my suprapubic area away from operation site  OBJECTIVE:  No complaints  Minimal incisional pain  No fever no chills no rigors  Tolerating p.o. Diet well  Normal bowel movement no constipation or diarrhea  Ambulating well   Vitals: There were no vitals taken for this visit.     Active medications:    Current Outpatient Medications:   •  amLODIPine (NORVASC) 2.5 mg tablet, Take 1 tablet (2.5 mg total) by mouth daily, Disp: 90 tablet, Rfl: 1  •  aspirin 81 mg chewable tablet, Chew 81 mg daily, Disp: , Rfl:   •  atorvastatin (LIPITOR) 10 mg tablet, TAKE 1 TABLET BY MOUTH  DAILY, Disp: 90 tablet, Rfl: 3  •  Ferrous Gluconate-C-Folic Acid (IRON-C PO), Take 1 Dose by mouth daily, Disp: , Rfl:   •  methocarbamol (ROBAXIN) 500 mg tablet, Take 0.5 tablets (250 mg total) by mouth 2 (two) times a day as needed for muscle spasms for up to 14 days, Disp: 14 tablet, Rfl: 0  •  Multiple Vitamin (MULTIVITAMINS PO), Take 1 capsule by mouth daily, Disp: , Rfl:   •  oxyCODONE-acetaminophen (PERCOCET) 5-325 mg per tablet, Take 1 tablet by mouth every 8 (eight) hours as needed for severe pain for up to 10 doses Max Daily Amount: 3 tablets, Disp: 10 tablet, Rfl: 0  •  quinapril (ACCUPRIL) 10 mg tablet, TAKE 1 TABLET BY MOUTH  EVERY 12 HOURS, Disp: 180 tablet, Rfl: 3    Physical Exam:   General Alert awake   Normocephalic atraumatic PERRLA   Lungs clear bilaterally  Cardiac normal S1 normal S2  Abdomen soft,non tender Bowel sounds present  Skin: surgical dressing is C/D/I 3 cm circular area of bruising over the suprapubic area  Signs of infection cellulitis or tenderness or increased temperature  Ext: No clubbing, cyanosis, edema  Surgical wound well healed    Visit Diagnosis: . Diagnoses and all orders for this visit:    Follow-up exam       Plan of care was discussed with patient in detail    Pertinent labs reviewed  Most Recent Labs:   Appointment on 08/14/2023   Component Date Value Ref Range Status   • Ventricular Rate 08/14/2023 60  BPM Final   • Atrial Rate 08/14/2023 60  BPM Final   • MA Interval 08/14/2023 218  ms Final   • QRSD Interval 08/14/2023 86  ms Final   • QT Interval 08/14/2023 402  ms Final   • QTC Interval 08/14/2023 402  ms Final   • P Axis 08/14/2023 61  degrees Final   • QRS Axis 08/14/2023 4  degrees Final   • T Wave Axis 08/14/2023 42  degrees Final   Appointment on 08/14/2023   Component Date Value Ref Range Status   • Sodium 08/14/2023 138  135 - 147 mmol/L Final   • Potassium 08/14/2023 4.1  3.5 - 5.3 mmol/L Final   • Chloride 08/14/2023 106  96 - 108 mmol/L Final   • CO2 08/14/2023 27  21 - 32 mmol/L Final   • ANION GAP 08/14/2023 5  mmol/L Final   • BUN 08/14/2023 12  5 - 25 mg/dL Final   • Creatinine 08/14/2023 0.89  0.60 - 1.30 mg/dL Final    Standardized to IDMS reference method   • Glucose, Fasting 08/14/2023 92  65 - 99 mg/dL Final   • Calcium 08/14/2023 9.3  8.4 - 10.2 mg/dL Final   • AST 08/14/2023 22  13 - 39 U/L Final   • ALT 08/14/2023 20  7 - 52 U/L Final    Specimen collection should occur prior to Sulfasalazine administration due to the potential for falsely depressed results.     • Alkaline Phosphatase 08/14/2023 41  34 - 104 U/L Final   • Total Protein 08/14/2023 7.5  6.4 - 8.4 g/dL Final   • Albumin 08/14/2023 4.2  3.5 - 5.0 g/dL Final   • Total Bilirubin 08/14/2023 0.78  0.20 - 1.00 mg/dL Final    Use of this assay is not recommended for patients undergoing treatment with eltrombopag due to the potential for falsely elevated results. N-acetyl-p-benzoquinone imine (metabolite of Acetaminophen) will generate erroneously low results in samples for patients that have taken an overdose of Acetaminophen. • eGFR 08/14/2023 84  ml/min/1.73sq m Final   • WBC 08/14/2023 5.76  4.31 - 10.16 Thousand/uL Final   • RBC 08/14/2023 4.13  3.88 - 5.62 Million/uL Final   • Hemoglobin 08/14/2023 12.9  12.0 - 17.0 g/dL Final   • Hematocrit 08/14/2023 38.2  36.5 - 49.3 % Final   • MCV 08/14/2023 93  82 - 98 fL Final   • MCH 08/14/2023 31.2  26.8 - 34.3 pg Final   • MCHC 08/14/2023 33.8  31.4 - 37.4 g/dL Final   • RDW 08/14/2023 12.7  11.6 - 15.1 % Final   • Platelets 84/22/2668 184  149 - 390 Thousands/uL Final   • MPV 08/14/2023 9.9  8.9 - 12.7 fL Final       Pertinent images and available reads personally reviewed  No results found. Pertinent notes reviewed    Counseling / Coordination of Care  Total Office time /unit time spent today 15minutes. Greater than 50% of total time was spent with the patient and / or family counseling and / or coordination of care. A description of the counseling / coordination of care:  I performed an interim history, pertinent images and labs, performed a physical examination to arrive at the plan delineated above with associated thought processes. Ning Jacob MD MS FRCS FACS  [facility]  08/25/23 11:28 AM        Portions of the record may have been created with voice recognition software. Occasional wrong word or "sound a like" substitutions may have occurred due to the inherent limitations of voice recognition software. Read the chart carefully and recognize, using context, where substitutions have occurred.

## 2023-08-29 DIAGNOSIS — I10 ESSENTIAL HYPERTENSION: ICD-10-CM

## 2023-08-30 RX ORDER — AMLODIPINE BESYLATE 2.5 MG/1
2.5 TABLET ORAL DAILY
Qty: 90 TABLET | Refills: 3 | Status: SHIPPED | OUTPATIENT
Start: 2023-08-30

## 2023-09-06 ENCOUNTER — OFFICE VISIT (OUTPATIENT)
Dept: SURGERY | Facility: CLINIC | Age: 73
End: 2023-09-06
Payer: COMMERCIAL

## 2023-09-06 VITALS — HEIGHT: 72 IN | TEMPERATURE: 97.1 F | BODY MASS INDEX: 30.48 KG/M2 | WEIGHT: 225 LBS

## 2023-09-06 DIAGNOSIS — Z09 SURGICAL FOLLOW-UP CARE: Primary | ICD-10-CM

## 2023-09-06 PROCEDURE — 99212 OFFICE O/P EST SF 10 MIN: CPT | Performed by: SPECIALIST

## 2023-09-06 NOTE — PROGRESS NOTES
General Surgery Office Visit Follow up   [facility]  Patient: Robert Deluna   : 1950 Sex: male MRN: [id number]   CSN: [id number] PCP: NEHAL Nickerson    Assessment/ Plan:  Robert Deluna is a 68 y.o. male  day(s) POD #2 weeks s/p incisional hernia repair supraumbilical status post robotic prostate surgery port site hernia  Surgical follow-up care [Z09]    Plan  Stable postop   Removal of Steri-Strips discharge from follow-up      SUBJECTIVE:   You did wonderful job I had no pain you got 10 out of 10  I have 3's surgeries in the past but this was the best    OBJECTIVE:  No complaints  No fever no chills no rigors  Tolerating p.o.  Diet well  Normal bowel movement no constipation or diarrhea  Ambulating well   Vitals:   Temp (!) 97.1 °F (36.2 °C) (Core)   Ht 6' (1.829 m)   Wt 102 kg (225 lb)   BMI 30.52 kg/m²     Active medications:    Current Outpatient Medications:   •  amLODIPine (NORVASC) 2.5 mg tablet, TAKE 1 TABLET BY MOUTH DAILY, Disp: 90 tablet, Rfl: 3  •  aspirin 81 mg chewable tablet, Chew 81 mg daily, Disp: , Rfl:   •  atorvastatin (LIPITOR) 10 mg tablet, TAKE 1 TABLET BY MOUTH  DAILY, Disp: 90 tablet, Rfl: 3  •  Ferrous Gluconate-C-Folic Acid (IRON-C PO), Take 1 Dose by mouth daily, Disp: , Rfl:   •  methocarbamol (ROBAXIN) 500 mg tablet, Take 0.5 tablets (250 mg total) by mouth 2 (two) times a day as needed for muscle spasms for up to 14 days, Disp: 14 tablet, Rfl: 0  •  Multiple Vitamin (MULTIVITAMINS PO), Take 1 capsule by mouth daily, Disp: , Rfl:   •  quinapril (ACCUPRIL) 10 mg tablet, TAKE 1 TABLET BY MOUTH  EVERY 12 HOURS, Disp: 180 tablet, Rfl: 3  •  oxyCODONE-acetaminophen (PERCOCET) 5-325 mg per tablet, Take 1 tablet by mouth every 8 (eight) hours as needed for severe pain for up to 10 doses Max Daily Amount: 3 tablets (Patient not taking: Reported on 2023), Disp: 10 tablet, Rfl: 0    Physical Exam:   General Alert awake   Normocephalic atraumatic PERRLA Lungs clear bilaterally  Cardiac normal S1 normal S2  Abdomen soft,non tender Bowel sounds present  Skin: surgical dressing is C/D/I  Ext: No clubbing, cyanosis, edema  Surgical wound well healed    Visit Diagnosis: . Diagnoses and all orders for this visit:    Surgical follow-up care       Plan of care was discussed with patient in detail    Pertinent labs reviewed  Most Recent Labs:   No visits with results within 2 Week(s) from this visit. Latest known visit with results is:   Admission on 08/22/2023, Discharged on 08/22/2023   Component Date Value Ref Range Status   • Case Report 08/22/2023    Final                    Value:Surgical Pathology Report                         Case: [id number]                                   Authorizing Provider:  Manjula Gabriel MD          Collected:           08/22/2023 1232              Ordering Location:     [address] Received:            08/22/2023 1657                                     Operating Room                                                               Pathologist:           Lidia Vernon MD                                                             Specimen:    umbilical hernia sac                                                                      • Final Diagnosis 08/22/2023    Final                    Value: This result contains rich text formatting which cannot be displayed here. • Additional Information 08/22/2023    Final                    Value: This result contains rich text formatting which cannot be displayed here. • Gross Description 08/22/2023    Final                    Value: This result contains rich text formatting which cannot be displayed here. Pertinent images and available reads personally reviewed  No results found.       Pertinent notes reviewed  Final Diagnosis   A. U, umbilical hernia sac:  - Fibroadipose tissue, consistent with ventral hernia      Electronically signed by Lidia Vernon MD on 8/25/2023 at 11:59 PM         Counseling / Coordination of Care  Total Office time /unit time spent today 15minutes. Greater than 50% of total time was spent with the patient and / or family counseling and / or coordination of care. A description of the counseling / coordination of care:  I performed an interim history, pertinent images and labs, performed a physical examination to arrive at the plan delineated above with associated thought processes. Manjula Gabriel MD MS [facility]  09/06/23 12:04 PM        Portions of the record may have been created with voice recognition software. Occasional wrong word or "sound a like" substitutions may have occurred due to the inherent limitations of voice recognition software. Read the chart carefully and recognize, using context, where substitutions have occurred.

## 2023-09-11 ENCOUNTER — TELEPHONE (OUTPATIENT)
Dept: FAMILY MEDICINE CLINIC | Facility: CLINIC | Age: 73
End: 2023-09-11

## 2023-09-11 DIAGNOSIS — I10 ESSENTIAL HYPERTENSION: ICD-10-CM

## 2023-09-11 DIAGNOSIS — I10 ESSENTIAL HYPERTENSION: Primary | ICD-10-CM

## 2023-09-11 RX ORDER — QUINAPRIL 10 MG/1
10 TABLET ORAL EVERY 12 HOURS
Qty: 180 TABLET | Refills: 1 | Status: SHIPPED | OUTPATIENT
Start: 2023-09-11 | End: 2023-09-11

## 2023-09-11 RX ORDER — LISINOPRIL 5 MG/1
5 TABLET ORAL DAILY
Qty: 90 TABLET | Refills: 1 | Status: SHIPPED | OUTPATIENT
Start: 2023-09-11

## 2023-09-11 NOTE — TELEPHONE ENCOUNTER
[facility]. They said Quinapril is on back order, they dont think its ever coming back. Please change.

## 2023-09-11 NOTE — TELEPHONE ENCOUNTER
Patient called and left message on machine stating that quinapril is out of stock at [facility] and would like something else sent in.  He is completely out of the medication  Rochelle Young, MARGRET

## 2023-09-11 NOTE — TELEPHONE ENCOUNTER
----- Message from Shreita Florentino. Bernadette Triston sent at 9/9/2023  3:58 PM EDT -----  Regarding: Quinapril Rx  Contact: [phone]  Hi,  I tried renewing my Quinapril prescription at [facility] and was told that they do not carry it. I was wondering if you could call the prescription in to [facility] in [place] for me. I only have a few left. Thank you!     Danish Trammell

## 2023-11-06 ENCOUNTER — OFFICE VISIT (OUTPATIENT)
Age: 73
End: 2023-11-06
Payer: COMMERCIAL

## 2023-11-06 VITALS
HEIGHT: 72 IN | SYSTOLIC BLOOD PRESSURE: 139 MMHG | WEIGHT: 225 LBS | HEART RATE: 60 BPM | BODY MASS INDEX: 30.48 KG/M2 | DIASTOLIC BLOOD PRESSURE: 82 MMHG

## 2023-11-06 DIAGNOSIS — L60.2 OVERGROWN TOENAILS: Primary | ICD-10-CM

## 2023-11-06 DIAGNOSIS — L60.0 INGROWN TOENAIL OF LEFT FOOT: ICD-10-CM

## 2023-11-06 PROCEDURE — 11719 TRIM NAIL(S) ANY NUMBER: CPT | Performed by: STUDENT IN AN ORGANIZED HEALTH CARE EDUCATION/TRAINING PROGRAM

## 2023-11-06 PROCEDURE — 99212 OFFICE O/P EST SF 10 MIN: CPT | Performed by: STUDENT IN AN ORGANIZED HEALTH CARE EDUCATION/TRAINING PROGRAM

## 2023-11-06 NOTE — PROGRESS NOTES
This patient was seen on 11/6/23. My role is Foot , Ankle, and Wound Specialist    ASSESSMENT     Diagnoses and all orders for this visit:    Overgrown toenails    Ingrown toenail of left foot       Problem List Items Addressed This Visit    None  Visit Diagnoses       Overgrown toenails    -  Primary    Ingrown toenail of left foot              PLAN  -Toenails x9 trimmed today out of courtesy  -"Slantback" nail procedure performed on left hallux nail secondary to pain on palpation at the distal medial aspect of the nail border. -RTC 3-months    Nail removal    Date/Time: 11/6/2023 9:45 AM    Performed by: Francie Wallace DPM  Authorized by: Francie Wallace DPM    Patient location:  Clinic  Indications / Diagnosis:  Ingrown toenail left foot  Universal Protocol:  Consent: Verbal consent obtained. Risks and benefits: risks, benefits and alternatives were discussed  Consent given by: patient  Patient understanding: patient states understanding of the procedure being performed  Patient identity confirmed: verbally with patient    Location:     Foot:  L big toe  Nails trimmed:     Number of nails trimmed:  1        SUBJECTIVE    Chief Complaint:  Overgrown toenails     Patient ID: Robert Deluna is a 68 y.o. male. 11/6/2023: Troy Cabezas is a pleasant 70-year-old male who presents today with overgrown toenails. He states that he has not been able to trim them for the past 6 months or so as it is difficult to reach down to his feet. He is hoping to have routine foot care performed today including nail trimming. The following portions of the patient's history were reviewed and updated as appropriate: allergies, current medications, past family history, past medical history, past social history, past surgical history and problem list.    Review of Systems   Constitutional: Negative. Respiratory: Negative. Cardiovascular: Negative. Gastrointestinal: Negative. Genitourinary: Negative.     Musculoskeletal: Negative. Skin: Negative. OBJECTIVE      /82   Pulse 60   Ht 6' (1.829 m)   Wt 102 kg (225 lb)   BMI 30.52 kg/m²        Physical Exam  Constitutional:       Appearance: Normal appearance. HENT:      Head: Normocephalic and atraumatic. Eyes:      General:         Right eye: No discharge. Left eye: No discharge. Cardiovascular:      Rate and Rhythm: Normal rate and regular rhythm. Pulses:           Dorsalis pedis pulses are 2+ on the right side and 2+ on the left side. Posterior tibial pulses are 2+ on the right side and 2+ on the left side. Pulmonary:      Effort: Pulmonary effort is normal.      Breath sounds: Normal breath sounds. Skin:     General: Skin is warm. Capillary Refill: Capillary refill takes less than 2 seconds. Neurological:      Mental Status: He is alert and oriented to person, place, and time. Sensory: Sensation is intact. No sensory deficit.    Psychiatric:         Mood and Affect: Mood normal.         Vascular:  -DP and PT pulses intact b/l  -Capillary refill time <2 sec b/l  -Digital hair growth: Present  -Skin temp: WNL    MSK:  -Pain on palpation to medial aspect of left hallux  -No gross deformities noted   -MMT is 5/5 to all muscle compartments of the lower extremity  -Ankle dorsiflexion >10 degrees with knee extended and knee flexed b/l    Neuro:  -Light sensation intact bilaterally  -Protective sensation intact bilaterally    Derm:  -No lesions, abrasions, or open wounds noted  -No noted interdigital maceration, peeling, malodor  -No callus formation noted on exam

## 2023-11-15 DIAGNOSIS — I10 ESSENTIAL HYPERTENSION: ICD-10-CM

## 2023-11-15 RX ORDER — LISINOPRIL 5 MG/1
5 TABLET ORAL DAILY
Qty: 90 TABLET | Refills: 1 | Status: SHIPPED | OUTPATIENT
Start: 2023-11-15

## 2023-11-15 RX ORDER — LISINOPRIL 5 MG/1
5 TABLET ORAL DAILY
Qty: 90 TABLET | Refills: 1 | OUTPATIENT
Start: 2023-11-15

## 2023-11-15 NOTE — TELEPHONE ENCOUNTER
Reason for call:   [x] Refill   [] Prior Auth  [] Other:     Office:   [x] PCP/Provider -   [] Specialty/Provider -     Medication: Lisinopril    Dose/Frequency: 5 mg     Quantity: #90    Pharmacy: [facility]    Does the patient have enough for 3 days?    [x] Yes   [] No - Send as HP to POD

## 2023-12-08 ENCOUNTER — OFFICE VISIT (OUTPATIENT)
Dept: FAMILY MEDICINE CLINIC | Facility: CLINIC | Age: 73
End: 2023-12-08
Payer: COMMERCIAL

## 2023-12-08 VITALS
HEIGHT: 72 IN | BODY MASS INDEX: 30.8 KG/M2 | TEMPERATURE: 97.3 F | HEART RATE: 66 BPM | DIASTOLIC BLOOD PRESSURE: 70 MMHG | SYSTOLIC BLOOD PRESSURE: 134 MMHG | RESPIRATION RATE: 18 BRPM | WEIGHT: 227.4 LBS

## 2023-12-08 DIAGNOSIS — R07.89 CHEST WALL DISCOMFORT: ICD-10-CM

## 2023-12-08 DIAGNOSIS — B02.9 HERPES ZOSTER WITHOUT COMPLICATION: Primary | ICD-10-CM

## 2023-12-08 DIAGNOSIS — I10 BENIGN ESSENTIAL HYPERTENSION: ICD-10-CM

## 2023-12-08 DIAGNOSIS — E78.2 MIXED HYPERLIPIDEMIA: ICD-10-CM

## 2023-12-08 PROCEDURE — 93000 ELECTROCARDIOGRAM COMPLETE: CPT | Performed by: NURSE PRACTITIONER

## 2023-12-08 PROCEDURE — 99214 OFFICE O/P EST MOD 30 MIN: CPT | Performed by: NURSE PRACTITIONER

## 2023-12-08 RX ORDER — VALACYCLOVIR HYDROCHLORIDE 1 G/1
1000 TABLET, FILM COATED ORAL 3 TIMES DAILY
Qty: 21 TABLET | Refills: 0 | Status: SHIPPED | OUTPATIENT
Start: 2023-12-08 | End: 2023-12-15

## 2023-12-08 NOTE — PROGRESS NOTES
Assessment/Plan:    1. Herpes zoster without complication  -     valACYclovir (VALTREX) 1,000 mg tablet; Take 1 tablet (1,000 mg total) by mouth 3 (three) times a day for 7 days    2. Chest wall discomfort  -     POCT ECG    3. Benign essential hypertension  Assessment & Plan:  Stable with current regimen      4. Mixed hyperlipidemia  Assessment & Plan:  Complaint with statin and tolerating it well            Patient Instructions:  Supportive care discussed and advised. Advised to RTO for any worsening and no improvement. Follow up for no improvement and worsening of conditions. Patient advised and educated when to see immediate medical care. Return if symptoms worsen or fail to improve. Future Appointments   Date Time Provider [address]   2/5/2024 11:00 AM DO [facility]   2/13/2024  9:45 AM Kaylah Quarles DPM [facility]           Subjective:      Patient ID: Melissa Mackenzie is a 68 y.o. male. Chief Complaint   Patient presents with   • chest tightness     Chest feels "tight" on left side of chest. Symptoms started 3 days ago Rain Arceo LPN     • Back Pain     Under left shoulder blade         Vitals:  /70   Pulse 66   Temp (!) 97.3 °F (36.3 °C)   Resp 18   Ht 6' (1.829 m)   Wt 103 kg (227 lb 6.4 oz)   BMI 30.84 kg/m²     Patient stated that feeling chest tightness on left side under left nipple area and also discomfort radiating to back from last 3 days. Denies any sob, and chest pain. Staff did EKG and noticed patient has rash.  Discussed with patient that symptoms due to shingles rash and EKG showed NSR with first degree block and has appt scheduled with cardiologist.  Complaint with medications for chronic illnesses and tolerating it well    Back Pain                PHQ-2/9 Depression Screening    Little interest or pleasure in doing things: 0 - not at all  Feeling down, depressed, or hopeless: 0 - not at all  Trouble falling or staying asleep, or sleeping too much: 0 - not at all  Feeling tired or having little energy: 0 - not at all  Poor appetite or overeatin - not at all  Feeling bad about yourself - or that you are a failure or have let yourself or your family down: 0 - not at all  Trouble concentrating on things, such as reading the newspaper or watching television: 0 - not at all  Moving or speaking so slowly that other people could have noticed. Or the opposite - being so fidgety or restless that you have been moving around a lot more than usual: 0 - not at all  Thoughts that you would be better off dead, or of hurting yourself in some way: 0 - not at all  PHQ-9 Score: 0   PHQ-9 Interpretation: No or Minimal depression        Depression Screening Follow-up Plan: Patient's depression screening was positive with a PHQ-2 score of . Their PHQ-9 score was 0. Clinically patient does not have depression. No treatment is required. The following portions of the patient's history were reviewed and updated as appropriate: allergies, current medications, past family history, past medical history, past social history, past surgical history and problem list.      Review of Systems   HENT: Negative. Respiratory: Negative. As noted in HPI       Cardiovascular: Negative. Musculoskeletal:  Positive for back pain. Objective:    Social History     Tobacco Use   Smoking Status Never   • Passive exposure: Never   Smokeless Tobacco Never       Allergies: Allergies   Allergen Reactions   • Augmentin [Amoxicillin-Pot Clavulanate] GI Intolerance   • Meperidine Other (See Comments)     Reaction Date: ; Annotation - [id number]: unsure of side effect  Reaction Date: ;  Annotation - [id number]: unsure of side effect         Current Outpatient Medications   Medication Sig Dispense Refill   • amLODIPine (NORVASC) 2.5 mg tablet TAKE 1 TABLET BY MOUTH DAILY 90 tablet 3   • aspirin 81 mg chewable tablet Chew 81 mg daily • atorvastatin (LIPITOR) 10 mg tablet TAKE 1 TABLET BY MOUTH  DAILY 90 tablet 3   • Ferrous Gluconate-C-Folic Acid (IRON-C PO) Take 1 Dose by mouth daily     • lisinopril (ZESTRIL) 5 mg tablet Take 1 tablet (5 mg total) by mouth daily 90 tablet 1   • methocarbamol (ROBAXIN) 500 mg tablet Take 0.5 tablets (250 mg total) by mouth 2 (two) times a day as needed for muscle spasms for up to 14 days 14 tablet 0   • Multiple Vitamin (MULTIVITAMINS PO) Take 1 capsule by mouth daily     • valACYclovir (VALTREX) 1,000 mg tablet Take 1 tablet (1,000 mg total) by mouth 3 (three) times a day for 7 days 21 tablet 0   • oxyCODONE-acetaminophen (PERCOCET) 5-325 mg per tablet Take 1 tablet by mouth every 8 (eight) hours as needed for severe pain for up to 10 doses Max Daily Amount: 3 tablets (Patient not taking: Reported on 9/6/2023) 10 tablet 0     No current facility-administered medications for this visit. Physical Exam  Constitutional:       Appearance: Normal appearance. HENT:      Head: Normocephalic. Nose: Nose normal.   Eyes:      Conjunctiva/sclera: Conjunctivae normal.   Cardiovascular:      Rate and Rhythm: Normal rate and regular rhythm. Heart sounds: Normal heart sounds. Pulmonary:      Effort: Pulmonary effort is normal.      Breath sounds: Normal breath sounds. Musculoskeletal:         General: No swelling or tenderness. Normal range of motion. Skin:     General: Skin is warm and dry. Findings: Rash (scattered painful rash on left chest area under left nipple and rash with vesicles) present. Neurological:      Mental Status: He is alert and oriented to person, place, and time. Psychiatric:         Mood and Affect: Mood normal.         Behavior: Behavior normal.         Thought Content:  Thought content normal.         Judgment: Judgment normal.                     NEHAL Stacy

## 2023-12-08 NOTE — PATIENT INSTRUCTIONS
Valacyclovir (By mouth)   Valacyclovir (kvt-aw-ERX-kloe-vir)  Treats herpes virus infections, including shingles, cold sores, and genital herpes. This medicine will not cure herpes, but may prevent a breakout of herpes sores or blisters. Also treats chicken pox. Brand Name(s): Valtrex, valACYclovir HCl AvPak   There may be other brand names for this medicine. When This Medicine Should Not Be Used: This medicine is not right for everyone. Do not use it if you had an allergic reaction to valacyclovir or acyclovir. How to Use This Medicine:   Tablet  Your doctor will tell you how much medicine to use. Do not use more than directed. This medicine works best when you take it at the first sign of a herpes breakout. Drink extra fluids so you will urinate more often and help prevent kidney problems. Missed dose: Take a dose as soon as you remember. If it is almost time for your next dose, wait until then and take a regular dose. Do not take extra medicine to make up for a missed dose. Store the medicine in a closed container at room temperature, away from heat, moisture, and direct light. Store the oral liquid in the refrigerator. Discard any unused medicine after 28 days. Drugs and Foods to Avoid:      Ask your doctor or pharmacist before using any other medicine, including over-the-counter medicines, vitamins, and herbal products. Warnings While Using This Medicine:   Tell your doctor if you are pregnant or breastfeeding, or if you have kidney disease, HIV or AIDS, or had a bone marrow or kidney transplant. This medicine may cause the following problems:   Kidney problems  Nervous system problems  Thrombotic thrombocytopenic purpura/hemolytic uremic syndrome (in patients who have HIV or had a bone marrow or kidney transplant)  Do not have sex while you have herpes sores. Valacyclovir will not stop the spread of herpes during sex.   Even if you have no signs of a herpes infection, it is still possible to spread the virus to others. Always use condoms made from latex or polyurethane when you have sexual contact. Call your doctor if your symptoms do not improve or if they get worse. Tell any doctor or dentist who treats you that you are using this medicine. This medicine may affect certain medical test results. Do not stop using this medicine suddenly, or change how you take it, without talking to your doctor. Keep all medicine out of the reach of children. Never share your medicine with anyone. Possible Side Effects While Using This Medicine:   Call your doctor right away if you notice any of these side effects: Allergic reaction: Itching or hives, swelling in your face or hands, swelling or tingling in your mouth or throat, chest tightness, trouble breathing  Confusion, agitation, depression, or other behavior changes  Decrease in how much or how often you urinate  Fast heartbeat, fainting, or extreme weakness  Pinpoint red spots on your skin, unusual bleeding or bruising, blood in your urine or stools  Problems with walking, speaking, or coordination, seeing or hearing things that are not there  Seizures or tremors  Yellow skin or eyes  If you notice these less serious side effects, talk with your doctor:   Headache or dizziness  Nausea, vomiting, diarrhea, stomach pain  If you notice other side effects that you think are caused by this medicine, tell your doctor. Call your doctor for medical advice about side effects. You may report side effects to FDA at 2-498-FDA-4375  © Copyright Mirian Loges 2023 Information is for End User's use only and may not be sold, redistributed or otherwise used for commercial purposes. The above information is an  only. It is not intended as medical advice for individual conditions or treatments. Talk to your doctor, nurse or pharmacist before following any medical regimen to see if it is safe and effective for you.   Shingles   AMBULATORY CARE:   Shingles  is a viral infection that causes a painful rash. Shingles is caused by the varicella-zoster virus. This is the same virus that causes chickenpox. The virus stays in your body after you have chickenpox, without causing any symptoms. Shingles occurs when the virus becomes active again. The active virus travels along a nerve to your skin and causes a rash. The rash usually lasts 2 to 3 weeks. Most people have shingles one time, but it is possible to develop it again. Common signs and symptoms:  Shingles can appear anywhere on your body, but it is most common on your torso. A line of painful blisters develops on the left or right side of your torso. The rash starts as red dots that become blisters filled with fluid. The blisters usually grow bigger, become filled with pus, and then crust over after a few days. You may also have any of the following:  Severe tiredness and muscle weakness    Pain when your skin is lightly touched    Headache    Fever    Eye pain when exposed to light       Call your local emergency number (911 in the [address]) if:   You have trouble moving your arms, legs, or face. You become confused, or have trouble speaking. You have a seizure. Seek care immediately if:   You have weakness in an arm or leg. You have dizziness, a severe headache, or hearing or vision loss. You have painful, red, warm skin around the blisters, or the blisters drain pus. Your neck is stiff or you have trouble moving it. Call your doctor if:   A painful rash appears near your eye. The rash spreads to more areas and your pain worsens. You feel weak or have a headache. You have a cough, chills, or a fever. You have abdominal pain or nausea, or you are vomiting. You have questions or concerns about your condition or care. Treatment:  Shingles cannot be cured. The following medicines can decrease your pain and help prevent complications:  Antiviral medicine  fights the virus causing your shingles. Start this medicine within 3 days after you notice the first symptoms. This may help prevent nerve pain. A shingles outbreak can cause nerve pain called post-herpetic neuralgia (PHN). PHN can last a long time after you heal from shingles. Topical anesthetics  are used to numb the skin and decrease pain. They can be a cream, gel, spray, or patch. Anticonvulsants and antidepressants  decrease nerve pain and may help you sleep at night. Antihistamines  may help decrease itching. Acetaminophen  decreases pain and fever. It is available without a doctor's order. Ask how much to take and how often to take it. Follow directions. Read the labels of all other medicines you are using to see if they also contain acetaminophen, or ask your doctor or pharmacist. Acetaminophen can cause liver damage if not taken correctly. NSAIDs , such as ibuprofen, help decrease swelling, pain, and fever. This medicine is available with or without a doctor's order. NSAIDs can cause stomach bleeding or kidney problems in certain people. If you take blood thinner medicine, always ask your healthcare provider if NSAIDs are safe for you. Always read the medicine label and follow directions. A steroid and numbing medicine injection  may decrease severe pain that does not get better with other medicines. Self-care:   Apply a cool, wet compress  or take a cool bath. This may help decrease itching and pain. Keep your rash clean and dry. Cover your rash with a bandage. Do not use bandages with adhesive. Clothes may irritate your skin. Prevent the spread of the shingles virus:  The virus can be passed to a person who has never had chickenpox. This usually happens if the other person comes in contact with your open sores. This person may get chickenpox, but not shingles. You are contagious until your blisters scab over. Stay away from people who have not had chickenpox or the chickenpox vaccine.  Avoid pregnant women, newborns, and people with weak immune systems. They have a higher risk of infection. Wash your hands often. Wash your hands several times each day. Wash after you use the bathroom, change a child's diaper, and before you prepare or eat food. Use soap and water every time. Rub your soapy hands together, lacing your fingers. Wash the front and back of your hands, and in between your fingers. Use the fingers of one hand to scrub under the fingernails of the other hand. Wash for at least 20 seconds. Rinse with warm, running water for several seconds. Then dry your hands with a clean towel or paper towel. Use hand  that contains alcohol if soap and water are not available. Do not touch your eyes, nose, or mouth without washing your hands first.         Cover a sneeze or cough. Use a tissue that covers your mouth and nose. Throw the tissue away in a trash can right away. Use the bend of your arm if a tissue is not available. Wash your hands well with soap and water or use a hand . Prevent shingles or another shingles outbreak:   A vaccine may be given to help prevent shingles. You can get the vaccine even if you already had shingles. The vaccine comes in 2 forms. A 2-dose vaccine is usually given to adults 48 years or older. A 1-dose vaccine may be given to adults 61 years or older. The vaccine can help prevent a future outbreak. If you do get shingles again, the vaccine can keep it from becoming severe. Ask your healthcare provider about other vaccines you may need. Follow up with your doctor as directed:  Write down your questions so you remember to ask them during your visits.   For more information:   [facility]  [address]  [place]  Phone: 8- [phone] - [phone]  Phone: 0- [phone]  Web Address: [employer]    © Copyright Arnol Ro 2023 Information is for End User's use only and may not be sold, redistributed or otherwise used for commercial purposes. The above information is an  only. It is not intended as medical advice for individual conditions or treatments. Talk to your doctor, nurse or pharmacist before following any medical regimen to see if it is safe and effective for you.

## 2024-02-05 ENCOUNTER — OFFICE VISIT (OUTPATIENT)
Dept: CARDIOLOGY CLINIC | Facility: CLINIC | Age: 74
End: 2024-02-05
Payer: COMMERCIAL

## 2024-02-05 VITALS
SYSTOLIC BLOOD PRESSURE: 106 MMHG | OXYGEN SATURATION: 98 % | WEIGHT: 226 LBS | DIASTOLIC BLOOD PRESSURE: 72 MMHG | BODY MASS INDEX: 30.61 KG/M2 | HEIGHT: 72 IN | HEART RATE: 73 BPM

## 2024-02-05 DIAGNOSIS — I10 BENIGN ESSENTIAL HYPERTENSION: Primary | ICD-10-CM

## 2024-02-05 DIAGNOSIS — E78.2 MIXED HYPERLIPIDEMIA: ICD-10-CM

## 2024-02-05 DIAGNOSIS — I11.9 HYPERTENSIVE HEART DISEASE WITHOUT HEART FAILURE: ICD-10-CM

## 2024-02-05 DIAGNOSIS — I72.0 PSEUDOANEURYSM OF CAROTID ARTERY (HCC): ICD-10-CM

## 2024-02-05 PROCEDURE — 99204 OFFICE O/P NEW MOD 45 MIN: CPT | Performed by: INTERNAL MEDICINE

## 2024-02-05 NOTE — PROGRESS NOTES
[facility]  [address]   [place] [address]    Cardiology Consultation    Gennaro Worthington  [id number]  1950      Consult for: Hyperetnsion  Appreciate consult by: NEHAL Crum      Discussion/Summary:     1. Benign essential hypertension  Stress test only, exercise    Echo complete w/ contrast if indicated      2. Mixed hyperlipidemia        3. 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(HCC)        4. Hypertensive heart disease without heart failure  Stress test only, exercise           Discussed risk factor reduction including refraining from smoking, eating a diet high in fruits and vegetables, maintaining a healthy weight, limiting screen time along with controlling BP and cholesterol.  Encouraged to exercise 150 minutes a week at a moderate level such as a fast walk or 75 minutes of high intensity.   BP and cholesterol are at goal.  Will schedule for treadmill stress test and 2D echocardiogram for further evaluation.        HPI:     Gennaro Worthington is a 73 y.o. here for evaluation of cardiac risk.  He has a history of hypertension and hyperlipidemia.  Both are well-controlled with current medication regimen.  He currently is using lisinopril 5 mg daily and amlodipine 2.5 mg daily along with atorvastatin 10 mg daily.  Most recent lipid panel in May 2023 showed an LDL of 66 mg/dL which was improved from 133 mg/dL.  HDL is 54 which is improved from 39 and total cholesterol is reduced from 195-129.  He denies any chest pain or shortness of breath.  He denies any history of syncope or near syncope.  No previous cardiac testing.  Activity is limited due to multiple hernias.  He does not have any chest pain or shortness of breath with exertion.  He is able to go for walks without difficulties.    Past Medical History:   Diagnosis Date    Chronic rhinitis 02/05/2005    last assessed 2/5/05, resolved 5/18/17     Elevated liver function tests     last assessed 3/9/15,  resolved 5/18/17     Herpes simplex type 1 infection     Hip arthritis     resolved 12/20/17     Hypertension     Shoulder bursitis     last assessed 9/23/13, resolved 5/18/17      Social History     Tobacco Use    Smoking status: Never     Passive exposure: Never    Smokeless tobacco: Never   Vaping Use    Vaping status: Never Used   Substance Use Topics    Alcohol use: Yes     Comment: very rare    Drug use: Never      Family History   Problem Relation Age of Onset    Hypertension Mother     Lung cancer Family     Prostate cancer Family     Mental illness Neg Hx      Past Surgical History:   Procedure Laterality Date    EPIDURAL BLOCK INJECTION N/A 05/23/2023    Procedure: BLOCK / INJECTION EPIDURAL STEROID CERVICAL C7-T1  ([id number]);  Surgeon: Bib Ball DO;  Location:  MAIN OR;  Service: Pain Management     HERNIA REPAIR      In guinal: x3 surgeries, [facility], [facility] and [facility]    [place] RPR AA HERNIA 1ST < 3 CM REDUCIBLE N/A 8/22/2023    Procedure: REPAIR HERNIA VENTRAL;  Surgeon: Nehemias Devries MD;  Location: [facility];  Service: General    PROSTATOTOMY      last assessed 1/11/18       Current Outpatient Medications:     amLODIPine (NORVASC) 2.5 mg tablet, TAKE 1 TABLET BY MOUTH DAILY, Disp: 90 tablet, Rfl: 3    aspirin 81 mg chewable tablet, Chew 81 mg daily, Disp: , Rfl:     atorvastatin (LIPITOR) 10 mg tablet, TAKE 1 TABLET BY MOUTH  DAILY, Disp: 90 tablet, Rfl: 3    Ferrous Gluconate-C-Folic Acid (IRON-C PO), Take 1 Dose by mouth daily, Disp: , Rfl:     lisinopril (ZESTRIL) 5 mg tablet, Take 1 tablet (5 mg total) by mouth daily, Disp: 90 tablet, Rfl: 1    Multiple Vitamin (MULTIVITAMINS PO), Take 1 capsule by mouth daily, Disp: , Rfl:     methocarbamol (ROBAXIN) 500 mg tablet, Take 0.5 tablets (250 mg total) by mouth 2 (two) times a day as needed for muscle spasms for up to 14 days, Disp: 14 tablet, Rfl: 0    oxyCODONE-acetaminophen (PERCOCET) 5-325 mg per tablet, Take 1 tablet by mouth every 8 (eight)  hours as needed for severe pain for up to 10 doses Max Daily Amount: 3 tablets (Patient not taking: Reported on 9/6/2023), Disp: 10 tablet, Rfl: 0    valACYclovir (VALTREX) 1,000 mg tablet, Take 1 tablet (1,000 mg total) by mouth 3 (three) times a day for 7 days, Disp: 21 tablet, Rfl: 0  Allergies   Allergen Reactions    Augmentin [Amoxicillin-Pot Clavulanate] GI Intolerance    Meperidine Other (See Comments)     Reaction Date: 05Feb2005; Annotation - [id number]: unsure of side effect  Reaction Date: 05Feb2005; Annotation - [id number]: unsure of side effect       Review of Systems:   Review of Systems   Respiratory:  Negative for shortness of breath.    Cardiovascular:  Negative for chest pain, palpitations and leg swelling.   Musculoskeletal:  Positive for arthralgias and myalgias.   All other systems reviewed and are negative.      Physical Examination:     Vitals:    02/05/24 1053   BP: 106/72   BP Location: Right arm   Patient Position: Sitting   Cuff Size: Large   Pulse: 73   SpO2: 98%   Weight: 103 kg (226 lb)   Height: 6' (1.829 m)       Physical Exam   Constitutional: He appears healthy. No distress.   Eyes: Pupils are equal, round, and reactive to light. Conjunctivae are normal.   Neck: No JVD present.   Cardiovascular: Normal rate, regular rhythm and normal heart sounds. Exam reveals no gallop and no friction rub.   No murmur heard.  Pulmonary/Chest: Effort normal and breath sounds normal. He has no wheezes. He has no rales.   Musculoskeletal:         General: No tenderness, deformity or edema.      Cervical back: Normal range of motion and neck supple.   Neurological: He is alert and oriented to person, place, and time.   Skin: Skin is warm and dry.        Labs:     Lab Results   Component Value Date    WBC 5.76 08/14/2023    HGB 12.9 08/14/2023    HCT 38.2 08/14/2023    MCV 93 08/14/2023    RDW 12.7 08/14/2023     08/14/2023     BMP:  Lab Results   Component Value Date    SODIUM 138 08/14/2023    K  "4.1 08/14/2023     08/14/2023    CO2 27 08/14/2023    ANIONGAP 15.8 10/09/2015    BUN 12 08/14/2023    CREATININE 0.89 08/14/2023    GLUC 105 04/25/2023    GLUF 92 08/14/2023    CALCIUM 9.3 08/14/2023    EGFR 84 08/14/2023     LFT:  Lab Results   Component Value Date    AST 22 08/14/2023    ALT 20 08/14/2023    ALKPHOS 41 08/14/2023    TP 7.5 08/14/2023    ALB 4.2 08/14/2023      Lab Results   Component Value Date    RHW5QVXCNAEH 0.813 04/25/2023     No results found for: \"HGBA1C\"  Lipid Profile:   Lab Results   Component Value Date    CHOLESTEROL 129 05/24/2023    HDL 54 05/24/2023    LDLCALC 66 05/24/2023    TRIG 43 05/24/2023     Lab Results   Component Value Date    CHOLESTEROL 129 05/24/2023    CHOLESTEROL 195 09/19/2022     Lab Results   Component Value Date    TROPONINI <0.02 06/11/2021     No results found for: \"NTBNP\"   No results found for this or any previous visit (from the past 672 hour(s)).    Imaging & Testing   I have personally reviewed pertinent reports.      Cardiac Testing   No results found for this or any previous visit.    EKG: Personally reviewed.    Normal ECG      Yoly Perea DO, [facility]  [phone]  Please call with any questions.  "

## 2024-02-07 ENCOUNTER — OFFICE VISIT (OUTPATIENT)
Dept: FAMILY MEDICINE CLINIC | Facility: CLINIC | Age: 74
End: 2024-02-07
Payer: COMMERCIAL

## 2024-02-07 VITALS
SYSTOLIC BLOOD PRESSURE: 130 MMHG | WEIGHT: 227 LBS | RESPIRATION RATE: 20 BRPM | HEIGHT: 72 IN | DIASTOLIC BLOOD PRESSURE: 84 MMHG | BODY MASS INDEX: 30.75 KG/M2 | TEMPERATURE: 97 F | HEART RATE: 68 BPM

## 2024-02-07 DIAGNOSIS — E78.2 MIXED HYPERLIPIDEMIA: ICD-10-CM

## 2024-02-07 DIAGNOSIS — I10 BENIGN ESSENTIAL HYPERTENSION: ICD-10-CM

## 2024-02-07 DIAGNOSIS — J06.9 ACUTE URI: Primary | ICD-10-CM

## 2024-02-07 PROCEDURE — 99214 OFFICE O/P EST MOD 30 MIN: CPT | Performed by: NURSE PRACTITIONER

## 2024-02-07 RX ORDER — AZITHROMYCIN 250 MG/1
TABLET, FILM COATED ORAL
Qty: 6 TABLET | Refills: 0 | Status: SHIPPED | OUTPATIENT
Start: 2024-02-07 | End: 2024-02-12

## 2024-02-07 NOTE — PROGRESS NOTES
Assessment/Plan:    1. Acute URI  -     azithromycin (ZITHROMAX) 250 mg tablet; Take 500mg on day 1, 250mg on days 2-5    2. Benign essential hypertension  Assessment & Plan:  Stable with current regimen      3. Mixed hyperlipidemia  Assessment & Plan:  Stable with current regimen            Patient Instructions:  Take medication with food.  It is important that you take the entire course of antibiotics prescribed.  May also take a probiotic of your choice to maintain healthy GI mary jane.    Can take some probiotic and yogurt with the medication.  Take prednisone with food in morning and do not take any NSAID's while taking prednisone.  Supportive care discussed and advised.  Advised to RTO for any worsening and no improvement.   Follow up for no improvement and worsening of conditions.  Patient advised and educated when to see immediate medical care.    Return in about 3 months (around 5/7/2024), or if symptoms worsen or fail to improve.      Future Appointments   Date Time Provider Department Center   2/13/2024  9:45 AM Margarito Husain DPM POD [facility]   2/26/2024  1:00 PM [facility] ECHO RM 1 [facility]   2/26/2024  2:00 PM [facility] STRESS 1 [facility]   5/7/2024  9:30 AM NEHAL Crum [facility]           Subjective:      Patient ID: Gennaro Worthington is a 73 y.o. male.    Chief Complaint   Patient presents with   • Sore Throat   • Nasal Congestion     Started 2 days ago. No fever.  Jmoyle LPN         Vitals:  /84   Pulse 68   Temp (!) 97 °F (36.1 °C)   Resp 20   Ht 6' (1.829 m)   Wt 103 kg (227 lb)   BMI 30.79 kg/m²     Patient stated that started with sore throat and post nasal drip couple of days ago. Denies fever, chills and sob.  Complaint with medications and tolerating it well.      Sore Throat   Pertinent negatives include no abdominal pain, congestion, coughing, diarrhea, drooling, ear discharge, ear pain, headaches, shortness of breath, trouble swallowing or  vomiting.               PHQ-2/9 Depression Screening    Little interest or pleasure in doing things: 0 - not at all  Feeling down, depressed, or hopeless: 0 - not at all  Trouble falling or staying asleep, or sleeping too much: 0 - not at all  Feeling tired or having little energy: 1 - several days  Poor appetite or overeatin - not at all  Feeling bad about yourself - or that you are a failure or have let yourself or your family down: 0 - not at all  Trouble concentrating on things, such as reading the newspaper or watching television: 0 - not at all  Moving or speaking so slowly that other people could have noticed. Or the opposite - being so fidgety or restless that you have been moving around a lot more than usual: 0 - not at all  Thoughts that you would be better off dead, or of hurting yourself in some way: 0 - not at all  PHQ-9 Score: 1  PHQ-9 Interpretation: No or Minimal depression             The following portions of the patient's history were reviewed and updated as appropriate: allergies, current medications, past family history, past medical history, past social history, past surgical history and problem list.      Review of Systems   Constitutional:  Negative for chills, diaphoresis, fatigue, fever and unexpected weight change.   HENT:  Positive for postnasal drip and sore throat. Negative for congestion, dental problem, drooling, ear discharge, ear pain, facial swelling, hearing loss, mouth sores, nosebleeds, rhinorrhea, sinus pressure, sinus pain, sneezing, tinnitus, trouble swallowing and voice change.    Respiratory:  Negative for cough, chest tightness, shortness of breath and wheezing.    Cardiovascular: Negative.    Gastrointestinal:  Negative for abdominal pain, constipation, diarrhea, nausea and vomiting.   Musculoskeletal: Negative.    Skin: Negative.    Neurological:  Negative for dizziness, light-headedness and headaches.         Objective:    Social History     Tobacco Use   Smoking  Status Never   • Passive exposure: Never   Smokeless Tobacco Never       Allergies:   Allergies   Allergen Reactions   • Augmentin [Amoxicillin-Pot Clavulanate] GI Intolerance   • Meperidine Other (See Comments)     Reaction Date: 05Feb2005; Annotation - [id number]: unsure of side effect  Reaction Date: 05Feb2005; Annotation - [id number]: unsure of side effect         Current Outpatient Medications   Medication Sig Dispense Refill   • amLODIPine (NORVASC) 2.5 mg tablet TAKE 1 TABLET BY MOUTH DAILY 90 tablet 3   • aspirin 81 mg chewable tablet Chew 81 mg daily     • atorvastatin (LIPITOR) 10 mg tablet TAKE 1 TABLET BY MOUTH  DAILY 90 tablet 3   • azithromycin (ZITHROMAX) 250 mg tablet Take 500mg on day 1, 250mg on days 2-5 6 tablet 0   • Ferrous Gluconate-C-Folic Acid (IRON-C PO) Take 1 Dose by mouth daily     • lisinopril (ZESTRIL) 5 mg tablet Take 1 tablet (5 mg total) by mouth daily 90 tablet 1   • Multiple Vitamin (MULTIVITAMINS PO) Take 1 capsule by mouth daily     • valACYclovir (VALTREX) 1,000 mg tablet Take 1 tablet (1,000 mg total) by mouth 3 (three) times a day for 7 days 21 tablet 0   • methocarbamol (ROBAXIN) 500 mg tablet Take 0.5 tablets (250 mg total) by mouth 2 (two) times a day as needed for muscle spasms for up to 14 days (Patient not taking: Reported on 2/7/2024) 14 tablet 0   • oxyCODONE-acetaminophen (PERCOCET) 5-325 mg per tablet Take 1 tablet by mouth every 8 (eight) hours as needed for severe pain for up to 10 doses Max Daily Amount: 3 tablets (Patient not taking: Reported on 9/6/2023) 10 tablet 0     No current facility-administered medications for this visit.          Physical Exam  Vitals reviewed.   Constitutional:       Appearance: Normal appearance. He is well-developed.   HENT:      Head: Normocephalic.      Right Ear: Tympanic membrane, ear canal and external ear normal.      Left Ear: Tympanic membrane, ear canal and external ear normal.      Nose: Mucosal edema present.      Right  Sinus: No maxillary sinus tenderness or frontal sinus tenderness.      Left Sinus: No maxillary sinus tenderness or frontal sinus tenderness.      Mouth/Throat:      Mouth: No oral lesions.      Pharynx: No oropharyngeal exudate or posterior oropharyngeal erythema.   Cardiovascular:      Rate and Rhythm: Normal rate and regular rhythm.      Heart sounds: Normal heart sounds.   Pulmonary:      Effort: Pulmonary effort is normal.      Breath sounds: Normal breath sounds.   Musculoskeletal:         General: Normal range of motion.      Cervical back: Neck supple.   Lymphadenopathy:      Cervical:      Right cervical: No superficial or posterior cervical adenopathy.     Left cervical: No superficial or posterior cervical adenopathy.   Skin:     General: Skin is warm and dry.   Neurological:      Mental Status: He is alert and oriented to person, place, and time.   Psychiatric:         Behavior: Behavior normal.         Thought Content: Thought content normal.         Judgment: Judgment normal.                     NEHAL Crum

## 2024-02-07 NOTE — PATIENT INSTRUCTIONS
Azithromycin (By mouth)   Azithromycin ([id number]-[id number]-sin)  Treats infections. This medicine is a macrolide antibiotic.   Brand Name(s): Zithromax, Zithromax Tri-Noah, Zithromax Z-Noah, Zmax   There may be other brand names for this medicine.  When This Medicine Should Not Be Used:   This medicine is not right for everyone. Do not use it if you had an allergic reaction to azithromycin, erythromycin, or similar medicines, or if you have a history of liver problems caused by azithromycin.  How to Use This Medicine:   Capsule, Liquid, Packet, Powder, Tablet  Your doctor will tell you how much medicine to use. Do not use more than directed.  Take all of the medicine in your prescription to clear up your infection, even if you feel better after the first few doses.  Multiple dose (Zithromax® oral liquid or tablets):   You may take this medicine with or without food.  Shake the bottle well before you measure the medicine. Measure the oral liquid medicine with a marked measuring spoon, oral syringe, or medicine cup.  Single dose (Zmax® extended-release oral liquid or powder):   Liquid:   Take this medicine on an empty stomach at least 1 hour before you eat, or 2 hours after you eat.  Call your doctor right away if you vomit within 1 hour after you take the medicine.  You must take the liquid within 12 hours after the pharmacist gives it to you.  Shake the bottle well before you measure the medicine. Measure your dose with a marked measuring spoon, cup, or syringe.  Powder:   Open 1 packet and pour all of the medicine into a glass with about 2 ounces (¼ cup) of water. Mix well and drink the medicine right away. Pour another 2 ounces of water into the same glass, and drink the remaining medicine.  Read and follow the patient instructions that come with this medicine. Talk to your doctor or pharmacist if you have any questions.  Missed dose: If you are taking multiple doses, take the dose as soon as you remember. If it is  almost time for your next dose, wait until then to take a regular dose. Do not use extra medicine to make up for a missed dose.  Store the medicine in a closed container at room temperature, away from heat, moisture, and direct light.  Extended-release oral liquid: Do not refrigerate or freeze.  Oral liquid for 1 dose only: Store at room temperature. Do not store in the refrigerator or allow the medicine to freeze.  Oral liquid for multiple doses: Store at room temperature or in the refrigerator. Use it within 10 days of filling the prescription.  Drugs and Foods to Avoid:   Ask your doctor or pharmacist before using any other medicine, including over-the-counter medicines, vitamins, and herbal products.  Some medicines can affect how azithromycin works. Tell your doctor if you are also using any of the following:  Colchicine, cyclosporine, digoxin, nelfinavir, phenytoin  Blood thinner (including warfarin)  Ergot medicine  Medicine for a heart rhythm problem (including amiodarone, dofetilide, procainamide, quinidine, sotalol)  Zithromax® for multiple doses: Do not take an antacid that contains magnesium or aluminum at the same time you take Zithromax®. An antacid will affect how the medicine works. Antacids will not affect Zmax® for single dose.  Warnings While Using This Medicine:   Tell your doctor if you are pregnant or breastfeeding, or if you have kidney disease, liver disease, heart disease, heart rhythm problems, heart failure, or myasthenia gravis. Tell your doctor if anyone in your family has heart rhythm problems.  This medicine may cause the following problems:   Serious skin reactions, including Bermeo-Roel syndrome, acute generalized exanthematous pustulosis, toxic epidermal necrolysis, and drug reaction with eosinophilia and systemic symptoms (DRESS)  Liver problems  Infantile hypertrophic pyloric stenosis  Heart rhythm problems, including QT prolongation  Increased risk of serious heart or blood  vessel problems  This medicine can cause diarrhea. Call your doctor if the diarrhea becomes severe, does not stop, or is bloody. Do not take any medicine to stop diarrhea until you have talked to your doctor. Diarrhea can occur 2 months or more after you stop taking this medicine. It may occur 2 months or more after you stop using this medicine.  Call your doctor if your symptoms do not improve or if they get worse.  Your doctor will do lab tests at regular visits to check on the effects of this medicine. Keep all appointments.  Keep all medicine out of the reach of children. Never share your medicine with anyone.  Possible Side Effects While Using This Medicine:   Call your doctor right away if you notice any of these side effects:  Allergic reaction: Itching or hives, swelling in your face or hands, swelling or tingling in your mouth or throat, chest tightness, trouble breathing  Blistering, peeling, red skin rash  Dark urine, pale stools, nausea, vomiting, loss of appetite, stomach pain, yellow skin or eyes  Fainting, dizziness, lightheadedness  Fast, pounding, or uneven heartbeat, blurred vision, chest pain, trouble breathing, tiredness or weakness  Feeling irritable or vomits after feeding (in babies)  Severe diarrhea that may contain blood, stomach cramps, fever  If you notice these less serious side effects, talk with your doctor:   Mild diarrhea, nausea, vomiting, stomach pain  If you notice other side effects that you think are caused by this medicine, tell your doctor.   Call your doctor for medical advice about side effects. You may report side effects to FDA at 5-836-FDA-1102  © Copyright Merative 2023 Information is for End User's use only and may not be sold, redistributed or otherwise used for commercial purposes.  The above information is an  only. It is not intended as medical advice for individual conditions or treatments. Talk to your doctor, nurse or pharmacist before following any  medical regimen to see if it is safe and effective for you.

## 2024-02-16 ENCOUNTER — OFFICE VISIT (OUTPATIENT)
Age: 74
End: 2024-02-16
Payer: COMMERCIAL

## 2024-02-16 VITALS — HEIGHT: 72 IN | RESPIRATION RATE: 17 BRPM | WEIGHT: 227 LBS | BODY MASS INDEX: 30.75 KG/M2

## 2024-02-16 DIAGNOSIS — L60.0 INGROWN TOENAIL OF LEFT FOOT: ICD-10-CM

## 2024-02-16 DIAGNOSIS — M76.62 ACHILLES TENDINITIS OF LEFT LOWER EXTREMITY: ICD-10-CM

## 2024-02-16 DIAGNOSIS — M72.2 PLANTAR FASCIITIS: Primary | ICD-10-CM

## 2024-02-16 DIAGNOSIS — M79.672 LEFT FOOT PAIN: ICD-10-CM

## 2024-02-16 PROCEDURE — 99213 OFFICE O/P EST LOW 20 MIN: CPT | Performed by: PODIATRIST

## 2024-02-16 NOTE — PROGRESS NOTES
Assessment/Plan: Achilles tendinitis left lower extremity.  Pain.  Planter fasciitis.  Heel spur syndrome.  Mycosis of nail.     Plan.  PCP notes reviewed.  Foot exam performed.  Chart reviewed.  X-rays taken and reviewed with patient.  At this time physical therapy will be considered to help with patient's symptoms and condition.  Patient can stretch daily.  Tylenol as needed.  Proper shoe style and fitting advice given.  Return for follow-up.  In addition patient will take Aleve as needed.  Aftercare instruction given.  Return for follow-up.            Diagnoses and all orders for this visit:     Plantar fasciitis     Left foot pain     Achilles tendinitis of left lower extremity     Onychomycosis            Subjective: Patient has pain in his left heel.  Patient has history of Achilles tendinitis and partial tear that was treated 2 or 3 years ago by immobilization.  He now has return of pain in the left heel.  No history of trauma.  He also gets pain in his toes with ambulation and shoewear           Allergies   Allergen Reactions    Augmentin [Amoxicillin-Pot Clavulanate] GI Intolerance    Meperidine Other (See Comments)       Reaction Date: 05Feb2005; Annotation - [id number]: unsure of side effect  Reaction Date: 05Feb2005; Annotation - [id number]: unsure of side effect            Current Outpatient Medications:     acetaminophen (TYLENOL) 500 mg tablet, Take 500 mg by mouth as needed, Disp: , Rfl:     amLODIPine (NORVASC) 2.5 mg tablet, TAKE 1 TABLET BY MOUTH  DAILY, Disp: 90 tablet, Rfl: 3    aspirin 81 mg chewable tablet, Chew 81 mg daily, Disp: , Rfl:     atorvastatin (LIPITOR) 10 mg tablet, Take 1 tablet (10 mg total) by mouth daily, Disp: 90 tablet, Rfl: 1    famotidine (PEPCID) 20 mg tablet, Take 1 tablet (20 mg total) by mouth 2 (two) times a day, Disp: 60 tablet, Rfl: 0    methylPREDNISolone 4 MG tablet therapy pack, Use as directed on package (Patient not taking: Reported on 2/10/2023), Disp: 21 tablet,  Rfl: 0    Multiple Vitamin (MULTIVITAMINS PO), Take 1 capsule by mouth daily, Disp: , Rfl:     quinapril (ACCUPRIL) 10 mg tablet, TAKE 1 TABLET BY MOUTH  EVERY 12 HOURS, Disp: 180 tablet, Rfl: 3         Patient Active Problem List   Diagnosis    Actinic keratosis    History of prostate cancer    Anemia    Benign essential hypertension    Disc degeneration, lumbar    Fatty liver disease, nonalcoholic    Herpes simplex infection    Mixed hyperlipidemia    Organic impotence    Lower extremity edema    Groin mass    S/P prostatectomy    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(HCC)    Periumbilical abdominal pain    Cervicalgia    Cervical radiculopathy    Adenocarcinoma of prostate (HCC)             Patient ID: Gennaro Worthington is a 73 y.o. male.     HPI     The following portions of the patient's history were reviewed and updated as appropriate:      family history includes Hypertension in his mother; Lung cancer in his family; Prostate cancer in his family.       reports that he has never smoked. He has never used smokeless tobacco. He reports current alcohol use. He reports that he does not use drugs.      Objective:  Patient's shoes and socks removed.   Foot Exam     General  General Appearance: appears stated age and healthy   Orientation: alert and oriented to person, place, and time   Affect: appropriate   Gait: antalgic         Right Foot/Ankle      Inspection and Palpation  Tenderness: bony tenderness   Swelling: dorsum   Arch: pes planus  Hammertoes: fifth toe  Skin Exam: dry skin;      Neurovascular  Dorsalis pedis: 2+  Posterior tibial: 2+        Left Foot/Ankle       Inspection and Palpation  Tenderness: bony tenderness, plantar fascia and calcaneus tenderness   Swelling: dorsum   Arch: pes planus  Hammertoes: fifth toe  Skin Exam: dry skin;      Neurovascular  Dorsalis pedis: 2+  Posterior tibial: 2+           Physical Exam  Vitals and nursing note reviewed.   Constitutional:       Appearance: Normal appearance.    Cardiovascular:      Rate and Rhythm: Normal rate and regular rhythm.      Pulses:           Dorsalis pedis pulses are 2+ on the right side and 2+ on the left side.        Posterior tibial pulses are 2+ on the right side and 2+ on the left side.   Musculoskeletal:      Right foot: Bony tenderness present.      Left foot: Bony tenderness present.   Feet:      Right foot:      Skin integrity: Dry skin present.      Left foot:      Skin integrity: Dry skin present.      Comments: Patient is pronated in stance and gait.  Pain with palpation plantar fashion insertion left foot.  Patient has equinus deformity bilateral.  There is palpable thickening of the left Achilles tendon insertion.  X-ray demonstrates plantar calcaneal spur as well as early retrocalcaneal spurring.  Skin:     Capillary Refill: Capillary refill takes less than 2 seconds.   Neurological:      Mental Status: He is alert.   Psychiatric:         Mood and Affect: Mood normal.         Behavior: Behavior normal.         Thought Content: Thought content normal.         Judgment: Judgment normal.

## 2024-03-05 DIAGNOSIS — E78.2 MIXED HYPERLIPIDEMIA: ICD-10-CM

## 2024-03-06 RX ORDER — ATORVASTATIN CALCIUM 10 MG/1
TABLET, FILM COATED ORAL
Qty: 90 TABLET | Refills: 1 | Status: SHIPPED | OUTPATIENT
Start: 2024-03-06

## 2024-03-07 ENCOUNTER — TELEPHONE (OUTPATIENT)
Dept: GASTROENTEROLOGY | Facility: CLINIC | Age: 74
End: 2024-03-07

## 2024-03-07 NOTE — TELEPHONE ENCOUNTER
Patient is due for a colon recall for hx of polyps with sessile serrated adenoma, hx of prostate cancer with Dr. Paulson. Called and spoke with patient to see if he wanted to schedule with Dr. Awad. He stated he heard of Dr. Awad but he will call back to schedule.

## 2024-04-01 ENCOUNTER — OFFICE VISIT (OUTPATIENT)
Dept: FAMILY MEDICINE CLINIC | Facility: CLINIC | Age: 74
End: 2024-04-01
Payer: COMMERCIAL

## 2024-04-01 ENCOUNTER — RA CDI HCC (OUTPATIENT)
Dept: OTHER | Facility: HOSPITAL | Age: 74
End: 2024-04-01

## 2024-04-01 VITALS
TEMPERATURE: 97.2 F | HEIGHT: 72 IN | HEART RATE: 62 BPM | BODY MASS INDEX: 30.64 KG/M2 | WEIGHT: 226.2 LBS | RESPIRATION RATE: 18 BRPM | DIASTOLIC BLOOD PRESSURE: 70 MMHG | SYSTOLIC BLOOD PRESSURE: 132 MMHG

## 2024-04-01 DIAGNOSIS — C61 ADENOCARCINOMA OF PROSTATE (HCC): ICD-10-CM

## 2024-04-01 DIAGNOSIS — M54.50 ACUTE RIGHT-SIDED LOW BACK PAIN WITHOUT SCIATICA: Primary | ICD-10-CM

## 2024-04-01 DIAGNOSIS — F33.9 DEPRESSION, RECURRENT (HCC): ICD-10-CM

## 2024-04-01 PROCEDURE — 99213 OFFICE O/P EST LOW 20 MIN: CPT | Performed by: FAMILY MEDICINE

## 2024-04-01 PROCEDURE — G2211 COMPLEX E/M VISIT ADD ON: HCPCS | Performed by: FAMILY MEDICINE

## 2024-04-01 RX ORDER — CYCLOBENZAPRINE HCL 10 MG
10 TABLET ORAL
Qty: 21 TABLET | Refills: 0 | Status: SHIPPED | OUTPATIENT
Start: 2024-04-01 | End: 2024-04-22

## 2024-04-01 RX ORDER — PREDNISONE 20 MG/1
TABLET ORAL
Qty: 32 TABLET | Refills: 0 | Status: SHIPPED | OUTPATIENT
Start: 2024-04-01

## 2024-04-01 NOTE — PROGRESS NOTES
Assessment/Plan:    1. Acute right-sided low back pain without sciatica  -     predniSONE 20 mg tablet; 4 tabs for three days, 3 tabs for three days, 2 tabs for three days, 1 tab for three days, 1/2 tab for 4 days  -     cyclobenzaprine (FLEXERIL) 10 mg tablet; Take 1 tablet (10 mg total) by mouth daily at bedtime for 21 days    2. Depression, recurrent (HCC)    3. Adenocarcinoma of prostate (HCC)    Should do well with meds, will follow response.  No neuro tension signs  Reflexes symmetrical  Strength intact        There are no Patient Instructions on file for this visit.    Return for Next scheduled follow up.    Subjective:      Patient ID: Gennaro Worthington is a 74 y.o. male.    Chief Complaint   Patient presents with   • Back Pain     X 4 days  Krysta Rao CMA        Pt states in his back.  States last three or 4 days.  States he bent over to  the [employer] dish and had acs and pain.  States it will go away and it comes back .  Rt lower back  No radiation  No loss of bowel or bladder control.  No wekaness in leg when it happens        The following portions of the patient's history were reviewed and updated as appropriate: allergies, current medications, past family history, past medical history, past social history, past surgical history and problem list.    Review of Systems   Musculoskeletal:  Positive for back pain, gait problem and myalgias.         Current Outpatient Medications   Medication Sig Dispense Refill   • amLODIPine (NORVASC) 2.5 mg tablet TAKE 1 TABLET BY MOUTH DAILY 90 tablet 3   • aspirin 81 mg chewable tablet Chew 81 mg daily     • atorvastatin (LIPITOR) 10 mg tablet TAKE 1 TABLET BY MOUTH DAILY 90 tablet 1   • cyclobenzaprine (FLEXERIL) 10 mg tablet Take 1 tablet (10 mg total) by mouth daily at bedtime for 21 days 21 tablet 0   • Ferrous Gluconate-C-Folic Acid (IRON-C PO) Take 1 Dose by mouth daily     • lisinopril (ZESTRIL) 5 mg tablet Take 1 tablet (5 mg total) by mouth daily 90 tablet 1    • Multiple Vitamin (MULTIVITAMINS PO) Take 1 capsule by mouth daily     • predniSONE 20 mg tablet 4 tabs for three days, 3 tabs for three days, 2 tabs for three days, 1 tab for three days, 1/2 tab for 4 days 32 tablet 0     No current facility-administered medications for this visit.       Objective:    /70   Pulse 62   Temp (!) 97.2 °F (36.2 °C)   Resp 18   Ht 6' (1.829 m)   Wt 103 kg (226 lb 3.2 oz)   BMI 30.68 kg/m²        Physical Exam  Musculoskeletal:      Comments: Pvms l spine  No neuro tension signs  Reflexes imntact                Tip Lombardi, DO

## 2024-05-03 ENCOUNTER — HOSPITAL ENCOUNTER (INPATIENT)
Facility: HOSPITAL | Age: 74
LOS: 5 days | Discharge: HOME | DRG: 372 | End: 2024-05-09
Attending: EMERGENCY MEDICINE | Admitting: INTERNAL MEDICINE
Payer: COMMERCIAL

## 2024-05-03 ENCOUNTER — APPOINTMENT (EMERGENCY)
Dept: RADIOLOGY | Facility: HOSPITAL | Age: 74
DRG: 372 | End: 2024-05-03
Payer: COMMERCIAL

## 2024-05-03 DIAGNOSIS — R55 SYNCOPE: Primary | ICD-10-CM

## 2024-05-03 DIAGNOSIS — D64.9 ANEMIA, UNSPECIFIED TYPE: ICD-10-CM

## 2024-05-03 DIAGNOSIS — K52.9 ENTERITIS: ICD-10-CM

## 2024-05-03 DIAGNOSIS — K92.1 HEMATOCHEZIA: ICD-10-CM

## 2024-05-03 DIAGNOSIS — R19.7 DIARRHEA: ICD-10-CM

## 2024-05-03 DIAGNOSIS — E87.20 LACTIC ACID ACIDOSIS: ICD-10-CM

## 2024-05-03 LAB
2HR DELTA HS TROPONIN: -2 NG/L
ABO GROUP BLD: NORMAL
ALBUMIN SERPL BCP-MCNC: 4.2 G/DL (ref 3.5–5)
ALP SERPL-CCNC: 35 U/L (ref 34–104)
ALT SERPL W P-5'-P-CCNC: 23 U/L (ref 7–52)
ANION GAP SERPL CALCULATED.3IONS-SCNC: 10 MMOL/L (ref 4–13)
APTT PPP: 22 SECONDS (ref 23–37)
AST SERPL W P-5'-P-CCNC: 29 U/L (ref 13–39)
BASOPHILS # BLD AUTO: 0.03 THOUSANDS/ÂΜL (ref 0–0.1)
BASOPHILS NFR BLD AUTO: 1 % (ref 0–1)
BILIRUB SERPL-MCNC: 0.78 MG/DL (ref 0.2–1)
BLD GP AB SCN SERPL QL: NEGATIVE
BUN SERPL-MCNC: 15 MG/DL (ref 5–25)
CALCIUM SERPL-MCNC: 9.2 MG/DL (ref 8.4–10.2)
CARDIAC TROPONIN I PNL SERPL HS: 5 NG/L
CARDIAC TROPONIN I PNL SERPL HS: 7 NG/L
CHLORIDE SERPL-SCNC: 103 MMOL/L (ref 96–108)
CO2 SERPL-SCNC: 23 MMOL/L (ref 21–32)
CREAT SERPL-MCNC: 1.07 MG/DL (ref 0.6–1.3)
EOSINOPHIL # BLD AUTO: 0.21 THOUSAND/ÂΜL (ref 0–0.61)
EOSINOPHIL NFR BLD AUTO: 4 % (ref 0–6)
ERYTHROCYTE [DISTWIDTH] IN BLOOD BY AUTOMATED COUNT: 12.6 % (ref 11.6–15.1)
GFR SERPL CREATININE-BSD FRML MDRD: 68 ML/MIN/1.73SQ M
GLUCOSE SERPL-MCNC: 113 MG/DL (ref 65–140)
HCT VFR BLD AUTO: 46 % (ref 36.5–49.3)
HGB BLD-MCNC: 15.5 G/DL (ref 12–17)
IMM GRANULOCYTES # BLD AUTO: 0.01 THOUSAND/UL (ref 0–0.2)
IMM GRANULOCYTES NFR BLD AUTO: 0 % (ref 0–2)
INR PPP: 0.98 (ref 0.84–1.19)
LACTATE SERPL-SCNC: 2.4 MMOL/L (ref 0.5–2)
LACTATE SERPL-SCNC: 3.6 MMOL/L (ref 0.5–2)
LIPASE SERPL-CCNC: 43 U/L (ref 11–82)
LYMPHOCYTES # BLD AUTO: 2.07 THOUSANDS/ÂΜL (ref 0.6–4.47)
LYMPHOCYTES NFR BLD AUTO: 34 % (ref 14–44)
MAGNESIUM SERPL-MCNC: 2.2 MG/DL (ref 1.9–2.7)
MCH RBC QN AUTO: 31.4 PG (ref 26.8–34.3)
MCHC RBC AUTO-ENTMCNC: 33.7 G/DL (ref 31.4–37.4)
MCV RBC AUTO: 93 FL (ref 82–98)
MONOCYTES # BLD AUTO: 0.26 THOUSAND/ÂΜL (ref 0.17–1.22)
MONOCYTES NFR BLD AUTO: 4 % (ref 4–12)
NEUTROPHILS # BLD AUTO: 3.47 THOUSANDS/ÂΜL (ref 1.85–7.62)
NEUTS SEG NFR BLD AUTO: 57 % (ref 43–75)
NRBC BLD AUTO-RTO: 0 /100 WBCS
PLATELET # BLD AUTO: 245 THOUSANDS/UL (ref 149–390)
PMV BLD AUTO: 9.7 FL (ref 8.9–12.7)
POTASSIUM SERPL-SCNC: 3.5 MMOL/L (ref 3.5–5.3)
PROT SERPL-MCNC: 7.5 G/DL (ref 6.4–8.4)
PROTHROMBIN TIME: 13.2 SECONDS (ref 11.6–14.5)
RBC # BLD AUTO: 4.93 MILLION/UL (ref 3.88–5.62)
RH BLD: POSITIVE
SODIUM SERPL-SCNC: 136 MMOL/L (ref 135–147)
SPECIMEN EXPIRATION DATE: NORMAL
WBC # BLD AUTO: 6.05 THOUSAND/UL (ref 4.31–10.16)

## 2024-05-03 PROCEDURE — 85025 COMPLETE CBC W/AUTO DIFF WBC: CPT | Performed by: EMERGENCY MEDICINE

## 2024-05-03 PROCEDURE — 83690 ASSAY OF LIPASE: CPT | Performed by: EMERGENCY MEDICINE

## 2024-05-03 PROCEDURE — 93005 ELECTROCARDIOGRAM TRACING: CPT

## 2024-05-03 PROCEDURE — 71260 CT THORAX DX C+: CPT

## 2024-05-03 PROCEDURE — 99285 EMERGENCY DEPT VISIT HI MDM: CPT | Performed by: EMERGENCY MEDICINE

## 2024-05-03 PROCEDURE — 86850 RBC ANTIBODY SCREEN: CPT | Performed by: EMERGENCY MEDICINE

## 2024-05-03 PROCEDURE — 83735 ASSAY OF MAGNESIUM: CPT | Performed by: EMERGENCY MEDICINE

## 2024-05-03 PROCEDURE — 87040 BLOOD CULTURE FOR BACTERIA: CPT | Performed by: EMERGENCY MEDICINE

## 2024-05-03 PROCEDURE — 83605 ASSAY OF LACTIC ACID: CPT | Performed by: EMERGENCY MEDICINE

## 2024-05-03 PROCEDURE — 85730 THROMBOPLASTIN TIME PARTIAL: CPT | Performed by: EMERGENCY MEDICINE

## 2024-05-03 PROCEDURE — 87493 C DIFF AMPLIFIED PROBE: CPT | Performed by: EMERGENCY MEDICINE

## 2024-05-03 PROCEDURE — 86900 BLOOD TYPING SEROLOGIC ABO: CPT | Performed by: EMERGENCY MEDICINE

## 2024-05-03 PROCEDURE — 36415 COLL VENOUS BLD VENIPUNCTURE: CPT | Performed by: EMERGENCY MEDICINE

## 2024-05-03 PROCEDURE — 72125 CT NECK SPINE W/O DYE: CPT

## 2024-05-03 PROCEDURE — 96360 HYDRATION IV INFUSION INIT: CPT

## 2024-05-03 PROCEDURE — 87505 NFCT AGENT DETECTION GI: CPT | Performed by: EMERGENCY MEDICINE

## 2024-05-03 PROCEDURE — 87154 CUL TYP ID BLD PTHGN 6+ TRGT: CPT | Performed by: EMERGENCY MEDICINE

## 2024-05-03 PROCEDURE — 74177 CT ABD & PELVIS W/CONTRAST: CPT

## 2024-05-03 PROCEDURE — 99285 EMERGENCY DEPT VISIT HI MDM: CPT

## 2024-05-03 PROCEDURE — 99221 1ST HOSP IP/OBS SF/LOW 40: CPT | Performed by: FAMILY MEDICINE

## 2024-05-03 PROCEDURE — 84484 ASSAY OF TROPONIN QUANT: CPT | Performed by: EMERGENCY MEDICINE

## 2024-05-03 PROCEDURE — 85610 PROTHROMBIN TIME: CPT | Performed by: EMERGENCY MEDICINE

## 2024-05-03 PROCEDURE — 70450 CT HEAD/BRAIN W/O DYE: CPT

## 2024-05-03 PROCEDURE — 80053 COMPREHEN METABOLIC PANEL: CPT | Performed by: EMERGENCY MEDICINE

## 2024-05-03 PROCEDURE — 86901 BLOOD TYPING SEROLOGIC RH(D): CPT | Performed by: EMERGENCY MEDICINE

## 2024-05-03 RX ORDER — ENOXAPARIN SODIUM 100 MG/ML
40 INJECTION SUBCUTANEOUS DAILY
Status: DISCONTINUED | OUTPATIENT
Start: 2024-05-04 | End: 2024-05-04

## 2024-05-03 RX ORDER — ACETAMINOPHEN 325 MG/1
650 TABLET ORAL EVERY 6 HOURS PRN
Status: DISCONTINUED | OUTPATIENT
Start: 2024-05-03 | End: 2024-05-09 | Stop reason: HOSPADM

## 2024-05-03 RX ORDER — CEFTRIAXONE 2 G/50ML
2000 INJECTION, SOLUTION INTRAVENOUS EVERY 24 HOURS
Status: DISCONTINUED | OUTPATIENT
Start: 2024-05-03 | End: 2024-05-09 | Stop reason: HOSPADM

## 2024-05-03 RX ORDER — ASPIRIN 81 MG/1
81 TABLET, CHEWABLE ORAL DAILY
Status: DISCONTINUED | OUTPATIENT
Start: 2024-05-04 | End: 2024-05-04

## 2024-05-03 RX ORDER — METRONIDAZOLE 500 MG/100ML
500 INJECTION, SOLUTION INTRAVENOUS EVERY 8 HOURS
Status: DISCONTINUED | OUTPATIENT
Start: 2024-05-03 | End: 2024-05-09 | Stop reason: HOSPADM

## 2024-05-03 RX ORDER — LISINOPRIL 5 MG/1
5 TABLET ORAL DAILY
Status: DISCONTINUED | OUTPATIENT
Start: 2024-05-04 | End: 2024-05-04

## 2024-05-03 RX ORDER — ATORVASTATIN CALCIUM 10 MG/1
10 TABLET, FILM COATED ORAL
Status: DISCONTINUED | OUTPATIENT
Start: 2024-05-03 | End: 2024-05-09 | Stop reason: HOSPADM

## 2024-05-03 RX ORDER — AMLODIPINE BESYLATE 2.5 MG/1
2.5 TABLET ORAL DAILY
Status: DISCONTINUED | OUTPATIENT
Start: 2024-05-04 | End: 2024-05-04

## 2024-05-03 RX ORDER — LOPERAMIDE HYDROCHLORIDE 2 MG/1
2 CAPSULE ORAL 4 TIMES DAILY PRN
Status: DISCONTINUED | OUTPATIENT
Start: 2024-05-03 | End: 2024-05-04

## 2024-05-03 RX ORDER — SODIUM CHLORIDE, SODIUM GLUCONATE, SODIUM ACETATE, POTASSIUM CHLORIDE, MAGNESIUM CHLORIDE, SODIUM PHOSPHATE, DIBASIC, AND POTASSIUM PHOSPHATE .53; .5; .37; .037; .03; .012; .00082 G/100ML; G/100ML; G/100ML; G/100ML; G/100ML; G/100ML; G/100ML
75 INJECTION, SOLUTION INTRAVENOUS CONTINUOUS
Status: DISCONTINUED | OUTPATIENT
Start: 2024-05-03 | End: 2024-05-09 | Stop reason: HOSPADM

## 2024-05-03 RX ORDER — CIPROFLOXACIN 2 MG/ML
400 INJECTION, SOLUTION INTRAVENOUS EVERY 12 HOURS
Status: DISCONTINUED | OUTPATIENT
Start: 2024-05-03 | End: 2024-05-03

## 2024-05-03 RX ADMIN — SODIUM CHLORIDE, SODIUM GLUCONATE, SODIUM ACETATE, POTASSIUM CHLORIDE, MAGNESIUM CHLORIDE, SODIUM PHOSPHATE, DIBASIC, AND POTASSIUM PHOSPHATE 125 ML/HR: .53; .5; .37; .037; .03; .012; .00082 INJECTION, SOLUTION INTRAVENOUS at 21:43

## 2024-05-03 RX ADMIN — SODIUM CHLORIDE 1000 ML: 0.9 INJECTION, SOLUTION INTRAVENOUS at 19:00

## 2024-05-03 RX ADMIN — CEFTRIAXONE 2000 MG: 2 INJECTION, SOLUTION INTRAVENOUS at 23:26

## 2024-05-03 RX ADMIN — METRONIDAZOLE 500 MG: 500 INJECTION, SOLUTION INTRAVENOUS at 21:50

## 2024-05-03 RX ADMIN — ATORVASTATIN CALCIUM 10 MG: 10 TABLET, FILM COATED ORAL at 21:50

## 2024-05-03 RX ADMIN — IOHEXOL 100 ML: 350 INJECTION, SOLUTION INTRAVENOUS at 18:45

## 2024-05-04 LAB
ANION GAP SERPL CALCULATED.3IONS-SCNC: 7 MMOL/L (ref 4–13)
BUN SERPL-MCNC: 15 MG/DL (ref 5–25)
C COLI+JEJUNI TUF STL QL NAA+PROBE: NEGATIVE
C DIFF TOX GENS STL QL NAA+PROBE: NEGATIVE
CALCIUM SERPL-MCNC: 7.8 MG/DL (ref 8.4–10.2)
CHLORIDE SERPL-SCNC: 107 MMOL/L (ref 96–108)
CO2 SERPL-SCNC: 21 MMOL/L (ref 21–32)
CREAT SERPL-MCNC: 0.9 MG/DL (ref 0.6–1.3)
EC STX1+STX2 GENES STL QL NAA+PROBE: NEGATIVE
ERYTHROCYTE [DISTWIDTH] IN BLOOD BY AUTOMATED COUNT: 12.9 % (ref 11.6–15.1)
GFR SERPL CREATININE-BSD FRML MDRD: 83 ML/MIN/1.73SQ M
GLUCOSE P FAST SERPL-MCNC: 112 MG/DL (ref 65–99)
GLUCOSE SERPL-MCNC: 112 MG/DL (ref 65–140)
HCT VFR BLD AUTO: 34.9 % (ref 36.5–49.3)
HCT VFR BLD AUTO: 36.6 % (ref 36.5–49.3)
HCT VFR BLD AUTO: 38.9 % (ref 36.5–49.3)
HGB BLD-MCNC: 12 G/DL (ref 12–17)
HGB BLD-MCNC: 12.3 G/DL (ref 12–17)
HGB BLD-MCNC: 13.1 G/DL (ref 12–17)
LACTATE SERPL-SCNC: 0.8 MMOL/L (ref 0.5–2)
LACTATE SERPL-SCNC: 2.7 MMOL/L (ref 0.5–2)
MAGNESIUM SERPL-MCNC: 2 MG/DL (ref 1.9–2.7)
MCH RBC QN AUTO: 32 PG (ref 26.8–34.3)
MCHC RBC AUTO-ENTMCNC: 33.7 G/DL (ref 31.4–37.4)
MCV RBC AUTO: 95 FL (ref 82–98)
PHOSPHATE SERPL-MCNC: 3.4 MG/DL (ref 2.3–4.1)
PLATELET # BLD AUTO: 204 THOUSANDS/UL (ref 149–390)
PMV BLD AUTO: 9.5 FL (ref 8.9–12.7)
POTASSIUM SERPL-SCNC: 4.1 MMOL/L (ref 3.5–5.3)
RBC # BLD AUTO: 4.1 MILLION/UL (ref 3.88–5.62)
SALMONELLA SP SPAO STL QL NAA+PROBE: NEGATIVE
SHIGELLA SP+EIEC IPAH STL QL NAA+PROBE: NEGATIVE
SODIUM SERPL-SCNC: 135 MMOL/L (ref 135–147)
WBC # BLD AUTO: 12.55 THOUSAND/UL (ref 4.31–10.16)

## 2024-05-04 PROCEDURE — 83605 ASSAY OF LACTIC ACID: CPT | Performed by: INTERNAL MEDICINE

## 2024-05-04 PROCEDURE — 85014 HEMATOCRIT: CPT | Performed by: INTERNAL MEDICINE

## 2024-05-04 PROCEDURE — 99232 SBSQ HOSP IP/OBS MODERATE 35: CPT | Performed by: INTERNAL MEDICINE

## 2024-05-04 PROCEDURE — 80048 BASIC METABOLIC PNL TOTAL CA: CPT | Performed by: FAMILY MEDICINE

## 2024-05-04 PROCEDURE — 84100 ASSAY OF PHOSPHORUS: CPT | Performed by: FAMILY MEDICINE

## 2024-05-04 PROCEDURE — 83735 ASSAY OF MAGNESIUM: CPT | Performed by: FAMILY MEDICINE

## 2024-05-04 PROCEDURE — 85018 HEMOGLOBIN: CPT | Performed by: INTERNAL MEDICINE

## 2024-05-04 PROCEDURE — 85027 COMPLETE CBC AUTOMATED: CPT | Performed by: FAMILY MEDICINE

## 2024-05-04 RX ORDER — LISINOPRIL 5 MG/1
5 TABLET ORAL DAILY
Status: DISCONTINUED | OUTPATIENT
Start: 2024-05-05 | End: 2024-05-09 | Stop reason: HOSPADM

## 2024-05-04 RX ORDER — AMLODIPINE BESYLATE 2.5 MG/1
2.5 TABLET ORAL DAILY
Status: DISCONTINUED | OUTPATIENT
Start: 2024-05-05 | End: 2024-05-09 | Stop reason: HOSPADM

## 2024-05-04 RX ADMIN — ASPIRIN 81 MG CHEWABLE TABLET 81 MG: 81 TABLET CHEWABLE at 08:47

## 2024-05-04 RX ADMIN — ACETAMINOPHEN 650 MG: 325 TABLET ORAL at 23:43

## 2024-05-04 RX ADMIN — ATORVASTATIN CALCIUM 10 MG: 10 TABLET, FILM COATED ORAL at 22:05

## 2024-05-04 RX ADMIN — METRONIDAZOLE 500 MG: 500 INJECTION, SOLUTION INTRAVENOUS at 22:05

## 2024-05-04 RX ADMIN — AMLODIPINE BESYLATE 2.5 MG: 2.5 TABLET ORAL at 08:47

## 2024-05-04 RX ADMIN — METRONIDAZOLE 500 MG: 500 INJECTION, SOLUTION INTRAVENOUS at 14:57

## 2024-05-04 RX ADMIN — LOPERAMIDE HYDROCHLORIDE 2 MG: 2 CAPSULE ORAL at 01:21

## 2024-05-04 RX ADMIN — CEFTRIAXONE 2000 MG: 2 INJECTION, SOLUTION INTRAVENOUS at 23:20

## 2024-05-04 RX ADMIN — LISINOPRIL 5 MG: 5 TABLET ORAL at 08:47

## 2024-05-04 RX ADMIN — METRONIDAZOLE 500 MG: 500 INJECTION, SOLUTION INTRAVENOUS at 06:35

## 2024-05-04 RX ADMIN — ENOXAPARIN SODIUM 40 MG: 40 INJECTION SUBCUTANEOUS at 08:47

## 2024-05-04 RX ADMIN — SODIUM CHLORIDE, SODIUM GLUCONATE, SODIUM ACETATE, POTASSIUM CHLORIDE, MAGNESIUM CHLORIDE, SODIUM PHOSPHATE, DIBASIC, AND POTASSIUM PHOSPHATE 125 ML/HR: .53; .5; .37; .037; .03; .012; .00082 INJECTION, SOLUTION INTRAVENOUS at 23:39

## 2024-05-04 NOTE — ASSESSMENT & PLAN NOTE
Presented with lower abdominal pain, loose bowel movements x 1 day, also reported associated nausea.  Nonbloody emesis  No further nausea vomiting.  Still with loose bowel movement, last few blood-tinged  Lower abdominal pain improved still with tenderness  CT abdomen-Multiple loops of nondilated fluid-filled small bowel as well as liquid stool noted throughout the colon. A few loops of mildly thick-walled left mid abdominal small bowel are also noted with trace adjacent free fluid within the right paracolic gutter likely related to an infectious or inflammatory enteritis. There is a second of mild wall thickening of the descending and sigmoid colon suspicious for an infectious or inflammatory colitis.   Stool C. difficile negative  Lactic acidosis on presentation, improving with hydration.  Leukocytosis uptrending  Suspect gastroenteritis,?  Ischemic component given syncopal episode  Continue ceftriaxone, metronidazole  Monitor abdominal exam  Follow-up CBC  Discontinue aspirin, Lovenox given bleeding  Currently on regular diet will downgrade to surgical soft low residual,  GI evaluation  Follow-up cultures

## 2024-05-04 NOTE — PLAN OF CARE

## 2024-05-04 NOTE — QUICK NOTE
Notified by nursing staff the patient continues with frequent bowel movements and now just had a bloody stool.   will continue monitor

## 2024-05-04 NOTE — UTILIZATION REVIEW
Initial Clinical Review    OBSERVATION 5-3-24 CHANGED TO INPATIENT 5-4-24 FOR ENTERITIS WITH BLOODY STOOL.      Admission: Date/Time/Statement:   Admission Orders (From admission, onward)       Ordered        05/04/24 1405  INPATIENT ADMISSION  Once            05/03/24 2039  Place in Observation  Once                            ED Arrival Information       Expected   -    Arrival   5/3/2024 18:15    Acuity   Emergent              Means of arrival   [facility]ulance    [facility]    Admission type   Emergency              Arrival complaint   syncope             Chief Complaint   Patient presents with    Syncope     Nausea, pale, diaphoretic, weak and vomiting       Initial Presentation: 74 y.o. male presents to ed from home via ems for evaluation and treatment of nausea, diaphoresis, weakness and vomiting.   PMHX: HTN.  Clinical assessment incontinent of stool copious diarrhea in ed. Temp 97.4, LA 3.6.  Imaging consistent wit enteritis.  Initially treated with iv fluids .9% ns 1L bolus.  Admit to observation  for enteritis.     Anticipated Length of Stay/Certification Statement: Patient will be admitted on an Observation basis with an anticipated length of stay of less than 2 midnights     Date: 5-4-24    Day 2: observation changed to inpatient   Patient with frequent bowel movements and now bloody stool.  Continue iv multi electrolyte 125/hr.  Start  iv ceftriaxone and iv flagyl. stool studies pending. Start stool chart and I/O, low fiber/ lo residue soft diet.      Date: 5-5-24   Day 3: Has surpassed a 2nd midnight with active treatments and services for one of two positive blood cultures showing gram negative rods. Afebrile. WBC 10.17, HB 11.9.  Yesterday with 5 bloody stool.   Continue iv ceftriaxone and iv flagyl with iv multi electrolyte 125/hr.  Consult gastroenterology for enteritis with syncope and lactic acidosis.        ED Triage Vitals   05/03/24 1819 05/03/24 1819  05/03/24 1819 05/03/24 1819 05/03/24 1819   (!) 97.4 °F (36.3 °C) 72 18 110/80 100 %      Oral Monitor         Pain Score       6          05/03/24 107 kg (235 lb)     Additional Vital Signs:     Date/Time Temp Pulse Resp BP MAP (mmHg) SpO2 O2 Device   05/04/24 15:13:04 98.9 °F (37.2 °C) 68 18 135/68 90 99 % --   05/04/24 08:21:23 99.2 °F (37.3 °C) 67 18 141/76 98 98 % None (Room air)   05/04/24 01:19:58 99.4 °F (37.4 °C) 81 16 132/80 97 95 % --   05/03/24 2200 -- -- -- -- -- 97 % None (Room air)   05/03/24 21:24:40 98.3 °F (36.8 °C) 78 17 135/81 99 -- --   05/03/24 2100 -- 74 12 126/63 89 99 % None (Room air)   05/03/24 2045 -- 65 15 101/57 76 98 % None (Room air)   05/03/24 2030 -- 64 15 -- -- 99 % None (Room air)   05/03/24 2015 -- 84 22 121/57 82 100 % None (Room air)   05/03/24 2000 -- 80 18 117/92 101 99 % None (Room air)   05/03/24 1930 -- 85 19 185/97 Abnormal  124 98 % None (Room air)   05/03/24 1915 -- 80 19 143/113 Abnormal  122 99 % None (Room air)   05/03/24 1830 -- 63 21 115/78 94 100 % None (Room air)   05/03/24 1819 97.4 °F (36.3 °C)   Abnormal  72 18 110/80 -- 100 % --         Pertinent Labs/Diagnostic Test Results:     CT head without contrast   Final  (05/03 1925)      No acute intracranial abnormality.         CT cervical spine without contrast   Final  (05/03 1934)      No cervical spine fracture or traumatic malalignment.         CT chest abdomen pelvis w contrast   Final (05/03 1946)      No evidence of solid organ injury.      Multiple loops of nondilated fluid-filled small bowel as well as liquid stool noted throughout the colon. A few loops of mildly thick-walled left mid abdominal small bowel are also noted with trace adjacent free fluid within the right paracolic gutter likely related to an infectious or inflammatory enteritis. There is a second of mild wall thickening of the descending and sigmoid colon suspicious for an infectious or inflammatory colitis.         Trace free fluid within  the left paracolic gutter. No free intraperitoneal air.      No infiltrate or pleural effusion. No pneumothorax.      Small hiatal hernia.            Results from last 7 days   Lab Units 05/04/24  0817 05/03/24  1832   WBC Thousand/uL 12.55* 6.05   HEMOGLOBIN g/dL 13.1 15.5   HEMATOCRIT % 38.9 46.0   PLATELETS Thousands/uL 204 245   TOTAL NEUT ABS Thousands/µL  --  3.47         Results from last 7 days   Lab Units 05/04/24 0817 05/03/24  1832   SODIUM mmol/L 135 136   POTASSIUM mmol/L 4.1 3.5   CHLORIDE mmol/L 107 103   CO2 mmol/L 21 23   ANION GAP mmol/L 7 10   BUN mg/dL 15 15   CREATININE mg/dL 0.90 1.07   EGFR ml/min/1.73sq m 83 68   CALCIUM mg/dL 7.8* 9.2   MAGNESIUM mg/dL 2.0 2.2   PHOSPHORUS mg/dL 3.4  --      Results from last 7 days   Lab Units 05/03/24  1832   AST U/L 29   ALT U/L 23   ALK PHOS U/L 35   TOTAL PROTEIN g/dL 7.5   ALBUMIN g/dL 4.2   TOTAL BILIRUBIN mg/dL 0.78         Results from last 7 days   Lab Units 05/04/24  0817 05/03/24  1832   GLUCOSE RANDOM mg/dL 112 113       Results from last 7 days   Lab Units 05/03/24 2036 05/03/24  1832   HS TNI 0HR ng/L  --  7   HS TNI 2HR ng/L 5  --    HSTNI D2 ng/L -2  --          Results from last 7 days   Lab Units 05/03/24  1832   PROTIME seconds 13.2   INR  0.98   PTT seconds 22*             Results from last 7 days   Lab Units 05/03/24 2036 05/03/24  1832   LACTIC ACID mmol/L 3.6* 2.4*       Results from last 7 days   Lab Units 05/03/24  1832   LIPASE u/L 43       Results from last 7 days   Lab Units 05/03/24  1845   C DIFF TOXIN B BY PCR  Negative     Results from last 7 days   Lab Units 05/03/24  1845   SALMONELLA SP PCR  Negative   SHIGELLA SP/ENTEROINVASIVE E. COLI (EIEC)  Negative   CAMPYLOBACTER SP (JEJUNI AND COLI)  Negative   SHIGA TOXIN 1/SHIGA TOXIN 2  Negative         Results from last 7 days   Lab Units 05/03/24  1904 05/03/24  1832   BLOOD CULTURE  Received in Microbiology Lab. Culture in Progress. Received in Microbiology Lab. Culture in  Progress.     ED Treatment:   Medication Administration from 05/03/2024 1815 to 05/03/2024 2119         Date/Time Order Dose Route Action     05/03/2024 1900 EDT sodium chloride 0.9 % bolus 1,000 mL 1,000 mL Intravenous New Bag          Past Medical History:   Diagnosis Date    Chronic rhinitis 02/05/2005    last assessed 2/5/05, resolved 5/18/17     Elevated liver function tests     last assessed 3/9/15, resolved 5/18/17     Herpes simplex type 1 infection     Hip arthritis     resolved 12/20/17     Hypertension     Shoulder bursitis     last assessed 9/23/13, resolved 5/18/17      Present on Admission:   Enteritis   Benign essential hypertension   Mixed hyperlipidemia      Admitting Diagnosis:     Diarrhea [R19.7]  Syncope [R55]  Lactic acid acidosis [E87.20]    Age/Sex: 74 y.o. male    Scheduled Medications:    [START ON 5/5/2024] amLODIPine, 2.5 mg, Oral, Daily  atorvastatin, 10 mg, Oral, HS  cefTRIAXone, 2,000 mg, Intravenous, Q24H  [START ON 5/5/2024] lisinopril, 5 mg, Oral, Daily  metroNIDAZOLE, 500 mg, Intravenous, Q8H      Continuous IV Infusions:  multi-electrolyte, 125 mL/hr, Intravenous, Continuous      PRN Meds:  acetaminophen, 650 mg, Oral, Q6H PRN        None    Network Utilization Review Department  ATTENTION: Please call with any questions or concerns to [phone] and carefully listen to the prompts so that you are directed to the right person. All voicemails are confidential.   For Discharge needs, contact Care Management DC Support Team at [phone] opt. 2  Send all requests for admission clinical reviews, approved or denied determinations and any other requests to dedicated fax number below belonging to the campus where the patient is receiving treatment. List of dedicated fax numbers for the Facilities:  FACILITY NAME UR FAX NUMBER   ADMISSION DENIALS (Administrative/Medical Necessity) [phone]   DISCHARGE SUPPORT TEAM (NETWORK) [phone]   [facility] CHILD [facility]  (Maternity/NICU/Pediatrics) [phone]   [facility] [phone]   [facility] [phone]   [facility] [phone]   [facility] [phone]   [facility] [phone]   [facility] [phone]   [facility] [phone]   [facility] [phone]   [facility] [phone]   [facility] [phone]   General acute hospital [phone]   [facility] [phone]

## 2024-05-04 NOTE — H&P
[facility]  H&P  Name: Gennaro Worthington 74 y.o. male I MRN: [id number]  Unit/Bed#: [facility] I Date of Admission: 5/3/2024   Date of Service: 5/3/2024 I Hospital Day: 0      Assessment/Plan   Enteritis  Assessment & Plan  Most likely acute food poisoning.  Will start on Rocephin and Flagy   C. difficile toxin pending ordered by ER    Benign essential hypertension  Assessment & Plan  Continue Norvasc and lisinopril    Mixed hyperlipidemia  Assessment & Plan  Continue Lipitor         Disposition  #1 IV Rocephin  and Flagyl  #2 IV fluids  #3 CBC BMP mag and Phos in the morning        VTE Prophylaxis: Enoxaparin (Lovenox)  / sequential compression device   Code Status: Level 1 - Full Code       Anticipated Length of Stay:  Patient will be admitted on an Observation basis with an anticipated length of stay of less than 2 midnights.   Justification for Hospital Stay: Please see detailed plans noted above.    Chief Complaint:     Diarrhea  History of Present Illness:  Gennaro Worthington is a 74 y.o. male who has past medical history significant for hypertension hyperlipidemia presents to the hospital for fainting episode secondary to nausea/vomiting and diarrhea.  The patient started at 5 PM with vomiting with couple episodes and then having 6 episodes of diarrhea.  Patient is morning ate scrambled eggs and the night before add flounder, asparagus and crab Cakes.  His wife also the same foods but is well.  Patient in workup in the emergency room had no electrolyte derangements, with just feels ill in the abdomen.  CAT scan showing inflammation of the small bowel and colon with enteritis and colitis.  In the emergency room the patient got 1 L of fluids but did not get any other medications.      Review of Systems:    Constitutional:  Denies fever or chills   Eyes:  Denies change in visual acuity   HENT:  Denies nasal congestion or sore throat   Respiratory:  Denies cough or shortness of breath   Cardiovascular:   Denies chest pain or edema   GI: Abdominal pain, diarrhea nausea vomiting  :  Denies dysuria   Musculoskeletal:  Denies back pain or joint pain   Integument:  Denies rash   Neurologic:  Denies headache or sensory changes   Endocrine:  Denies polyuria or polydipsia   Lymphatic:  Denies swollen glands   Psychiatric:  Denies depression or anxiety     Past Medical and Surgical History:   Past Medical History:   Diagnosis Date    Chronic rhinitis 02/05/2005    last assessed 2/5/05, resolved 5/18/17     Elevated liver function tests     last assessed 3/9/15, resolved 5/18/17     Herpes simplex type 1 infection     Hip arthritis     resolved 12/20/17     Hypertension     Shoulder bursitis     last assessed 9/23/13, resolved 5/18/17      Past Surgical History:   Procedure Laterality Date    EPIDURAL BLOCK INJECTION N/A 05/23/2023    Procedure: BLOCK / INJECTION EPIDURAL STEROID CERVICAL C7-T1  (07242);  Surgeon: Bib Ball DO;  Location:  MAIN OR;  Service: Pain Management     HERNIA REPAIR      In inal: x3 surgeries, [place], [facility] and [facility]    OK RPR AA HERNIA 1ST < 3 CM REDUCIBLE N/A 8/22/2023    Procedure: REPAIR HERNIA VENTRAL;  Surgeon: Nehemias Devries MD;  Location: [facility] OR;  Service: General    PROSTATOTOMY      last assessed 1/11/18       Meds/Allergies:  No current facility-administered medications on file prior to encounter.     Current Outpatient Medications on File Prior to Encounter   Medication Sig Dispense Refill    amLODIPine (NORVASC) 2.5 mg tablet TAKE 1 TABLET BY MOUTH DAILY 90 tablet 3    aspirin 81 mg chewable tablet Chew 81 mg daily      atorvastatin (LIPITOR) 10 mg tablet TAKE 1 TABLET BY MOUTH DAILY 90 tablet 1    cyclobenzaprine (FLEXERIL) 10 mg tablet Take 1 tablet (10 mg total) by mouth daily at bedtime for 21 days 21 tablet 0    Ferrous Gluconate-C-Folic Acid (IRON-C PO) Take 1 Dose by mouth daily      lisinopril (ZESTRIL) 5 mg tablet Take 1 tablet (5 mg total) by mouth daily 90  tablet 1    Multiple Vitamin (MULTIVITAMINS PO) Take 1 capsule by mouth daily      predniSONE 20 mg tablet 4 tabs for three days, 3 tabs for three days, 2 tabs for three days, 1 tab for three days, 1/2 tab for 4 days 32 tablet 0           Allergies:   Allergies   Allergen Reactions    Augmentin [Amoxicillin-Pot Clavulanate] GI Intolerance    Meperidine Other (See Comments)     Reaction Date: 05Feb2005; Annotation - 04Sep2012: unsure of side effect  Reaction Date: 05Feb2005; Annotation - 04Sep2012: unsure of side effect       History:  Marital Status: /Civil Union     Substance Use History:   Social History     Substance and Sexual Activity   Alcohol Use Yes    Comment: very rare     Social History     Tobacco Use   Smoking Status Never    Passive exposure: Never   Smokeless Tobacco Never     Social History     Substance and Sexual Activity   Drug Use Never       Family History:  Family History   Problem Relation Age of Onset    Hypertension Mother     Lung cancer Family     Prostate cancer Family     Mental illness Neg Hx        Physical Exam:     Vitals:   Blood Pressure: 126/63 (05/03/24 2100)  Pulse: 74 (05/03/24 2100)  Temperature: (!) 97.4 °F (36.3 °C) (05/03/24 1819)  Respirations: 12 (05/03/24 2100)  Height: 6' (182.9 cm) (05/03/24 1817)  Weight - Scale: 102 kg (224 lb 10.4 oz) (05/03/24 1817)  SpO2: 99 % (05/03/24 2100)    Constitutional:  Non-toxic appearance  Eyes:  EOMI, No scleral icterus   HENT:   oropharynx moist, external ears normal, external nose normal   Respiratory:  No respiratory distress, no wheezing   Cardiovascular:  Normal rate, no murmurs   GI:  Soft, nondistended, no guarding tenderness to palpation  :  No costovertebral angle tenderness   Musculoskeletal:  no tenderness, no deformities. Back- no tenderness  Integument:  no jaundice, no rash   Neurologic:  Alert &awake, communicative, CN 2-12 normal,  no focal deficits noted   Psychiatric:  Speech and behavior appropriate        Lab Results: I have personally reviewed pertinent reports.   and I have personally reviewed pertinent films in PACS    Results from last 7 days   Lab Units 05/03/24  1832   WBC Thousand/uL 6.05   HEMOGLOBIN g/dL 15.5   HEMATOCRIT % 46.0   PLATELETS Thousands/uL 245   SEGS PCT % 57   LYMPHO PCT % 34   MONO PCT % 4   EOS PCT % 4     Results from last 7 days   Lab Units 05/03/24  1832   POTASSIUM mmol/L 3.5   CHLORIDE mmol/L 103   CO2 mmol/L 23   BUN mg/dL 15   CREATININE mg/dL 1.07   CALCIUM mg/dL 9.2   ALK PHOS U/L 35   ALT U/L 23   AST U/L 29     Results from last 7 days   Lab Units 05/03/24  1832   INR  0.98         Imaging: I have personally reviewed pertinent reports.   and I have personally reviewed pertinent films in PACS    CT chest abdomen pelvis w contrast    Result Date: 5/3/2024  Narrative: CT CHEST, ABDOMEN AND PELVIS WITH IV CONTRAST INDICATION: trauma. COMPARISON: CT chest abdomen pelvis dated June 11, 2021. TECHNIQUE: CT examination of the chest, abdomen and pelvis was performed. Multiplanar 2D reformatted images were created from the source data. This examination, like all CT scans performed in the [facility], was performed utilizing techniques to minimize radiation dose exposure, including the use of iterative reconstruction and automated exposure control. Radiation dose length product (DLP) for this visit: 1389.43 mGy-cm IV Contrast: 100 mL of iohexol (OMNIPAQUE) Enteric Contrast: Not administered. FINDINGS: CHEST LUNGS: Lungs are clear. No tracheal or endobronchial lesion. PLEURA: Unremarkable. HEART/GREAT VESSELS: Heart is unremarkable for patient's age. No thoracic aortic aneurysm. MEDIASTINUM AND SCOOTER: Unremarkable. CHEST WALL AND LOWER NECK: Unremarkable. ABDOMEN LIVER/BILIARY TREE: Unremarkable. GALLBLADDER: No calcified gallstones. No pericholecystic inflammatory change. SPLEEN: Unremarkable. PANCREAS: Unremarkable. ADRENAL GLANDS: Unremarkable. KIDNEYS/URETERS: Simple  renal cyst(s). No hydronephrosis. Subcentimeter hypoattenuating renal lesion(s), too small to characterize but statistically likely benign, which do not warrant follow-up (Radiology June 2019). STOMACH AND BOWEL: There are multiple loops of nondilated fluid-filled small bowel throughout the abdomen and pelvis as well as liquid stool throughout the colon a few mildly thick-walled small bowel loops are noted at the left mid abdomen.. There is a segment of mild colonic wall thickening involving the descending and sigmoid colon with no pericolonic inflammatory stranding. A few scattered colonic diverticula are present. No evidence of large or small bowel obstruction. APPENDIX: Normal. ABDOMINOPELVIC CAVITY: Small volume ascites. No pneumoperitoneum. No lymphadenopathy. VESSELS: Unremarkable for patient's age. PELVIS REPRODUCTIVE ORGANS: Post prostatectomy. URINARY BLADDER: Unremarkable. ABDOMINAL WALL/INGUINAL REGIONS: Postoperative changes of bilateral inguinal hernia repair are present. There is a small left periumbilical fat-containing hernia. BONES: No acute fracture or suspicious osseous lesion.     Impression: No evidence of solid organ injury. Multiple loops of nondilated fluid-filled small bowel as well as liquid stool noted throughout the colon. A few loops of mildly thick-walled left mid abdominal small bowel are also noted with trace adjacent free fluid within the right paracolic gutter likely related to an infectious or inflammatory enteritis. There is a second of mild wall thickening of the descending and sigmoid colon suspicious for an infectious or inflammatory colitis. Trace free fluid within the left paracolic gutter. No free intraperitoneal air. No infiltrate or pleural effusion. No pneumothorax. Small hiatal hernia. Workstation performed: [id number]     CT cervical spine without contrast    Result Date: 5/3/2024  Narrative: CT CERVICAL SPINE - WITHOUT CONTRAST INDICATION:   trauma. COMPARISON: CTA  head and neck dated October 19, 2021. TECHNIQUE:  CT examination of the cervical spine was performed without intravenous contrast.  Contiguous axial images were obtained. Multiplanar 2D reformatted images were created from the source data. Radiation dose length product (DLP) for this visit:  583 mGy-cm .  This examination, like all CT scans performed in the [facility], was performed utilizing techniques to minimize radiation dose exposure, including the use of iterative reconstruction and automated exposure control. IMAGE QUALITY:  Diagnostic. FINDINGS: ALIGNMENT:  Normal alignment of the cervical spine. No subluxation. VERTEBRAE:  No fracture. DEGENERATIVE CHANGES:  Moderate multilevel cervical degenerative changes are noted. No critical central canal stenosis. 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 PREVERTEBRAL AND PARASPINAL SOFT TISSUES: Unremarkable THORACIC INLET:  Normal.     Impression: No cervical spine fracture or traumatic malalignment. Workstation performed: [id number]     CT head without contrast    Result Date: 5/3/2024  Narrative: CT BRAIN - WITHOUT CONTRAST INDICATION:   trauma. COMPARISON: CT brain dated October 19, 2021. TECHNIQUE:  CT examination of the brain was performed.  Multiplanar 2D reformatted images were created from the source data. Radiation dose length product (DLP) for this visit:  1009 mGy-cm .  This examination, like all CT scans performed in the [facility], was performed utilizing techniques to minimize radiation dose exposure, including the use of iterative reconstruction and automated exposure control. IMAGE QUALITY:  Diagnostic. FINDINGS: PARENCHYMA:  No intracranial mass, mass effect or midline shift. No CT signs of acute infarction.  No acute parenchymal hemorrhage. VENTRICLES AND EXTRA-AXIAL SPACES:  Normal for the patient's age. VISUALIZED ORBITS: Normal visualized orbits. PARANASAL SINUSES:  Small amount of inspissated material is noted within the left maxillary sinus. No air-fluid levels are identified. CALVARIUM AND EXTRACRANIAL SOFT TISSUES:  Normal.     Impression: No acute intracranial abnormality. Workstation performed: [id number]     [facility] low  Patient requires observation hospital due to diarrhea  Reviewed CBC, CMP, troponin, CAT scan  Prescription drug therapy with IV Cipro Flagyl and IV fluids  Repeat CBC, BMP mag and Phos in the morning      Epic Records Reviewed as well as Records in Care Everywhere    ** Please Note: Dragon 360 Dictation voice to text software was used in the creation of this document. **

## 2024-05-04 NOTE — PLAN OF CARE
Problem: SAFETY ADULT  Goal: Patient will remain free of falls  Description: INTERVENTIONS:  - Educate patient/family on patient safety including physical limitations  - Instruct patient to call for assistance with activity   - Consult OT/PT to assist with strengthening/mobility   - Keep Call bell within reach  - Keep bed low and locked with side rails adjusted as appropriate  - Keep care items and personal belongings within reach  - Initiate and maintain comfort rounds  - Make Fall Risk Sign visible to staff  - Apply yellow socks and bracelet for high fall risk patients  - Consider moving patient to room near nurses station  Outcome: Progressing        Problem: INFECTION - ADULT  Goal: Absence or prevention of progression during hospitalization  Description: INTERVENTIONS:  - Assess and monitor for signs and symptoms of infection  - Monitor lab/diagnostic results  - Monitor all insertion sites, i.e. indwelling lines, tubes, and drains  - Monitor endotracheal if appropriate and nasal secretions for changes in amount and color  - [place] appropriate cooling/warming therapies per order  - Administer medications as ordered  - Instruct and encourage patient and family to use good hand hygiene technique  - Identify and instruct in appropriate isolation precautions for identified infection/condition  Outcome: Progressing

## 2024-05-04 NOTE — PROGRESS NOTES
[facility]  Progress Note  Name: Gennaro Worthington I  MRN: [id number]  Unit/Bed#: 4 [address] I Date of Admission: 5/3/2024   Date of Service: 5/4/2024 I Hospital Day: 0    Assessment/Plan   * Enteritis  Assessment & Plan  Presented with lower abdominal pain, loose bowel movements x 1 day, also reported associated nausea.  Nonbloody emesis  No further nausea vomiting.  Still with loose bowel movement, last few blood-tinged  Lower abdominal pain improved still with tenderness  CT abdomen-Multiple loops of nondilated fluid-filled small bowel as well as liquid stool noted throughout the colon. A few loops of mildly thick-walled left mid abdominal small bowel are also noted with trace adjacent free fluid within the right paracolic gutter likely related to an infectious or inflammatory enteritis. There is a second of mild wall thickening of the descending and sigmoid colon suspicious for an infectious or inflammatory colitis.   Stool C. difficile negative  Lactic acidosis on presentation, improving with hydration.  Leukocytosis uptrending  Suspect gastroenteritis,?  Ischemic component given syncopal episode  Continue ceftriaxone, metronidazole  Monitor abdominal exam  Follow-up CBC  Discontinue aspirin, Lovenox given bleeding  Currently on regular diet will downgrade to surgical soft low residual,  GI evaluation  Follow-up cultures    Syncopal episodes  Assessment & Plan  Likely vasovagal or hypotensive  Denies any cardiorespiratory symptoms  Blood pressure currently stable  Telemetry unremarkable    Benign essential hypertension  Assessment & Plan  Continue Norvasc and lisinopril with holding parameters    Mixed hyperlipidemia  Assessment & Plan  Continue Lipitor               VTE Pharmacologic Prophylaxis:   Moderate Risk (Score 3-4) - Pharmacological DVT Prophylaxis Contraindicated. Sequential Compression Devices Ordered.  Hold DVT prophylaxis due to bowel movements with blood    Mobility:    Basic Mobility Inpatient Raw Score: 18  JH-HLM Goal: 6: Walk 10 steps or more  JH-HLM Achieved: 6: Walk 10 steps or more  JH-HLM Goal achieved. Continue to encourage appropriate mobility.    Patient Centered Rounds: I performed bedside rounds with nursing staff today.   Discussions with Specialists or Other Care Team Provider: Yes    Education and Discussions with Family / Patient: Updated  (wife) at bedside.    Total Time Spent on Date of Encounter in care of patient: 45 mins. This time was spent on one or more of the following: performing physical exam; counseling and coordination of care; obtaining or reviewing history; documenting in the medical record; reviewing/ordering tests, medications or procedures; communicating with other healthcare professionals and discussing with patient's family/caregivers.    Current Length of Stay: 0 day(s)  Current Patient Status: Inpatient   Certification Statement: The patient will continue to require additional inpatient hospital stay due to colitis  Discharge Plan: Anticipate discharge in 24-48 hrs to home.    Code Status: Level 1 - Full Code    Subjective:   Reports that he is feeling better  Still with loose bowel movement but improving, noted to have moderate amount of blood with the bowel movement  Denies nausea vomiting  Abdominal pain is improving only mild tenderness in the lower abdomen  Denies any fever or chills    HPI reviewed as noted.  Patient reported that his symptoms started around 4 PM yesterday when he started with lower abdominal pain and urge to have a bowel movement which was loose.  Subsequently patient failed cold and clammy nauseous had syncopal episode with subsequent diarrhea nausea and emesis.  Patient continued to have further loose watery diarrhea and route to the hospital and ED.  Denies chest pain shortness of breath or dizziness.  Denies eating unusual food, sick contacts.  Reports that He was doing well prior to the event though  he was outside in the sun the day prior and did not eat much on the day prior to the event.  Denies any chest pain shortness of breath or dizziness.      Objective:     Vitals:   Temp (24hrs), Av.6 °F (37 °C), Min:97.4 °F (36.3 °C), Max:99.4 °F (37.4 °C)    Temp:  [97.4 °F (36.3 °C)-99.4 °F (37.4 °C)] 99.2 °F (37.3 °C)  HR:  [63-85] 67  Resp:  [12-22] 18  BP: (101-185)/() 141/76  SpO2:  [95 %-100 %] 98 %  Body mass index is 31 kg/m².     Input and Output Summary (last 24 hours):     Intake/Output Summary (Last 24 hours) at 2024 0940  Last data filed at 2024 0142  Gross per 24 hour   Intake 1200 ml   Output 300 ml   Net 900 ml       Physical Exam:   Physical Exam  Vitals and nursing note reviewed.   Constitutional:       General: He is not in acute distress.     Appearance: He is well-developed. He is obese.   HENT:      Head: Normocephalic and atraumatic.   Eyes:      Conjunctiva/sclera: Conjunctivae normal.   Cardiovascular:      Rate and Rhythm: Normal rate and regular rhythm.      Heart sounds: No murmur heard.  Pulmonary:      Effort: Pulmonary effort is normal. No respiratory distress.      Breath sounds: Normal breath sounds.   Abdominal:      Palpations: Abdomen is soft.      Tenderness: There is abdominal tenderness (Lower abdominal right more than left).   Musculoskeletal:         General: No swelling.      Right lower leg: No edema.      Left lower leg: No edema.   Skin:     General: Skin is warm and dry.      Capillary Refill: Capillary refill takes less than 2 seconds.   Neurological:      Mental Status: He is alert. Mental status is at baseline.   Psychiatric:         Mood and Affect: Mood normal.         Additional Data:     Labs:  Results from last 7 days   Lab Units 24  0817 24  1832   WBC Thousand/uL 12.55* 6.05   HEMOGLOBIN g/dL 13.1 15.5   HEMATOCRIT % 38.9 46.0   PLATELETS Thousands/uL 204 245   SEGS PCT %  --  57   LYMPHO PCT %  --  34   MONO PCT %  --  4   EOS PCT  %  --  4     Results from last 7 days   Lab Units 05/04/24  0817 05/03/24  1832   SODIUM mmol/L 135 136   POTASSIUM mmol/L 4.1 3.5   CHLORIDE mmol/L 107 103   CO2 mmol/L 21 23   BUN mg/dL 15 15   CREATININE mg/dL 0.90 1.07   ANION GAP mmol/L 7 10   CALCIUM mg/dL 7.8* 9.2   ALBUMIN g/dL  --  4.2   TOTAL BILIRUBIN mg/dL  --  0.78   ALK PHOS U/L  --  35   ALT U/L  --  23   AST U/L  --  29   GLUCOSE RANDOM mg/dL 112 113     Results from last 7 days   Lab Units 05/03/24  1832   INR  0.98             Results from last 7 days   Lab Units 05/03/24  2036 05/03/24  1832   LACTIC ACID mmol/L 3.6* 2.4*       Lines/Drains:  Invasive Devices       Peripheral Intravenous Line  Duration             Peripheral IV Right Hand -- days    Peripheral IV 05/03/24 Left Antecubital <1 day    Peripheral IV 05/03/24 Right Antecubital <1 day                      Telemetry:  Telemetry Orders (From admission, onward)               24 Hour Telemetry Monitoring  Continuous x 24 Hours (Telem)        Question:  Reason for 24 Hour Telemetry  Answer:  Syncope suspected to be cardiac in origin                     Telemetry Reviewed: Normal Sinus Rhythm  Indication for Continued Telemetry Use: No indication for continued use. Will discontinue.              Imaging: Reviewed radiology reports from this admission including: abdominal/pelvic CT    Recent Cultures (last 7 days):   Results from last 7 days   Lab Units 05/03/24  1904 05/03/24  1832   BLOOD CULTURE  Received in Microbiology Lab. Culture in Progress. Received in Microbiology Lab. Culture in Progress.       Last 24 Hours Medication List:   Current Facility-Administered Medications   Medication Dose Route Frequency Provider Last Rate    acetaminophen  650 mg Oral Q6H PRN Fabiano López MD      amLODIPine  2.5 mg Oral Daily Fabiano López MD      aspirin  81 mg Oral Daily Fabiano López MD      atorvastatin  10 mg Oral HS Fabiano López MD      cefTRIAXone  2,000 mg  Intravenous Q24H Fabiano López MD 2,000 mg (05/03/24 2326)    enoxaparin  40 mg Subcutaneous Daily Fabiano López MD      lisinopril  5 mg Oral Daily Fabiano López MD      loperamide  2 mg Oral 4x Daily PRN Fabiano López MD      metroNIDAZOLE  500 mg Intravenous Q8H Fabiano López  mg (05/04/24 0635)    multi-electrolyte  125 mL/hr Intravenous Continuous Fabiano López  mL/hr (05/03/24 2147)        Today, Patient Was Seen By: Vanessa Crocker MD    **Please Note: This note may have been constructed using a voice recognition system.**

## 2024-05-04 NOTE — ED PROVIDER NOTES
History  Chief Complaint   Patient presents with    Syncope     Nausea, pale, diaphoretic, weak and vomiting     Patient is a 74-year-old male with a history of hypertension, hyperlipidemia presents to the emergency department after syncopal episode.  Patient states that he had 1 episode of diarrhea at approximately 4 PM, was in the process of having another bowel movement when he lost consciousness.  Patient was incontinent of stool when EMS arrived, and he was also noted to be hypotensive.  Patient with copious amounts of diarrhea during my initial evaluation.  He was treated with a bolus of 200 mL normal saline on the way to the hospital.  He denies sick contacts.  He denies recent antibiotic use.      History provided by:  Patient   used: No        Prior to Admission Medications   Prescriptions Last Dose Informant Patient Reported? Taking?   Ferrous Gluconate-C-Folic Acid (IRON-C PO)  Self Yes No   Sig: Take 1 Dose by mouth daily   Multiple Vitamin (MULTIVITAMINS PO)  Self Yes No   Sig: Take 1 capsule by mouth daily   amLODIPine (NORVASC) 2.5 mg tablet   No No   Sig: TAKE 1 TABLET BY MOUTH DAILY   aspirin 81 mg chewable tablet  Self Yes No   Sig: Chew 81 mg daily   atorvastatin (LIPITOR) 10 mg tablet   No No   Sig: TAKE 1 TABLET BY MOUTH DAILY   cyclobenzaprine (FLEXERIL) 10 mg tablet   No No   Sig: Take 1 tablet (10 mg total) by mouth daily at bedtime for 21 days   lisinopril (ZESTRIL) 5 mg tablet   No No   Sig: Take 1 tablet (5 mg total) by mouth daily   predniSONE 20 mg tablet   No No   Si tabs for three days, 3 tabs for three days, 2 tabs for three days, 1 tab for three days, 1/2 tab for 4 days      Facility-Administered Medications: None       Past Medical History:   Diagnosis Date    Chronic rhinitis 2005    last assessed 05, resolved 17     Elevated liver function tests     last assessed 3/9/15, resolved 17     Herpes simplex type 1 infection     Hip arthritis      resolved 12/20/17     Hypertension     Shoulder bursitis     last assessed 9/23/13, resolved 5/18/17        Past Surgical History:   Procedure Laterality Date    EPIDURAL BLOCK INJECTION N/A 05/23/2023    Procedure: BLOCK / INJECTION EPIDURAL STEROID CERVICAL C7-T1  (15090);  Surgeon: Bib Ball DO;  Location:  MAIN OR;  Service: Pain Management     HERNIA REPAIR      In guinal: x3 surgeries, [facility], [facility] and [place] [facility]    [place] RPR AA HERNIA 1ST < 3 CM REDUCIBLE N/A 8/22/2023    Procedure: REPAIR HERNIA VENTRAL;  Surgeon: Nehemias Devries MD;  Location: [facility];  Service: General    PROSTATOTOMY      last assessed 1/11/18       Family History   Problem Relation Age of Onset    Hypertension Mother     Lung cancer Family     Prostate cancer Family     Mental illness Neg Hx      I have reviewed and agree with the history as documented.    E-Cigarette/Vaping    E-Cigarette Use Never User      E-Cigarette/Vaping Substances    Nicotine No     THC No     CBD No     Flavoring No     Other No     Unknown No      Social History     Tobacco Use    Smoking status: Never     Passive exposure: Never    Smokeless tobacco: Never   Vaping Use    Vaping status: Never Used   Substance Use Topics    Alcohol use: Yes     Comment: very rare    Drug use: Never       Review of Systems   Constitutional:  Negative for chills and fever.   Respiratory:  Negative for cough, shortness of breath and wheezing.    Cardiovascular:  Negative for chest pain and palpitations.   Gastrointestinal:  Positive for abdominal pain and diarrhea. Negative for constipation, nausea and vomiting.   Genitourinary:  Negative for dysuria, flank pain, hematuria and urgency.   Musculoskeletal:  Negative for back pain.   Skin:  Negative for color change and rash.   Neurological:  Positive for syncope.   All other systems reviewed and are negative.      Physical Exam  Physical Exam  Vitals and nursing note reviewed.   Constitutional:       Appearance: He is  well-developed.   HENT:      Head: Normocephalic and atraumatic.   Eyes:      Pupils: Pupils are equal, round, and reactive to light.   Cardiovascular:      Rate and Rhythm: Normal rate and regular rhythm.      Heart sounds: Normal heart sounds.   Pulmonary:      Effort: Pulmonary effort is normal.      Breath sounds: Normal breath sounds.   Abdominal:      General: Bowel sounds are normal. There is no distension.      Palpations: Abdomen is soft. There is no mass.      Tenderness: There is abdominal tenderness. There is no guarding or rebound.   Musculoskeletal:      Cervical back: Normal range of motion and neck supple.   Skin:     General: Skin is warm and dry.      Capillary Refill: Capillary refill takes less than 2 seconds.   Neurological:      Mental Status: He is alert and oriented to person, place, and time.   Psychiatric:         Behavior: Behavior normal.         Thought Content: Thought content normal.         Judgment: Judgment normal.         Vital Signs  ED Triage Vitals   Temperature Pulse Respirations Blood Pressure SpO2   05/03/24 1819 05/03/24 1819 05/03/24 1819 05/03/24 1819 05/03/24 1819   (!) 97.4 °F (36.3 °C) 72 18 110/80 100 %      Temp src Heart Rate Source Patient Position - Orthostatic VS BP Location FiO2 (%)   -- 05/03/24 1830 05/03/24 1830 05/03/24 1830 --    Monitor Lying Left arm       Pain Score       --                  Vitals:    05/03/24 2015 05/03/24 2030 05/03/24 2045 05/03/24 2100   BP: 121/57  101/57 126/63   Pulse: 84 64 65 74   Patient Position - Orthostatic VS: Lying  Lying Lying         Visual Acuity      ED Medications  Medications   ciprofloxacin (CIPRO) IVPB (premix in 5% dextrose) 400 mg 200 mL (has no administration in time range)   metroNIDAZOLE (FLAGYL) IVPB (premix) 500 mg 100 mL (has no administration in time range)   loperamide (IMODIUM) capsule 2 mg (has no administration in time range)   amLODIPine (NORVASC) tablet 2.5 mg (has no administration in time range)    aspirin chewable tablet 81 mg (has no administration in time range)   atorvastatin (LIPITOR) tablet 10 mg (has no administration in time range)   lisinopril (ZESTRIL) tablet 5 mg (has no administration in time range)   multi-electrolyte (PLASMALYTE-A/ISOLYTE-S PH 7.4) IV solution (has no administration in time range)   acetaminophen (TYLENOL) tablet 650 mg (has no administration in time range)   enoxaparin (LOVENOX) subcutaneous injection 40 mg (has no administration in time range)   sodium chloride 0.9 % bolus 1,000 mL (0 mL Intravenous Stopped 5/3/24 2013)   iohexol (OMNIPAQUE) 350 MG/ML injection (MULTI-DOSE) 100 mL (100 mL Intravenous Given 5/3/24 1845)       Diagnostic Studies  Results Reviewed       Procedure Component Value Units Date/Time    HS Troponin I 2hr [[id number]]  (Normal) Collected: 05/03/24 2036    Lab Status: Final result Specimen: Blood from Arm, Right Updated: 05/03/24 2107     hs TnI 2hr 5 ng/L      Delta 2hr hsTnI -2 ng/L     Lactic acid 2 Hours [[id number]]  (Abnormal) Collected: 05/03/24 2036    Lab Status: Final result Specimen: Blood from Arm, Right Updated: 05/03/24 2102     LACTIC ACID 3.6 mmol/L     Narrative:      Result may be elevated if tourniquet was used during collection.    Protime-INR [[id number]]  (Normal) Collected: 05/03/24 1832    Lab Status: Final result Specimen: Blood from Arm, Right Updated: 05/03/24 1937     Protime 13.2 seconds      INR 0.98    APTT [[id number]]  (Abnormal) Collected: 05/03/24 1832    Lab Status: Final result Specimen: Blood from Arm, Right Updated: 05/03/24 1937     PTT 22 seconds     Narrative:      No clots detected.    HS Troponin 0hr (reflex protocol) [[id number]]  (Normal) Collected: 05/03/24 1832    Lab Status: Final result Specimen: Blood from Arm, Right Updated: 05/03/24 1907     hs TnI 0hr 7 ng/L     Blood culture #1 [[id number]] Collected: 05/03/24 1904    Lab Status: In process Specimen: Blood from Arm, Left Updated: 05/03/24 2871     Clostridium difficile toxin by PCR with EIA [[id number]] Collected: 05/03/24 1845    Lab Status: In process Specimen: Stool from Rectum Updated: 05/03/24 1903    Stool Enteric Bacterial Panel by PCR [[id number]] Collected: 05/03/24 1845    Lab Status: In process Specimen: Stool from Rectum Updated: 05/03/24 1903    Lactic acid, plasma (w/reflex if result > 2.0) [[id number]]  (Abnormal) Collected: 05/03/24 1832    Lab Status: Final result Specimen: Blood from Arm, Right Updated: 05/03/24 1858     LACTIC ACID 2.4 mmol/L     Narrative:      Result may be elevated if tourniquet was used during collection.    Comprehensive metabolic panel [[id number]] Collected: 05/03/24 1832    Lab Status: Final result Specimen: Blood from Arm, Right Updated: 05/03/24 1858     Sodium 136 mmol/L      Potassium 3.5 mmol/L      Chloride 103 mmol/L      CO2 23 mmol/L      ANION GAP 10 mmol/L      BUN 15 mg/dL      Creatinine 1.07 mg/dL      Glucose 113 mg/dL      Calcium 9.2 mg/dL      AST 29 U/L      ALT 23 U/L      Alkaline Phosphatase 35 U/L      Total Protein 7.5 g/dL      Albumin 4.2 g/dL      Total Bilirubin 0.78 mg/dL      eGFR 68 ml/min/1.73sq m     Narrative:      [employer] Kid[employer] guidelines for Chronic Kidney Disease (CKD):     Stage 1 with normal or high GFR (GFR > 90 mL/min/1.73 square meters)    Stage 2 Mild CKD (GFR = 60-89 mL/min/1.73 square meters)    Stage 3A Moderate CKD (GFR = 45-59 mL/min/1.73 square meters)    Stage 3B Moderate CKD (GFR = 30-44 mL/min/1.73 square meters)    Stage 4 Severe CKD (GFR = 15-29 mL/min/1.73 square meters)    Stage 5 End Stage CKD (GFR <15 mL/min/1.73 square meters)  Note: GFR calculation is accurate only with a steady state creatinine    Lipase [[id number]]  (Normal) Collected: 05/03/24 1832    Lab Status: Final result Specimen: Blood from Arm, Right Updated: 05/03/24 1858     Lipase 43 u/L     Magnesium [[id number]]  (Normal) Collected: 05/03/24 4058    Lab Status: Final result  Specimen: Blood from Arm, Right Updated: 05/03/24 1858     Magnesium 2.2 mg/dL     CBC and differential [[id number]] Collected: 05/03/24 1832    Lab Status: Final result Specimen: Blood from Arm, Right Updated: 05/03/24 1842     WBC 6.05 Thousand/uL      RBC 4.93 Million/uL      Hemoglobin 15.5 g/dL      Hematocrit 46.0 %      MCV 93 fL      MCH 31.4 pg      MCHC 33.7 g/dL      RDW 12.6 %      MPV 9.7 fL      Platelets 245 Thousands/uL      nRBC 0 /100 WBCs      Segmented % 57 %      Immature Grans % 0 %      Lymphocytes % 34 %      Monocytes % 4 %      Eosinophils Relative 4 %      Basophils Relative 1 %      Absolute Neutrophils 3.47 Thousands/µL      Absolute Immature Grans 0.01 Thousand/uL      Absolute Lymphocytes 2.07 Thousands/µL      Absolute Monocytes 0.26 Thousand/µL      Eosinophils Absolute 0.21 Thousand/µL      Basophils Absolute 0.03 Thousands/µL     Blood culture #2 [[id number]] Collected: 05/03/24 1832    Lab Status: In process Specimen: Blood from Arm, Right Updated: 05/03/24 1839                   CT head without contrast   Final Result by Sergio Glass MD (05/03 1925)      No acute intracranial abnormality.                  Workstation performed: [id number]         CT cervical spine without contrast   Final Result by Sergio Glass MD (05/03 1934)      No cervical spine fracture or traumatic malalignment.                  Workstation performed: [id number]         CT chest abdomen pelvis w contrast   Final Result by Sergio Glass MD (05/03 1946)      No evidence of solid organ injury.      Multiple loops of nondilated fluid-filled small bowel as well as liquid stool noted throughout the colon. A few loops of mildly thick-walled left mid abdominal small bowel are also noted with trace adjacent free fluid within the right paracolic gutter    likely related to an infectious or inflammatory enteritis. There is a second of mild wall thickening of the descending and sigmoid colon  suspicious for an infectious or inflammatory colitis.      Trace free fluid within the left paracolic gutter. No free intraperitoneal air.      No infiltrate or pleural effusion. No pneumothorax.      Small hiatal hernia.         Workstation performed: [id number]                    Procedures  ECG 12 Lead Documentation Only    Date/Time: 5/3/2024 6:30 PM    Performed by: Yulissa Melton DO  Authorized by: Yulissa Melton DO    Indications / Diagnosis:  Syncope  ECG reviewed by me, the ED Provider: yes    Patient location:  ED  Previous ECG:     Previous ECG:  Unavailable    Comparison to cardiac monitor: Yes    Interpretation:     Interpretation: normal    Rate:     ECG rate:  64bpm    ECG rate assessment: normal    Rhythm:     Rhythm: sinus rhythm    Ectopy:     Ectopy: none    QRS:     QRS axis:  Normal  Conduction:     Conduction: normal    ST segments:     ST segments:  Normal  T waves:     T waves: normal             ED Course                               SBIRT 22yo+      Flowsheet Row Most Recent Value   Initial Alcohol Screen: US AUDIT-C     1. How often do you have a drink containing alcohol? 0 Filed at: 05/03/2024 1819   2. How many drinks containing alcohol do you have on a typical day you are drinking?  0 Filed at: 05/03/2024 1819   3a. Male UNDER 65: How often do you have five or more drinks on one occasion? 0 Filed at: 05/03/2024 1819   3b. FEMALE Any Age, or MALE 65+: How often do you have 4 or more drinks on one occassion? 0 Filed at: 05/03/2024 1819   Audit-C Score 0 Filed at: 05/03/2024 1819   TRACY: How many times in the past year have you...    Used an illegal drug or used a prescription medication for non-medical reasons? Never Filed at: 05/03/2024 1819                      Medical Decision Making  Amount and/or Complexity of Data Reviewed  Labs: ordered.  Radiology: ordered.    Risk  Prescription drug management.  Decision regarding hospitalization.             Disposition  Final  diagnoses:   Syncope   Diarrhea   Lactic acid acidosis     Time reflects when diagnosis was documented in both MDM as applicable and the Disposition within this note       Time User Action Codes Description Comment    5/3/2024  8:38 PM [email or web address] deal Add [R55] Syncope     5/3/2024  8:38 PM Oyesanmi Olubdevin CLEMENTE Add [R19.7] Diarrhea     5/3/2024  8:38 PM OyesanmVictorino aroraubuskirstie CLEMENTE Add [E87.20] Lactic acid acidosis           ED Disposition       ED Disposition   Admit    Condition   Stable    Date/Time   Fri May 3, 2024 2038    Comment   Case was discussed with Dr López and the patient's admission status was agreed to be Admission Status: observation status to the service of Dr. López .               Follow-up Information    None         Patient's Medications   Discharge Prescriptions    No medications on file       No discharge procedures on file.    PDMP Review         Value Time User    PDMP Reviewed  Yes 4/3/2023  3:49 PM Bib Ball DO            ED Provider  Electronically Signed by             Yulissa Melton DO  05/04/24 0047

## 2024-05-04 NOTE — ASSESSMENT & PLAN NOTE
Most likely acute food poisoning.  Will start on Cipro Flagyl.  C. difficile toxin pending ordered by ER

## 2024-05-05 PROBLEM — R55 SYNCOPAL EPISODES: Status: ACTIVE | Noted: 2024-05-05

## 2024-05-05 PROBLEM — R78.81 GRAM-NEGATIVE BACTEREMIA: Status: ACTIVE | Noted: 2024-05-05

## 2024-05-05 LAB
ANION GAP SERPL CALCULATED.3IONS-SCNC: 6 MMOL/L (ref 4–13)
BASOPHILS # BLD AUTO: 0.02 THOUSANDS/ÂΜL (ref 0–0.1)
BASOPHILS NFR BLD AUTO: 0 % (ref 0–1)
BUN SERPL-MCNC: 9 MG/DL (ref 5–25)
CALCIUM SERPL-MCNC: 7.7 MG/DL (ref 8.4–10.2)
CHLORIDE SERPL-SCNC: 106 MMOL/L (ref 96–108)
CO2 SERPL-SCNC: 25 MMOL/L (ref 21–32)
CREAT SERPL-MCNC: 0.77 MG/DL (ref 0.6–1.3)
CRP SERPL QL: 103.2 MG/L
EOSINOPHIL # BLD AUTO: 0.16 THOUSAND/ÂΜL (ref 0–0.61)
EOSINOPHIL NFR BLD AUTO: 2 % (ref 0–6)
ERYTHROCYTE [DISTWIDTH] IN BLOOD BY AUTOMATED COUNT: 12.8 % (ref 11.6–15.1)
GFR SERPL CREATININE-BSD FRML MDRD: 89 ML/MIN/1.73SQ M
GLUCOSE SERPL-MCNC: 90 MG/DL (ref 65–140)
HCT VFR BLD AUTO: 35 % (ref 36.5–49.3)
HGB BLD-MCNC: 11.9 G/DL (ref 12–17)
IMM GRANULOCYTES # BLD AUTO: 0.03 THOUSAND/UL (ref 0–0.2)
IMM GRANULOCYTES NFR BLD AUTO: 0 % (ref 0–2)
LYMPHOCYTES # BLD AUTO: 1.4 THOUSANDS/ÂΜL (ref 0.6–4.47)
LYMPHOCYTES NFR BLD AUTO: 14 % (ref 14–44)
MCH RBC QN AUTO: 31.6 PG (ref 26.8–34.3)
MCHC RBC AUTO-ENTMCNC: 34 G/DL (ref 31.4–37.4)
MCV RBC AUTO: 93 FL (ref 82–98)
MONOCYTES # BLD AUTO: 0.63 THOUSAND/ÂΜL (ref 0.17–1.22)
MONOCYTES NFR BLD AUTO: 6 % (ref 4–12)
NEUTROPHILS # BLD AUTO: 7.93 THOUSANDS/ÂΜL (ref 1.85–7.62)
NEUTS SEG NFR BLD AUTO: 78 % (ref 43–75)
NRBC BLD AUTO-RTO: 0 /100 WBCS
PLATELET # BLD AUTO: 171 THOUSANDS/UL (ref 149–390)
PMV BLD AUTO: 9.6 FL (ref 8.9–12.7)
POTASSIUM SERPL-SCNC: 3.8 MMOL/L (ref 3.5–5.3)
RBC # BLD AUTO: 3.76 MILLION/UL (ref 3.88–5.62)
SODIUM SERPL-SCNC: 137 MMOL/L (ref 135–147)
WBC # BLD AUTO: 10.17 THOUSAND/UL (ref 4.31–10.16)

## 2024-05-05 PROCEDURE — 99222 1ST HOSP IP/OBS MODERATE 55: CPT | Performed by: INTERNAL MEDICINE

## 2024-05-05 PROCEDURE — 83993 ASSAY FOR CALPROTECTIN FECAL: CPT | Performed by: PHYSICIAN ASSISTANT

## 2024-05-05 PROCEDURE — 99232 SBSQ HOSP IP/OBS MODERATE 35: CPT | Performed by: INTERNAL MEDICINE

## 2024-05-05 PROCEDURE — 85025 COMPLETE CBC W/AUTO DIFF WBC: CPT | Performed by: INTERNAL MEDICINE

## 2024-05-05 PROCEDURE — 80048 BASIC METABOLIC PNL TOTAL CA: CPT | Performed by: INTERNAL MEDICINE

## 2024-05-05 PROCEDURE — 86140 C-REACTIVE PROTEIN: CPT | Performed by: PHYSICIAN ASSISTANT

## 2024-05-05 RX ADMIN — AMLODIPINE BESYLATE 2.5 MG: 2.5 TABLET ORAL at 08:02

## 2024-05-05 RX ADMIN — CEFTRIAXONE 2000 MG: 2 INJECTION, SOLUTION INTRAVENOUS at 22:00

## 2024-05-05 RX ADMIN — POLYETHYLENE GLYCOL 3350, SODIUM SULFATE ANHYDROUS, SODIUM BICARBONATE, SODIUM CHLORIDE, POTASSIUM CHLORIDE 4000 ML: 236; 22.74; 6.74; 5.86; 2.97 POWDER, FOR SOLUTION ORAL at 16:11

## 2024-05-05 RX ADMIN — METRONIDAZOLE 500 MG: 500 INJECTION, SOLUTION INTRAVENOUS at 21:25

## 2024-05-05 RX ADMIN — METRONIDAZOLE 500 MG: 500 INJECTION, SOLUTION INTRAVENOUS at 05:53

## 2024-05-05 RX ADMIN — LISINOPRIL 5 MG: 5 TABLET ORAL at 08:02

## 2024-05-05 RX ADMIN — METRONIDAZOLE 500 MG: 500 INJECTION, SOLUTION INTRAVENOUS at 13:53

## 2024-05-05 NOTE — PLAN OF CARE

## 2024-05-05 NOTE — H&P (VIEW-ONLY)
Consultation -  [facility]  Gennaro MARISSA Worthington 74 y.o. male MRN: [id number]  Unit/Bed#: [address] [place] [address] Encounter: [id number]        Inpatient consult to gastroenterology  Consult performed by: Dinesh Moss PA-C  Consult ordered by: Vanessa Crocker MD          Reason for Consult / Principal Problem: Syncope, lactic acidosis, enteritis    ASSESSMENT and PLAN:    Principal Problem:    Enteritis  Active Problems:    Benign essential hypertension    Mixed hyperlipidemia    Syncopal episodes  History of adenomatous colon polyp  Abnormal CT of the abdomen  Hematochezia  Lactic acidosis (resolving)  -Currently on IV fluids at 75/h  -Currently on ceftriaxone and metronidazole  -Tolerating a surgical soft light meal low fiber low residue diet  -Get CRP and fecal calprotectin  - Plan colonoscopy Monday 5/6/24  -------------------------------------------------------------------------------------------------------------------    HPI: This is a 74-year-old white male with past medical history of chronic rhinitis, herpes simplex 1, osteoarthritis of the hip, hypertension, hyperlipidemia, prostate cancer and iron deficiency anemia who was admitted on Friday, May 3, 2024 with episode of fainting due to ongoing nausea vomiting and diarrhea.  He had reported several episodes of nausea vomiting with 6 episodes of diarrhea initially.  GI consulted for enteritis, nausea vomiting and diarrhea.    Vital signs currently stable.  There was some hypertension on admission with /97.  Currently 139/75.  No leukocytosis on admission however it jeanette mildly to 12.55 and is now at 10.17.  Hemoglobin stable 11.9.  Platelets normal at 171,000.  He had initial lactic acidosis of 2.4 which elevated to 3.6 and is now down to 2.7.  Blood culture x 2 with second 1 positive for gram-negative rods.  Stool enteric pathogen and C. difficile are negative.  CT with IV contrast on admission showed multiple loops of nondilated  fluid-filled small bowel as well as liquid stool noted throughout the colon. A few loops of mildly thick-walled left mid abdominal small bowel are also noted with trace adjacent free fluid within the right paracolic gutter   likely related to an infectious or inflammatory enteritis. There is a second of mild wall thickening of the descending and sigmoid colon suspicious for an infectious or inflammatory colitis.    Overall feeling better with improved abdominal pain but still with loose stools that does contain some blood.  No nausea or vomiting.    Endoscopic History:  EGD - none previously  Colonoscopy -October 2020 with adequate bowel prep and sessile serrated adenoma removed from sigmoid    REVIEW OF SYSTEMS:    CONSTITUTIONAL: Denies any fever, chills, or rigors. Good appetite, and no recent weight loss.  HEENT: No earache or tinnitus. Denies hearing loss or visual disturbances.  CARDIOVASCULAR: No chest pain or palpitations.   RESPIRATORY: Denies any cough, hemoptysis, shortness of breath or dyspnea on exertion.  GASTROINTESTINAL: As noted in the History of Present Illness.   GENITOURINARY: No problems with urination. Denies any hematuria or dysuria.  NEUROLOGIC: No dizziness or vertigo, denies headaches.   MUSCULOSKELETAL: Denies any muscle or joint pain.   SKIN: Denies skin rashes or itching.   ENDOCRINE: Denies excessive thirst. Denies intolerance to heat or cold.  PSYCHOSOCIAL: Denies depression or anxiety. Denies any recent memory loss.       Historical Information   Past Medical History:   Diagnosis Date    Chronic rhinitis 02/05/2005    last assessed 2/5/05, resolved 5/18/17     Elevated liver function tests     last assessed 3/9/15, resolved 5/18/17     Herpes simplex type 1 infection     Hip arthritis     resolved 12/20/17     Hypertension     Shoulder bursitis     last assessed 9/23/13, resolved 5/18/17      Past Surgical History:   Procedure Laterality Date    EPIDURAL BLOCK INJECTION N/A 05/23/2023     Procedure: BLOCK / INJECTION EPIDURAL STEROID CERVICAL C7-T1  (27022);  Surgeon: Bib Ball DO;  Location:  MAIN OR;  Service: Pain Management     HERNIA REPAIR      In guinal: x3 surgeries, [place], [facility]    NE RPR AA HERNIA 1ST < 3 CM REDUCIBLE N/A 8/22/2023    Procedure: REPAIR HERNIA VENTRAL;  Surgeon: Nehemias Devries MD;  Location: [facility];  Service: General    PROSTATOTOMY      last assessed 1/11/18     Social History   Social History     Substance and Sexual Activity   Alcohol Use Yes    Comment: very rare     Social History     Substance and Sexual Activity   Drug Use Never     Social History     Tobacco Use   Smoking Status Never    Passive exposure: Never   Smokeless Tobacco Never     Family History   Problem Relation Age of Onset    Hypertension Mother     Lung cancer Family     Prostate cancer Family     Mental illness Neg Hx        Meds/Allergies     Medications Prior to Admission   Medication    amLODIPine (NORVASC) 2.5 mg tablet    aspirin 81 mg chewable tablet    atorvastatin (LIPITOR) 10 mg tablet    lisinopril (ZESTRIL) 5 mg tablet    Multiple Vitamin (MULTIVITAMINS PO)    cyclobenzaprine (FLEXERIL) 10 mg tablet    Ferrous Gluconate-C-Folic Acid (IRON-C PO)    predniSONE 20 mg tablet     Current Facility-Administered Medications   Medication Dose Route Frequency    acetaminophen (TYLENOL) tablet 650 mg  650 mg Oral Q6H PRN    amLODIPine (NORVASC) tablet 2.5 mg  2.5 mg Oral Daily    atorvastatin (LIPITOR) tablet 10 mg  10 mg Oral HS    cefTRIAXone (ROCEPHIN) IVPB (premix in dextrose) 2,000 mg 50 mL  2,000 mg Intravenous Q24H    lisinopril (ZESTRIL) tablet 5 mg  5 mg Oral Daily    metroNIDAZOLE (FLAGYL) IVPB (premix) 500 mg 100 mL  500 mg Intravenous Q8H    multi-electrolyte (PLASMALYTE-A/ISOLYTE-S PH 7.4) IV solution  75 mL/hr Intravenous Continuous       Allergies   Allergen Reactions    Augmentin [Amoxicillin-Pot Clavulanate] GI Intolerance    Meperidine Other (See  "Comments)     Reaction Date: 05Feb2005; Annotation - [id number]: unsure of side effect  Reaction Date: 05Feb2005; Annotation - [id number]: unsure of side effect           Objective     Blood pressure 139/75, pulse 75, temperature 98.9 °F (37.2 °C), resp. rate 19, height 6' 1\" (1.854 m), weight 107 kg (235 lb), SpO2 96%.      Intake/Output Summary (Last 24 hours) at 5/5/2024 1148  Last data filed at 5/5/2024 0755  Gross per 24 hour   Intake 500 ml   Output 1050 ml   Net -550 ml         PHYSICAL EXAM:      General Appearance:   Alert, cooperative, mild distress distress, obese, appears stated age    HEENT:   Normocephalic, atraumatic, sclera anicteric   Neck:  Supple, symmetrical   Lungs:   Clear to auscultation bilaterally; no rales, rhonchi or wheezing; respirations unlabored    Heart::   S1 and S2 normal; regular rate and rhythm; no murmur, rub, or gallop.   Abdomen:   Soft, some lower abdominal tenderness to palpation, non-distended; normal bowel sounds; no masses, no organomegaly    Genitalia:   Deferred    Rectal:   Deferred    Extremities:  No cyanosis, clubbing or edema    Pulses:  2+ and symmetric all extremities    Skin:  Skin color, texture normal, no rashes or lesions    Lymph nodes:  Not assessed  Neuro: alert and oriented x 3  Psych: normal behavior       Lab Results:   Results from last 7 days   Lab Units 05/05/24  0602   WBC Thousand/uL 10.17*   HEMOGLOBIN g/dL 11.9*   HEMATOCRIT % 35.0*   PLATELETS Thousands/uL 171   SEGS PCT % 78*   LYMPHO PCT % 14   MONO PCT % 6   EOS PCT % 2     Results from last 7 days   Lab Units 05/05/24  0602 05/04/24  0817 05/03/24  1832   POTASSIUM mmol/L 3.8   < > 3.5   CHLORIDE mmol/L 106   < > 103   CO2 mmol/L 25   < > 23   BUN mg/dL 9   < > 15   CREATININE mg/dL 0.77   < > 1.07   CALCIUM mg/dL 7.7*   < > 9.2   ALK PHOS U/L  --   --  35   ALT U/L  --   --  23   AST U/L  --   --  29    < > = values in this interval not displayed.     Results from last 7 days   Lab Units " 05/03/24  1832   INR  0.98     Results from last 7 days   Lab Units 05/03/24  1832   LIPASE u/L 43       Imaging Studies: I have personally reviewed pertinent imaging studies.    CT chest abdomen pelvis w contrast    Result Date: 5/3/2024  Impression: No evidence of solid organ injury. Multiple loops of nondilated fluid-filled small bowel as well as liquid stool noted throughout the colon. A few loops of mildly thick-walled left mid abdominal small bowel are also noted with trace adjacent free fluid within the right paracolic gutter likely related to an infectious or inflammatory enteritis. There is a second of mild wall thickening of the descending and sigmoid colon suspicious for an infectious or inflammatory colitis. Trace free fluid within the left paracolic gutter. No free intraperitoneal air. No infiltrate or pleural effusion. No pneumothorax. Small hiatal hernia. Workstation performed: [id number]     CT cervical spine without contrast    Result Date: 5/3/2024  Impression: No cervical spine fracture or traumatic malalignment. Workstation performed: [id number]     CT head without contrast    Result Date: 5/3/2024  Impression: No acute intracranial abnormality. Workstation performed: [id number]           Patient was seen and examined by Dr. Phillip. All phillips medical decisions were made by Dr. Phillip. Thank you for allowing us to participate in the care of this present patient. We will follow-up with you closely.      Dinesh Moss PA-C  Gastroenterology

## 2024-05-05 NOTE — PROGRESS NOTES
[facility]  Progress Note  Name: Gennaro Worthington I  MRN: [id number]  Unit/Bed#: 4 [address] I Date of Admission: 5/3/2024   Date of Service: 5/5/2024 I Hospital Day: 1    Assessment/Plan   Gram-negative bacteremia  Assessment & Plan  Likely GI source, possible translocation with enterocolitis  LFT unremarkable, patient denies any urinary symptoms.  CT abdomen pelvis without any other etiology  Continue IV ceftriaxone  Follow-up culture sensitivities    * Enterocolitis  Assessment & Plan  Presented with lower abdominal pain, loose bowel movements x 1 day, also reported associated nausea.  Nonbloody emesis  No further nausea vomiting.  Still with loose bowel movement, last few blood-tinged  Lower abdominal pain improved still with tenderness  CT abdomen-Multiple loops of nondilated fluid-filled small bowel as well as liquid stool noted throughout the colon. A few loops of mildly thick-walled left mid abdominal small bowel are also noted with trace adjacent free fluid within the right paracolic gutter likely related to an infectious or inflammatory enteritis. There is a second of mild wall thickening of the descending and sigmoid colon suspicious for an infectious or inflammatory colitis.   Stool C. difficile negative  Lactic acidosis on presentation, improving with hydration.   Clinically improving diarrhea and abdominal discomfort is improving.  Remains afebrile hemodynamically stable.  Leukocytosis downtrending.  Still blood-tinged  Suspect enterocolitis?  Ischemic component given syncopal episode  Continue ceftriaxone, metronidazole  Monitor abdominal exam  Follow-up CBC  Discontinued aspirin, Lovenox given bleeding  Surgical soft low residue, discussed about downgrading to liquid but patient reported that he will select liquid diet only as of now  GI evaluation  Follow-up blood culture sensitivities    Syncopal episodes  Assessment & Plan  Likely vasovagal or hypotensive in setting of  enterocolitis and bacteremia  Denies any cardiorespiratory symptoms  Blood pressure currently stable  Telemetry unremarkable-discontinued  CT head, C-spine without any acute abnormalities    Benign essential hypertension  Assessment & Plan  Continue Norvasc and lisinopril with holding parameters    Mixed hyperlipidemia  Assessment & Plan  Continue Lipitor           CT noted subcentimeter renal lesion-too small to characterize but likely benign.  Discussed with patient and wife at bedside for outpatient follow-up for monitoring with ultrasound    VTE Pharmacologic Prophylaxis:   Moderate Risk (Score 3-4) - Pharmacological DVT Prophylaxis Contraindicated. Sequential Compression Devices Ordered.  Hold DVT prophylaxis due to bowel movements with blood    Mobility:   Basic Mobility Inpatient Raw Score: 18  JH-HLM Goal: 6: Walk 10 steps or more  JH-HLM Achieved: 7: Walk 25 feet or more  JH-HLM Goal achieved. Continue to encourage appropriate mobility.    Patient Centered Rounds: I performed bedside rounds with nursing staff today.   Discussions with Specialists or Other Care Team Provider: Yes, GI    Education and Discussions with Family / Patient: Updated  (wife) at bedside.  Discussed with patient's friend  at his request    Total Time Spent on Date of Encounter in care of patient: 45 mins. This time was spent on one or more of the following: performing physical exam; counseling and coordination of care; obtaining or reviewing history; documenting in the medical record; reviewing/ordering tests, medications or procedures; communicating with other healthcare professionals and discussing with patient's family/caregivers.    Current Length of Stay: 1 day(s)  Current Patient Status: Inpatient   Certification Statement: The patient will continue to require additional inpatient hospital stay due to colitis  Discharge Plan: Anticipate discharge in 24-48 hrs to home.    Code Status: Level 1 - Full  Code    Subjective:   Bowel movements are still more formed, red-tinged  Denies chest pain shortness of breath dizziness  Still with lower abdominal discomfort but improving since admission.  Denies any fever or chills    1 out of 2 blood culture noted to be positive for GNR      Objective:     Vitals:   Temp (24hrs), Av.8 °F (37.1 °C), Min:98.6 °F (37 °C), Max:98.9 °F (37.2 °C)    Temp:  [98.6 °F (37 °C)-98.9 °F (37.2 °C)] 98.9 °F (37.2 °C)  HR:  [65-77] 75  Resp:  [18-19] 19  BP: (122-139)/(60-75) 139/75  SpO2:  [95 %-99 %] 96 %  Body mass index is 31 kg/m².     Input and Output Summary (last 24 hours):     Intake/Output Summary (Last 24 hours) at 2024 0972  Last data filed at 2024 0755  Gross per 24 hour   Intake 500 ml   Output 1050 ml   Net -550 ml       Physical Exam:   Physical Exam  Vitals and nursing note reviewed.   Constitutional:       General: He is not in acute distress.     Appearance: He is well-developed. He is obese.   HENT:      Head: Normocephalic and atraumatic.   Eyes:      Conjunctiva/sclera: Conjunctivae normal.   Cardiovascular:      Rate and Rhythm: Normal rate and regular rhythm.      Heart sounds: No murmur heard.  Pulmonary:      Effort: Pulmonary effort is normal. No respiratory distress.      Breath sounds: Normal breath sounds.   Abdominal:      Palpations: Abdomen is soft.      Tenderness: There is abdominal tenderness (Mild lower abdomen right more than left-improving).   Musculoskeletal:         General: No swelling.   Skin:     General: Skin is warm and dry.      Capillary Refill: Capillary refill takes less than 2 seconds.   Neurological:      Mental Status: He is alert.   Psychiatric:         Mood and Affect: Mood normal.         Additional Data:     Labs:  Results from last 7 days   Lab Units 24  0602   WBC Thousand/uL 10.17*   HEMOGLOBIN g/dL 11.9*   HEMATOCRIT % 35.0*   PLATELETS Thousands/uL 171   SEGS PCT % 78*   LYMPHO PCT % 14   MONO PCT % 6   EOS PCT %  2     Results from last 7 days   Lab Units 05/05/24  0602 05/04/24  0817 05/03/24  1832   SODIUM mmol/L 137   < > 136   POTASSIUM mmol/L 3.8   < > 3.5   CHLORIDE mmol/L 106   < > 103   CO2 mmol/L 25   < > 23   BUN mg/dL 9   < > 15   CREATININE mg/dL 0.77   < > 1.07   ANION GAP mmol/L 6   < > 10   CALCIUM mg/dL 7.7*   < > 9.2   ALBUMIN g/dL  --   --  4.2   TOTAL BILIRUBIN mg/dL  --   --  0.78   ALK PHOS U/L  --   --  35   ALT U/L  --   --  23   AST U/L  --   --  29   GLUCOSE RANDOM mg/dL 90   < > 113    < > = values in this interval not displayed.     Results from last 7 days   Lab Units 05/03/24  1832   INR  0.98             Results from last 7 days   Lab Units 05/04/24  2208 05/04/24  1612 05/03/24  2036 05/03/24  1832   LACTIC ACID mmol/L 0.8 2.7* 3.6* 2.4*       Lines/Drains:  Invasive Devices       Peripheral Intravenous Line  Duration             Peripheral IV Right Hand -- days    Peripheral IV 05/03/24 Left Antecubital 1 day    Peripheral IV 05/03/24 Right Antecubital 1 day                      Telemetry:  Telemetry Orders (From admission, onward)               24 Hour Telemetry Monitoring  Continuous x 24 Hours (Telem)        Question:  Reason for 24 Hour Telemetry  Answer:  Syncope suspected to be cardiac in origin                     Telemetry Reviewed: Normal Sinus Rhythm  Indication for Continued Telemetry Use: No indication for continued use. Will discontinue.              Imaging: Reviewed radiology reports from this admission including: abdominal/pelvic CT    Recent Cultures (last 7 days):   Results from last 7 days   Lab Units 05/03/24  1904 05/03/24  1845 05/03/24  1832   BLOOD CULTURE  No Growth at 24 hrs.  --   --    GRAM STAIN RESULT   --   --  Gram negative rods*   C DIFF TOXIN B BY PCR   --  Negative  --        Last 24 Hours Medication List:   Current Facility-Administered Medications   Medication Dose Route Frequency Provider Last Rate    acetaminophen  650 mg Oral Q6H PRN Fabiano López  MD      amLODIPine  2.5 mg Oral Daily Vanessa Crocker MD      atorvastatin  10 mg Oral HS Fabiano López MD      cefTRIAXone  2,000 mg Intravenous Q24H Fabiano López MD 2,000 mg (05/04/24 2320)    lisinopril  5 mg Oral Daily Vanessa Crocker MD      metroNIDAZOLE  500 mg Intravenous Q8H Fabiano López  mg (05/05/24 0578)    multi-electrolyte  125 mL/hr Intravenous Continuous Fabiano López  mL/hr (05/04/24 2339)        Today, Patient Was Seen By: Vanessa Crocker MD    **Please Note: This note may have been constructed using a voice recognition system.**

## 2024-05-05 NOTE — ASSESSMENT & PLAN NOTE
Likely vasovagal or hypotensive in setting of enterocolitis and bacteremia  Denies any cardiorespiratory symptoms  Blood pressure currently stable  Telemetry unremarkable-discontinued  CT head, C-spine without any acute abnormalities

## 2024-05-05 NOTE — CONSULTS
Consultation -  [facility]  Gennaro MARISSA Worthington 74 y.o. male MRN: [id number]  Unit/Bed#: [address] [place] [address] Encounter: [id number]        Inpatient consult to gastroenterology  Consult performed by: Dinesh Moss PA-C  Consult ordered by: Vanessa Crocker MD          Reason for Consult / Principal Problem: Syncope, lactic acidosis, enteritis    ASSESSMENT and PLAN:    Principal Problem:    Enteritis  Active Problems:    Benign essential hypertension    Mixed hyperlipidemia    Syncopal episodes  History of adenomatous colon polyp  Abnormal CT of the abdomen  Hematochezia  Lactic acidosis (resolving)  -Currently on IV fluids at 75/h  -Currently on ceftriaxone and metronidazole  -Tolerating a surgical soft light meal low fiber low residue diet  -Get CRP and fecal calprotectin  - Plan colonoscopy Monday 5/6/24  -------------------------------------------------------------------------------------------------------------------    HPI: This is a 74-year-old white male with past medical history of chronic rhinitis, herpes simplex 1, osteoarthritis of the hip, hypertension, hyperlipidemia, prostate cancer and iron deficiency anemia who was admitted on Friday, May 3, 2024 with episode of fainting due to ongoing nausea vomiting and diarrhea.  He had reported several episodes of nausea vomiting with 6 episodes of diarrhea initially.  GI consulted for enteritis, nausea vomiting and diarrhea.    Vital signs currently stable.  There was some hypertension on admission with /97.  Currently 139/75.  No leukocytosis on admission however it jeanette mildly to 12.55 and is now at 10.17.  Hemoglobin stable 11.9.  Platelets normal at 171,000.  He had initial lactic acidosis of 2.4 which elevated to 3.6 and is now down to 2.7.  Blood culture x 2 with second 1 positive for gram-negative rods.  Stool enteric pathogen and C. difficile are negative.  CT with IV contrast on admission showed multiple loops of nondilated  fluid-filled small bowel as well as liquid stool noted throughout the colon. A few loops of mildly thick-walled left mid abdominal small bowel are also noted with trace adjacent free fluid within the right paracolic gutter   likely related to an infectious or inflammatory enteritis. There is a second of mild wall thickening of the descending and sigmoid colon suspicious for an infectious or inflammatory colitis.    Overall feeling better with improved abdominal pain but still with loose stools that does contain some blood.  No nausea or vomiting.    Endoscopic History:  EGD - none previously  Colonoscopy -October 2020 with adequate bowel prep and sessile serrated adenoma removed from sigmoid    REVIEW OF SYSTEMS:    CONSTITUTIONAL: Denies any fever, chills, or rigors. Good appetite, and no recent weight loss.  HEENT: No earache or tinnitus. Denies hearing loss or visual disturbances.  CARDIOVASCULAR: No chest pain or palpitations.   RESPIRATORY: Denies any cough, hemoptysis, shortness of breath or dyspnea on exertion.  GASTROINTESTINAL: As noted in the History of Present Illness.   GENITOURINARY: No problems with urination. Denies any hematuria or dysuria.  NEUROLOGIC: No dizziness or vertigo, denies headaches.   MUSCULOSKELETAL: Denies any muscle or joint pain.   SKIN: Denies skin rashes or itching.   ENDOCRINE: Denies excessive thirst. Denies intolerance to heat or cold.  PSYCHOSOCIAL: Denies depression or anxiety. Denies any recent memory loss.       Historical Information   Past Medical History:   Diagnosis Date    Chronic rhinitis 02/05/2005    last assessed 2/5/05, resolved 5/18/17     Elevated liver function tests     last assessed 3/9/15, resolved 5/18/17     Herpes simplex type 1 infection     Hip arthritis     resolved 12/20/17     Hypertension     Shoulder bursitis     last assessed 9/23/13, resolved 5/18/17      Past Surgical History:   Procedure Laterality Date    EPIDURAL BLOCK INJECTION N/A 05/23/2023     Procedure: BLOCK / INJECTION EPIDURAL STEROID CERVICAL C7-T1  (92383);  Surgeon: Bib Ball DO;  Location:  MAIN OR;  Service: Pain Management     HERNIA REPAIR      In guinal: x3 surgeries, [place], [facility] [place] [facility]    [place] R[facility] AA HERNIA 1ST < 3 CM REDUCIBLE N/A 8/22/2023    Procedure: REPAIR HERNIA VENTRAL;  Surgeon: Nehemias Devries MD;  Location: [facility];  Service: General    PROSTATOTOMY      last assessed 1/11/18     Social History   Social History     Substance and Sexual Activity   Alcohol Use Yes    Comment: very rare     Social History     Substance and Sexual Activity   Drug Use Never     Social History     Tobacco Use   Smoking Status Never    Passive exposure: Never   Smokeless Tobacco Never     Family History   Problem Relation Age of Onset    Hypertension Mother     Lung cancer Family     Prostate cancer Family     Mental illness Neg Hx        Meds/Allergies     Medications Prior to Admission   Medication    amLODIPine (NORVASC) 2.5 mg tablet    aspirin 81 mg chewable tablet    atorvastatin (LIPITOR) 10 mg tablet    lisinopril (ZESTRIL) 5 mg tablet    Multiple Vitamin (MULTIVITAMINS PO)    cyclobenzaprine (FLEXERIL) 10 mg tablet    Ferrous Gluconate-C-Folic Acid (IRON-C PO)    predniSONE 20 mg tablet     Current Facility-Administered Medications   Medication Dose Route Frequency    acetaminophen (TYLENOL) tablet 650 mg  650 mg Oral Q6H PRN    amLODIPine (NORVASC) tablet 2.5 mg  2.5 mg Oral Daily    atorvastatin (LIPITOR) tablet 10 mg  10 mg Oral HS    cefTRIAXone (ROCEPHIN) IVPB (premix in dextrose) 2,000 mg 50 mL  2,000 mg Intravenous Q24H    lisinopril (ZESTRIL) tablet 5 mg  5 mg Oral Daily    metroNIDAZOLE (FLAGYL) IVPB (premix) 500 mg 100 mL  500 mg Intravenous Q8H    multi-electrolyte (PLASMALYTE-A/ISOLYTE-S PH 7.4) IV solution  75 mL/hr Intravenous Continuous       Allergies   Allergen Reactions    Augmentin [Amoxicillin-Pot Clavulanate] GI Intolerance    Meperidine Other (See  "Comments)     Reaction Date: 05Feb2005; Annotation - [id number]: unsure of side effect  Reaction Date: 05Feb2005; Annotation - [id number]: unsure of side effect           Objective     Blood pressure 139/75, pulse 75, temperature 98.9 °F (37.2 °C), resp. rate 19, height 6' 1\" (1.854 m), weight 107 kg (235 lb), SpO2 96%.      Intake/Output Summary (Last 24 hours) at 5/5/2024 1148  Last data filed at 5/5/2024 0755  Gross per 24 hour   Intake 500 ml   Output 1050 ml   Net -550 ml         PHYSICAL EXAM:      General Appearance:   Alert, cooperative, mild distress distress, obese, appears stated age    HEENT:   Normocephalic, atraumatic, sclera anicteric   Neck:  Supple, symmetrical   Lungs:   Clear to auscultation bilaterally; no rales, rhonchi or wheezing; respirations unlabored    Heart::   S1 and S2 normal; regular rate and rhythm; no murmur, rub, or gallop.   Abdomen:   Soft, some lower abdominal tenderness to palpation, non-distended; normal bowel sounds; no masses, no organomegaly    Genitalia:   Deferred    Rectal:   Deferred    Extremities:  No cyanosis, clubbing or edema    Pulses:  2+ and symmetric all extremities    Skin:  Skin color, texture normal, no rashes or lesions    Lymph nodes:  Not assessed  Neuro: alert and oriented x 3  Psych: normal behavior       Lab Results:   Results from last 7 days   Lab Units 05/05/24  0602   WBC Thousand/uL 10.17*   HEMOGLOBIN g/dL 11.9*   HEMATOCRIT % 35.0*   PLATELETS Thousands/uL 171   SEGS PCT % 78*   LYMPHO PCT % 14   MONO PCT % 6   EOS PCT % 2     Results from last 7 days   Lab Units 05/05/24  0602 05/04/24  0817 05/03/24  1832   POTASSIUM mmol/L 3.8   < > 3.5   CHLORIDE mmol/L 106   < > 103   CO2 mmol/L 25   < > 23   BUN mg/dL 9   < > 15   CREATININE mg/dL 0.77   < > 1.07   CALCIUM mg/dL 7.7*   < > 9.2   ALK PHOS U/L  --   --  35   ALT U/L  --   --  23   AST U/L  --   --  29    < > = values in this interval not displayed.     Results from last 7 days   Lab Units " 05/03/24  1832   INR  0.98     Results from last 7 days   Lab Units 05/03/24  1832   LIPASE u/L 43       Imaging Studies: I have personally reviewed pertinent imaging studies.    CT chest abdomen pelvis w contrast    Result Date: 5/3/2024  Impression: No evidence of solid organ injury. Multiple loops of nondilated fluid-filled small bowel as well as liquid stool noted throughout the colon. A few loops of mildly thick-walled left mid abdominal small bowel are also noted with trace adjacent free fluid within the right paracolic gutter likely related to an infectious or inflammatory enteritis. There is a second of mild wall thickening of the descending and sigmoid colon suspicious for an infectious or inflammatory colitis. Trace free fluid within the left paracolic gutter. No free intraperitoneal air. No infiltrate or pleural effusion. No pneumothorax. Small hiatal hernia. Workstation performed: [id number]     CT cervical spine without contrast    Result Date: 5/3/2024  Impression: No cervical spine fracture or traumatic malalignment. Workstation performed: [id number]     CT head without contrast    Result Date: 5/3/2024  Impression: No acute intracranial abnormality. Workstation performed: [id number]           Patient was seen and examined by Dr. Phillip. All phillips medical decisions were made by Dr. Phillip. Thank you for allowing us to participate in the care of this present patient. We will follow-up with you closely.      Dinesh Moss PA-C  Gastroenterology

## 2024-05-05 NOTE — NURSING NOTE
"Patient noted to have stomach craping. Patient on colonoscopy prep. Informed patient that it was to be completed by 10pm. Patient states \"I don't know if I can do it.\" Encouraged patient to take a break and then attempt to drink some more.  "

## 2024-05-05 NOTE — ASSESSMENT & PLAN NOTE
Presented with lower abdominal pain, loose bowel movements x 1 day, also reported associated nausea.  Nonbloody emesis    CT abdomen-Multiple loops of nondilated fluid-filled small bowel as well as liquid stool noted throughout the colon. A few loops of mildly thick-walled left mid abdominal small bowel are also noted with trace adjacent free fluid within the right paracolic gutter likely related to an infectious or inflammatory enteritis. There is a second of mild wall thickening of the descending and sigmoid colon suspicious for an infectious or inflammatory colitis.   Stool C. difficile negative.  CRP elevated  Lactic acidosis on presentation, improving with hydration.     Suspect enterocolitis?  Ischemic component given syncopal episode  Continue ceftriaxone, metronidazole  Monitor abdominal exam  Holding aspirin, Lovenox given bleeding-resume if no further bleeding  Clear liquid diet today  D/W GI recs:  CT: Interval resolution of enteritis with persistent grossly stable colitis involving the left hemicolon compared to 5/3/2024,   stool studies negative  Continue ceftriaxone and Flagyl  Plan colonoscopy tomorrow

## 2024-05-05 NOTE — ASSESSMENT & PLAN NOTE
Likely GI source, possible translocation with enterocolitis  LFT unremarkable, patient denies any urinary symptoms.  CT abdomen pelvis without any other etiology  BCx 1 out of 2 GNR, second set with growth of gram variable bacteremia  Continue IV ceftriaxone, metronidazole  Follow-up culture sensitivities

## 2024-05-05 NOTE — ASSESSMENT & PLAN NOTE
Likely vasovagal or hypotensive  Denies any cardiorespiratory symptoms  Blood pressure currently stable  Telemetry unremarkable

## 2024-05-05 NOTE — INCIDENTAL FINDINGS
The following findings require follow up:  Radiographic finding   Finding: CTA head noted subcentimeter renal lesion too small to characterize but likely benign   Follow up required: Yes    Please notify the following clinician to assist with the follow up:   NEHAL Horowitz

## 2024-05-06 ENCOUNTER — APPOINTMENT (INPATIENT)
Dept: RADIOLOGY | Facility: HOSPITAL | Age: 74
DRG: 372 | End: 2024-05-06
Payer: COMMERCIAL

## 2024-05-06 LAB
ANION GAP SERPL CALCULATED.3IONS-SCNC: 6 MMOL/L (ref 4–13)
ATRIAL RATE: 62 BPM
ATRIAL RATE: 64 BPM
ATRIAL RATE: 69 BPM
BASOPHILS # BLD AUTO: 0.04 THOUSANDS/ÂΜL (ref 0–0.1)
BASOPHILS NFR BLD AUTO: 0 % (ref 0–1)
BUN SERPL-MCNC: 7 MG/DL (ref 5–25)
CALCIUM SERPL-MCNC: 7.9 MG/DL (ref 8.4–10.2)
CHLORIDE SERPL-SCNC: 106 MMOL/L (ref 96–108)
CO2 SERPL-SCNC: 26 MMOL/L (ref 21–32)
CREAT SERPL-MCNC: 0.75 MG/DL (ref 0.6–1.3)
EOSINOPHIL # BLD AUTO: 0.17 THOUSAND/ÂΜL (ref 0–0.61)
EOSINOPHIL NFR BLD AUTO: 2 % (ref 0–6)
ERYTHROCYTE [DISTWIDTH] IN BLOOD BY AUTOMATED COUNT: 12.5 % (ref 11.6–15.1)
GFR SERPL CREATININE-BSD FRML MDRD: 90 ML/MIN/1.73SQ M
GLUCOSE SERPL-MCNC: 82 MG/DL (ref 65–140)
HCT VFR BLD AUTO: 32.9 % (ref 36.5–49.3)
HGB BLD-MCNC: 11.3 G/DL (ref 12–17)
IMM GRANULOCYTES # BLD AUTO: 0.06 THOUSAND/UL (ref 0–0.2)
IMM GRANULOCYTES NFR BLD AUTO: 1 % (ref 0–2)
LYMPHOCYTES # BLD AUTO: 1.22 THOUSANDS/ÂΜL (ref 0.6–4.47)
LYMPHOCYTES NFR BLD AUTO: 13 % (ref 14–44)
MCH RBC QN AUTO: 31.7 PG (ref 26.8–34.3)
MCHC RBC AUTO-ENTMCNC: 34.3 G/DL (ref 31.4–37.4)
MCV RBC AUTO: 92 FL (ref 82–98)
MONOCYTES # BLD AUTO: 0.57 THOUSAND/ÂΜL (ref 0.17–1.22)
MONOCYTES NFR BLD AUTO: 6 % (ref 4–12)
NEUTROPHILS # BLD AUTO: 7.57 THOUSANDS/ÂΜL (ref 1.85–7.62)
NEUTS SEG NFR BLD AUTO: 78 % (ref 43–75)
NRBC BLD AUTO-RTO: 0 /100 WBCS
P AXIS: 72 DEGREES
P AXIS: 74 DEGREES
P AXIS: 75 DEGREES
PLATELET # BLD AUTO: 174 THOUSANDS/UL (ref 149–390)
PMV BLD AUTO: 9.7 FL (ref 8.9–12.7)
POTASSIUM SERPL-SCNC: 3.7 MMOL/L (ref 3.5–5.3)
PR INTERVAL: 202 MS
PR INTERVAL: 204 MS
PR INTERVAL: 208 MS
QRS AXIS: 22 DEGREES
QRS AXIS: 24 DEGREES
QRS AXIS: 36 DEGREES
QRSD INTERVAL: 88 MS
QRSD INTERVAL: 90 MS
QRSD INTERVAL: 90 MS
QT INTERVAL: 408 MS
QT INTERVAL: 416 MS
QT INTERVAL: 418 MS
QTC INTERVAL: 424 MS
QTC INTERVAL: 429 MS
QTC INTERVAL: 437 MS
RBC # BLD AUTO: 3.57 MILLION/UL (ref 3.88–5.62)
SODIUM SERPL-SCNC: 138 MMOL/L (ref 135–147)
T WAVE AXIS: 65 DEGREES
T WAVE AXIS: 65 DEGREES
T WAVE AXIS: 67 DEGREES
VENTRICULAR RATE: 62 BPM
VENTRICULAR RATE: 64 BPM
VENTRICULAR RATE: 69 BPM
WBC # BLD AUTO: 9.63 THOUSAND/UL (ref 4.31–10.16)

## 2024-05-06 PROCEDURE — 80048 BASIC METABOLIC PNL TOTAL CA: CPT | Performed by: INTERNAL MEDICINE

## 2024-05-06 PROCEDURE — 85025 COMPLETE CBC W/AUTO DIFF WBC: CPT | Performed by: INTERNAL MEDICINE

## 2024-05-06 PROCEDURE — 99232 SBSQ HOSP IP/OBS MODERATE 35: CPT | Performed by: INTERNAL MEDICINE

## 2024-05-06 PROCEDURE — 93010 ELECTROCARDIOGRAM REPORT: CPT | Performed by: INTERNAL MEDICINE

## 2024-05-06 PROCEDURE — 74177 CT ABD & PELVIS W/CONTRAST: CPT

## 2024-05-06 RX ADMIN — CEFTRIAXONE 2000 MG: 2 INJECTION, SOLUTION INTRAVENOUS at 22:06

## 2024-05-06 RX ADMIN — METRONIDAZOLE 500 MG: 500 INJECTION, SOLUTION INTRAVENOUS at 21:23

## 2024-05-06 RX ADMIN — AMLODIPINE BESYLATE 2.5 MG: 2.5 TABLET ORAL at 09:41

## 2024-05-06 RX ADMIN — METRONIDAZOLE 500 MG: 500 INJECTION, SOLUTION INTRAVENOUS at 14:44

## 2024-05-06 RX ADMIN — IOHEXOL 100 ML: 350 INJECTION, SOLUTION INTRAVENOUS at 16:56

## 2024-05-06 RX ADMIN — METRONIDAZOLE 500 MG: 500 INJECTION, SOLUTION INTRAVENOUS at 06:16

## 2024-05-06 RX ADMIN — LISINOPRIL 5 MG: 5 TABLET ORAL at 09:41

## 2024-05-06 RX ADMIN — SODIUM CHLORIDE, SODIUM GLUCONATE, SODIUM ACETATE, POTASSIUM CHLORIDE, MAGNESIUM CHLORIDE, SODIUM PHOSPHATE, DIBASIC, AND POTASSIUM PHOSPHATE 75 ML/HR: .53; .5; .37; .037; .03; .012; .00082 INJECTION, SOLUTION INTRAVENOUS at 19:22

## 2024-05-06 RX ADMIN — ATORVASTATIN CALCIUM 10 MG: 10 TABLET, FILM COATED ORAL at 21:23

## 2024-05-06 RX ADMIN — IOHEXOL 50 ML: 240 INJECTION, SOLUTION INTRATHECAL; INTRAVASCULAR; INTRAVENOUS; ORAL at 16:56

## 2024-05-06 NOTE — PROGRESS NOTES
[facility]  Progress Note  Name: Gennaro Worthington I  MRN: [id number]  Unit/Bed#: 4 [address] I Date of Admission: 5/3/2024   Date of Service: 5/6/2024 I Hospital Day: 2    Assessment/Plan   Gram-negative bacteremia  Assessment & Plan  Likely GI source, possible translocation with enterocolitis  LFT unremarkable, patient denies any urinary symptoms.  CT abdomen pelvis without any other etiology  Blood cultures 1 out of 2 gram-negative bacteremia, second set with growth of gram variable bacteremia  Continue IV ceftriaxone, metronidazole  Follow-up culture sensitivities    * Enterocolitis  Assessment & Plan  Presented with lower abdominal pain, loose bowel movements x 1 day, also reported associated nausea.  Nonbloody emesis  CT abdomen-Multiple loops of nondilated fluid-filled small bowel as well as liquid stool noted throughout the colon. A few loops of mildly thick-walled left mid abdominal small bowel are also noted with trace adjacent free fluid within the right paracolic gutter likely related to an infectious or inflammatory enteritis. There is a second of mild wall thickening of the descending and sigmoid colon suspicious for an infectious or inflammatory colitis.   Stool C. difficile negative.  CRP elevated  Lactic acidosis on presentation, improving with hydration.   Remains afebrile leukocytosis downtrending.  Hemodynamically stable.  Still with lower abdominal pain now more alert left than right  Suspect enterocolitis?  Ischemic component given syncopal episode  Continue ceftriaxone, metronidazole  Monitor abdominal exam  Follow-up CBC  Holding aspirin, Lovenox given bleeding-resume if no further bleeding  Clear liquid diet today  GI evaluation-recommended colonoscopy but patient was not able to complete the prep.    Will get CT abdomen pelvis with contrast due to persistent lower abdominal tenderness  Follow-up blood culture sensitivities    Syncopal episodes  Assessment &  Plan  Likely vasovagal or hypotensive in setting of enterocolitis and bacteremia  Denies any cardiorespiratory symptoms  Blood pressure currently stable  Telemetry unremarkable-discontinued  CT head, C-spine without any acute abnormalities    Benign essential hypertension  Assessment & Plan  Continue Norvasc and lisinopril with holding parameters    Mixed hyperlipidemia  Assessment & Plan  Continue Lipitor           VTE Pharmacologic Prophylaxis:   Moderate Risk (Score 3-4) - Pharmacological DVT Prophylaxis Contraindicated. Sequential Compression Devices Ordered.  Hold DVT prophylaxis due to bowel movements with blood    Mobility:   Basic Mobility Inpatient Raw Score: 18  JH-HLM Goal: 6: Walk 10 steps or more  JH-HLM Achieved: 7: Walk 25 feet or more  JH-HLM Goal achieved. Continue to encourage appropriate mobility.    Patient Centered Rounds: I performed bedside rounds with nursing staff today.   Discussions with Specialists or Other Care Team Provider: Yes, GI    Education and Discussions with Family / Patient: Updated  (wife) at bedside.    Total Time Spent on Date of Encounter in care of patient: 45 mins. This time was spent on one or more of the following: performing physical exam; counseling and coordination of care; obtaining or reviewing history; documenting in the medical record; reviewing/ordering tests, medications or procedures; communicating with other healthcare professionals and discussing with patient's family/caregivers.    Current Length of Stay: 2 day(s)  Current Patient Status: Inpatient   Certification Statement: The patient will continue to require additional inpatient hospital stay due to colitis  Discharge Plan: Anticipate discharge in 24-48 hrs to home.    Code Status: Level 1 - Full Code    Subjective:   Reports over lower abdominal discomfort  Could not complete the bowel prep due to nausea    Remains afebrile  Last bowel movement was with brown stool      Objective:      Vitals:   Temp (24hrs), Av °F (37.2 °C), Min:98.9 °F (37.2 °C), Max:99.2 °F (37.3 °C)    Temp:  [98.9 °F (37.2 °C)-99.2 °F (37.3 °C)] 98.9 °F (37.2 °C)  HR:  [64-75] 66  Resp:  [18-19] 18  BP: (120-155)/(66-85) 130/66  SpO2:  [96 %-99 %] 99 %  Body mass index is 31 kg/m².     Input and Output Summary (last 24 hours):     Intake/Output Summary (Last 24 hours) at 2024 0715  Last data filed at 2024 0755  Gross per 24 hour   Intake --   Output 600 ml   Net -600 ml       Physical Exam:   Physical Exam  Vitals and nursing note reviewed.   Constitutional:       General: He is not in acute distress.     Appearance: He is well-developed. He is obese.   HENT:      Head: Normocephalic and atraumatic.   Eyes:      Conjunctiva/sclera: Conjunctivae normal.   Cardiovascular:      Rate and Rhythm: Normal rate and regular rhythm.      Heart sounds: No murmur heard.  Pulmonary:      Effort: Pulmonary effort is normal. No respiratory distress.      Breath sounds: Normal breath sounds.   Abdominal:      Palpations: Abdomen is soft.      Tenderness: There is abdominal tenderness (lower Abdomen, still with tenderness but appears more on left than right today).   Musculoskeletal:         General: No swelling.      Cervical back: Neck supple.   Skin:     General: Skin is warm and dry.      Capillary Refill: Capillary refill takes less than 2 seconds.   Neurological:      Mental Status: He is alert.   Psychiatric:         Mood and Affect: Mood normal.         Additional Data:     Labs:  Results from last 7 days   Lab Units 24  0605   WBC Thousand/uL 9.63   HEMOGLOBIN g/dL 11.3*   HEMATOCRIT % 32.9*   PLATELETS Thousands/uL 174   SEGS PCT % 78*   LYMPHO PCT % 13*   MONO PCT % 6   EOS PCT % 2     Results from last 7 days   Lab Units 24  0605 24  0817 24  1832   SODIUM mmol/L 138   < > 136   POTASSIUM mmol/L 3.7   < > 3.5   CHLORIDE mmol/L 106   < > 103   CO2 mmol/L 26   < > 23   BUN mg/dL 7   < > 15    CREATININE mg/dL 0.75   < > 1.07   ANION GAP mmol/L 6   < > 10   CALCIUM mg/dL 7.9*   < > 9.2   ALBUMIN g/dL  --   --  4.2   TOTAL BILIRUBIN mg/dL  --   --  0.78   ALK PHOS U/L  --   --  35   ALT U/L  --   --  23   AST U/L  --   --  29   GLUCOSE RANDOM mg/dL 82   < > 113    < > = values in this interval not displayed.     Results from last 7 days   Lab Units 05/03/24  1832   INR  0.98             Results from last 7 days   Lab Units 05/04/24  2208 05/04/24  1612 05/03/24  2036 05/03/24  1832   LACTIC ACID mmol/L 0.8 2.7* 3.6* 2.4*       Lines/Drains:  Invasive Devices       Peripheral Intravenous Line  Duration             Peripheral IV Right Hand -- days    Peripheral IV 05/03/24 Left Antecubital 2 days    Peripheral IV 05/03/24 Right Antecubital 2 days                      Telemetry:  Telemetry Orders (From admission, onward)               24 Hour Telemetry Monitoring  Continuous x 24 Hours (Telem)        Question:  Reason for 24 Hour Telemetry  Answer:  Syncope suspected to be cardiac in origin                     Telemetry Reviewed: Normal Sinus Rhythm  Indication for Continued Telemetry Use: No indication for continued use. Will discontinue.              Imaging: Reviewed radiology reports from this admission including: abdominal/pelvic CT    Recent Cultures (last 7 days):   Results from last 7 days   Lab Units 05/03/24  1904 05/03/24  1845 05/03/24  1832   BLOOD CULTURE  No Growth at 48 hrs.  --   --    GRAM STAIN RESULT   --   --  Gram negative rods*   C DIFF TOXIN B BY PCR   --  Negative  --        Last 24 Hours Medication List:   Current Facility-Administered Medications   Medication Dose Route Frequency Provider Last Rate    acetaminophen  650 mg Oral Q6H PRN Fabiano López MD      amLODIPine  2.5 mg Oral Daily Vanessa Crocker MD      atorvastatin  10 mg Oral HS Fabiano López MD      cefTRIAXone  2,000 mg Intravenous Q24H Fabiano López MD 2,000 mg (05/05/24 2200)    lisinopril  5 mg  Oral Daily Vanessa Crocker MD      metroNIDAZOLE  500 mg Intravenous Q8H Fabiano López  mg (05/06/24 0616)    multi-electrolyte  75 mL/hr Intravenous Continuous Vanessa Crocker MD 75 mL/hr (05/05/24 1233)        Today, Patient Was Seen By: Vanessa Crocker MD    **Please Note: This note may have been constructed using a voice recognition system.**

## 2024-05-06 NOTE — PLAN OF CARE

## 2024-05-06 NOTE — PLAN OF CARE

## 2024-05-06 NOTE — NURSING NOTE
Patient refusing to drink any more prep. Educated on need to continue prep in order to get a clear view on imaging. Patient still refusing to drink more. Patient only completed half of prep at this time.

## 2024-05-06 NOTE — PROGRESS NOTES
"Progress Note - Gennaro Worthington 74 y.o. male MRN: [id number]    Unit/Bed#: [address] [place] [address] Encounter: [id number]        Assessment/Plan:  74-year-old man with chronic rhinitis, herpes simplex, hypertension hyperlipidemia and iron deficiency anemia presented with acute onset of nausea vomiting and diarrhea.  Patient was found to have mild leukocytosis on admission.  1 blood culture showed gram-negative rods.  Patient reported blood in his stool, plan was for colonoscopy today- CT scan showed multiple loops of nondilated fluid-filled small bowel as well as liquid stool noted throughout the colon. A few loops of mildly thick-walled left mid abdominal small bowel are also noted with trace adjacent free fluid within the right paracolic gutter likely related to an infectious or inflammatory enteritis. There is a second of mild wall thickening of the descending and sigmoid colon suspicious for an infectious or inflammatory colitis  -Pt did not drink prep for colonoscopy, reportedly having loose brown stools  -Patient will have repeat CAT scan today with IV and p.o. contrast due to increased pain in left lower quadrant  -Continue ceftriaxone and Flagyl  -Pending CAT scan results, can tentatively plan colonoscopy for tomorrow or the following day    Subjective:   Patient is lying in bed.  He reports that he is having some left-sided pain with palpation which has increased but he is not having significant pain while he is lying here.  He otherwise has been having brown loose stools but only drank about half the prep.  No further nausea or vomiting.  Wife at bedside.    Objective:     Vitals: /69   Pulse 67   Temp 97.5 °F (36.4 °C)   Resp 18   Ht 6' 1\" (1.854 m)   Wt 107 kg (235 lb)   SpO2 99%   BMI 31.00 kg/m²       Physical Exam:  Gen-alert, nad  Abd-positive bowel sounds, nondistended, positive tenderness palpation bilateral lower quadrants left greater than right       Lab, Imaging and other studies:   Recent " Results (from the past 72 hour(s))   ECG 12 lead    Collection Time: 05/03/24  6:24 PM   Result Value Ref Range    Ventricular Rate 69 BPM    Atrial Rate 69 BPM    NJ Interval 204 ms    QRSD Interval 90 ms    QT Interval 408 ms    QTC Interval 437 ms    P Axis 72 degrees    QRS Axis 36 degrees    T Wave Axis 65 degrees   ECG 12 lead    Collection Time: 05/03/24  6:25 PM   Result Value Ref Range    Ventricular Rate 62 BPM    Atrial Rate 62 BPM    NJ Interval 208 ms    QRSD Interval 90 ms    QT Interval 418 ms    QTC Interval 424 ms    P [place] 74 degrees    QRS Axis 24 degrees    T Wave Axis 67 degrees   ECG 12 lead    Collection Time: 05/03/24  6:27 PM   Result Value Ref Range    Ventricular Rate 64 BPM    Atrial Rate 64 BPM    NJ Interval 202 ms    QRSD Interval 88 ms    QT Interval 416 ms    QTC Interval 429 ms    P Axis 75 degrees    QRS Axis 22 degrees    T Wave Axis 65 degrees   CBC and differential    Collection Time: 05/03/24  6:32 PM   Result Value Ref Range    WBC 6.05 4.31 - 10.16 Thousand/uL    RBC 4.93 3.88 - 5.62 Million/uL    Hemoglobin 15.5 12.0 - 17.0 g/dL    Hematocrit 46.0 36.5 - 49.3 %    MCV 93 82 - 98 fL    MCH 31.4 26.8 - 34.3 pg    MCHC 33.7 31.4 - 37.4 g/dL    RDW 12.6 11.6 - 15.1 %    MPV 9.7 8.9 - 12.7 fL    Platelets 245 149 - 390 Thousands/uL    nRBC 0 /100 WBCs    Segmented % 57 43 - 75 %    Immature Grans % 0 0 - 2 %    Lymphocytes % 34 14 - 44 %    Monocytes % 4 4 - 12 %    Eosinophils Relative 4 0 - 6 %    Basophils Relative 1 0 - 1 %    Absolute Neutrophils 3.47 1.85 - 7.62 Thousands/µL    Absolute Immature Grans 0.01 0.00 - 0.20 Thousand/uL    Absolute Lymphocytes 2.07 0.60 - 4.47 Thousands/µL    Absolute Monocytes 0.26 0.17 - 1.22 Thousand/µL    Eosinophils Absolute 0.21 0.00 - 0.61 Thousand/µL    Basophils Absolute 0.03 0.00 - 0.10 Thousands/µL   Comprehensive metabolic panel    Collection Time: 05/03/24  6:32 PM   Result Value Ref Range    Sodium 136 135 - 147 mmol/L    Potassium  "3.5 3.5 - 5.3 mmol/L    Chloride 103 96 - 108 mmol/L    CO2 23 21 - 32 mmol/L    ANION GAP 10 4 - 13 mmol/L    BUN 15 5 - 25 mg/dL    Creatinine 1.07 0.60 - 1.30 mg/dL    Glucose 113 65 - 140 mg/dL    Calcium 9.2 8.4 - 10.2 mg/dL    AST 29 13 - 39 U/L    ALT 23 7 - 52 U/L    Alkaline Phosphatase 35 34 - 104 U/L    Total Protein 7.5 6.4 - 8.4 g/dL    Albumin 4.2 3.5 - 5.0 g/dL    Total Bilirubin 0.78 0.20 - 1.00 mg/dL    eGFR 68 ml/min/1.73sq m   Magnesium    Collection Time: 05/03/24  6:32 PM   Result Value Ref Range    Magnesium 2.2 1.9 - 2.7 mg/dL   HS Troponin 0hr (reflex protocol)    Collection Time: 05/03/24  6:32 PM   Result Value Ref Range    hs TnI 0hr 7 \"Refer to ACS Flowchart\"- see link ng/L   Protime-INR    Collection Time: 05/03/24  6:32 PM   Result Value Ref Range    Protime 13.2 11.6 - 14.5 seconds    INR 0.98 0.84 - 1.19   APTT    Collection Time: 05/03/24  6:32 PM   Result Value Ref Range    PTT 22 (L) 23 - 37 seconds   Lactic acid, plasma (w/reflex if result > 2.0)    Collection Time: 05/03/24  6:32 PM   Result Value Ref Range    LACTIC ACID 2.4 (H) 0.5 - 2.0 mmol/L   Blood culture #2    Collection Time: 05/03/24  6:32 PM    Specimen: Arm, Right; Blood   Result Value Ref Range    Gram Stain Result Gram negative rods (A)    Lipase    Collection Time: 05/03/24  6:32 PM   Result Value Ref Range    Lipase 43 11 - 82 u/L   Blood Culture Identification Panel    Collection Time: 05/03/24  6:32 PM    Specimen: Arm, Right; Blood   Result Value Ref Range    ALL TARGETS Not Detected    Stool Enteric Bacterial Panel by PCR    Collection Time: 05/03/24  6:45 PM    Specimen: Rectum; Stool   Result Value Ref Range    Salmonella sp PCR Negative Negative    Shigella sp/Enteroinvasive E. coli (EIEC) PCR Negative Negative    Campylobacter sp (jejuni and coli) PCR Negative Negative    Shiga toxin 1/Shiga toxin 2 genes PCR Negative Negative   Clostridium difficile toxin by PCR with EIA    Collection Time: 05/03/24  6:45 " "PM    Specimen: Rectum; Stool   Result Value Ref Range    C.difficile toxin by PCR Negative Negative   Type and screen    Collection Time: 05/03/24  6:56 PM   Result Value Ref Range    ABO Grouping O     Rh Factor Positive     Antibody Screen Negative     Specimen Expiration Date [id number]    Blood culture #1    Collection Time: 05/03/24  7:04 PM    Specimen: Arm, Left; Blood   Result Value Ref Range    Gram Stain Result Gram variable rods (A)    Lactic acid 2 Hours    Collection Time: 05/03/24  8:36 PM   Result Value Ref Range    LACTIC ACID 3.6 (H) 0.5 - 2.0 mmol/L   HS Troponin I 2hr    Collection Time: 05/03/24  8:36 PM   Result Value Ref Range    hs TnI 2hr 5 \"Refer to ACS Flowchart\"- see link ng/L    Delta 2hr hsTnI -2 <20 ng/L   Basic metabolic panel    Collection Time: 05/04/24  8:17 AM   Result Value Ref Range    Sodium 135 135 - 147 mmol/L    Potassium 4.1 3.5 - 5.3 mmol/L    Chloride 107 96 - 108 mmol/L    CO2 21 21 - 32 mmol/L    ANION GAP 7 4 - 13 mmol/L    BUN 15 5 - 25 mg/dL    Creatinine 0.90 0.60 - 1.30 mg/dL    Glucose 112 65 - 140 mg/dL    Glucose, Fasting 112 (H) 65 - 99 mg/dL    Calcium 7.8 (L) 8.4 - 10.2 mg/dL    eGFR 83 ml/min/1.73sq m   Magnesium    Collection Time: 05/04/24  8:17 AM   Result Value Ref Range    Magnesium 2.0 1.9 - 2.7 mg/dL   Phosphorus    Collection Time: 05/04/24  8:17 AM   Result Value Ref Range    Phosphorus 3.4 2.3 - 4.1 mg/dL   CBC (With Platelets)    Collection Time: 05/04/24  8:17 AM   Result Value Ref Range    WBC 12.55 (H) 4.31 - 10.16 Thousand/uL    RBC 4.10 3.88 - 5.62 Million/uL    Hemoglobin 13.1 12.0 - 17.0 g/dL    Hematocrit 38.9 36.5 - 49.3 %    MCV 95 82 - 98 fL    MCH 32.0 26.8 - 34.3 pg    MCHC 33.7 31.4 - 37.4 g/dL    RDW 12.9 11.6 - 15.1 %    Platelets 204 149 - 390 Thousands/uL    MPV 9.5 8.9 - 12.7 fL   Lactic acid, plasma (w/reflex if result > 2.0)    Collection Time: 05/04/24  4:12 PM   Result Value Ref Range    LACTIC ACID 2.7 (H) 0.5 - 2.0 mmol/L "   Hemoglobin and hematocrit, blood    Collection Time: 05/04/24  4:12 PM   Result Value Ref Range    Hemoglobin 12.3 12.0 - 17.0 g/dL    Hematocrit 36.6 36.5 - 49.3 %   Lactic acid 2 Hours    Collection Time: 05/04/24 10:08 PM   Result Value Ref Range    LACTIC ACID 0.8 0.5 - 2.0 mmol/L   Hemoglobin and hematocrit, blood    Collection Time: 05/04/24 10:08 PM   Result Value Ref Range    Hemoglobin 12.0 12.0 - 17.0 g/dL    Hematocrit 34.9 (L) 36.5 - 49.3 %   CBC and differential    Collection Time: 05/05/24  6:02 AM   Result Value Ref Range    WBC 10.17 (H) 4.31 - 10.16 Thousand/uL    RBC 3.76 (L) 3.88 - 5.62 Million/uL    Hemoglobin 11.9 (L) 12.0 - 17.0 g/dL    Hematocrit 35.0 (L) 36.5 - 49.3 %    MCV 93 82 - 98 fL    MCH 31.6 26.8 - 34.3 pg    MCHC 34.0 31.4 - 37.4 g/dL    RDW 12.8 11.6 - 15.1 %    MPV 9.6 8.9 - 12.7 fL    Platelets 171 149 - 390 Thousands/uL    nRBC 0 /100 WBCs    Segmented % 78 (H) 43 - 75 %    Immature Grans % 0 0 - 2 %    Lymphocytes % 14 14 - 44 %    Monocytes % 6 4 - 12 %    Eosinophils Relative 2 0 - 6 %    Basophils Relative 0 0 - 1 %    Absolute Neutrophils 7.93 (H) 1.85 - 7.62 Thousands/µL    Absolute Immature Grans 0.03 0.00 - 0.20 Thousand/uL    Absolute Lymphocytes 1.40 0.60 - 4.47 Thousands/µL    Absolute Monocytes 0.63 0.17 - 1.22 Thousand/µL    Eosinophils Absolute 0.16 0.00 - 0.61 Thousand/µL    Basophils Absolute 0.02 0.00 - 0.10 Thousands/µL   Basic metabolic panel    Collection Time: 05/05/24  6:02 AM   Result Value Ref Range    Sodium 137 135 - 147 mmol/L    Potassium 3.8 3.5 - 5.3 mmol/L    Chloride 106 96 - 108 mmol/L    CO2 25 21 - 32 mmol/L    ANION GAP 6 4 - 13 mmol/L    BUN 9 5 - 25 mg/dL    Creatinine 0.77 0.60 - 1.30 mg/dL    Glucose 90 65 - 140 mg/dL    Calcium 7.7 (L) 8.4 - 10.2 mg/dL    eGFR 89 ml/min/1.73sq m   C-reactive protein    Collection Time: 05/05/24  6:02 AM   Result Value Ref Range    .2 (H) <3.0 mg/L   CBC and differential    Collection Time:  05/06/24  6:05 AM   Result Value Ref Range    WBC 9.63 4.31 - 10.16 Thousand/uL    RBC 3.57 (L) 3.88 - 5.62 Million/uL    Hemoglobin 11.3 (L) 12.0 - 17.0 g/dL    Hematocrit 32.9 (L) 36.5 - 49.3 %    MCV 92 82 - 98 fL    MCH 31.7 26.8 - 34.3 pg    MCHC 34.3 31.4 - 37.4 g/dL    RDW 12.5 11.6 - 15.1 %    MPV 9.7 8.9 - 12.7 fL    Platelets 174 149 - 390 Thousands/uL    nRBC 0 /100 WBCs    Segmented % 78 (H) 43 - 75 %    Immature Grans % 1 0 - 2 %    Lymphocytes % 13 (L) 14 - 44 %    Monocytes % 6 4 - 12 %    Eosinophils Relative 2 0 - 6 %    Basophils Relative 0 0 - 1 %    Absolute Neutrophils 7.57 1.85 - 7.62 Thousands/µL    Absolute Immature Grans 0.06 0.00 - 0.20 Thousand/uL    Absolute Lymphocytes 1.22 0.60 - 4.47 Thousands/µL    Absolute Monocytes 0.57 0.17 - 1.22 Thousand/µL    Eosinophils Absolute 0.17 0.00 - 0.61 Thousand/µL    Basophils Absolute 0.04 0.00 - 0.10 Thousands/µL   Basic metabolic panel    Collection Time: 05/06/24  6:05 AM   Result Value Ref Range    Sodium 138 135 - 147 mmol/L    Potassium 3.7 3.5 - 5.3 mmol/L    Chloride 106 96 - 108 mmol/L    CO2 26 21 - 32 mmol/L    ANION GAP 6 4 - 13 mmol/L    BUN 7 5 - 25 mg/dL    Creatinine 0.75 0.60 - 1.30 mg/dL    Glucose 82 65 - 140 mg/dL    Calcium 7.9 (L) 8.4 - 10.2 mg/dL    eGFR 90 ml/min/1.73sq m

## 2024-05-07 LAB
ANION GAP SERPL CALCULATED.3IONS-SCNC: 10 MMOL/L (ref 4–13)
BASOPHILS # BLD AUTO: 0.03 THOUSANDS/ÂΜL (ref 0–0.1)
BASOPHILS NFR BLD AUTO: 0 % (ref 0–1)
BUN SERPL-MCNC: 7 MG/DL (ref 5–25)
CALCIUM SERPL-MCNC: 8.1 MG/DL (ref 8.4–10.2)
CHLORIDE SERPL-SCNC: 104 MMOL/L (ref 96–108)
CO2 SERPL-SCNC: 23 MMOL/L (ref 21–32)
CREAT SERPL-MCNC: 0.73 MG/DL (ref 0.6–1.3)
EOSINOPHIL # BLD AUTO: 0.2 THOUSAND/ÂΜL (ref 0–0.61)
EOSINOPHIL NFR BLD AUTO: 2 % (ref 0–6)
ERYTHROCYTE [DISTWIDTH] IN BLOOD BY AUTOMATED COUNT: 12.3 % (ref 11.6–15.1)
GFR SERPL CREATININE-BSD FRML MDRD: 91 ML/MIN/1.73SQ M
GLUCOSE SERPL-MCNC: 66 MG/DL (ref 65–140)
HCT VFR BLD AUTO: 35 % (ref 36.5–49.3)
HGB BLD-MCNC: 11.8 G/DL (ref 12–17)
IMM GRANULOCYTES # BLD AUTO: 0.04 THOUSAND/UL (ref 0–0.2)
IMM GRANULOCYTES NFR BLD AUTO: 1 % (ref 0–2)
LYMPHOCYTES # BLD AUTO: 1.32 THOUSANDS/ÂΜL (ref 0.6–4.47)
LYMPHOCYTES NFR BLD AUTO: 15 % (ref 14–44)
MCH RBC QN AUTO: 31.1 PG (ref 26.8–34.3)
MCHC RBC AUTO-ENTMCNC: 33.7 G/DL (ref 31.4–37.4)
MCV RBC AUTO: 92 FL (ref 82–98)
MONOCYTES # BLD AUTO: 0.5 THOUSAND/ÂΜL (ref 0.17–1.22)
MONOCYTES NFR BLD AUTO: 6 % (ref 4–12)
NEUTROPHILS # BLD AUTO: 6.47 THOUSANDS/ÂΜL (ref 1.85–7.62)
NEUTS SEG NFR BLD AUTO: 76 % (ref 43–75)
NRBC BLD AUTO-RTO: 0 /100 WBCS
PLATELET # BLD AUTO: 189 THOUSANDS/UL (ref 149–390)
PMV BLD AUTO: 10 FL (ref 8.9–12.7)
POTASSIUM SERPL-SCNC: 3.8 MMOL/L (ref 3.5–5.3)
RBC # BLD AUTO: 3.79 MILLION/UL (ref 3.88–5.62)
SODIUM SERPL-SCNC: 137 MMOL/L (ref 135–147)
WBC # BLD AUTO: 8.56 THOUSAND/UL (ref 4.31–10.16)

## 2024-05-07 PROCEDURE — 99232 SBSQ HOSP IP/OBS MODERATE 35: CPT | Performed by: STUDENT IN AN ORGANIZED HEALTH CARE EDUCATION/TRAINING PROGRAM

## 2024-05-07 PROCEDURE — 80048 BASIC METABOLIC PNL TOTAL CA: CPT | Performed by: INTERNAL MEDICINE

## 2024-05-07 PROCEDURE — 85025 COMPLETE CBC W/AUTO DIFF WBC: CPT | Performed by: INTERNAL MEDICINE

## 2024-05-07 RX ORDER — ONDANSETRON 2 MG/ML
4 INJECTION INTRAMUSCULAR; INTRAVENOUS EVERY 6 HOURS PRN
Status: DISCONTINUED | OUTPATIENT
Start: 2024-05-07 | End: 2024-05-09 | Stop reason: HOSPADM

## 2024-05-07 RX ORDER — BISACODYL 5 MG/1
20 TABLET, DELAYED RELEASE ORAL ONCE
Status: DISCONTINUED | OUTPATIENT
Start: 2024-05-07 | End: 2024-05-09 | Stop reason: HOSPADM

## 2024-05-07 RX ORDER — POLYETHYLENE GLYCOL 3350 17 G/17G
238 POWDER, FOR SOLUTION ORAL ONCE
Status: COMPLETED | OUTPATIENT
Start: 2024-05-07 | End: 2024-05-07

## 2024-05-07 RX ADMIN — ACETAMINOPHEN 650 MG: 325 TABLET ORAL at 00:05

## 2024-05-07 RX ADMIN — CEFTRIAXONE 2000 MG: 2 INJECTION, SOLUTION INTRAVENOUS at 22:27

## 2024-05-07 RX ADMIN — METRONIDAZOLE 500 MG: 500 INJECTION, SOLUTION INTRAVENOUS at 06:08

## 2024-05-07 RX ADMIN — POLYETHYLENE GLYCOL 3350 238 G: 17 POWDER, FOR SOLUTION ORAL at 18:05

## 2024-05-07 RX ADMIN — SODIUM CHLORIDE, SODIUM GLUCONATE, SODIUM ACETATE, POTASSIUM CHLORIDE, MAGNESIUM CHLORIDE, SODIUM PHOSPHATE, DIBASIC, AND POTASSIUM PHOSPHATE 75 ML/HR: .53; .5; .37; .037; .03; .012; .00082 INJECTION, SOLUTION INTRAVENOUS at 20:41

## 2024-05-07 RX ADMIN — ONDANSETRON 4 MG: 2 INJECTION INTRAMUSCULAR; INTRAVENOUS at 16:33

## 2024-05-07 RX ADMIN — METRONIDAZOLE 500 MG: 500 INJECTION, SOLUTION INTRAVENOUS at 15:38

## 2024-05-07 RX ADMIN — ATORVASTATIN CALCIUM 10 MG: 10 TABLET, FILM COATED ORAL at 21:21

## 2024-05-07 RX ADMIN — AMLODIPINE BESYLATE 2.5 MG: 2.5 TABLET ORAL at 08:43

## 2024-05-07 RX ADMIN — METRONIDAZOLE 500 MG: 500 INJECTION, SOLUTION INTRAVENOUS at 21:24

## 2024-05-07 RX ADMIN — LISINOPRIL 5 MG: 5 TABLET ORAL at 08:43

## 2024-05-07 NOTE — PLAN OF CARE
Problem: Potential for Falls  Goal: Patient will remain free of falls  Description: INTERVENTIONS:  - Educate patient/family on patient safety including physical limitations  - Instruct patient to call for assistance with activity   - Consult OT/PT to assist with strengthening/mobility   - Keep Call bell within reach  - Keep bed low and locked with side rails adjusted as appropriate  - Keep care items and personal belongings within reach  - Initiate and maintain comfort rounds  - Make Fall Risk Sign visible to staff  - Offer Toileting every 2 Hours, in advance of need  - Initiate/Maintain bed/chair alarm  - Obtain necessary fall risk management equipment: bed/chair alarm  - Apply yellow socks and bracelet for high fall risk patients  - Consider moving patient to room near nurses station  Outcome: Progressing     Problem: PAIN - ADULT  Goal: Verbalizes/displays adequate comfort level or baseline comfort level  Description: Interventions:  - Encourage patient to monitor pain and request assistance  - Assess pain using appropriate pain scale  - Administer analgesics based on type and severity of pain and evaluate response  - Implement non-pharmacological measures as appropriate and evaluate response  - Consider cultural and social influences on pain and pain management  - Notify physician/advanced practitioner if interventions unsuccessful or patient reports new pain  Outcome: Progressing     Problem: INFECTION - ADULT  Goal: Absence or prevention of progression during hospitalization  Description: INTERVENTIONS:  - Assess and monitor for signs and symptoms of infection  - Monitor lab/diagnostic results  - Monitor all insertion sites, i.e. indwelling lines, tubes, and drains  - Monitor endotracheal if appropriate and nasal secretions for changes in amount and color  - [place] appropriate cooling/warming therapies per order  - Administer medications as ordered  - Instruct and encourage patient and family to use good  hand hygiene technique  - Identify and instruct in appropriate isolation precautions for identified infection/condition  Outcome: Progressing  Goal: Absence of fever/infection during neutropenic period  Description: INTERVENTIONS:  - Monitor WBC    Outcome: Progressing

## 2024-05-07 NOTE — PLAN OF CARE
Problem: Potential for Falls  Goal: Patient will remain free of falls  Description: INTERVENTIONS:  - Educate patient/family on patient safety including physical limitations  - Instruct patient to call for assistance with activity   - Consult OT/PT to assist with strengthening/mobility   - Keep Call bell within reach  - Keep bed low and locked with side rails adjusted as appropriate  - Keep care items and personal belongings within reach  - Initiate and maintain comfort rounds  - Make Fall Risk Sign visible to staff  - Offer Toileting every 2 Hours, in advance of need  - Initiate/Maintain bed/chair alarm  - Obtain necessary fall risk management equipment: bed/chair alarm  - Apply yellow socks and bracelet for high fall risk patients  - Consider moving patient to room near nurses station  Outcome: Progressing     Problem: PAIN - ADULT  Goal: Verbalizes/displays adequate comfort level or baseline comfort level  Description: Interventions:  - Encourage patient to monitor pain and request assistance  - Assess pain using appropriate pain scale  - Administer analgesics based on type and severity of pain and evaluate response  - Implement non-pharmacological measures as appropriate and evaluate response  - Consider cultural and social influences on pain and pain management  - Notify physician/advanced practitioner if interventions unsuccessful or patient reports new pain  Outcome: Progressing     Problem: INFECTION - ADULT  Goal: Absence or prevention of progression during hospitalization  Description: INTERVENTIONS:  - Assess and monitor for signs and symptoms of infection  - Monitor lab/diagnostic results  - Monitor all insertion sites, i.e. indwelling lines, tubes, and drains  - Monitor endotracheal if appropriate and nasal secretions for changes in amount and color  - [place] appropriate cooling/warming therapies per order  - Administer medications as ordered  - Instruct and encourage patient and family to use good  hand hygiene technique  - Identify and instruct in appropriate isolation precautions for identified infection/condition  Outcome: Progressing  Goal: Absence of fever/infection during neutropenic period  Description: INTERVENTIONS:  - Monitor WBC    Outcome: Progressing     Problem: SAFETY ADULT  Goal: Patient will remain free of falls  Description: INTERVENTIONS:  - Educate patient/family on patient safety including physical limitations  - Instruct patient to call for assistance with activity   - Consult OT/PT to assist with strengthening/mobility   - Keep Call bell within reach  - Keep bed low and locked with side rails adjusted as appropriate  - Keep care items and personal belongings within reach  - Initiate and maintain comfort rounds  - Make Fall Risk Sign visible to staff  - Offer Toileting every 2 Hours, in advance of need  - Initiate/Maintain bed/chair alarm  - Obtain necessary fall risk management equipment: bed/chair alarm  - Apply yellow socks and bracelet for high fall risk patients  - Consider moving patient to room near nurses station  Outcome: Progressing  Goal: Maintain or return to baseline ADL function  Description: INTERVENTIONS:  - Educate patient/family on patient safety including physical limitations  - Instruct patient to call for assistance with activity   - Consult OT/PT to assist with strengthening/mobility   - Keep Call bell within reach  - Keep bed low and locked with side rails adjusted as appropriate  - Keep care items and personal belongings within reach  - Initiate and maintain comfort rounds  - Make Fall Risk Sign visible to staff  - Offer Toileting every 3 Hours, in advance of need  - Initiate/Maintain bed/chair alarm  - Obtain necessary fall risk management equipment: bed/chair alarm  - Apply yellow socks and bracelet for high fall risk patients  - Consider moving patient to room near nurses station  Outcome: Progressing  Goal: Maintains/Returns to pre admission functional  level  Description: INTERVENTIONS:  - Perform AM-PAC 6 Click Basic Mobility/ Daily Activity assessment daily.  - Set and communicate daily mobility goal to care team and patient/family/caregiver.   - Collaborate with rehabilitation services on mobility goals if consulted  - Perform Range of Motion 3 times a day.  - Reposition patient every 3 hours.  - Dangle patient 3 times a day  - Stand patient 3 times a day  - Ambulate patient 3 times a day  - Out of bed to chair 3 times a day   - Out of bed for meals 3  Problem: DISCHARGE PLANNING  Goal: Discharge to home or other facility with appropriate resources  Description: INTERVENTIONS:  - Identify barriers to discharge w/patient and caregiver  - Arrange for needed discharge resources and transportation as appropriate  - Identify discharge learning needs (meds, wound care, etc.)  - Arrange for interpretive services to assist at discharge as needed  - Refer to Case Management Department for coordinating discharge planning if the patient needs post-hospital services based on physician/advanced practitioner order or complex needs related to functional status, cognitive ability, or social support system  Outcome: Progressing     Problem: Knowledge Deficit  Goal: Patient/family/caregiver demonstrates understanding of disease process, treatment plan, medications, and discharge instructions  Description: Complete learning assessment and assess knowledge base.  Interventions:  - Provide teaching at level of understanding  - Provide teaching via preferred learning methods  Outcome: Progressing    times a day  - Out of bed for toileting  - Record patient progress and toleration of activity level   Outcome: Progressing

## 2024-05-07 NOTE — PROGRESS NOTES
[facility]  Progress Note  Name: Gennaro Worthington I  MRN: [id number]  Unit/Bed#: 4 [address] I Date of Admission: 5/3/2024   Date of Service: 5/7/2024 I Hospital Day: 3    Assessment/Plan   * Enterocolitis  Assessment & Plan  Presented with lower abdominal pain, loose bowel movements x 1 day, also reported associated nausea.  Nonbloody emesis    CT abdomen-Multiple loops of nondilated fluid-filled small bowel as well as liquid stool noted throughout the colon. A few loops of mildly thick-walled left mid abdominal small bowel are also noted with trace adjacent free fluid within the right paracolic gutter likely related to an infectious or inflammatory enteritis. There is a second of mild wall thickening of the descending and sigmoid colon suspicious for an infectious or inflammatory colitis.   Stool C. difficile negative.  CRP elevated  Lactic acidosis on presentation, improving with hydration.     Suspect enterocolitis?  Ischemic component given syncopal episode  Continue ceftriaxone, metronidazole  Monitor abdominal exam  Holding aspirin, Lovenox given bleeding-resume if no further bleeding  Clear liquid diet today  D/W GI recs:  CT: Interval resolution of enteritis with persistent grossly stable colitis involving the left hemicolon compared to 5/3/2024,   stool studies negative  Continue ceftriaxone and Flagyl  Plan colonoscopy tomorrow    Gram-negative bacteremia  Assessment & Plan  Likely GI source, possible translocation with enterocolitis  LFT unremarkable, patient denies any urinary symptoms.  CT abdomen pelvis without any other etiology  BCx 1 out of 2 GNR, second set with growth of gram variable bacteremia  Continue IV ceftriaxone, metronidazole  Follow-up culture sensitivities    Syncopal episodes  Assessment & Plan  Likely vasovagal or hypotensive in setting of enterocolitis and bacteremia  Denies any cardiorespiratory symptoms  Blood pressure currently stable  Telemetry  unremarkable-discontinued  CT head, C-spine without any acute abnormalities    Mixed hyperlipidemia  Assessment & Plan  Continue Lipitor    Benign essential hypertension  Assessment & Plan  Continue Norvasc and lisinopril with holding parameters           VTE Pharmacologic Prophylaxis:   Moderate Risk (Score 3-4) - Pharmacological DVT Prophylaxis Ordered: enoxaparin (Lovenox).    Mobility:   Basic Mobility Inpatient Raw Score: 18  JH-HLM Goal: 6: Walk 10 steps or more  JH-HLM Achieved: 6: Walk 10 steps or more  JH-HLM Goal achieved. Continue to encourage appropriate mobility.    Patient Centered Rounds: I evaluated the patient without nursing staff present due to alternate patient care responsibilities    Discussions with Specialists or Other Care Team Provider: GI    Education and Discussions with Family / Patient: Updated  (2 family members/friends) at bedside.    Total Time Spent on Date of Encounter in care of patient: 35 mins. This time was spent on one or more of the following: performing physical exam; counseling and coordination of care; obtaining or reviewing history; documenting in the medical record; reviewing/ordering tests, medications or procedures; communicating with other healthcare professionals and discussing with patient's family/caregivers.    Current Length of Stay: 3 day(s)  Current Patient Status: Inpatient   Certification Statement: The patient will continue to require additional inpatient hospital stay due to clinical course and specialist recs  Discharge Plan: Anticipate discharge tomorrow to discharge location to be determined pending rehab evaluations.    Code Status: Level 1 - Full Code    Subjective:   Patient seen and examined at bedside with family friends present, reported that he has been eating a liquid diet today and that he really wants to eat.  Patient reported decrease in diarrhea, no longer bloody since Saturday.  Patient denies shortness of breath, weakness,  lightheadedness, chest pain or palpitations.  Patient reported no gas significantly.    Objective:     Vitals:   Temp (24hrs), Av.3 °F (36.8 °C), Min:97.7 °F (36.5 °C), Max:99 °F (37.2 °C)    Temp:  [97.7 °F (36.5 °C)-99 °F (37.2 °C)] 98.1 °F (36.7 °C)  HR:  [61-68] 61  Resp:  [18-19] 18  BP: (123-147)/(71-84) 123/71  SpO2:  [94 %-99 %] 98 %  Body mass index is 31 kg/m².     Input and Output Summary (last 24 hours):     Intake/Output Summary (Last 24 hours) at 2024 1634  Last data filed at 2024 0610  Gross per 24 hour   Intake --   Output 1200 ml   Net -1200 ml         Physical Exam  Vitals and nursing note reviewed.   Constitutional:       General: He is not in acute distress.     Appearance: He is well-developed.   HENT:      Head: Normocephalic and atraumatic.   Eyes:      Conjunctiva/sclera: Conjunctivae normal.   Cardiovascular:      Rate and Rhythm: Normal rate and regular rhythm.      Heart sounds: No murmur heard.  Pulmonary:      Effort: Pulmonary effort is normal. No respiratory distress.      Breath sounds: Normal breath sounds.   Abdominal:      Palpations: Abdomen is soft.      Tenderness: There is abdominal tenderness in the left lower quadrant.   Musculoskeletal:         General: No swelling.      Cervical back: Neck supple.   Skin:     General: Skin is warm and dry.      Capillary Refill: Capillary refill takes less than 2 seconds.   Neurological:      Mental Status: He is alert.   Psychiatric:         Mood and Affect: Mood normal.          Additional Data:     Labs:  Results from last 7 days   Lab Units 24  0429   WBC Thousand/uL 8.56   HEMOGLOBIN g/dL 11.8*   HEMATOCRIT % 35.0*   PLATELETS Thousands/uL 189   SEGS PCT % 76*   LYMPHO PCT % 15   MONO PCT % 6   EOS PCT % 2     Results from last 7 days   Lab Units 24  0429 24  0817 24  1832   SODIUM mmol/L 137   < > 136   POTASSIUM mmol/L 3.8   < > 3.5   CHLORIDE mmol/L 104   < > 103   CO2 mmol/L 23   < > 23   BUN  mg/dL 7   < > 15   CREATININE mg/dL 0.73   < > 1.07   ANION GAP mmol/L 10   < > 10   CALCIUM mg/dL 8.1*   < > 9.2   ALBUMIN g/dL  --   --  4.2   TOTAL BILIRUBIN mg/dL  --   --  0.78   ALK PHOS U/L  --   --  35   ALT U/L  --   --  23   AST U/L  --   --  29   GLUCOSE RANDOM mg/dL 66   < > 113    < > = values in this interval not displayed.     Results from last 7 days   Lab Units 05/03/24  1832   INR  0.98             Results from last 7 days   Lab Units 05/04/24  2208 05/04/24  1612 05/03/24  2036 05/03/24  1832   LACTIC ACID mmol/L 0.8 2.7* 3.6* 2.4*       Lines/Drains:  Invasive Devices       Peripheral Intravenous Line  Duration             Peripheral IV Right Hand -- days    Peripheral IV 05/03/24 Left Antecubital 3 days    Peripheral IV 05/03/24 Right Antecubital 3 days                          Imaging: Reviewed radiology reports from this admission including: abdominal/pelvic CTx2    Recent Cultures (last 7 days):   Results from last 7 days   Lab Units 05/03/24  1904 05/03/24  1845 05/03/24  1832   GRAM STAIN RESULT  Gram variable rods*  --  Gram negative rods*   C DIFF TOXIN B BY PCR   --  Negative  --        Last 24 Hours Medication List:   Current Facility-Administered Medications   Medication Dose Route Frequency Provider Last Rate    acetaminophen  650 mg Oral Q6H PRN Fabiano López MD      amLODIPine  2.5 mg Oral Daily Vanessa Crocker MD      atorvastatin  10 mg Oral HS Fabiano López MD      bisacodyl  20 mg Oral Once Aimee Castorena PA-C      cefTRIAXone  2,000 mg Intravenous Q24H Fabiano López MD 2,000 mg (05/06/24 2206)    lisinopril  5 mg Oral Daily Vanessa Crocker MD      metroNIDAZOLE  500 mg Intravenous Q8H Fabiano López  mg (05/07/24 1538)    multi-electrolyte  75 mL/hr Intravenous Continuous Vanessa Crocker MD 75 mL/hr (05/06/24 1922)    ondansetron  4 mg Intravenous Q6H PRN Tanja Carpenter MD      polyethylene glycol  238 g Oral Once Aimee A Raffaele, PA-C           Today, Patient Was Seen By: Tanja Carpenter MD    **Please Note: This note may have been constructed using a voice recognition system.**

## 2024-05-07 NOTE — CASE MANAGEMENT
Case Management Assessment & Discharge Planning Note    Patient name Gennaro Worthington  Location [address] [address]/[address] [address]-* MRN [id number]  : 1950 Date 2024       Current Admission Date: 5/3/2024  Current Admission Diagnosis:Enterocolitis   Patient Active Problem List    Diagnosis Date Noted    Syncopal episodes 2024    Gram-negative bacteremia 2024    Enterocolitis 2024    Surgical follow-up care 2023    Depression, recurrent (HCC) 08/10/2023    Port-site hernia 2023    Umbilical hernia without obstruction and without gangrene 2023    Adenocarcinoma of prostate (HCC) 2022    Cervical radiculopathy 2021    Cervicalgia 2021    Periumbilical abdominal pain 10/14/2020    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(HCC) 2019    S/P prostatectomy 2019    Lower extremity edema 2018    Groin mass 2018    Disc degeneration, lumbar 2015    Actinic keratosis 2015    Anemia 2015    Fatty liver disease, nonalcoholic 2014    History of prostate cancer 2013    Mixed hyperlipidemia 2013    Benign essential hypertension 2012    Herpes simplex infection 2012    Organic impotence 2012      LOS (days): 3  Geometric Mean LOS (GMLOS) (days): 2.6  Days to GMLOS:-0.4     OBJECTIVE:    Risk of Unplanned Readmission Score: 12.96      Current admission status: Inpatient     Preferred Pharmacy:   [id number] - [place], [place] - [facility] [address]  Phone: [phone] Fax: [phone]    [facility] - [place], [place] - [address]  [address]  [address] [address]  Phone: [phone] Fax: [phone]    Primary Care Provider: NEHAL Crum    Primary Insurance: [employer] [facility]  Secondary Insurance:     ASSESSMENT:  Active Health Care Proxies    There are no active Health Care Proxies on file.        Readmission Root Cause  30 Day Readmission: No    Patient Information  Admitted from:: Home  Mental Status: Alert  During Assessment patient was accompanied by: Not accompanied during assessment  Assessment information provided by:: Patient  Primary Caregiver: Self  Support Systems: Spouse/significant other, Children  County [place] Residence: [place]  What [facility] do you live in?: [place], [place]  Home entry access options. Select all that apply.: Stairs  Number of steps to enter home.: 2  Type of Current Residence: 2 story home  Upon entering residence, is there a bedroom on the main floor (no further steps)?: No  A bedroom is located on the following floor levels of residence (select all that apply):: 2nd Floor  Upon entering residence, is there a bathroom on the main floor (no further steps)?: Yes (1/2 bath 1st floor, full bath 2nd floor)  Living Arrangements: Lives w/ Spouse/significant other    Activities of Daily Living Prior to Admission  Functional Status: Independent  Completes ADLs independently?: Yes  Ambulates independently?: Yes  Does patient use assisted devices?: No  Does patient currently own DME?: No  Does patient have a history of Outpatient Therapy (PT/OT)?: No  Does the patient have a history of Short-Term Rehab?: No  Does patient have a history of HHC?: No  Does patient currently have HHC?: No     Patient Information Continued  Income Source: Pension/halfway  Does patient have prescription coverage?: Yes  Does patient receive dialysis treatments?: No     Means of Transportation  Means of Transport to Appts:: Drives Self    Social Determinants of Health (SDOH)      Flowsheet Row Most Recent Value   Housing Stability    In the last 12 months, was there a time when you were not able to pay the mortgage or rent on time? N   In the last 12 months, how many places have you lived? 1   In the last 12 months, was there a time when you did not have a steady place to sleep or slept in a shelter (including  now)? N   Transportation Needs    In the past 12 months, has lack of transportation kept you from medical appointments or from getting medications? no   In the past 12 months, has lack of transportation kept you from meetings, work, or from getting things needed for daily living? No   Food Insecurity    Within the past 12 months, you worried that your food would run out before you got the money to buy more. Never true   Within the past 12 months, the food you bought just didn't last and you didn't have money to get more. Never true   Utilities    In the past 12 months has the electric, gas, oil, or water company threatened to shut off services in your home? No          DISCHARGE DETAILS:    Discharge planning discussed with:: Patient  Freedom of Choice: Yes     Were Treatment Team discharge recommendations reviewed with patient/caregiver?: Yes  Did patient/caregiver verbalize understanding of patient care needs?: Yes  Were patient/caregiver advised of the risks associated with not following Treatment Team discharge recommendations?: Yes    Contacts  Patient Contacts: Jaja Worthington (spouse)  Relationship to Patient:: Family  Contact Method: Phone  Phone Number: [phone]  Reason/Outcome: Emergency Contact    Requested Home Health Care         Is the patient interested in HHC at discharge?: No    DME Referral Provided  Referral made for DME?: No    Other Referral/Resources/Interventions Provided:  Interventions: None Indicated     Treatment Team Recommendation: Home  Discharge Destination Plan:: Home  Transport at Discharge : Family

## 2024-05-07 NOTE — PLAN OF CARE

## 2024-05-07 NOTE — PROGRESS NOTES
"Progress Note - Gennaro Worthington 74 y.o. male MRN: [id number]    Unit/Bed#: [address] [place] [address] Encounter: [id number]        Assessment/Plan:  74-year-old man with chronic rhinitis, herpes simplex, hypertension hyperlipidemia and iron deficiency anemia presented with acute onset of nausea vomiting and diarrhea.  Patient was found to have mild leukocytosis on admission. Positive blood cultures showed gram-negative rods/gram variable rods  Patient reported blood in his stool, plan was for colonoscopy yesterday- Initial CT scan showed multiple loops of nondilated fluid-filled small bowel as well as liquid stool noted throughout the colon. A few loops of mildly thick-walled left mid abdominal small bowel are also noted with trace adjacent free fluid within the right paracolic gutter likely related to an infectious or inflammatory enteritis. There is a second of mild wall thickening of the descending and sigmoid colon suspicious for an infectious or inflammatory colitis  -Pt did not drink entire prep for colonoscopy, reportedly having loose brown stools  -Patient had repeat CAT scan today with IV and p.o. contrast due to increased pain in left lower quadrant showing  Interval resolution of enteritis with persistent grossly stable colitis involving the left hemicolon compared to 5/3/2024, stool studies negative  -Continue ceftriaxone and Flagyl  -Plan colonoscopy tomorrow  -Spoke with patient and wife at bedside    Subjective:   Patient is lying in bed.  He reports that he still has some discomfort but no bowel movements.  Went over CAT scan results that still showed colitis but enteritis had improved.    Objective:     Vitals: /73   Pulse 62   Temp 98.1 °F (36.7 °C)   Resp 18   Ht 6' 1\" (1.854 m)   Wt 107 kg (235 lb)   SpO2 96%   BMI 31.00 kg/m²       Physical Exam:  Gen-alert no acute distress  Abd-positive bowel sounds, nondistended, tenderness to palpation bilateral lower quadrants, improved from previous   "     Lab, Imaging and other studies:   Recent Results (from the past 72 hour(s))   Lactic acid, plasma (w/reflex if result > 2.0)    Collection Time: 05/04/24  4:12 PM   Result Value Ref Range    LACTIC ACID 2.7 (H) 0.5 - 2.0 mmol/L   Hemoglobin and hematocrit, blood    Collection Time: 05/04/24  4:12 PM   Result Value Ref Range    Hemoglobin 12.3 12.0 - 17.0 g/dL    Hematocrit 36.6 36.5 - 49.3 %   Lactic acid 2 Hours    Collection Time: 05/04/24 10:08 PM   Result Value Ref Range    LACTIC ACID 0.8 0.5 - 2.0 mmol/L   Hemoglobin and hematocrit, blood    Collection Time: 05/04/24 10:08 PM   Result Value Ref Range    Hemoglobin 12.0 12.0 - 17.0 g/dL    Hematocrit 34.9 (L) 36.5 - 49.3 %   CBC and differential    Collection Time: 05/05/24  6:02 AM   Result Value Ref Range    WBC 10.17 (H) 4.31 - 10.16 Thousand/uL    RBC 3.76 (L) 3.88 - 5.62 Million/uL    Hemoglobin 11.9 (L) 12.0 - 17.0 g/dL    Hematocrit 35.0 (L) 36.5 - 49.3 %    MCV 93 82 - 98 fL    MCH 31.6 26.8 - 34.3 pg    MCHC 34.0 31.4 - 37.4 g/dL    RDW 12.8 11.6 - 15.1 %    MPV 9.6 8.9 - 12.7 fL    Platelets 171 149 - 390 Thousands/uL    nRBC 0 /100 WBCs    Segmented % 78 (H) 43 - 75 %    Immature Grans % 0 0 - 2 %    Lymphocytes % 14 14 - 44 %    Monocytes % 6 4 - 12 %    Eosinophils Relative 2 0 - 6 %    Basophils Relative 0 0 - 1 %    Absolute Neutrophils 7.93 (H) 1.85 - 7.62 Thousands/µL    Absolute Immature Grans 0.03 0.00 - 0.20 Thousand/uL    Absolute Lymphocytes 1.40 0.60 - 4.47 Thousands/µL    Absolute Monocytes 0.63 0.17 - 1.22 Thousand/µL    Eosinophils Absolute 0.16 0.00 - 0.61 Thousand/µL    Basophils Absolute 0.02 0.00 - 0.10 Thousands/µL   Basic metabolic panel    Collection Time: 05/05/24  6:02 AM   Result Value Ref Range    Sodium 137 135 - 147 mmol/L    Potassium 3.8 3.5 - 5.3 mmol/L    Chloride 106 96 - 108 mmol/L    CO2 25 21 - 32 mmol/L    ANION GAP 6 4 - 13 mmol/L    BUN 9 5 - 25 mg/dL    Creatinine 0.77 0.60 - 1.30 mg/dL    Glucose 90 65 -  140 mg/dL    Calcium 7.7 (L) 8.4 - 10.2 mg/dL    eGFR 89 ml/min/1.73sq m   C-reactive protein    Collection Time: 05/05/24  6:02 AM   Result Value Ref Range    .2 (H) <3.0 mg/L   CBC and differential    Collection Time: 05/06/24  6:05 AM   Result Value Ref Range    WBC 9.63 4.31 - 10.16 Thousand/uL    RBC 3.57 (L) 3.88 - 5.62 Million/uL    Hemoglobin 11.3 (L) 12.0 - 17.0 g/dL    Hematocrit 32.9 (L) 36.5 - 49.3 %    MCV 92 82 - 98 fL    MCH 31.7 26.8 - 34.3 pg    MCHC 34.3 31.4 - 37.4 g/dL    RDW 12.5 11.6 - 15.1 %    MPV 9.7 8.9 - 12.7 fL    Platelets 174 149 - 390 Thousands/uL    nRBC 0 /100 WBCs    Segmented % 78 (H) 43 - 75 %    Immature Grans % 1 0 - 2 %    Lymphocytes % 13 (L) 14 - 44 %    Monocytes % 6 4 - 12 %    Eosinophils Relative 2 0 - 6 %    Basophils Relative 0 0 - 1 %    Absolute Neutrophils 7.57 1.85 - 7.62 Thousands/µL    Absolute Immature Grans 0.06 0.00 - 0.20 Thousand/uL    Absolute Lymphocytes 1.22 0.60 - 4.47 Thousands/µL    Absolute Monocytes 0.57 0.17 - 1.22 Thousand/µL    Eosinophils Absolute 0.17 0.00 - 0.61 Thousand/µL    Basophils Absolute 0.04 0.00 - 0.10 Thousands/µL   Basic metabolic panel    Collection Time: 05/06/24  6:05 AM   Result Value Ref Range    Sodium 138 135 - 147 mmol/L    Potassium 3.7 3.5 - 5.3 mmol/L    Chloride 106 96 - 108 mmol/L    CO2 26 21 - 32 mmol/L    ANION GAP 6 4 - 13 mmol/L    BUN 7 5 - 25 mg/dL    Creatinine 0.75 0.60 - 1.30 mg/dL    Glucose 82 65 - 140 mg/dL    Calcium 7.9 (L) 8.4 - 10.2 mg/dL    eGFR 90 ml/min/1.73sq m   CBC and differential    Collection Time: 05/07/24  4:29 AM   Result Value Ref Range    WBC 8.56 4.31 - 10.16 Thousand/uL    RBC 3.79 (L) 3.88 - 5.62 Million/uL    Hemoglobin 11.8 (L) 12.0 - 17.0 g/dL    Hematocrit 35.0 (L) 36.5 - 49.3 %    MCV 92 82 - 98 fL    MCH 31.1 26.8 - 34.3 pg    MCHC 33.7 31.4 - 37.4 g/dL    RDW 12.3 11.6 - 15.1 %    MPV 10.0 8.9 - 12.7 fL    Platelets 189 149 - 390 Thousands/uL    nRBC 0 /100 WBCs     Segmented % 76 (H) 43 - 75 %    Immature Grans % 1 0 - 2 %    Lymphocytes % 15 14 - 44 %    Monocytes % 6 4 - 12 %    Eosinophils Relative 2 0 - 6 %    Basophils Relative 0 0 - 1 %    Absolute Neutrophils 6.47 1.85 - 7.62 Thousands/µL    Absolute Immature Grans 0.04 0.00 - 0.20 Thousand/uL    Absolute Lymphocytes 1.32 0.60 - 4.47 Thousands/µL    Absolute Monocytes 0.50 0.17 - 1.22 Thousand/µL    Eosinophils Absolute 0.20 0.00 - 0.61 Thousand/µL    Basophils Absolute 0.03 0.00 - 0.10 Thousands/µL   Basic metabolic panel    Collection Time: 05/07/24  4:29 AM   Result Value Ref Range    Sodium 137 135 - 147 mmol/L    Potassium 3.8 3.5 - 5.3 mmol/L    Chloride 104 96 - 108 mmol/L    CO2 23 21 - 32 mmol/L    ANION GAP 10 4 - 13 mmol/L    BUN 7 5 - 25 mg/dL    Creatinine 0.73 0.60 - 1.30 mg/dL    Glucose 66 65 - 140 mg/dL    Calcium 8.1 (L) 8.4 - 10.2 mg/dL    eGFR 91 ml/min/1.73sq m

## 2024-05-07 NOTE — PROGRESS NOTES
Patient is AxOx4 & is on RA. VSS. Patient denies pain/discomfort. Patient is an assist x1 for care. Urinal is utilized. Bed is in lowest position. All needs are within reach.

## 2024-05-08 ENCOUNTER — APPOINTMENT (OUTPATIENT)
Dept: PERIOP | Facility: HOSPITAL | Age: 74
DRG: 372 | End: 2024-05-08
Payer: COMMERCIAL

## 2024-05-08 ENCOUNTER — ANESTHESIA (INPATIENT)
Dept: PERIOP | Facility: HOSPITAL | Age: 74
DRG: 372 | End: 2024-05-08
Payer: COMMERCIAL

## 2024-05-08 ENCOUNTER — ANESTHESIA EVENT (INPATIENT)
Dept: PERIOP | Facility: HOSPITAL | Age: 74
DRG: 372 | End: 2024-05-08
Payer: COMMERCIAL

## 2024-05-08 PROBLEM — K92.2 GI BLEEDING: Status: ACTIVE | Noted: 2024-05-08

## 2024-05-08 LAB
ALBUMIN SERPL BCP-MCNC: 3.4 G/DL (ref 3.5–5)
ALP SERPL-CCNC: 34 U/L (ref 34–104)
ALT SERPL W P-5'-P-CCNC: 18 U/L (ref 7–52)
ANION GAP SERPL CALCULATED.3IONS-SCNC: 8 MMOL/L (ref 4–13)
AST SERPL W P-5'-P-CCNC: 22 U/L (ref 13–39)
BASOPHILS # BLD AUTO: 0.03 THOUSANDS/ÂΜL (ref 0–0.1)
BASOPHILS NFR BLD AUTO: 0 % (ref 0–1)
BILIRUB SERPL-MCNC: 0.38 MG/DL (ref 0.2–1)
BUN SERPL-MCNC: 7 MG/DL (ref 5–25)
CALCIUM ALBUM COR SERPL-MCNC: 8.7 MG/DL (ref 8.3–10.1)
CALCIUM SERPL-MCNC: 8.2 MG/DL (ref 8.4–10.2)
CHLORIDE SERPL-SCNC: 105 MMOL/L (ref 96–108)
CO2 SERPL-SCNC: 25 MMOL/L (ref 21–32)
CREAT SERPL-MCNC: 0.67 MG/DL (ref 0.6–1.3)
EOSINOPHIL # BLD AUTO: 0.18 THOUSAND/ÂΜL (ref 0–0.61)
EOSINOPHIL NFR BLD AUTO: 3 % (ref 0–6)
ERYTHROCYTE [DISTWIDTH] IN BLOOD BY AUTOMATED COUNT: 12.3 % (ref 11.6–15.1)
GFR SERPL CREATININE-BSD FRML MDRD: 94 ML/MIN/1.73SQ M
GLUCOSE SERPL-MCNC: 91 MG/DL (ref 65–140)
HCT VFR BLD AUTO: 32.7 % (ref 36.5–49.3)
HGB BLD-MCNC: 11.2 G/DL (ref 12–17)
IMM GRANULOCYTES # BLD AUTO: 0.07 THOUSAND/UL (ref 0–0.2)
IMM GRANULOCYTES NFR BLD AUTO: 1 % (ref 0–2)
LYMPHOCYTES # BLD AUTO: 1.22 THOUSANDS/ÂΜL (ref 0.6–4.47)
LYMPHOCYTES NFR BLD AUTO: 18 % (ref 14–44)
MCH RBC QN AUTO: 31.5 PG (ref 26.8–34.3)
MCHC RBC AUTO-ENTMCNC: 34.3 G/DL (ref 31.4–37.4)
MCV RBC AUTO: 92 FL (ref 82–98)
MONOCYTES # BLD AUTO: 0.44 THOUSAND/ÂΜL (ref 0.17–1.22)
MONOCYTES NFR BLD AUTO: 7 % (ref 4–12)
NEUTROPHILS # BLD AUTO: 4.86 THOUSANDS/ÂΜL (ref 1.85–7.62)
NEUTS SEG NFR BLD AUTO: 71 % (ref 43–75)
NRBC BLD AUTO-RTO: 0 /100 WBCS
PLATELET # BLD AUTO: 209 THOUSANDS/UL (ref 149–390)
PMV BLD AUTO: 9.7 FL (ref 8.9–12.7)
POTASSIUM SERPL-SCNC: 3.6 MMOL/L (ref 3.5–5.3)
PROT SERPL-MCNC: 6.2 G/DL (ref 6.4–8.4)
RBC # BLD AUTO: 3.56 MILLION/UL (ref 3.88–5.62)
SODIUM SERPL-SCNC: 138 MMOL/L (ref 135–147)
WBC # BLD AUTO: 6.8 THOUSAND/UL (ref 4.31–10.16)

## 2024-05-08 PROCEDURE — 87040 BLOOD CULTURE FOR BACTERIA: CPT | Performed by: STUDENT IN AN ORGANIZED HEALTH CARE EDUCATION/TRAINING PROGRAM

## 2024-05-08 PROCEDURE — 88342 IMHCHEM/IMCYTCHM 1ST ANTB: CPT | Performed by: STUDENT IN AN ORGANIZED HEALTH CARE EDUCATION/TRAINING PROGRAM

## 2024-05-08 PROCEDURE — 45380 COLONOSCOPY AND BIOPSY: CPT | Performed by: INTERNAL MEDICINE

## 2024-05-08 PROCEDURE — 0DBL8ZX EXCISION OF TRANSVERSE COLON, VIA NATURAL OR ARTIFICIAL OPENING ENDOSCOPIC, DIAGNOSTIC: ICD-10-PCS | Performed by: STUDENT IN AN ORGANIZED HEALTH CARE EDUCATION/TRAINING PROGRAM

## 2024-05-08 PROCEDURE — 80053 COMPREHEN METABOLIC PANEL: CPT | Performed by: STUDENT IN AN ORGANIZED HEALTH CARE EDUCATION/TRAINING PROGRAM

## 2024-05-08 PROCEDURE — 88305 TISSUE EXAM BY PATHOLOGIST: CPT | Performed by: STUDENT IN AN ORGANIZED HEALTH CARE EDUCATION/TRAINING PROGRAM

## 2024-05-08 PROCEDURE — 88341 IMHCHEM/IMCYTCHM EA ADD ANTB: CPT | Performed by: STUDENT IN AN ORGANIZED HEALTH CARE EDUCATION/TRAINING PROGRAM

## 2024-05-08 PROCEDURE — 85025 COMPLETE CBC W/AUTO DIFF WBC: CPT | Performed by: STUDENT IN AN ORGANIZED HEALTH CARE EDUCATION/TRAINING PROGRAM

## 2024-05-08 PROCEDURE — 99232 SBSQ HOSP IP/OBS MODERATE 35: CPT | Performed by: STUDENT IN AN ORGANIZED HEALTH CARE EDUCATION/TRAINING PROGRAM

## 2024-05-08 PROCEDURE — 0DBN8ZX EXCISION OF SIGMOID COLON, VIA NATURAL OR ARTIFICIAL OPENING ENDOSCOPIC, DIAGNOSTIC: ICD-10-PCS | Performed by: STUDENT IN AN ORGANIZED HEALTH CARE EDUCATION/TRAINING PROGRAM

## 2024-05-08 RX ORDER — LIDOCAINE HYDROCHLORIDE 10 MG/ML
INJECTION, SOLUTION EPIDURAL; INFILTRATION; INTRACAUDAL; PERINEURAL AS NEEDED
Status: DISCONTINUED | OUTPATIENT
Start: 2024-05-08 | End: 2024-05-08

## 2024-05-08 RX ORDER — OLANZAPINE 10 MG/2ML
2.5 INJECTION, POWDER, FOR SOLUTION INTRAMUSCULAR ONCE
Status: CANCELLED | OUTPATIENT
Start: 2024-05-08

## 2024-05-08 RX ORDER — SODIUM CHLORIDE, SODIUM LACTATE, POTASSIUM CHLORIDE, CALCIUM CHLORIDE 600; 310; 30; 20 MG/100ML; MG/100ML; MG/100ML; MG/100ML
INJECTION, SOLUTION INTRAVENOUS CONTINUOUS PRN
Status: DISCONTINUED | OUTPATIENT
Start: 2024-05-08 | End: 2024-05-08

## 2024-05-08 RX ORDER — PROPOFOL 10 MG/ML
INJECTION, EMULSION INTRAVENOUS AS NEEDED
Status: DISCONTINUED | OUTPATIENT
Start: 2024-05-08 | End: 2024-05-08

## 2024-05-08 RX ADMIN — SODIUM CHLORIDE, SODIUM GLUCONATE, SODIUM ACETATE, POTASSIUM CHLORIDE, MAGNESIUM CHLORIDE, SODIUM PHOSPHATE, DIBASIC, AND POTASSIUM PHOSPHATE 75 ML/HR: .53; .5; .37; .037; .03; .012; .00082 INJECTION, SOLUTION INTRAVENOUS at 09:54

## 2024-05-08 RX ADMIN — METRONIDAZOLE 500 MG: 500 INJECTION, SOLUTION INTRAVENOUS at 17:33

## 2024-05-08 RX ADMIN — PROPOFOL 20 MG: 10 INJECTION, EMULSION INTRAVENOUS at 16:24

## 2024-05-08 RX ADMIN — ACETAMINOPHEN 650 MG: 325 TABLET ORAL at 09:54

## 2024-05-08 RX ADMIN — LIDOCAINE HYDROCHLORIDE 50 MG: 10 INJECTION, SOLUTION EPIDURAL; INFILTRATION; INTRACAUDAL; PERINEURAL at 16:18

## 2024-05-08 RX ADMIN — PROPOFOL 130 MG: 10 INJECTION, EMULSION INTRAVENOUS at 16:18

## 2024-05-08 RX ADMIN — METRONIDAZOLE 500 MG: 500 INJECTION, SOLUTION INTRAVENOUS at 05:54

## 2024-05-08 RX ADMIN — AMLODIPINE BESYLATE 2.5 MG: 2.5 TABLET ORAL at 09:56

## 2024-05-08 RX ADMIN — LISINOPRIL 5 MG: 5 TABLET ORAL at 09:56

## 2024-05-08 RX ADMIN — SODIUM CHLORIDE, SODIUM LACTATE, POTASSIUM CHLORIDE, AND CALCIUM CHLORIDE: .6; .31; .03; .02 INJECTION, SOLUTION INTRAVENOUS at 16:15

## 2024-05-08 RX ADMIN — ATORVASTATIN CALCIUM 10 MG: 10 TABLET, FILM COATED ORAL at 22:07

## 2024-05-08 RX ADMIN — CEFTRIAXONE 2000 MG: 2 INJECTION, SOLUTION INTRAVENOUS at 22:26

## 2024-05-08 RX ADMIN — PROPOFOL 50 MG: 10 INJECTION, EMULSION INTRAVENOUS at 16:32

## 2024-05-08 NOTE — PROGRESS NOTES
Patient is AxOx4 & is on RA. VSS. Patient complains of nausea & vomiting, PRN Zofran was given. Patient is a standby assist for care. Plasmalyte @ 75 mL/hr. Clear Liquids diet. Bowel prep was started @ 1800. BSC & urinal are utilized. Bed is in lowest position. All needs are within reach.

## 2024-05-08 NOTE — PLAN OF CARE
Problem: Potential for Falls  Goal: Patient will remain free of falls  Description: INTERVENTIONS:  - Educate patient/family on patient safety including physical limitations  - Instruct patient to call for assistance with activity   - Consult OT/PT to assist with strengthening/mobility   - Keep Call bell within reach  - Keep bed low and locked with side rails adjusted as appropriate  - Keep care items and personal belongings within reach  - Initiate and maintain comfort rounds  - Make Fall Risk Sign visible to staff  - Offer Toileting every 2 Hours, in advance of need  - Initiate/Maintain bed/chair alarm  - Obtain necessary fall risk management equipment: bed/chair alarm  - Apply yellow socks and bracelet for high fall risk patients  - Consider moving patient to room near nurses station  Outcome: Progressing     Problem: PAIN - ADULT  Goal: Verbalizes/displays adequate comfort level or baseline comfort level  Description: Interventions:  - Encourage patient to monitor pain and request assistance  - Assess pain using appropriate pain scale  - Administer analgesics based on type and severity of pain and evaluate response  - Implement non-pharmacological measures as appropriate and evaluate response  - Consider cultural and social influences on pain and pain management  - Notify physician/advanced practitioner if interventions unsuccessful or patient reports new pain  Outcome: Progressing     Problem: INFECTION - ADULT  Goal: Absence or prevention of progression during hospitalization  Description: INTERVENTIONS:  - Assess and monitor for signs and symptoms of infection  - Monitor lab/diagnostic results  - Monitor all insertion sites, i.e. indwelling lines, tubes, and drains  - Monitor endotracheal if appropriate and nasal secretions for changes in amount and color  - [place] appropriate cooling/warming therapies per order  - Administer medications as ordered  - Instruct and encourage patient and family to use good  hand hygiene technique  - Identify and instruct in appropriate isolation precautions for identified infection/condition  Outcome: Progressing  Goal: Absence of fever/infection during neutropenic period  Description: INTERVENTIONS:  - Monitor WBC    Outcome: Progressing     Problem: SAFETY ADULT  Goal: Patient will remain free of falls  Description: INTERVENTIONS:  - Educate patient/family on patient safety including physical limitations  - Instruct patient to call for assistance with activity   - Consult OT/PT to assist with strengthening/mobility   - Keep Call bell within reach  - Keep bed low and locked with side rails adjusted as appropriate  - Keep care items and personal belongings within reach  - Initiate and maintain comfort rounds  - Make Fall Risk Sign visible to staff  - Offer Toileting every 2 Hours, in advance of need  - Initiate/Maintain bed/chair alarm  - Obtain necessary fall risk management equipment: bed/chair alarm  - Apply yellow socks and bracelet for high fall risk patients  - Consider moving patient to room near nurses station  Outcome: Progressing  Goal: Maintain or return to baseline ADL function  Description: INTERVENTIONS:  - Educate patient/family on patient safety including physical limitations  - Instruct patient to call for assistance with activity   - Consult OT/PT to assist with strengthening/mobility   - Keep Call bell within reach  - Keep bed low and locked with side rails adjusted as appropriate  - Keep care items and personal belongings within reach  - Initiate and maintain comfort rounds  - Make Fall Risk Sign visible to staff  - Offer Toileting every 3 Hours, in advance of need  - Initiate/Maintain bed/chair alarm  - Obtain necessary fall risk management equipment: bed/chair alarm  - Apply yellow socks and bracelet for high fall risk patients  - Consider moving patient to room near nurses station  Outcome: Progressing  Goal: Maintains/Returns to pre admission functional  level  Description: INTERVENTIONS:  - Perform AM-PAC 6 Click Basic Mobility/ Daily Activity assessment daily.  - Set and communicate daily mobility goal to care team and patient/family/caregiver.   - Collaborate with rehabilitation services on mobility goals if consulted  - Perform Range of Motion 3 times a day.  - Reposition patient every 2 hours.  - Dangle patient 3 times a day  - Stand patient 3 times a day  - Ambulate patient 3 times a day  - Out of bed to chair 3 times a day   - Out of bed for meals 3 times a day  - Out of bed for toileting  - Record patient progress and toleration of activity level   Outcome: Progressing     Problem: DISCHARGE PLANNING  Goal: Discharge to home or other facility with appropriate resources  Description: INTERVENTIONS:  - Identify barriers to discharge w/patient and caregiver  - Arrange for needed discharge resources and transportation as appropriate  - Identify discharge learning needs (meds, wound care, etc.)  - Arrange for interpretive services to assist at discharge as needed  - Refer to Case Management Department for coordinating discharge planning if the patient needs post-hospital services based on physician/advanced practitioner order or complex needs related to functional status, cognitive ability, or social support system  Outcome: Progressing     Problem: Knowledge Deficit  Goal: Patient/family/caregiver demonstrates understanding of disease process, treatment plan, medications, and discharge instructions  Description: Complete learning assessment and assess knowledge base.  Interventions:  - Provide teaching at level of understanding  - Provide teaching via preferred learning methods  Outcome: Progressing

## 2024-05-08 NOTE — ANESTHESIA PREPROCEDURE EVALUATION
Procedure:  COLONOSCOPY    Relevant Problems   CARDIO   (+) Benign essential hypertension   (+) Mixed hyperlipidemia      GI/HEPATIC   (+) Fatty liver disease, nonalcoholic   (+) GI bleeding      /RENAL   (+) Adenocarcinoma of prostate (HCC)      HEMATOLOGY   (+) Anemia      MUSCULOSKELETAL   (+) Disc degeneration, lumbar      NEURO/PSYCH   (+) Depression, recurrent (HCC)      Surgery/Wound/Pain   (+) S/P prostatectomy        Physical Exam    Airway    Mallampati score: III  TM Distance: >3 FB  Neck ROM: full     Dental       Cardiovascular  Rhythm: regular, Rate: normal    Pulmonary   Breath sounds clear to auscultation    Other Findings        Anesthesia Plan  ASA Score- 2     Anesthesia Type- IV sedation with anesthesia with ASA Monitors.         Additional Monitors:     Airway Plan:            Plan Factors-    Chart reviewed.        Patient is not a current smoker.              Induction- intravenous.    Postoperative Plan-     Informed Consent- Anesthetic plan and risks discussed with patient.  I personally reviewed this patient with the CRNA. Discussed and agreed on the Anesthesia Plan with the CRNA..

## 2024-05-08 NOTE — ASSESSMENT & PLAN NOTE
Currently asymptomatic  Colonoscopy today per GI recs  Monitor for signs of bleeding  Restart home AC/AP when hemodynamically appropriate

## 2024-05-08 NOTE — ASSESSMENT & PLAN NOTE
Presented with lower abdominal pain, loose bowel movements x 1 day, also reported associated nausea.  Nonbloody emesis    CT abdomen-Multiple loops of nondilated fluid-filled small bowel as well as liquid stool noted throughout the colon. A few loops of mildly thick-walled left mid abdominal small bowel are also noted with trace adjacent free fluid within the right paracolic gutter likely related to an infectious or inflammatory enteritis. There is a second of mild wall thickening of the descending and sigmoid colon suspicious for an infectious or inflammatory colitis.   Stool C. difficile negative.  CRP elevated  Lactic acidosis on presentation, improving with hydration.     Suspect enterocolitis?  Ischemic component given syncopal episode  Continue ceftriaxone, metronidazole  Monitor abdominal exam  Holding aspirin, Lovenox given bleeding-resume if no further bleeding  Clear liquid diet today  D/W GI recs:  CT: Interval resolution of enteritis with persistent grossly stable colitis involving the left hemicolon compared to 5/3/2024,   stool studies negative  Continue ceftriaxone and Flagyl  colonoscopy today

## 2024-05-08 NOTE — ASSESSMENT & PLAN NOTE
Likely GI source, possible translocation with enterocolitis  LFT unremarkable, patient denies any urinary symptoms.  CT abdomen pelvis without any other etiology  BCx 1 out of 2 GNR, second set 1/2: NG 4 days/+1  Continue IV ceftriaxone, metronidazole  Trend culture sensitivities

## 2024-05-08 NOTE — ANESTHESIA POSTPROCEDURE EVALUATION
Post-Op Assessment Note    CV Status:  Stable  Pain Score: 0    Pain management: adequate       Mental Status:  Alert and awake   Hydration Status:  Euvolemic and stable   PONV Controlled:  Controlled   Airway Patency:  Patent     Post Op Vitals Reviewed: Yes    No anethesia notable event occurred.    Staff: CRNA               BP   119/66   Temp      Pulse  67   Resp   15   SpO2   97%

## 2024-05-08 NOTE — PROGRESS NOTES
[facility]  Progress Note  Name: Gennaro Worthington I  MRN: [id number]  Unit/Bed#: [address] [address] I Date of Admission: 5/3/2024   Date of Service: 5/8/2024 I Hospital Day: 4    Assessment/Plan   * Enterocolitis  Assessment & Plan  Presented with lower abdominal pain, loose bowel movements x 1 day, also reported associated nausea.  Nonbloody emesis    CT abdomen-Multiple loops of nondilated fluid-filled small bowel as well as liquid stool noted throughout the colon. A few loops of mildly thick-walled left mid abdominal small bowel are also noted with trace adjacent free fluid within the right paracolic gutter likely related to an infectious or inflammatory enteritis. There is a second of mild wall thickening of the descending and sigmoid colon suspicious for an infectious or inflammatory colitis.   Stool C. difficile negative.  CRP elevated  Lactic acidosis on presentation, improving with hydration.     Suspect enterocolitis?  Ischemic component given syncopal episode  Continue ceftriaxone, metronidazole  Monitor abdominal exam  Holding aspirin, Lovenox given bleeding-resume if no further bleeding  Clear liquid diet today  D/W GI recs:  CT: Interval resolution of enteritis with persistent grossly stable colitis involving the left hemicolon compared to 5/3/2024,   stool studies negative  Continue ceftriaxone and Flagyl  colonoscopy today    Gram-negative bacteremia  Assessment & Plan  Likely GI source, possible translocation with enterocolitis  LFT unremarkable, patient denies any urinary symptoms.  CT abdomen pelvis without any other etiology  BCx 1 out of 2 GNR, second set 1/2: NG 4 days/+1  Continue IV ceftriaxone, metronidazole  Trend culture sensitivities    GI bleeding  Assessment & Plan  Currently asymptomatic  Colonoscopy today per GI recs  Monitor for signs of bleeding  Restart home AC/AP when hemodynamically appropriate    Syncopal episodes  Assessment & Plan  Likely vasovagal or  hypotensive in setting of enterocolitis and bacteremia  Denies any cardiorespiratory symptoms  Blood pressure currently stable  Telemetry unremarkable-discontinued  CT head, C-spine without any acute abnormalities    Mixed hyperlipidemia  Assessment & Plan  Continue Lipitor    Benign essential hypertension  Assessment & Plan  Continue Norvasc and lisinopril with holding parameters           VTE Pharmacologic Prophylaxis:   Moderate Risk (Score 3-4) - Pharmacological DVT Prophylaxis Ordered: enoxaparin (Lovenox).    Mobility:   Basic Mobility Inpatient Raw Score: 18  JH-HLM Goal: 6: Walk 10 steps or more  JH-HLM Achieved: 7: Walk 25 feet or more  JH-HLM Goal achieved. Continue to encourage appropriate mobility.    Patient Centered Rounds: I evaluated the patient without nursing staff present due to alternate patient care responsibilities    Discussions with Specialists or Other Care Team Provider: GI    Education and Discussions with Family / Patient: Updated  (2 family members/friends) at bedside.    Total Time Spent on Date of Encounter in care of patient: 35 mins. This time was spent on one or more of the following: performing physical exam; counseling and coordination of care; obtaining or reviewing history; documenting in the medical record; reviewing/ordering tests, medications or procedures; communicating with other healthcare professionals and discussing with patient's family/caregivers.    Current Length of Stay: 4 day(s)  Current Patient Status: Inpatient   Certification Statement: The patient will continue to require additional inpatient hospital stay due to clinical course and specialist recs  Discharge Plan: Anticipate discharge tomorrow to discharge location to be determined pending rehab evaluations.    Code Status: Level 1 - Full Code    Subjective:   Patient seen and examined at bedside reported no headache, no blurry vision, no chest pain, no shortness of breath, stated that he had  liquid bowel movements from drinking the prep that was light brown.  Patient denied any fever or chills.  Discussed discharge planning, patient wants to leave after colonoscopy, informed monitor for 1 night, follow specialist recommendations and await cultures.  Discussion with patient's wife while awaiting patient results post-colonoscopy provider.    Objective:     Vitals:   Temp (24hrs), Av.2 °F (36.8 °C), Min:97.9 °F (36.6 °C), Max:98.9 °F (37.2 °C)    Temp:  [97.9 °F (36.6 °C)-98.9 °F (37.2 °C)] 98.1 °F (36.7 °C)  HR:  [56-67] 67  Resp:  [18-20] 19  BP: (119-168)/(65-89) 119/66  SpO2:  [97 %-98 %] 97 %  Body mass index is 31 kg/m².     Input and Output Summary (last 24 hours):     Intake/Output Summary (Last 24 hours) at 2024 1653  Last data filed at 2024 1642  Gross per 24 hour   Intake 550 ml   Output 1600 ml   Net -1050 ml         Physical Exam  Vitals and nursing note reviewed.   Constitutional:       General: He is not in acute distress.     Appearance: He is well-developed.   HENT:      Head: Normocephalic and atraumatic.   Eyes:      Conjunctiva/sclera: Conjunctivae normal.   Cardiovascular:      Rate and Rhythm: Normal rate and regular rhythm.      Heart sounds: No murmur heard.  Pulmonary:      Effort: Pulmonary effort is normal. No respiratory distress.      Breath sounds: Normal breath sounds.   Abdominal:      Palpations: Abdomen is soft.      Tenderness: There is abdominal tenderness in the left lower quadrant.   Musculoskeletal:         General: No swelling.      Cervical back: Neck supple.   Skin:     General: Skin is warm and dry.      Capillary Refill: Capillary refill takes less than 2 seconds.   Neurological:      Mental Status: He is alert.   Psychiatric:         Mood and Affect: Mood normal.          Additional Data:     Labs:  Results from last 7 days   Lab Units 24  0558   WBC Thousand/uL 6.80   HEMOGLOBIN g/dL 11.2*   HEMATOCRIT % 32.7*   PLATELETS Thousands/uL 209    SEGS PCT % 71   LYMPHO PCT % 18   MONO PCT % 7   EOS PCT % 3     Results from last 7 days   Lab Units 05/08/24  0558   SODIUM mmol/L 138   POTASSIUM mmol/L 3.6   CHLORIDE mmol/L 105   CO2 mmol/L 25   BUN mg/dL 7   CREATININE mg/dL 0.67   ANION GAP mmol/L 8   CALCIUM mg/dL 8.2*   ALBUMIN g/dL 3.4*   TOTAL BILIRUBIN mg/dL 0.38   ALK PHOS U/L 34   ALT U/L 18   AST U/L 22   GLUCOSE RANDOM mg/dL 91     Results from last 7 days   Lab Units 05/03/24  1832   INR  0.98             Results from last 7 days   Lab Units 05/04/24  2208 05/04/24  1612 05/03/24  2036 05/03/24  1832   LACTIC ACID mmol/L 0.8 2.7* 3.6* 2.4*       Lines/Drains:  Invasive Devices       Peripheral Intravenous Line  Duration             Peripheral IV Right Hand -- days    Peripheral IV 05/03/24 Left Antecubital 4 days    Peripheral IV 05/03/24 Right Antecubital 4 days                          Imaging: Reviewed radiology reports from this admission including: abdominal/pelvic CTx2    Recent Cultures (last 7 days):   Results from last 7 days   Lab Units 05/03/24  1904 05/03/24  1845 05/03/24  1832   BLOOD CULTURE  No Growth After 4 Days.  --  Bacteroides species*   GRAM STAIN RESULT  Gram variable rods*  --  Gram negative rods*   C DIFF TOXIN B BY PCR   --  Negative  --        Last 24 Hours Medication List:   Current Facility-Administered Medications   Medication Dose Route Frequency Provider Last Rate    acetaminophen  650 mg Oral Q6H PRN Fabiano López MD      amLODIPine  2.5 mg Oral Daily Vanessa Crocker MD      atorvastatin  10 mg Oral HS Fabiano López MD      bisacodyl  20 mg Oral Once Aimee Castorena PA-C      cefTRIAXone  2,000 mg Intravenous Q24H Fabiano López MD 2,000 mg (05/07/24 2227)    lisinopril  5 mg Oral Daily Vanessa Crocker MD      metroNIDAZOLE  500 mg Intravenous Q8H Fabiano López  mg (05/08/24 0554)    multi-electrolyte  75 mL/hr Intravenous Continuous Vanessa Crocker MD 75 mL/hr (05/08/24 0954)     ondansetron  4 mg Intravenous Q6H PRN Tanja Carpenter MD          Today, Patient Was Seen By: Tanja Carpenter MD    **Please Note: This note may have been constructed using a voice recognition system.**

## 2024-05-08 NOTE — INTERVAL H&P NOTE
H&P reviewed. After examining the patient I find no changes in the patients condition since the H&P had been written.    Vitals:    05/08/24 1413   BP: 146/65   Pulse: 56   Resp: 18   Temp: 98.1 °F (36.7 °C)   SpO2: 98%

## 2024-05-09 VITALS
HEART RATE: 84 BPM | RESPIRATION RATE: 18 BRPM | DIASTOLIC BLOOD PRESSURE: 88 MMHG | OXYGEN SATURATION: 97 % | WEIGHT: 235 LBS | BODY MASS INDEX: 31.14 KG/M2 | TEMPERATURE: 98.5 F | HEIGHT: 73 IN | SYSTOLIC BLOOD PRESSURE: 163 MMHG

## 2024-05-09 LAB
ALBUMIN SERPL BCP-MCNC: 3.3 G/DL (ref 3.5–5)
ALP SERPL-CCNC: 33 U/L (ref 34–104)
ALT SERPL W P-5'-P-CCNC: 23 U/L (ref 7–52)
ANION GAP SERPL CALCULATED.3IONS-SCNC: 6 MMOL/L (ref 4–13)
AST SERPL W P-5'-P-CCNC: 32 U/L (ref 13–39)
BASOPHILS # BLD AUTO: 0.05 THOUSANDS/ÂΜL (ref 0–0.1)
BASOPHILS NFR BLD AUTO: 1 % (ref 0–1)
BILIRUB SERPL-MCNC: 0.36 MG/DL (ref 0.2–1)
BUN SERPL-MCNC: 8 MG/DL (ref 5–25)
CALCIUM ALBUM COR SERPL-MCNC: 8.8 MG/DL (ref 8.3–10.1)
CALCIUM SERPL-MCNC: 8.2 MG/DL (ref 8.4–10.2)
CHLORIDE SERPL-SCNC: 104 MMOL/L (ref 96–108)
CO2 SERPL-SCNC: 27 MMOL/L (ref 21–32)
CREAT SERPL-MCNC: 0.74 MG/DL (ref 0.6–1.3)
EOSINOPHIL # BLD AUTO: 0.2 THOUSAND/ÂΜL (ref 0–0.61)
EOSINOPHIL NFR BLD AUTO: 3 % (ref 0–6)
ERYTHROCYTE [DISTWIDTH] IN BLOOD BY AUTOMATED COUNT: 12.5 % (ref 11.6–15.1)
GFR SERPL CREATININE-BSD FRML MDRD: 90 ML/MIN/1.73SQ M
GLUCOSE SERPL-MCNC: 87 MG/DL (ref 65–140)
HCT VFR BLD AUTO: 34 % (ref 36.5–49.3)
HGB BLD-MCNC: 11.6 G/DL (ref 12–17)
IMM GRANULOCYTES # BLD AUTO: 0.1 THOUSAND/UL (ref 0–0.2)
IMM GRANULOCYTES NFR BLD AUTO: 2 % (ref 0–2)
LYMPHOCYTES # BLD AUTO: 1.51 THOUSANDS/ÂΜL (ref 0.6–4.47)
LYMPHOCYTES NFR BLD AUTO: 23 % (ref 14–44)
MCH RBC QN AUTO: 31.3 PG (ref 26.8–34.3)
MCHC RBC AUTO-ENTMCNC: 34.1 G/DL (ref 31.4–37.4)
MCV RBC AUTO: 92 FL (ref 82–98)
MONOCYTES # BLD AUTO: 0.56 THOUSAND/ÂΜL (ref 0.17–1.22)
MONOCYTES NFR BLD AUTO: 9 % (ref 4–12)
NEUTROPHILS # BLD AUTO: 4.02 THOUSANDS/ÂΜL (ref 1.85–7.62)
NEUTS SEG NFR BLD AUTO: 62 % (ref 43–75)
NRBC BLD AUTO-RTO: 0 /100 WBCS
PLATELET # BLD AUTO: 203 THOUSANDS/UL (ref 149–390)
PMV BLD AUTO: 9.7 FL (ref 8.9–12.7)
POTASSIUM SERPL-SCNC: 3.6 MMOL/L (ref 3.5–5.3)
PROT SERPL-MCNC: 6 G/DL (ref 6.4–8.4)
RBC # BLD AUTO: 3.71 MILLION/UL (ref 3.88–5.62)
SODIUM SERPL-SCNC: 137 MMOL/L (ref 135–147)
WBC # BLD AUTO: 6.44 THOUSAND/UL (ref 4.31–10.16)

## 2024-05-09 PROCEDURE — 80053 COMPREHEN METABOLIC PANEL: CPT | Performed by: INTERNAL MEDICINE

## 2024-05-09 PROCEDURE — 99239 HOSP IP/OBS DSCHRG MGMT >30: CPT | Performed by: STUDENT IN AN ORGANIZED HEALTH CARE EDUCATION/TRAINING PROGRAM

## 2024-05-09 PROCEDURE — 85025 COMPLETE CBC W/AUTO DIFF WBC: CPT | Performed by: INTERNAL MEDICINE

## 2024-05-09 RX ORDER — METRONIDAZOLE 250 MG/1
500 TABLET ORAL EVERY 12 HOURS SCHEDULED
Qty: 6 TABLET | Refills: 0 | Status: SHIPPED | OUTPATIENT
Start: 2024-05-09 | End: 2024-05-12

## 2024-05-09 RX ORDER — CEFDINIR 300 MG/1
300 CAPSULE ORAL EVERY 12 HOURS SCHEDULED
Qty: 6 CAPSULE | Refills: 0 | Status: SHIPPED | OUTPATIENT
Start: 2024-05-09 | End: 2024-05-12

## 2024-05-09 RX ADMIN — SODIUM CHLORIDE, SODIUM GLUCONATE, SODIUM ACETATE, POTASSIUM CHLORIDE, MAGNESIUM CHLORIDE, SODIUM PHOSPHATE, DIBASIC, AND POTASSIUM PHOSPHATE 75 ML/HR: .53; .5; .37; .037; .03; .012; .00082 INJECTION, SOLUTION INTRAVENOUS at 05:08

## 2024-05-09 RX ADMIN — LISINOPRIL 5 MG: 5 TABLET ORAL at 08:04

## 2024-05-09 RX ADMIN — AMLODIPINE BESYLATE 2.5 MG: 2.5 TABLET ORAL at 08:05

## 2024-05-09 RX ADMIN — METRONIDAZOLE 500 MG: 500 INJECTION, SOLUTION INTRAVENOUS at 01:50

## 2024-05-09 RX ADMIN — METRONIDAZOLE 500 MG: 500 INJECTION, SOLUTION INTRAVENOUS at 08:55

## 2024-05-09 NOTE — CASE MANAGEMENT
Case Management Discharge Planning Note    Patient name Gennaro Worthington  Location [address] [address]/[address] [address]-* MRN [id number]  : 1950 Date 2024       Current Admission Date: 5/3/2024  Current Admission Diagnosis:Enterocolitis   Patient Active Problem List    Diagnosis Date Noted    GI bleeding 2024    Syncopal episodes 2024    Gram-negative bacteremia 2024    Enterocolitis 2024    Surgical follow-up care 2023    Depression, recurrent (HCC) 08/10/2023    Port-site hernia 2023    Umbilical hernia without obstruction and without gangrene 2023    Adenocarcinoma of prostate (HCC) 2022    Cervical radiculopathy 2021    Cervicalgia 2021    Periumbilical abdominal pain 10/14/2020    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(HCC) 2019    S/P prostatectomy 2019    Lower extremity edema 2018    Groin mass 2018    Disc degeneration, lumbar 2015    Actinic keratosis 2015    Anemia 2015    Fatty liver disease, nonalcoholic 2014    History of prostate cancer 2013    Mixed hyperlipidemia 2013    Benign essential hypertension 2012    Herpes simplex infection 2012    Organic impotence 2012      LOS (days): 5  Geometric Mean LOS (GMLOS) (days): 3  Days to GMLOS:-1.9     OBJECTIVE:  Risk of Unplanned Readmission Score: 13.33         Current admission status: Inpatient   Preferred Pharmacy:   [id number] - [place], [place] - [address] [address]  [address] [address]  Phone: [phone] Fax: [phone]    [facility] - [place], [place] - [address]  [address]  [address] [address]  Phone: [phone] Fax: [phone]    Primary Care Provider: NEHAL Crum    Primary Insurance: [employer]  REP  Secondary Insurance:     DISCHARGE DETAILS:    Discharge planning discussed with:: Patient, Dtr     CM contacted  family/caregiver?: Yes    Treatment Team Recommendation: Home  Discharge Destination Plan:: Home  Transport at Discharge : Family     IMM Given (Date):: 05/09/24  IMM Given to:: Patient  IMM reviewed with patient, patient agrees with discharge determination. Original provided. Copy placed in scan bin.

## 2024-05-09 NOTE — DISCHARGE SUMMARY
Formerly [facility]  Discharge- Gennaro Worthington 1950, 74 y.o. male MRN: [id number]  Unit/Bed#: [address] [place] [address] Encounter: [id number]  Primary Care Provider: NEHAL Crum   Date and time admitted to hospital: 5/3/2024  6:17 PM    * Enterocolitis  Assessment & Plan  Presented with lower abdominal pain, loose bowel movements x 1 day, also reported associated nausea.  Nonbloody emesis    CT abdomen-Multiple loops of nondilated fluid-filled small bowel as well as liquid stool noted throughout the colon. A few loops of mildly thick-walled left mid abdominal small bowel are also noted with trace adjacent free fluid within the right paracolic gutter likely related to an infectious or inflammatory enteritis. There is a second of mild wall thickening of the descending and sigmoid colon suspicious for an infectious or inflammatory colitis.   Stool C. difficile negative.  CRP elevated  Lactic acidosis on presentation, improving with hydration.     Suspect enterocolitis?  Ischemic component given syncopal episode  Continue ceftriaxone, metronidazole  Monitor abdominal exam  Holding aspirin, Lovenox given bleeding-resume if no further bleeding  Clear liquid diet today  D/W GI recs:  CT: Interval resolution of enteritis with persistent grossly stable colitis involving the left hemicolon compared to 5/3/2024,   stool studies negative  Transition ceftriaxone and Flagyl to oral equivalent on discharge for 3 additional days, Flagyl lower dose  colonoscopy completed likely ischemic colitis    Gram-negative bacteremia  Assessment & Plan  Likely GI source, possible translocation with enterocolitis  LFT unremarkable, patient denies any urinary symptoms.  CT abdomen pelvis without any other etiology  BCx 1 out of 2 GNR, second set 1/2: NG 4 days/+1  Continue IV ceftriaxone, metronidazole, see #1  Trend culture sensitivities    GI bleeding-resolved  Assessment & Plan  Currently asymptomatic  Colonoscopy today per GI  recs  Monitor for signs of bleeding  Restart home AC/AP when hemodynamically appropriate    Syncopal episodes-resolved  Assessment & Plan  Likely vasovagal or hypotensive in setting of enterocolitis and bacteremia  Denies any cardiorespiratory symptoms  Blood pressure currently stable  Telemetry unremarkable-discontinued  CT head, C-spine without any acute abnormalities  Echo-ordered, patient prefer to do outpatient which was ordered by cardiologist    Mixed hyperlipidemia  Assessment & Plan  Continue Lipitor    Benign essential hypertension  Assessment & Plan  Continue Norvasc and lisinopril with holding parameters        Medical Problems       Resolved Problems  Date Reviewed: 5/3/2024   None       Discharging Physician / Practitioner: Tanja Carpenter MD  PCP: NEHAL Crum  Admission Date:   Admission Orders (From admission, onward)       Ordered        05/04/24 1405  INPATIENT ADMISSION  Once            05/03/24 2039  Place in Observation  Once                          Discharge Date: 05/09/24      Consultations During Hospital Stay:  GI    Procedures Performed:   N/A    Significant Findings / Test Results:   N/A    Incidental Findings:   N/A    Test Results Pending at Discharge (will require follow up):   Follow-up GI labs     Outpatient Tests Requested:  GI    Complications:  N/A        Hospital Course:   Gennaro Worthington is a 74 y.o. male patient who originally presented to the hospital on 5/3/2024 due to syncope after nausea, vomiting, bloody diarrhea.  Patient imaging demonstrated enteritis, seen by GI for colonoscopy consistent with ischemic colitis, with repeat colonoscopy in 1 year noted an adequate bowel prep, while IP all symptoms nausea and vomiting and diarrhea ceased.  Patient was empirically treated with metronidazole and Rocephin and will be discharged with 3 additional days.  Reported syncope on admission, no IP echo, patient stated he would do outpatient echo ordered by outpatient  "cardiologist.  Patient noted to have bacteremia with multiple draws 1 out of 2 GNR, repeat pending likely contaminant, patient did not want to stay in hospital will be made aware if abnormal for return to the hospital if needed.  For antibiotics.    Patient has PMH of HLD, HTN and continue home medication inpatient equivalent follow-up with PCP and specialist for monitoring    Please see above list of diagnoses and related plan for additional information.     Condition at Discharge: fair    Discharge Day Visit / Exam:   Subjective: Patient seen and examined at bedside, reported no headache, blurry vision, chest pain, shortness of breath, patient stated he had no more abdominal pain, was ready for discharge, that his mother-in-law was downstairs in the ICU, all symptoms of nausea, vomiting and diarrhea that was bloody has resolved.  Patient stated that he would follow-up with specialist and PCP.  Patient discuss regarding abnormal lab with GNR blood cultures, patient made aware that if abnormal labs recurred from repeat drawers he would have to come to hospital for further antibiotics if warranted.  Patient agreed.  Patient stated he would notify wife.    Vitals: Blood Pressure: 163/88 (05/09/24 1548)  Pulse: 84 (05/09/24 1548)  Temperature: 98.5 °F (36.9 °C) (05/09/24 1548)  Temp Source: Oral (05/09/24 1548)  Respirations: 18 (05/09/24 1548)  Height: 6' 1\" (185.4 cm) (05/03/24 2124)  Weight - Scale: 107 kg (235 lb) (05/03/24 2124)  SpO2: 97 % (05/09/24 0732)  Exam:   Physical Exam  Vitals and nursing note reviewed.   Constitutional:       General: He is not in acute distress.     Appearance: He is well-developed.   HENT:      Head: Normocephalic and atraumatic.   Eyes:      Conjunctiva/sclera: Conjunctivae normal.   Cardiovascular:      Rate and Rhythm: Normal rate and regular rhythm.      Heart sounds: No murmur heard.  Pulmonary:      Effort: Pulmonary effort is normal. No respiratory distress.      Breath sounds: " Normal breath sounds.   Abdominal:      Palpations: Abdomen is soft.      Tenderness: There is no abdominal tenderness.   Musculoskeletal:         General: No swelling.      Cervical back: Neck supple.   Skin:     General: Skin is warm and dry.      Capillary Refill: Capillary refill takes less than 2 seconds.   Neurological:      Mental Status: He is alert.   Psychiatric:         Mood and Affect: Mood normal.          Discussion with Family:  Patient stated he would notify wife.     Discharge instructions/Information to patient and family:   See after visit summary for information provided to patient and family.      Provisions for Follow-Up Care:  See after visit summary for information related to follow-up care and any pertinent home health orders.      Mobility at time of Discharge:   Basic Mobility Inpatient Raw Score: 18  JH-HLM Goal: 6: Walk 10 steps or more  JH-HLM Achieved: 8: Walk 250 feet ot more  HLM Goal achieved. Continue to encourage appropriate mobility.     Disposition:   Home    Planned Readmission: No     Discharge Statement:  I spent 45 minutes discharging the patient. This time was spent on the day of discharge. I had direct contact with the patient on the day of discharge. Greater than 50% of the total time was spent examining patient, answering all patient questions, arranging and discussing plan of care with patient as well as directly providing post-discharge instructions.  Additional time then spent on discharge activities.    Discharge Medications:  See after visit summary for reconciled discharge medications provided to patient and/or family.      **Please Note: This note may have been constructed using a voice recognition system**

## 2024-05-09 NOTE — PLAN OF CARE
Problem: Potential for Falls  Goal: Patient will remain free of falls  Description: INTERVENTIONS:  - Educate patient/family on patient safety including physical limitations  - Instruct patient to call for assistance with activity   - Consult OT/PT to assist with strengthening/mobility   - Keep Call bell within reach  - Keep bed low and locked with side rails adjusted as appropriate  - Keep care items and personal belongings within reach  - Initiate and maintain comfort rounds  - Make Fall Risk Sign visible to staff  - Offer Toileting every 2 Hours, in advance of need  - Initiate/Maintain bed/chair alarm  - Obtain necessary fall risk management equipment: bed/chair alarm  - Apply yellow socks and bracelet for high fall risk patients  - Consider moving patient to room near nurses station  5/9/2024 1609 by Natanael Red RN  Outcome: Adequate for Discharge  5/9/2024 1035 by Natanael Red RN  Outcome: Progressing     Problem: PAIN - ADULT  Goal: Verbalizes/displays adequate comfort level or baseline comfort level  Description: Interventions:  - Encourage patient to monitor pain and request assistance  - Assess pain using appropriate pain scale  - Administer analgesics based on type and severity of pain and evaluate response  - Implement non-pharmacological measures as appropriate and evaluate response  - Consider cultural and social influences on pain and pain management  - Notify physician/advanced practitioner if interventions unsuccessful or patient reports new pain  5/9/2024 1609 by Natanael Red RN  Outcome: Adequate for Discharge  5/9/2024 1035 by Natanael Red RN  Outcome: Progressing     Problem: INFECTION - ADULT  Goal: Absence or prevention of progression during hospitalization  Description: INTERVENTIONS:  - Assess and monitor for signs and symptoms of infection  - Monitor lab/diagnostic results  - Monitor all insertion sites, i.e. indwelling lines, tubes, and drains  - Monitor endotracheal if  appropriate and nasal secretions for changes in amount and color  - [place] appropriate cooling/warming therapies per order  - Administer medications as ordered  - Instruct and encourage patient and family to use good hand hygiene technique  - Identify and instruct in appropriate isolation precautions for identified infection/condition  5/9/2024 1609 by Natanael Red RN  Outcome: Adequate for Discharge  5/9/2024 1035 by Natanael Red RN  Outcome: Progressing  Goal: Absence of fever/infection during neutropenic period  Description: INTERVENTIONS:  - Monitor WBC    5/9/2024 1609 by Natanael Red RN  Outcome: Adequate for Discharge  5/9/2024 1035 by Natanael Red RN  Outcome: Progressing     Problem: SAFETY ADULT  Goal: Patient will remain free of falls  Description: INTERVENTIONS:  - Educate patient/family on patient safety including physical limitations  - Instruct patient to call for assistance with activity   - Consult OT/PT to assist with strengthening/mobility   - Keep Call bell within reach  - Keep bed low and locked with side rails adjusted as appropriate  - Keep care items and personal belongings within reach  - Initiate and maintain comfort rounds  - Make Fall Risk Sign visible to staff  - Offer Toileting every 2 Hours, in advance of need  - Initiate/Maintain bed/chair alarm  - Obtain necessary fall risk management equipment: bed/chair alarm  - Apply yellow socks and bracelet for high fall risk patients  - Consider moving patient to room near nurses station  5/9/2024 1609 by Natanael Red RN  Outcome: Adequate for Discharge  5/9/2024 1035 by Natanael Red RN  Outcome: Progressing  Goal: Maintain or return to baseline ADL function  Description: INTERVENTIONS:  - Educate patient/family on patient safety including physical limitations  - Instruct patient to call for assistance with activity   - Consult OT/PT to assist with strengthening/mobility   - Keep Call bell within reach  - Keep bed low and  locked with side rails adjusted as appropriate  - Keep care items and personal belongings within reach  - Initiate and maintain comfort rounds  - Make Fall Risk Sign visible to staff  - Offer Toileting every 3 Hours, in advance of need  - Initiate/Maintain bed/chair alarm  - Obtain necessary fall risk management equipment: bed/chair alarm  - Apply yellow socks and bracelet for high fall risk patients  - Consider moving patient to room near nurses station  5/9/2024 1609 by Natanael Red RN  Outcome: Adequate for Discharge  5/9/2024 1035 by Natanael Red RN  Outcome: Progressing  Goal: Maintains/Returns to pre admission functional level  Description: INTERVENTIONS:  - Perform AM-PAC 6 Click Basic Mobility/ Daily Activity assessment daily.  - Set and communicate daily mobility goal to care team and patient/family/caregiver.   - Collaborate with rehabilitation services on mobility goals if consulted  - Perform Range of Motion 3 times a day.  - Reposition patient every 2 hours.  - Dangle patient 3 times a day  - Stand patient 3 times a day  - Ambulate patient 3 times a day  - Out of bed to chair 3 times a day   - Out of bed for meals 3 times a day  - Out of bed for toileting  - Record patient progress and toleration of activity level   5/9/2024 1609 by Natanael Red RN  Outcome: Adequate for Discharge  5/9/2024 1035 by Natanael Red RN  Outcome: Progressing     Problem: DISCHARGE PLANNING  Goal: Discharge to home or other facility with appropriate resources  Description: INTERVENTIONS:  - Identify barriers to discharge w/patient and caregiver  - Arrange for needed discharge resources and transportation as appropriate  - Identify discharge learning needs (meds, wound care, etc.)  - Arrange for interpretive services to assist at discharge as needed  - Refer to Case Management Department for coordinating discharge planning if the patient needs post-hospital services based on physician/advanced practitioner order or  complex needs related to functional status, cognitive ability, or social support system  5/9/2024 1609 by Natanael Red RN  Outcome: Adequate for Discharge  5/9/2024 1035 by Natanael Red RN  Outcome: Progressing     Problem: Knowledge Deficit  Goal: Patient/family/caregiver demonstrates understanding of disease process, treatment plan, medications, and discharge instructions  Description: Complete learning assessment and assess knowledge base.  Interventions:  - Provide teaching at level of understanding  - Provide teaching via preferred learning methods  5/9/2024 1609 by Natanael Red RN  Outcome: Adequate for Discharge  5/9/2024 1035 by Natanael Red RN  Outcome: Progressing

## 2024-05-09 NOTE — PLAN OF CARE
Problem: Potential for Falls  Goal: Patient will remain free of falls  Description: INTERVENTIONS:  - Educate patient/family on patient safety including physical limitations  - Instruct patient to call for assistance with activity   - Consult OT/PT to assist with strengthening/mobility   - Keep Call bell within reach  - Keep bed low and locked with side rails adjusted as appropriate  - Keep care items and personal belongings within reach  - Initiate and maintain comfort rounds  - Make Fall Risk Sign visible to staff  - Offer Toileting every 2 Hours, in advance of need  - Initiate/Maintain bed/chair alarm  - Obtain necessary fall risk management equipment: bed/chair alarm  - Apply yellow socks and bracelet for high fall risk patients  - Consider moving patient to room near nurses station  Outcome: Progressing     Problem: PAIN - ADULT  Goal: Verbalizes/displays adequate comfort level or baseline comfort level  Description: Interventions:  - Encourage patient to monitor pain and request assistance  - Assess pain using appropriate pain scale  - Administer analgesics based on type and severity of pain and evaluate response  - Implement non-pharmacological measures as appropriate and evaluate response  - Consider cultural and social influences on pain and pain management  - Notify physician/advanced practitioner if interventions unsuccessful or patient reports new pain  Outcome: Progressing     Problem: INFECTION - ADULT  Goal: Absence or prevention of progression during hospitalization  Description: INTERVENTIONS:  - Assess and monitor for signs and symptoms of infection  - Monitor lab/diagnostic results  - Monitor all insertion sites, i.e. indwelling lines, tubes, and drains  - Monitor endotracheal if appropriate and nasal secretions for changes in amount and color  - [place] appropriate cooling/warming therapies per order  - Administer medications as ordered  - Instruct and encourage patient and family to use good  hand hygiene technique  - Identify and instruct in appropriate isolation precautions for identified infection/condition  Outcome: Progressing  Goal: Absence of fever/infection during neutropenic period  Description: INTERVENTIONS:  - Monitor WBC    Outcome: Progressing     Problem: SAFETY ADULT  Goal: Patient will remain free of falls  Description: INTERVENTIONS:  - Educate patient/family on patient safety including physical limitations  - Instruct patient to call for assistance with activity   - Consult OT/PT to assist with strengthening/mobility   - Keep Call bell within reach  - Keep bed low and locked with side rails adjusted as appropriate  - Keep care items and personal belongings within reach  - Initiate and maintain comfort rounds  - Make Fall Risk Sign visible to staff  - Offer Toileting every 2 Hours, in advance of need  - Initiate/Maintain bed/chair alarm  - Obtain necessary fall risk management equipment: bed/chair alarm  - Apply yellow socks and bracelet for high fall risk patients  - Consider moving patient to room near nurses station  Outcome: Progressing  Goal: Maintain or return to baseline ADL function  Description: INTERVENTIONS:  - Educate patient/family on patient safety including physical limitations  - Instruct patient to call for assistance with activity   - Consult OT/PT to assist with strengthening/mobility   - Keep Call bell within reach  - Keep bed low and locked with side rails adjusted as appropriate  - Keep care items and personal belongings within reach  - Initiate and maintain comfort rounds  - Make Fall Risk Sign visible to staff  - Offer Toileting every 3 Hours, in advance of need  - Initiate/Maintain bed/chair alarm  - Obtain necessary fall risk management equipment: bed/chair alarm  - Apply yellow socks and bracelet for high fall risk patients  - Consider moving patient to room near nurses station  Outcome: Progressing  Goal: Maintains/Returns to pre admission functional  level  Description: INTERVENTIONS:  - Perform AM-PAC 6 Click Basic Mobility/ Daily Activity assessment daily.  - Set and communicate daily mobility goal to care team and patient/family/caregiver.   - Collaborate with rehabilitation services on mobility goals if consulted  - Perform Range of Motion 3 times a day.  - Reposition patient every 2 hours.  - Dangle patient 3 times a day  - Stand patient 3 times a day  - Ambulate patient 3 times a day  - Out of bed to chair 3 times a day   - Out of bed for meals 3 times a day  - Out of bed for toileting  - Record patient progress and toleration of activity level   Outcome: Progressing     Problem: DISCHARGE PLANNING  Goal: Discharge to home or other facility with appropriate resources  Description: INTERVENTIONS:  - Identify barriers to discharge w/patient and caregiver  - Arrange for needed discharge resources and transportation as appropriate  - Identify discharge learning needs (meds, wound care, etc.)  - Arrange for interpretive services to assist at discharge as needed  - Refer to Case Management Department for coordinating discharge planning if the patient needs post-hospital services based on physician/advanced practitioner order or complex needs related to functional status, cognitive ability, or social support system  Outcome: Progressing     Problem: Knowledge Deficit  Goal: Patient/family/caregiver demonstrates understanding of disease process, treatment plan, medications, and discharge instructions  Description: Complete learning assessment and assess knowledge base.  Interventions:  - Provide teaching at level of understanding  - Provide teaching via preferred learning methods  Outcome: Progressing

## 2024-05-10 ENCOUNTER — TRANSITIONAL CARE MANAGEMENT (OUTPATIENT)
Dept: FAMILY MEDICINE CLINIC | Facility: CLINIC | Age: 74
End: 2024-05-10

## 2024-05-10 ENCOUNTER — TELEPHONE (OUTPATIENT)
Dept: FAMILY MEDICINE CLINIC | Facility: CLINIC | Age: 74
End: 2024-05-10

## 2024-05-10 NOTE — TELEPHONE ENCOUNTER
First attempt to reach Gennaro. Message left to call back on 5/10/24 so I can schedule a TCM Alexander

## 2024-05-10 NOTE — UTILIZATION REVIEW
NOTIFICATION OF ADMISSION DISCHARGE   This is a Notification of Discharge from [facility]. Please be advised that this patient has been discharge from our facility. Below you will find the admission and discharge date and time including the patient’s disposition.   UTILIZATION REVIEW CONTACT:  Naty Mcclendon  Utilization   Network Utilization Review Department  Phone: [phone] x carefully listen to the prompts. All voicemails are confidential.  Email: [email or web address]     ADMISSION INFORMATION  PRESENTATION DATE: 5/3/2024  6:17 PM  OBERVATION ADMISSION DATE:   INPATIENT ADMISSION DATE: 5/4/24  2:05 PM   DISCHARGE DATE: 5/9/2024  5:32 PM   DISPOSITION:Home/Self Care    Network Utilization Review Department  ATTENTION: Please call with any questions or concerns to [phone] and carefully listen to the prompts so that you are directed to the right person. All voicemails are confidential.   For Discharge needs, contact Care Management DC Support Team at [phone] opt. 2  Send all requests for admission clinical reviews, approved or denied determinations and any other requests to dedicated fax number below belonging to the campus where the patient is receiving treatment. List of dedicated fax numbers for the Facilities:  FACILITY NAME UR FAX NUMBER   ADMISSION DENIALS (Administrative/Medical Necessity) [phone]   DISCHARGE SUPPORT TEAM ([facility]) [phone]   PARENT CHILD HEALTH (Maternity/NICU/Pediatrics) [phone]   [facility] [phone]   [facility] [phone]   [facility] [phone]   [facility] [phone]   [facility] [phone]   [facility] [phone]   [facility] [phone]   [facility] [phone]   [facility]  [facility] [phone]   Critical access hospital [phone]   [facility] [phone]   [facility] [phone]

## 2024-05-11 LAB
ALL TARGETS: NOT DETECTED
BACTERIA BLD CULT: ABNORMAL
CALPROTECTIN STL-MCNT: 1730 UG/G (ref 0–120)
GRAM STN SPEC: ABNORMAL

## 2024-05-12 LAB
ALL TARGETS: NOT DETECTED
BACTERIA BLD CULT: ABNORMAL
BACTERIA BLD CULT: NORMAL
BACTERIA BLD CULT: NORMAL
GRAM STN SPEC: ABNORMAL

## 2024-05-13 LAB
BACTERIA BLD CULT: NORMAL
BACTERIA BLD CULT: NORMAL

## 2024-05-14 ENCOUNTER — OFFICE VISIT (OUTPATIENT)
Age: 74
End: 2024-05-14
Payer: COMMERCIAL

## 2024-05-14 VITALS
DIASTOLIC BLOOD PRESSURE: 74 MMHG | BODY MASS INDEX: 31.14 KG/M2 | HEART RATE: 79 BPM | SYSTOLIC BLOOD PRESSURE: 112 MMHG | RESPIRATION RATE: 17 BRPM | WEIGHT: 235 LBS | HEIGHT: 73 IN

## 2024-05-14 DIAGNOSIS — M72.2 PLANTAR FASCIITIS: Primary | ICD-10-CM

## 2024-05-14 DIAGNOSIS — B35.1 ONYCHOMYCOSIS: ICD-10-CM

## 2024-05-14 DIAGNOSIS — L60.0 INGROWN TOENAIL OF LEFT FOOT: ICD-10-CM

## 2024-05-14 DIAGNOSIS — M76.62 ACHILLES TENDINITIS OF LEFT LOWER EXTREMITY: ICD-10-CM

## 2024-05-14 PROBLEM — Z09 SURGICAL FOLLOW-UP CARE: Status: RESOLVED | Noted: 2023-09-06 | Resolved: 2024-05-14

## 2024-05-14 PROCEDURE — 88342 IMHCHEM/IMCYTCHM 1ST ANTB: CPT | Performed by: STUDENT IN AN ORGANIZED HEALTH CARE EDUCATION/TRAINING PROGRAM

## 2024-05-14 PROCEDURE — 88341 IMHCHEM/IMCYTCHM EA ADD ANTB: CPT | Performed by: STUDENT IN AN ORGANIZED HEALTH CARE EDUCATION/TRAINING PROGRAM

## 2024-05-14 PROCEDURE — 88305 TISSUE EXAM BY PATHOLOGIST: CPT | Performed by: STUDENT IN AN ORGANIZED HEALTH CARE EDUCATION/TRAINING PROGRAM

## 2024-05-14 PROCEDURE — 99213 OFFICE O/P EST LOW 20 MIN: CPT | Performed by: PODIATRIST

## 2024-05-14 NOTE — PROGRESS NOTES
Assessment/Plan: Achilles tendinitis left lower extremity.  Pain.  Planter fasciitis.  Heel spur syndrome.  Mycosis of nail.     Plan.  PCP notes reviewed.  Foot exam performed.  Chart reviewed.  X-rays taken and reviewed with patient.  At this time physical therapy will be considered to help with patient's symptoms and condition.  Patient can stretch daily.  Tylenol as needed.  Proper shoe style and fitting advice given.  Return for follow-up.  In addition patient will take Aleve as needed.  Aftercare instruction given.  Return for follow-up.            Diagnoses and all orders for this visit:     Plantar fasciitis     Left foot pain     Achilles tendinitis of left lower extremity     Onychomycosis            Subjective: Patient has pain in his left heel.  Patient has history of Achilles tendinitis and partial tear that was treated 2 or 3 years ago by immobilization.  He now has return of pain in the left heel.  No history of trauma.  He also gets pain in his toes with ambulation and shoewear               Allergies   Allergen Reactions    Augmentin [Amoxicillin-Pot Clavulanate] GI Intolerance    Meperidine Other (See Comments)       Reaction Date: 05Feb2005; Annotation - [id number]: unsure of side effect  Reaction Date: 05Feb2005; Annotation - [id number]: unsure of side effect            Current Outpatient Medications:     acetaminophen (TYLENOL) 500 mg tablet, Take 500 mg by mouth as needed, Disp: , Rfl:     amLODIPine (NORVASC) 2.5 mg tablet, TAKE 1 TABLET BY MOUTH  DAILY, Disp: 90 tablet, Rfl: 3    aspirin 81 mg chewable tablet, Chew 81 mg daily, Disp: , Rfl:     atorvastatin (LIPITOR) 10 mg tablet, Take 1 tablet (10 mg total) by mouth daily, Disp: 90 tablet, Rfl: 1    famotidine (PEPCID) 20 mg tablet, Take 1 tablet (20 mg total) by mouth 2 (two) times a day, Disp: 60 tablet, Rfl: 0    methylPREDNISolone 4 MG tablet therapy pack, Use as directed on package (Patient not taking: Reported on 2/10/2023), Disp: 21  tablet, Rfl: 0    Multiple Vitamin (MULTIVITAMINS PO), Take 1 capsule by mouth daily, Disp: , Rfl:     quinapril (ACCUPRIL) 10 mg tablet, TAKE 1 TABLET BY MOUTH  EVERY 12 HOURS, Disp: 180 tablet, Rfl: 3           Patient Active Problem List   Diagnosis    Actinic keratosis    History of prostate cancer    Anemia    Benign essential hypertension    Disc degeneration, lumbar    Fatty liver disease, nonalcoholic    Herpes simplex infection    Mixed hyperlipidemia    Organic impotence    Lower extremity edema    Groin mass    S/P prostatectomy    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(HCC)    Periumbilical abdominal pain    Cervicalgia    Cervical radiculopathy    Adenocarcinoma of prostate (HCC)             Patient ID: Gennaro Worthington is a 74 y.o. male.     HPI     The following portions of the patient's history were reviewed and updated as appropriate:      family history includes Hypertension in his mother; Lung cancer in his family; Prostate cancer in his family.       reports that he has never smoked. He has never used smokeless tobacco. He reports current alcohol use. He reports that he does not use drugs.      Objective:  Patient's shoes and socks removed.   Foot Exam     General  General Appearance: appears stated age and healthy   Orientation: alert and oriented to person, place, and time   Affect: appropriate   Gait: antalgic         Right Foot/Ankle      Inspection and Palpation  Tenderness: bony tenderness   Swelling: dorsum   Arch: pes planus  Hammertoes: fifth toe  Skin Exam: dry skin;      Neurovascular  Dorsalis pedis: 2+  Posterior tibial: 2+        Left Foot/Ankle       Inspection and Palpation  Tenderness: bony tenderness, plantar fascia and calcaneus tenderness   Swelling: dorsum   Arch: pes planus  Hammertoes: fifth toe  Skin Exam: dry skin;      Neurovascular  Dorsalis pedis: 2+  Posterior tibial: 2+           Physical Exam  Vitals and nursing note reviewed.   Constitutional:       Appearance: Normal  appearance.   Cardiovascular:      Rate and Rhythm: Normal rate and regular rhythm.      Pulses:           Dorsalis pedis pulses are 2+ on the right side and 2+ on the left side.        Posterior tibial pulses are 2+ on the right side and 2+ on the left side.   Musculoskeletal:      Right foot: Bony tenderness present.      Left foot: Bony tenderness present.   Feet:      Right foot:      Skin integrity: Dry skin present.      Left foot:      Skin integrity: Dry skin present.      Comments: Patient is pronated in stance and gait.  Pain with palpation plantar fashion insertion left foot.  Patient has equinus deformity bilateral.  There is palpable thickening of the left Achilles tendon insertion.  X-ray demonstrates plantar calcaneal spur as well as early retrocalcaneal spurring.  Skin:     Capillary Refill: Capillary refill takes less than 2 seconds.   Neurological:      Mental Status: He is alert.   Psychiatric:         Mood and Affect: Mood normal.         Behavior: Behavior normal.         Thought Content: Thought content normal.         Judgment: Judgment normal.

## 2024-05-15 ENCOUNTER — TELEPHONE (OUTPATIENT)
Dept: ADMINISTRATIVE | Facility: OTHER | Age: 74
End: 2024-05-15

## 2024-05-15 NOTE — TELEPHONE ENCOUNTER
05/15/24 9:20 AM    Patient contacted (spoke with patient) to bring Advance Directive, POLST, or Living Will document to next scheduled pcp visit.    Thank you.  Lissette Goldberg  [facility]

## 2024-05-16 ENCOUNTER — OFFICE VISIT (OUTPATIENT)
Dept: FAMILY MEDICINE CLINIC | Facility: CLINIC | Age: 74
End: 2024-05-16
Payer: COMMERCIAL

## 2024-05-16 VITALS
RESPIRATION RATE: 17 BRPM | TEMPERATURE: 97.6 F | BODY MASS INDEX: 29.4 KG/M2 | HEART RATE: 69 BPM | WEIGHT: 221.8 LBS | HEIGHT: 73 IN | SYSTOLIC BLOOD PRESSURE: 118 MMHG | DIASTOLIC BLOOD PRESSURE: 66 MMHG

## 2024-05-16 DIAGNOSIS — K52.9 ENTERITIS: Primary | ICD-10-CM

## 2024-05-16 DIAGNOSIS — I10 BENIGN ESSENTIAL HYPERTENSION: ICD-10-CM

## 2024-05-16 DIAGNOSIS — K92.2 GASTROINTESTINAL HEMORRHAGE, UNSPECIFIED GASTROINTESTINAL HEMORRHAGE TYPE: ICD-10-CM

## 2024-05-16 DIAGNOSIS — E78.2 MIXED HYPERLIPIDEMIA: ICD-10-CM

## 2024-05-16 PROCEDURE — 99496 TRANSJ CARE MGMT HIGH F2F 7D: CPT | Performed by: NURSE PRACTITIONER

## 2024-05-16 NOTE — PATIENT INSTRUCTIONS
Colitis   WHAT YOU NEED TO KNOW:   Colitis is swelling and irritation of your colon. Colitis may be caused by ulcers or a problem with your immune system. Bacteria, a virus, or a parasite may also cause colitis. The cause may not be known. You may have diarrhea, abdominal pain, fever, or blood or mucus in your bowel movement.  DISCHARGE INSTRUCTIONS:   Return to the emergency department if:   You have sudden trouble breathing.    Your bowel movements are black or have blood in them.    You have blood in your vomit.    You have severe abdominal pain or your abdomen is swollen and feels hard.    You have any of the following signs of dehydration:     Dizziness or weakness    Dry mouth, cracked lips, or severe thirst    Fast heartbeat or breathing    Urinating very little or not at all    Call your doctor if:   Your symptoms get worse or do not go away.    You have a fever, chills, cough, or feel weak and achy.    You suddenly lose weight without trying.    You have questions or concerns about your condition or care.    Medicines:   Medicines  may be given to decrease inflammation in your colon and treat diarrhea.    Take your medicine as directed.  Contact your healthcare provider if you think your medicine is not helping or if you have side effects. Tell your provider if you are allergic to any medicine. Keep a list of the medicines, vitamins, and herbs you take. Include the amounts, and when and why you take them. Bring the list or the pill bottles to follow-up visits. Carry your medicine list with you in case of an emergency.    Manage your symptoms:   Drink liquids as directed to help prevent dehydration.  Good liquids to drink include water, juice, and broth. Ask how much liquid to drink each day. You may need to drink an oral rehydration solution (ORS). An ORS contains a balance of water, salt, and sugar to replace body fluids lost during diarrhea.    Eat a variety of healthy foods.  Healthy foods include  fruits, vegetables, whole-grain breads, beans, low-fat dairy products, lean meats, and fish. You may need to eat several small meals throughout the day instead of large meals. Avoid spicy foods, caffeine, chocolate, and foods high in fat.    Talk to your healthcare provider before you take NSAIDs.  NSAIDs can cause worsen your symptoms if ulcers are causing your colitis.    Start to exercise when you feel better.  Regular exercise helps your bowels work normally. Ask about the best exercise plan for you.    Prevent the spread of germs:       Wash your hands often.  Wash your hands several times each day. Wash after you use the bathroom, change a child's diaper, and before you prepare or eat food. Use soap and water every time. Rub your soapy hands together, lacing your fingers. Wash the front and back of your hands, and in between your fingers. Use the fingers of one hand to scrub under the fingernails of the other hand. Wash for at least 20 seconds. Rinse with warm, running water for several seconds. Then dry your hands with a clean towel or paper towel. Use hand  that contains alcohol if soap and water are not available. Do not touch your eyes, nose, or mouth without washing your hands first.         Cover a sneeze or cough.  Use a tissue that covers your mouth and nose. Throw the tissue away in a trash can right away. Use the bend of your arm if a tissue is not available. Wash your hands well with soap and water or use a hand .    Clean surfaces often.  Clean doorknobs, countertops, cell phones, and other surfaces that are touched often. Use a disinfecting wipe, a single-use sponge, or a cloth you can wash and reuse. Use disinfecting  if you do not have wipes. You can create a disinfecting  by mixing 1 part bleach with 10 parts water.    Ask about vaccines you may need.  Vaccines help prevent disease caused by some viruses and bacteria. Get the influenza (flu) vaccine as soon as  recommended each year. The flu vaccine is usually available starting in September or October. Flu viruses change, so it is important to get a flu vaccine every year. Get the pneumonia vaccine if recommended. This vaccine is usually recommended every 5 years. Your provider will tell you when to get this vaccine, if needed. Your healthcare provider can tell you if you should get other vaccines, and when to get them.  Follow up with your doctor as directed:  You may need to return for a colonoscopy or other tests. Write down how often you have a bowel movements and what they look like. Bring this to your follow-up visits. Write down your questions so you remember to ask them during your visits.  © Copyright [employer] 2023 Information is for End User's use only and may not be sold, redistributed or otherwise used for commercial purposes.  The above information is an  only. It is not intended as medical advice for individual conditions or treatments. Talk to your doctor, nurse or pharmacist before following any medical regimen to see if it is safe and effective for you.

## 2024-05-16 NOTE — PROGRESS NOTES
"Assessment/Plan:    1. Enterocolitis  Assessment & Plan:  Resolved and will follow with gastro    2. Benign essential hypertension  Assessment & Plan:  Stable with current regimen  3. Mixed hyperlipidemia  Assessment & Plan:  Complaint with statin and tolerating it well  4. Gastrointestinal hemorrhage, unspecified gastrointestinal hemorrhage type  Assessment & Plan:  resolved        Patient Instructions:  Supportive care discussed and advised.  Advised to RTO for any worsening and no improvement.   Follow up for no improvement and worsening of conditions.  Patient advised and educated when to see immediate medical care.    Return in about 2 months (around 7/16/2024), or if symptoms worsen or fail to improve, for Annual physical.      Future Appointments   Date Time Provider Department Center   7/16/2024 10:30 AM NEHAL Crum [facility]   8/6/2024  9:30 AM Margarito Husain DPM [facility]           Subjective:      Patient ID: Gennaro Worthington is a 74 y.o. male.    Chief Complaint   Patient presents with    Transition of Care Management     [facility] f[facility]u Flavia Barrera LPN           Vitals:  /66   Pulse 69   Temp 97.6 °F (36.4 °C)   Resp 17   Ht 6' 1\" (1.854 m)   Wt 101 kg (221 lb 12.8 oz)   BMI 29.26 kg/m²     HPI  Patient is here to follow up after recent hospitalization.  Stated that finished antibiotics and doing well. Denies abdominal pain, fever, chills, nausea, vomiting and diarrhea. Stated that having regular BM but still slightly soft. Repeat CT abdomen showed interval resolution of enteritis  Will schedule follow up with gastroenterologist.  Complaint with medications and tolerating it well.      TCM Call       Date and time call was made  5/10/2024  8:13 AM    Hospital care reviewed  Records reviewed    Patient was hospitialized at  [facility]    Date of Admission  05/03/24    Date of discharge  05/09/24    Diagnosis  Enterocolitis    Disposition  Home    Were the " patients medications reviewed and updated  No    Current Symptoms  Fatigue    Fatigue severity  Mild          TCM Call       Post hospital issues  None    Should patient be enrolled in anticoag monitoring?  No    Scheduled for follow up?  Yes    Patients specialists  Other (comment)    Other specialists names  Tha Phillip MD (Gastroenterology)    Did you obtain your prescribed medications  Yes    Do you need help managing your prescriptions or medications  No    Is transportation to your appointment needed  No    I have advised the patient to call PCP with any new or worsening symptoms  Yo Ann LPN    Living Arrangements  Spouse or Significiant other    Support System  Family    The type of support provided  Physical; Emotional    Do you have social support  Yes, as much as I need    Are you recieving any outpatient services  No    Are you recieving home care services  No    Have you fallen in the last 12 months  No    Interperter language line needed  No    Counseling  Patient    Counseling topics  Activities of daily living; Importance of RX compliance; patient and family education; instructions for management; Risk factor reduction    Comments  I spoke with Gennaro who states that he is feeling better overall. He is tired, but no other complaints. He knows to go to the ER if any chest pain, dyspnea, etc and to notify us if any fevers, etc [email or web address]                The following portions of the patient's history were reviewed and updated as appropriate: allergies, current medications, past family history, past medical history, past social history, past surgical history and problem list.      Review of Systems   HENT: Negative.     Respiratory: Negative.     Cardiovascular: Negative.    Gastrointestinal: Negative.         As noted in HPI       Neurological:  Negative for dizziness.         Objective:    Social History     Tobacco Use   Smoking Status Never    Passive exposure: Never   Smokeless Tobacco Never        Allergies:   Allergies   Allergen Reactions    Augmentin [Amoxicillin-Pot Clavulanate] GI Intolerance    Meperidine Other (See Comments)     Reaction Date: 05Feb2005; Annotation - [id number]: unsure of side effect  Reaction Date: 05Feb2005; Annotation - [id number]: unsure of side effect         Current Outpatient Medications   Medication Sig Dispense Refill    amLODIPine (NORVASC) 2.5 mg tablet TAKE 1 TABLET BY MOUTH DAILY 90 tablet 3    aspirin 81 mg chewable tablet Chew 81 mg daily      atorvastatin (LIPITOR) 10 mg tablet TAKE 1 TABLET BY MOUTH DAILY 90 tablet 1    Ferrous Gluconate-C-Folic Acid (IRON-C PO) Take 1 Dose by mouth daily      lisinopril (ZESTRIL) 5 mg tablet Take 1 tablet (5 mg total) by mouth daily 90 tablet 1    Multiple Vitamin (MULTIVITAMINS PO) Take 1 capsule by mouth daily       No current facility-administered medications for this visit.          Physical Exam  Constitutional:       Appearance: Normal appearance. He is well-developed.   Cardiovascular:      Rate and Rhythm: Normal rate and regular rhythm.      Heart sounds: Normal heart sounds.   Pulmonary:      Effort: Pulmonary effort is normal.      Breath sounds: Normal breath sounds.   Abdominal:      General: Bowel sounds are normal. There is no distension.      Palpations: Abdomen is soft.      Tenderness: There is no abdominal tenderness. There is no rebound.   Skin:     General: Skin is warm and dry.   Neurological:      Mental Status: He is alert and oriented to person, place, and time.   Psychiatric:         Behavior: Behavior normal.         Thought Content: Thought content normal.         Judgment: Judgment normal.                     NEHAL Crum

## 2024-05-27 DIAGNOSIS — I10 ESSENTIAL HYPERTENSION: ICD-10-CM

## 2024-05-28 RX ORDER — LISINOPRIL 5 MG/1
5 TABLET ORAL DAILY
Qty: 90 TABLET | Refills: 1 | Status: SHIPPED | OUTPATIENT
Start: 2024-05-28

## 2024-07-07 ENCOUNTER — RA CDI HCC (OUTPATIENT)
Dept: OTHER | Facility: HOSPITAL | Age: 74
End: 2024-07-07

## 2024-07-16 ENCOUNTER — OFFICE VISIT (OUTPATIENT)
Dept: FAMILY MEDICINE CLINIC | Facility: CLINIC | Age: 74
End: 2024-07-16
Payer: COMMERCIAL

## 2024-07-16 VITALS
BODY MASS INDEX: 28.84 KG/M2 | HEART RATE: 72 BPM | TEMPERATURE: 97.3 F | DIASTOLIC BLOOD PRESSURE: 72 MMHG | SYSTOLIC BLOOD PRESSURE: 126 MMHG | RESPIRATION RATE: 18 BRPM | WEIGHT: 217.6 LBS | HEIGHT: 73 IN

## 2024-07-16 DIAGNOSIS — Z12.5 SCREENING FOR PROSTATE CANCER: ICD-10-CM

## 2024-07-16 DIAGNOSIS — Z00.00 MEDICARE ANNUAL WELLNESS VISIT, SUBSEQUENT: Primary | ICD-10-CM

## 2024-07-16 DIAGNOSIS — E78.2 MIXED HYPERLIPIDEMIA: ICD-10-CM

## 2024-07-16 PROCEDURE — G0439 PPPS, SUBSEQ VISIT: HCPCS | Performed by: NURSE PRACTITIONER

## 2024-07-16 NOTE — PATIENT INSTRUCTIONS
Medicare Preventive Visit Patient Instructions  Thank you for completing your Welcome to Medicare Visit or Medicare Annual Wellness Visit today. Your next wellness visit will be due in one year (7/17/2025).  The screening/preventive services that you may require over the next 5-10 years are detailed below. Some tests may not apply to you based off risk factors and/or age. Screening tests ordered at today's visit but not completed yet may show as past due. Also, please note that scanned in results may not display below.  Preventive Screenings:  Service Recommendations Previous Testing/Comments   Colorectal Cancer Screening  Colonoscopy    Fecal Occult Blood Test (FOBT)/Fecal Immunochemical Test (FIT)  Fecal DNA/Cologuard Test  Flexible Sigmoidoscopy Age: 45-75 years old   Colonoscopy: every 10 years (May be performed more frequently if at higher risk)  OR  FOBT/FIT: every 1 year  OR  Cologuard: every 3 years  OR  Sigmoidoscopy: every 5 years  Screening may be recommended earlier than age 45 if at higher risk for colorectal cancer. Also, an individualized decision between you and your healthcare provider will decide whether screening between the ages of 76-85 would be appropriate. Colonoscopy: 05/08/2024  FOBT/FIT: Not on file  Cologuard: Not on file  Sigmoidoscopy: Not on file    Screening Current     Prostate Cancer Screening Individualized decision between patient and health care provider in men between ages of 55-69   Medicare will cover every 12 months beginning on the day after your 50th birthday PSA: <0.1 ng/mL     History Prostate Cancer     Hepatitis C Screening Once for adults born between 1945 and 1965  More frequently in patients at high risk for Hepatitis C Hep C Antibody: 07/11/2019    Screening Current   Diabetes Screening 1-2 times per year if you're at risk for diabetes or have pre-diabetes Fasting glucose: 112 mg/dL (5/4/2024)  A1C: No results in last 5 years (No results in last 5 years)  Screening  Current   Cholesterol Screening Once every 5 years if you don't have a lipid disorder. May order more often based on risk factors. Lipid panel: 05/24/2023  Screening Not Indicated  History Lipid Disorder      Other Preventive Screenings Covered by Medicare:  Abdominal Aortic Aneurysm (AAA) Screening: covered once if your at risk. You're considered to be at risk if you have a family history of AAA or a male between the age of 65-75 who smoking at least 100 cigarettes in your lifetime.  Lung Cancer Screening: covers low dose CT scan once per year if you meet all of the following conditions: (1) Age 55-77; (2) No signs or symptoms of lung cancer; (3) Current smoker or have quit smoking within the last 15 years; (4) You have a tobacco smoking history of at least 20 pack years (packs per day x number of years you smoked); (5) You get a written order from a healthcare provider.  Glaucoma Screening: covered annually if you're considered high risk: (1) You have diabetes OR (2) Family history of glaucoma OR (3)  aged 50 and older OR (4)  American aged 65 and older  Osteoporosis Screening: covered every 2 years if you meet one of the following conditions: (1) Have a vertebral abnormality; (2) On glucocorticoid therapy for more than 3 months; (3) Have primary hyperparathyroidism; (4) On osteoporosis medications and need to assess response to drug therapy.  HIV Screening: covered annually if you're between the age of 15-65. Also covered annually if you are younger than 15 and older than 65 with risk factors for HIV infection. For pregnant patients, it is covered up to 3 times per pregnancy.    Immunizations:  Immunization Recommendations   Influenza Vaccine Annual influenza vaccination during flu season is recommended for all persons aged >= 6 months who do not have contraindications   Pneumococcal Vaccine   * Pneumococcal conjugate vaccine = PCV13 (Prevnar 13), PCV15 (Vaxneuvance), PCV20 (Prevnar 20)  *  Pneumococcal polysaccharide vaccine = PPSV23 (Pneumovax) Adults 19-63 yo with certain risk factors or if 65+ yo  If never received any pneumonia vaccine: recommend Prevnar 20 (PCV20)  Give PCV20 if previously received 1 dose of PCV13 or PPSV23   Hepatitis B Vaccine 3 dose series if at intermediate or high risk (ex: diabetes, end stage renal disease, liver disease)   Respiratory syncytial virus (RSV) Vaccine - COVERED BY MEDICARE PART D  * RSVPreF3 (Arexvy) CDC recommends that adults 60 years of age and older may receive a single dose of RSV vaccine using shared clinical decision-making (SCDM)   Tetanus (Td) Vaccine - COST NOT COVERED BY MEDICARE PART B Following completion of primary series, a booster dose should be given every 10 years to maintain immunity against tetanus. Td may also be given as tetanus wound prophylaxis.   Tdap Vaccine - COST NOT COVERED BY MEDICARE PART B Recommended at least once for all adults. For pregnant patients, recommended with each pregnancy.   Shingles Vaccine (Shingrix) - COST NOT COVERED BY MEDICARE PART B  2 shot series recommended in those 19 years and older who have or will have weakened immune systems or those 50 years and older     Health Maintenance Due:      Topic Date Due   • Colorectal Cancer Screening  05/08/2025   • Hepatitis C Screening  Completed     Immunizations Due:      Topic Date Due   • COVID-19 Vaccine (1 - 2023-24 season) Never done   • Influenza Vaccine (1) 09/01/2024     Advance Directives   What are advance directives?  Advance directives are legal documents that state your wishes and plans for medical care. These plans are made ahead of time in case you lose your ability to make decisions for yourself. Advance directives can apply to any medical decision, such as the treatments you want, and if you want to donate organs.   What are the types of advance directives?  There are many types of advance directives, and each state has rules about how to use them. You  may choose a combination of any of the following:  Living will:  This is a written record of the treatment you want. You can also choose which treatments you do not want, which to limit, and which to stop at a certain time. This includes surgery, medicine, IV fluid, and tube feedings.   Durable power of  for healthcare (DPAHC):  This is a written record that states who you want to make healthcare choices for you when you are unable to make them for yourself. This person, called a proxy, is usually a family member or a friend. You may choose more than 1 proxy.  Do not resuscitate (DNR) order:  A DNR order is used in case your heart stops beating or you stop breathing. It is a request not to have certain forms of treatment, such as CPR. A DNR order may be included in other types of advance directives.  Medical directive:  This covers the care that you want if you are in a coma, near death, or unable to make decisions for yourself. You can list the treatments you want for each condition. Treatment may include pain medicine, surgery, blood transfusions, dialysis, IV or tube feedings, and a ventilator (breathing machine).  Values history:  This document has questions about your views, beliefs, and how you feel and think about life. This information can help others choose the care that you would choose.  Why are advance directives important?  An advance directive helps you control your care. Although spoken wishes may be used, it is better to have your wishes written down. Spoken wishes can be misunderstood, or not followed. Treatments may be given even if you do not want them. An advance directive may make it easier for your family to make difficult choices about your care.   Weight Management   Why it is important to manage your weight:  Being overweight increases your risk of health conditions such as heart disease, high blood pressure, type 2 diabetes, and certain types of cancer. It can also increase your  risk for osteoarthritis, sleep apnea, and other respiratory problems. Aim for a slow, steady weight loss. Even a small amount of weight loss can lower your risk of health problems.  How to lose weight safely:  A safe and healthy way to lose weight is to eat fewer calories and get regular exercise. You can lose up about 1 pound a week by decreasing the number of calories you eat by 500 calories each day.   Healthy meal plan for weight management:  A healthy meal plan includes a variety of foods, contains fewer calories, and helps you stay healthy. A healthy meal plan includes the following:  Eat whole-grain foods more often.  A healthy meal plan should contain fiber. Fiber is the part of grains, fruits, and vegetables that is not broken down by your body. Whole-grain foods are healthy and provide extra fiber in your diet. Some examples of whole-grain foods are whole-wheat breads and pastas, oatmeal, brown rice, and bulgur.  Eat a variety of vegetables every day.  Include dark, leafy greens such as spinach, kale, heaven greens, and mustard greens. Eat yellow and orange vegetables such as carrots, sweet potatoes, and winter squash.   Eat a variety of fruits every day.  Choose fresh or canned fruit (canned in its own juice or light syrup) instead of juice. Fruit juice has very little or no fiber.  Eat low-fat dairy foods.  Drink fat-free (skim) milk or 1% milk. Eat fat-free yogurt and low-fat cottage cheese. Try low-fat cheeses such as mozzarella and other reduced-fat cheeses.  Choose meat and other protein foods that are low in fat.  Choose beans or other legumes such as split peas or lentils. Choose fish, skinless poultry (chicken or turkey), or lean cuts of red meat (beef or pork). Before you cook meat or poultry, cut off any visible fat.   Use less fat and oil.  Try baking foods instead of frying them. Add less fat, such as margarine, sour cream, regular salad dressing and mayonnaise to foods. Eat fewer high-fat  foods. Some examples of high-fat foods include french fries, doughnuts, ice cream, and cakes.  Eat fewer sweets.  Limit foods and drinks that are high in sugar. This includes candy, cookies, regular soda, and sweetened drinks.  Exercise:  Exercise at least 30 minutes per day on most days of the week. Some examples of exercise include walking, biking, dancing, and swimming. You can also fit in more physical activity by taking the stairs instead of the elevator or parking farther away from stores. Ask your healthcare provider about the best exercise plan for you.      © Copyright [employer] 2018 Information is for End User's use only and may not be sold, redistributed or otherwise used for commercial purposes. All illustrations and images included in CareNotes® are the copyrighted property of [employer] or [employer]

## 2024-07-16 NOTE — PROGRESS NOTES
Ambulatory Visit  Name: Gennaro Worthington      : 1950      MRN: [id number]  Encounter Provider: NEHAL Crum  Encounter Date: 2024   Encounter department: [facility]    Assessment & Plan   1. Medicare annual wellness visit, subsequent  2. Mixed hyperlipidemia  -     Lipid Panel with Direct LDL reflex; Future  3. Screening for prostate cancer  -     PSA, Total Screen; Future      Depression Screening and Follow-up Plan: Patient's depression screening was positive with a PHQ-9 score of 9. Patient assessed for underlying major depression. Brief counseling provided and recommend additional follow-up/re-evaluation next office visit. Patient declines further evaluation by mental health professional and/or medications. Brief counseling provided. Will re-evaluate at next office visit.       Preventive health issues were discussed with patient, and age appropriate screening tests were ordered as noted in patient's After Visit Summary. Personalized health advice and appropriate referrals for health education or preventive services given if needed, as noted in patient's After Visit Summary.    History of Present Illness     HPI   Patient is here for AWV.  Denies any concerns and complains.  Blood work ordered as due.  Aware to schedule colonoscopy as last one was not good prep.  Complaint with medications and tolerating it well  Discussed depression screening and denies any further treatment and will follow back as needed and denies any suicidal ideation and thoughts  Patient Care Team:  NEHAL Crum as PCP - General (Family Medicine)  Irene Aguilera MD as Consulting Physician (Ophthalmology)  Byng Saba as Consulting Physician (Gastroenterology)  Lauri Bernal MD as Consulting Physician (Neurosurgery)    Review of Systems   HENT: Negative.     Respiratory: Negative.     Cardiovascular: Negative.    Gastrointestinal: Negative.    Neurological: Negative.      Medical History Reviewed by  provider this encounter:       Annual Wellness Visit Questionnaire   Gennaro is here for his Subsequent Wellness visit.     Health Risk Assessment:   Patient rates overall health as good. Patient feels that their physical health rating is same. Patient is satisfied with their life. Eyesight was rated as same. Hearing was rated as same. Patient feels that their emotional and mental health rating is same. Patients states they are never, rarely angry. Patient states they are always unusually tired/fatigued. Pain experienced in the last 7 days has been some. Patient's pain rating has been 5/10. Patient states that he has experienced no weight loss or gain in last 6 months.     Depression Screening:   PHQ-9 Score: 9      Fall Risk Screening:   In the past year, patient has experienced: no history of falling in past year      Home Safety:  Patient does not have trouble with stairs inside or outside of their home. Patient has working smoke alarms and has working carbon monoxide detector. Home safety hazards include: none.     Nutrition:   Current diet is Regular.     Medications:   Patient is currently taking over-the-counter supplements. OTC medications include: see medication list. Patient is able to manage medications.     Activities of Daily Living (ADLs)/Instrumental Activities of Daily Living (IADLs):   Walk and transfer into and out of bed and chair?: Yes  Dress and groom yourself?: Yes    Bathe or shower yourself?: Yes    Feed yourself? Yes  Do your laundry/housekeeping?: Yes  Manage your money, pay your bills and track your expenses?: Yes  Make your own meals?: Yes    Do your own shopping?: Yes    Previous Hospitalizations:   Any hospitalizations or ED visits within the last 12 months?: Yes    How many hospitalizations have you had in the last year?: 1-2    Advance Care Planning:   Living will: Yes    Durable POA for healthcare: No    Advanced directive: Yes    Advanced directive counseling given: Yes    Patient  declined ACP directive: No      Cognitive Screening:   Provider or family/friend/caregiver concerned regarding cognition?: No    PREVENTIVE SCREENINGS      Cardiovascular Screening:    General: Risks and Benefits Discussed    Due for: Lipid Panel      Diabetes Screening:     General: Screening Current      Colorectal Cancer Screening:     General: Screening Current      Prostate Cancer Screening:    General: History Prostate Cancer and Risks and Benefits Discussed    Due for: PSA      Osteoporosis Screening:    General: Risks and Benefits Discussed and Patient Declines      Abdominal Aortic Aneurysm (AAA) Screening:    Risk factors include: age between 65-76 yo        General: Screening Not Indicated      Lung Cancer Screening:     General: Screening Not Indicated      Hepatitis C Screening:    General: Screening Current    Screening, Brief Intervention, and Referral to Treatment (SBIRT)    Screening      AUDIT-C Screenin) How often did you have a drink containing alcohol in the past year? never  2) How many drinks did you have on a typical day when you were drinking in the past year? 0  3) How often did you have 6 or more drinks on one occasion in the past year? never    AUDIT-C Score: 0  Interpretation: Score 0-3 (male): Negative screen for alcohol misuse    Single Item Drug Screening:  How often have you used an illegal drug (including marijuana) or a prescription medication for non-medical reasons in the past year? never    Single Item Drug Screen Score: 0  Interpretation: Negative screen for possible drug use disorder    Brief Intervention  Alcohol & drug use screenings were reviewed. No concerns regarding substance use disorder identified.     Other Counseling Topics:   Car/seat belt/driving safety, skin self-exam, sunscreen and calcium and vitamin D intake and regular weightbearing exercise.     Social Determinants of Health     Financial Resource Strain: Low Risk  (2023)    Overall Financial  "Resource Strain (CARDIA)     Difficulty of Paying Living Expenses: Not hard at all   Food Insecurity: No Food Insecurity (7/16/2024)    Hunger Vital Sign     Worried About Running Out of Food in the Last Year: Never true     Ran Out of Food in the Last Year: Never true   Transportation Needs: No Transportation Needs (7/16/2024)    PRAPARE - Transportation     Lack of Transportation (Medical): No     Lack of Transportation (Non-Medical): No   Housing Stability: Low Risk  (7/16/2024)    Housing Stability Vital Sign     Unable to Pay for Housing in the Last Year: No     Number of Times Moved in the Last Year: 1     Homeless in the Last Year: No   Utilities: Not At Risk (7/16/2024)    [facility] Utilities     Threatened with loss of utilities: No     No results found.    Objective     /72   Pulse 72   Temp (!) 97.3 °F (36.3 °C)   Resp 18   Ht 6' 1\" (1.854 m)   Wt 98.7 kg (217 lb 9.6 oz)   BMI 28.71 kg/m²   Depression Screening Follow-up Plan: Patient's depression screening was positive with a PHQ-2 score of . Their PHQ-9 score was 9. Patient assessed for underlying major depression. They have no active suicidal ideations. Brief counseling provided and recommend additional follow-up/re-evaluation next office visit. Patient declines further evaluation by mental health professional and/or medications. They have no active suicidal ideations. Brief counseling provided and recommend additional follow-up/re-evaluation at next office visit.   Physical Exam  HENT:      Head: Normocephalic.      Right Ear: External ear normal.      Left Ear: External ear normal.      Nose: Nose normal.   Eyes:      Conjunctiva/sclera: Conjunctivae normal.   Cardiovascular:      Rate and Rhythm: Normal rate and regular rhythm.      Heart sounds: Normal heart sounds.   Pulmonary:      Effort: Pulmonary effort is normal.      Breath sounds: Normal breath sounds.   Abdominal:      Hernia: A hernia is present. Hernia is present in the ventral " area.   Musculoskeletal:      Cervical back: Normal range of motion and neck supple.   Neurological:      Mental Status: He is alert and oriented to person, place, and time.   Psychiatric:         Mood and Affect: Mood normal.         Behavior: Behavior normal.         Thought Content: Thought content normal.         Judgment: Judgment normal.

## 2024-07-17 ENCOUNTER — LAB (OUTPATIENT)
Dept: LAB | Facility: CLINIC | Age: 74
End: 2024-07-17
Payer: COMMERCIAL

## 2024-07-17 DIAGNOSIS — Z12.5 SCREENING FOR PROSTATE CANCER: ICD-10-CM

## 2024-07-17 DIAGNOSIS — E78.2 MIXED HYPERLIPIDEMIA: ICD-10-CM

## 2024-07-17 LAB
CHOLEST SERPL-MCNC: 123 MG/DL
HDLC SERPL-MCNC: 41 MG/DL
LDLC SERPL CALC-MCNC: 65 MG/DL (ref 0–100)
PSA SERPL-MCNC: <0.008 NG/ML (ref 0–4)
TRIGL SERPL-MCNC: 83 MG/DL

## 2024-07-17 PROCEDURE — 36415 COLL VENOUS BLD VENIPUNCTURE: CPT

## 2024-07-17 PROCEDURE — 80061 LIPID PANEL: CPT

## 2024-07-17 PROCEDURE — G0103 PSA SCREENING: HCPCS

## 2024-07-22 ENCOUNTER — TELEPHONE (OUTPATIENT)
Dept: GASTROENTEROLOGY | Facility: CLINIC | Age: 74
End: 2024-07-22

## 2024-07-22 NOTE — TELEPHONE ENCOUNTER
----- Message from Jelani Singh MD sent at 7/21/2024 12:23 PM EDT -----  Please call the patient regarding his result.  Colon biopsies benign.  Repeat colonoscopy in 1 year.  Follow-up in the office

## 2024-09-19 ENCOUNTER — OFFICE VISIT (OUTPATIENT)
Age: 74
End: 2024-09-19
Payer: COMMERCIAL

## 2024-09-19 VITALS
SYSTOLIC BLOOD PRESSURE: 99 MMHG | HEIGHT: 73 IN | DIASTOLIC BLOOD PRESSURE: 67 MMHG | HEART RATE: 84 BPM | BODY MASS INDEX: 28.76 KG/M2 | RESPIRATION RATE: 18 BRPM | WEIGHT: 217 LBS

## 2024-09-19 DIAGNOSIS — M72.2 PLANTAR FASCIITIS: Primary | ICD-10-CM

## 2024-09-19 DIAGNOSIS — M79.672 LEFT FOOT PAIN: ICD-10-CM

## 2024-09-19 DIAGNOSIS — B35.1 ONYCHOMYCOSIS: ICD-10-CM

## 2024-09-19 DIAGNOSIS — M76.62 ACHILLES TENDINITIS OF LEFT LOWER EXTREMITY: ICD-10-CM

## 2024-09-19 DIAGNOSIS — L60.0 INGROWN TOENAIL OF LEFT FOOT: ICD-10-CM

## 2024-09-19 PROCEDURE — 99213 OFFICE O/P EST LOW 20 MIN: CPT | Performed by: PODIATRIST

## 2024-09-19 NOTE — PROGRESS NOTES
Assessment/Plan: Achilles tendinitis left lower extremity.  Pain.  Planter fasciitis.  Heel spur syndrome.  Mycosis of nail.     Plan.  PCP notes reviewed.  Foot exam performed.  Chart reviewed.  X-rays taken and reviewed with patient.  At this time physical therapy will be considered to help with patient's symptoms and condition.  Patient can stretch daily.  Tylenol as needed.  Proper shoe style and fitting advice given.  Return for follow-up.  In addition patient will take Aleve as needed.  Aftercare instruction given.  Return for follow-up.            Diagnoses and all orders for this visit:     Plantar fasciitis     Left foot pain     Achilles tendinitis of left lower extremity     Onychomycosis            Subjective: Patient has pain in his left heel.  Patient has history of Achilles tendinitis and partial tear that was treated 2 or 3 years ago by immobilization.  He now has return of pain in the left heel.  No history of trauma.  He also gets pain in his toes with ambulation and shoewear               Allergies   Allergen Reactions    Augmentin [Amoxicillin-Pot Clavulanate] GI Intolerance    Meperidine Other (See Comments)       Reaction Date: 05Feb2005; Annotation - [id number]: unsure of side effect  Reaction Date: 05Feb2005; Annotation - [id number]: unsure of side effect            Current Outpatient Medications:     acetaminophen (TYLENOL) 500 mg tablet, Take 500 mg by mouth as needed, Disp: , Rfl:     amLODIPine (NORVASC) 2.5 mg tablet, TAKE 1 TABLET BY MOUTH  DAILY, Disp: 90 tablet, Rfl: 3    aspirin 81 mg chewable tablet, Chew 81 mg daily, Disp: , Rfl:     atorvastatin (LIPITOR) 10 mg tablet, Take 1 tablet (10 mg total) by mouth daily, Disp: 90 tablet, Rfl: 1    famotidine (PEPCID) 20 mg tablet, Take 1 tablet (20 mg total) by mouth 2 (two) times a day, Disp: 60 tablet, Rfl: 0    methylPREDNISolone 4 MG tablet therapy pack, Use as directed on package (Patient not taking: Reported on 2/10/2023), Disp: 21  tablet, Rfl: 0    Multiple Vitamin (MULTIVITAMINS PO), Take 1 capsule by mouth daily, Disp: , Rfl:     quinapril (ACCUPRIL) 10 mg tablet, TAKE 1 TABLET BY MOUTH  EVERY 12 HOURS, Disp: 180 tablet, Rfl: 3           Patient Active Problem List   Diagnosis    Actinic keratosis    History of prostate cancer    Anemia    Benign essential hypertension    Disc degeneration, lumbar    Fatty liver disease, nonalcoholic    Herpes simplex infection    Mixed hyperlipidemia    Organic impotence    Lower extremity edema    Groin mass    S/P prostatectomy    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(HCC)    Periumbilical abdominal pain    Cervicalgia    Cervical radiculopathy    Adenocarcinoma of prostate (HCC)             Patient ID: Gennaro Worthington is a 74 y.o. male.     HPI     The following portions of the patient's history were reviewed and updated as appropriate:      family history includes Hypertension in his mother; Lung cancer in his family; Prostate cancer in his family.       reports that he has never smoked. He has never used smokeless tobacco. He reports current alcohol use. He reports that he does not use drugs.      Objective:  Patient's shoes and socks removed.       Foot Exam     General  General Appearance: appears stated age and healthy   Orientation: alert and oriented to person, place, and time   Affect: appropriate   Gait: antalgic         Right Foot/Ankle      Inspection and Palpation  Tenderness: bony tenderness   Swelling: dorsum   Arch: pes planus  Hammertoes: fifth toe  Skin Exam: dry skin;      Neurovascular  Dorsalis pedis: 2+  Posterior tibial: 2+        Left Foot/Ankle       Inspection and Palpation  Tenderness: bony tenderness, plantar fascia and calcaneus tenderness   Swelling: dorsum   Arch: pes planus  Hammertoes: fifth toe  Skin Exam: dry skin;      Neurovascular  Dorsalis pedis: 2+  Posterior tibial: 2+           Physical Exam  Vitals and nursing note reviewed.   Constitutional:       Appearance: Normal  appearance.   Cardiovascular:      Rate and Rhythm: Normal rate and regular rhythm.      Pulses:           Dorsalis pedis pulses are 2+ on the right side and 2+ on the left side.        Posterior tibial pulses are 2+ on the right side and 2+ on the left side.   Musculoskeletal:      Right foot: Bony tenderness present.      Left foot: Bony tenderness present.   Feet:      Right foot:      Skin integrity: Dry skin present.      Left foot:      Skin integrity: Dry skin present.      Comments: Patient is pronated in stance and gait.  Pain with palpation plantar fashion insertion left foot.  Patient has equinus deformity bilateral.  There is palpable thickening of the left Achilles tendon insertion.  X-ray demonstrates plantar calcaneal spur as well as early retrocalcaneal spurring.  Skin:     Capillary Refill: Capillary refill takes less than 2 seconds.   Neurological:      Mental Status: He is alert.   Psychiatric:         Mood and Affect: Mood normal.         Behavior: Behavior normal.         Thought Content: Thought content normal.         Judgment: Judgment normal.

## 2024-10-23 ENCOUNTER — HOSPITAL ENCOUNTER (OUTPATIENT)
Dept: NON INVASIVE DIAGNOSTICS | Facility: HOSPITAL | Age: 74
Discharge: HOME | End: 2024-10-23
Attending: INTERNAL MEDICINE
Payer: COMMERCIAL

## 2024-10-23 VITALS
WEIGHT: 217 LBS | DIASTOLIC BLOOD PRESSURE: 80 MMHG | HEIGHT: 73 IN | BODY MASS INDEX: 28.76 KG/M2 | SYSTOLIC BLOOD PRESSURE: 130 MMHG | HEART RATE: 60 BPM

## 2024-10-23 DIAGNOSIS — I10 ESSENTIAL HYPERTENSION: ICD-10-CM

## 2024-10-23 DIAGNOSIS — I10 BENIGN ESSENTIAL HYPERTENSION: ICD-10-CM

## 2024-10-23 DIAGNOSIS — I11.9 HYPERTENSIVE HEART DISEASE WITHOUT HEART FAILURE: ICD-10-CM

## 2024-10-23 LAB
AORTIC ROOT: 3.3 CM
APICAL FOUR CHAMBER EJECTION FRACTION: 62 %
BSA FOR ECHO PROCEDURE: 2.23 M2
CHEST PAIN STATEMENT: NORMAL
E WAVE DECELERATION TIME: 229 MS
E/A RATIO: 0.96
FRACTIONAL SHORTENING: 39 (ref 28–44)
INTERVENTRICULAR SEPTUM IN DIASTOLE (PARASTERNAL SHORT AXIS VIEW): 1.2 CM
INTERVENTRICULAR SEPTUM: 1.2 CM (ref 0.6–1.1)
LAAS-AP2: 25.5 CM2
LAAS-AP4: 12.9 CM2
LEFT ATRIUM SIZE: 3.7 CM
LEFT ATRIUM VOLUME (MOD BIPLANE): 61 ML
LEFT ATRIUM VOLUME INDEX (MOD BIPLANE): 27.4 ML/M2
LEFT INTERNAL DIMENSION IN SYSTOLE: 2.7 CM (ref 2.1–4)
LEFT VENTRICULAR INTERNAL DIMENSION IN DIASTOLE: 4.4 CM (ref 3.5–6)
LEFT VENTRICULAR POSTERIOR WALL IN END DIASTOLE: 1.2 CM
LEFT VENTRICULAR STROKE VOLUME: 62 ML
LVSV (TEICH): 62 ML
MAX DIASTOLIC BP: 80 MMHG
MAX PREDICTED HEART RATE: 146 BPM
MV E'TISSUE VEL-LAT: 11 CM/S
MV E'TISSUE VEL-SEP: 9 CM/S
MV PEAK A VEL: 0.75 M/S
MV PEAK E VEL: 72 CM/S
MV STENOSIS PRESSURE HALF TIME: 66 MS
MV VALVE AREA P 1/2 METHOD: 3.33
PROTOCOL NAME: NORMAL
RIGHT ATRIUM AREA SYSTOLE A4C: 16.2 CM2
RIGHT VENTRICLE ID DIMENSION: 3.5 CM
SL CV LEFT ATRIUM LENGTH A2C: 6.3 CM
SL CV LV EF: 60
SL CV PED ECHO LEFT VENTRICLE DIASTOLIC VOLUME (MOD BIPLANE) 2D: 88 ML
SL CV PED ECHO LEFT VENTRICLE SYSTOLIC VOLUME (MOD BIPLANE) 2D: 26 ML
STRESS POST EXERCISE DUR MIN: 5 MIN
STRESS POST EXERCISE DUR SEC: 3 SEC
STRESS POST PEAK HR: 144 BPM
STRESS POST PEAK SYSTOLIC BP: 150 MMHG
TARGET HR FORMULA: NORMAL
TEST INDICATION: NORMAL
TR MAX PG: 25 MMHG
TR PEAK VELOCITY: 2.5 M/S
TRICUSPID ANNULAR PLANE SYSTOLIC EXCURSION: 2 CM
TRICUSPID VALVE PEAK REGURGITATION VELOCITY: 2.49 M/S

## 2024-10-23 PROCEDURE — 93017 CV STRESS TEST TRACING ONLY: CPT

## 2024-10-23 PROCEDURE — 93306 TTE W/DOPPLER COMPLETE: CPT | Performed by: INTERNAL MEDICINE

## 2024-10-23 PROCEDURE — 93016 CV STRESS TEST SUPVJ ONLY: CPT | Performed by: INTERNAL MEDICINE

## 2024-10-23 PROCEDURE — 93018 CV STRESS TEST I&R ONLY: CPT | Performed by: INTERNAL MEDICINE

## 2024-10-23 PROCEDURE — 93306 TTE W/DOPPLER COMPLETE: CPT

## 2024-10-24 ENCOUNTER — TELEPHONE (OUTPATIENT)
Dept: CARDIOLOGY CLINIC | Facility: CLINIC | Age: 74
End: 2024-10-24

## 2024-10-24 LAB
MAX HR PERCENT: 98 %
MAX HR: 144 BPM
RATE PRESSURE PRODUCT: NORMAL
SL CV STRESS RECOVERY BP: NORMAL MMHG
SL CV STRESS RECOVERY HR: 76 BPM
SL CV STRESS RECOVERY O2 SAT: 98 %
SL CV STRESS STAGE REACHED: 2
STRESS ANGINA INDEX: 0
STRESS BASELINE BP: NORMAL MMHG
STRESS BASELINE HR: 82 BPM
STRESS O2 SAT REST: 98 %
STRESS PEAK HR: 144 BPM
STRESS POST ESTIMATED WORKLOAD: 7 METS
STRESS POST EXERCISE DUR MIN: 5 MIN
STRESS POST O2 SAT PEAK: 98 %
STRESS POST PEAK BP: 150 MMHG

## 2024-10-24 RX ORDER — LISINOPRIL 5 MG/1
5 TABLET ORAL DAILY
Qty: 90 TABLET | Refills: 1 | Status: SHIPPED | OUTPATIENT
Start: 2024-10-24

## 2024-10-24 NOTE — TELEPHONE ENCOUNTER
----- Message from Yoly Perea DO sent at 10/24/2024  2:49 PM EDT -----  Can you please let the patient know echo was normal

## 2024-10-25 ENCOUNTER — TELEPHONE (OUTPATIENT)
Dept: CARDIOLOGY CLINIC | Facility: CLINIC | Age: 74
End: 2024-10-25

## 2024-10-25 NOTE — TELEPHONE ENCOUNTER
----- Message from Yoly Perea DO sent at 10/25/2024  9:14 AM EDT -----  Can you please let the patient know stress tst was normal

## 2024-11-25 ENCOUNTER — OFFICE VISIT (OUTPATIENT)
Dept: FAMILY MEDICINE CLINIC | Facility: CLINIC | Age: 74
End: 2024-11-25
Payer: COMMERCIAL

## 2024-11-25 VITALS
HEIGHT: 73 IN | HEART RATE: 64 BPM | RESPIRATION RATE: 18 BRPM | DIASTOLIC BLOOD PRESSURE: 74 MMHG | SYSTOLIC BLOOD PRESSURE: 116 MMHG | WEIGHT: 221.8 LBS | TEMPERATURE: 98.8 F | BODY MASS INDEX: 29.4 KG/M2

## 2024-11-25 DIAGNOSIS — I10 BENIGN ESSENTIAL HYPERTENSION: ICD-10-CM

## 2024-11-25 DIAGNOSIS — E78.2 MIXED HYPERLIPIDEMIA: ICD-10-CM

## 2024-11-25 DIAGNOSIS — J06.9 ACUTE URI: Primary | ICD-10-CM

## 2024-11-25 DIAGNOSIS — H10.9 CONJUNCTIVITIS OF BOTH EYES, UNSPECIFIED CONJUNCTIVITIS TYPE: ICD-10-CM

## 2024-11-25 PROCEDURE — G2211 COMPLEX E/M VISIT ADD ON: HCPCS | Performed by: NURSE PRACTITIONER

## 2024-11-25 PROCEDURE — 99214 OFFICE O/P EST MOD 30 MIN: CPT | Performed by: NURSE PRACTITIONER

## 2024-11-25 RX ORDER — METHYLPREDNISOLONE 4 MG/1
TABLET ORAL
Qty: 21 EACH | Refills: 0 | Status: SHIPPED | OUTPATIENT
Start: 2024-11-25

## 2024-11-25 RX ORDER — AZITHROMYCIN 250 MG/1
TABLET, FILM COATED ORAL
Qty: 6 TABLET | Refills: 0 | Status: SHIPPED | OUTPATIENT
Start: 2024-11-25 | End: 2024-11-30

## 2024-11-25 RX ORDER — MOXIFLOXACIN 5 MG/ML
1 SOLUTION/ DROPS OPHTHALMIC 3 TIMES DAILY
Qty: 3 ML | Refills: 0 | Status: SHIPPED | OUTPATIENT
Start: 2024-11-25 | End: 2024-12-02

## 2024-11-25 NOTE — PROGRESS NOTES
Name: Gennaro Worthington      : 1950      MRN: [id number]  Encounter Provider: NEHAL Crum  Encounter Date: 2024   Encounter department: [facility]  :  Chief Complaint   Patient presents with    Nasal Congestion     2 weeks  Vida Alvarez MA       Assessment & Plan  Acute URI    Orders:    azithromycin (ZITHROMAX) 250 mg tablet; Take 500mg on day 1, 250mg on days 2-5    methylPREDNISolone 4 MG tablet therapy pack; Use as directed on package    Conjunctivitis of both eyes, unspecified conjunctivitis type    Orders:    moxifloxacin (VIGAMOX) 0.5 % ophthalmic solution; Administer 1 drop to both eyes 3 (three) times a day for 7 days    Benign essential hypertension  Stable with current regimen         Mixed hyperlipidemia  Complaint with statin and tolerating it well                History of Present Illness     Patient stated that having tickel in throat with cough, congestion and fatigue from 2 weeks. Denies fever, chills and sob.  Taking OCT but no relief  Stated that also having glue like discharge coming from both eyes  Complaint with medications and tolerating it well for chronic illnesses      Review of Systems   Constitutional:  Positive for fatigue. Negative for chills, diaphoresis, fever and unexpected weight change.   HENT:  Positive for congestion and postnasal drip. Negative for dental problem, drooling, ear discharge, ear pain, facial swelling, hearing loss, mouth sores, nosebleeds, rhinorrhea, sinus pressure, sinus pain, sneezing, sore throat, tinnitus, trouble swallowing and voice change.    Eyes:  Positive for discharge. Negative for photophobia, pain, redness, itching and visual disturbance.   Respiratory:  Positive for cough. Negative for chest tightness, shortness of breath and wheezing.    Cardiovascular: Negative.    Gastrointestinal:  Negative for abdominal pain, constipation, diarrhea, nausea and vomiting.   Musculoskeletal: Negative.    Skin: Negative.   "  Neurological:  Negative for dizziness, light-headedness and headaches.          Objective   /74   Pulse 64   Temp 98.8 °F (37.1 °C)   Resp 18   Ht 6' 1\" (1.854 m)   Wt 101 kg (221 lb 12.8 oz)   BMI 29.26 kg/m²      Physical Exam  Vitals reviewed.   Constitutional:       Appearance: Normal appearance. He is well-developed.   HENT:      Head: Normocephalic.      Right Ear: Tympanic membrane, ear canal and external ear normal.      Left Ear: Tympanic membrane, ear canal and external ear normal.      Nose: Mucosal edema present.      Right Sinus: No maxillary sinus tenderness or frontal sinus tenderness.      Left Sinus: No maxillary sinus tenderness or frontal sinus tenderness.      Comments: Post nasal drip noted     Mouth/Throat:      Mouth: No oral lesions.      Pharynx: No oropharyngeal exudate or posterior oropharyngeal erythema.   Eyes:      General: Lids are normal.         Right eye: Discharge present.         Left eye: Discharge present.     Conjunctiva/sclera:      Right eye: Right conjunctiva is not injected.      Left eye: Left conjunctiva is not injected.   Cardiovascular:      Rate and Rhythm: Normal rate and regular rhythm.      Heart sounds: Normal heart sounds.   Pulmonary:      Effort: Pulmonary effort is normal.      Breath sounds: Normal breath sounds.   Musculoskeletal:      Cervical back: Neck supple.   Lymphadenopathy:      Cervical:      Right cervical: No superficial or posterior cervical adenopathy.     Left cervical: No superficial or posterior cervical adenopathy.   Skin:     General: Skin is warm and dry.   Neurological:      Mental Status: He is alert and oriented to person, place, and time.   Psychiatric:         Behavior: Behavior normal.         Thought Content: Thought content normal.         Judgment: Judgment normal.         "

## 2024-11-25 NOTE — PATIENT INSTRUCTIONS
Patient Education     Moxifloxacin (Ophthalmic) (moxs i FLOKS a sin)   Brand Names: [place] Moxeza [DSC]; Vigamox   Brand Names: [place] ACT Moxifloxacin [DSC]; AG-Moxifloxacin; APO-Moxifloxacin; JAMP-Moxifloxacin; PMS-Moxifloxacin; SANDOZ Moxifloxacin; Vigamox   What is this drug used for?   It is used to treat eye infections.  What do I need to tell my doctor BEFORE I take this drug?   If you are allergic to this drug; any part of this drug; or any other drugs, foods, or substances. Tell your doctor about the allergy and what signs you had.  This drug may interact with other drugs or health problems.  Tell your doctor and pharmacist about all of your drugs (prescription or OTC, natural products, vitamins) and health problems. You must check to make sure that it is safe for you to take this drug with all of your drugs and health problems. Do not start, stop, or change the dose of any drug without checking with your doctor.  What are some things I need to know or do while I take this drug?   Tell all of your health care providers that you take this drug. This includes your doctors, nurses, pharmacists, and dentists.  Avoid wearing contacts unless told to wear them by your doctor.  Do not put contacts back in if eyes are irritated or infected.  Use care when driving or doing other tasks that call for clear eyesight.  Do not use longer than you have been told. A second infection may happen.  Very bad and sometimes deadly allergic reactions have rarely happened with other forms of this drug as well as drugs like this one. Talk with the doctor.  Tell your doctor if you are pregnant, plan on getting pregnant, or are breast-feeding. You will need to talk about the benefits and risks to you and the baby.  What are some side effects that I need to call my doctor about right away?   WARNING/CAUTION: Even though it may be rare, some people may have very bad and sometimes deadly side effects when taking a drug. Tell your doctor or  get medical help right away if you have any of the following signs or symptoms that may be related to a very bad side effect:  Signs of an allergic reaction, like rash; hives; itching; red, swollen, blistered, or peeling skin with or without fever; wheezing; tightness in the chest or throat; trouble breathing, swallowing, or talking; unusual hoarseness; or swelling of the mouth, face, lips, tongue, or throat.  Change in eyesight, eye pain, or severe eye irritation.  What are some other side effects of this drug?   All drugs may cause side effects. However, many people have no side effects or only have minor side effects. Call your doctor or get medical help if any of these side effects or any other side effects bother you or do not go away:  Eye irritation.  Short-term pain after use.  Dry eyes.  More tears.  These are not all of the side effects that may occur. If you have questions about side effects, call your doctor. Call your doctor for medical advice about side effects.  You may report side effects to your national health agency.  You may report side effects to the FDA at 1-194.993.4665. You may also report side effects at https://www.fda.gov/medwatch.  How is this drug best taken?   Use this drug as ordered by your doctor. Read all information given to you. Follow all instructions closely.  Use as you have been told, even if your signs get better.  For the eye only.  Wash your hands before and after use.  Do not touch the container tip to the eye, lid, or other skin. This could lead to bacteria in the drug, which may cause severe eye problems or loss of eyesight.  Tilt your head back and drop drug into the eye.  After use, keep your eyes closed. Put pressure on the inside corner of the eye. Do this for 1 to 2 minutes. This keeps the drug in your eye.  What do I do if I miss a dose?   Use a missed dose as soon as you think about it.  If it is close to the time for your next dose, skip the missed dose and go  back to your normal time.  Do not use 2 doses at the same time or extra doses.  How do I store and/or throw out this drug?   Store at room temperature or in a refrigerator. Do not freeze.  Keep all drugs in a safe place. Keep all drugs out of the reach of children and pets.  Throw away unused or  drugs. Do not flush down a toilet or pour down a drain unless you are told to do so. Check with your pharmacist if you have questions about the best way to throw out drugs. There may be drug take-back programs in your area.  General drug facts   If your symptoms or health problems do not get better or if they become worse, call your doctor.  Do not share your drugs with others and do not take anyone else's drugs.  Some drugs may have another patient information leaflet. If you have any questions about this drug, please talk with your doctor, nurse, pharmacist, or other health care provider.  Some drugs may have another patient information leaflet. Check with your pharmacist. If you have any questions about this drug, please talk with your doctor, nurse, pharmacist, or other health care provider.  If you think there has been an overdose, call your poison control center or get medical care right away. Be ready to tell or show what was taken, how much, and when it happened.  Consumer Information Use and Disclaimer   This generalized information is a limited summary of diagnosis, treatment, and/or medication information. It is not meant to be comprehensive and should be used as a tool to help the user understand and/or assess potential diagnostic and treatment options. It does NOT include all information about conditions, treatments, medications, side effects, or risks that may apply to a specific patient. It is not intended to be medical advice or a substitute for the medical advice, diagnosis, or treatment of a health care provider based on the health care provider's examination and assessment of a patient's specific  and unique circumstances. Patients must speak with a health care provider for complete information about their health, medical questions, and treatment options, including any risks or benefits regarding use of medications. This information does not endorse any treatments or medications as safe, effective, or approved for treating a specific patient. Up[employer] and its affiliates disclaim any warranty or liability relating to this information or the use thereof. The use of this information is governed by the Terms of Use, available at https://www.[employer].com/en/know/clinical-effectiveness-terms.  Last Reviewed Date   2020-06-23  Copyright   © 2024 [employer] and its affiliates and/or licensors. All rights reserved.  Patient Education     Methylprednisolone (meth il pred NIS oh lone)   Brand Names: [place] DEPO-Medrol; Medrol; P-Care D40 [DSC]; P-Care D80 [DSC]; SOLU-Medrol   Brand Names: [place] Depo-Medrol; Medrol; Solu-MEDROL; SOLU-Medrol (PF); Uni-Med [DSC]   What is this drug used for?   It is used for many health problems like allergy signs, asthma, adrenal gland problems, blood problems, skin rashes, or swelling problems. This is not a list of all health problems that this drug may be used for. Talk with the doctor.  What do I need to tell my doctor BEFORE I take this drug?   All products:   If you are allergic to this drug; any part of this drug; or any other drugs, foods, or substances. Tell your doctor about the allergy and what signs you had.  If you have any of these health problems: A fungal infection or a malaria infection in the brain.  If you have a herpes infection of the eye.  If you have nerve problems in the eye.  Injection (if given in the muscle):   If you have idiopathic thrombocytopenic purpura (ITP).  This is not a list of all drugs or health problems that interact with this drug.  Tell your doctor and pharmacist about all of your drugs (prescription or OTC, natural products, vitamins)  and health problems. You must check to make sure that it is safe for you to take this drug with all of your drugs and health problems. Do not start, stop, or change the dose of any drug without checking with your doctor.  What are some things I need to know or do while I take this drug?   All products:   Tell all of your health care providers that you take this drug. This includes your doctors, nurses, pharmacists, and dentists.  This drug may affect allergy skin tests. Be sure your doctor and lab workers know you take this drug.  You may have more chance of getting an infection. Wash hands often. Stay away from people with infections, colds, or flu.  Call your doctor right away if you have any signs of infection like fever, chills, flu-like signs, very bad sore throat, ear or sinus pain, cough, more sputum or change in color of sputum, pain with passing urine, mouth sores, or a wound that will not heal.  Chickenpox and measles can be very bad or even deadly in some people taking steroid drugs like this drug. Avoid being near anyone with chickenpox or measles if you have not had these health problems before. If you have been exposed to chickenpox or measles, talk with your doctor.  This drug lowers how much natural steroid your body makes. Tell your doctor if you have fever, infection, surgery, or injury. Your body's normal response to these stresses may be affected. You may need extra doses of steroid.  Long-term use may raise the chance of cataracts or glaucoma. Talk with the doctor.  This drug may cause weak bones (osteoporosis) with long-term use. Talk with your doctor to see if you have a higher chance of weak bones or if you have any questions.  Talk with your doctor before getting any vaccines. Use of some vaccines with this drug may either raise the chance of an infection or make the vaccine not work as well.  If you have high blood sugar (diabetes), you will need to watch your blood sugar closely.  Talk  with your doctor before you drink alcohol.  Liver problems have rarely happened with this drug. Sometimes, this has been deadly. Call your doctor right away if you have signs of liver problems like dark urine, tiredness, decreased appetite, upset stomach or stomach pain, light-colored stools, throwing up, or yellow skin or eyes.  Patients with cancer may be at greater risk of getting a bad and sometimes deadly health problem called tumor lysis syndrome (TLS). Talk with the doctor.  If you are 65 or older, use this drug with care. You could have more side effects.  This drug may affect growth in children and teens in some cases. They may need regular growth checks. Talk with the doctor.  Tell your doctor if you are pregnant, plan on getting pregnant, or are breast-feeding. You will need to talk about the benefits and risks to you and the baby.  Injection:   Very bad health problems have happened when drugs like this one have been given into the spine (epidural). These include paralysis, loss of eyesight, stroke, and sometimes death. It is not known if drugs like this one are safe and effective when given into the spine. These drugs are not approved for this use. Talk with the doctor.  Some products have benzyl alcohol. If possible, avoid products with benzyl alcohol in newborns or infants. Serious side effects can happen in these children with some doses of benzyl alcohol, including if given with other drugs that have benzyl alcohol. Talk with the doctor to see if this product has benzyl alcohol in it.  What are some side effects that I need to call my doctor about right away?   WARNING/CAUTION: Even though it may be rare, some people may have very bad and sometimes deadly side effects when taking a drug. Tell your doctor or get medical help right away if you have any of the following signs or symptoms that may be related to a very bad side effect:  Signs of an allergic reaction, like rash; hives; itching; red,  swollen, blistered, or peeling skin with or without fever; wheezing; tightness in the chest or throat; trouble breathing, swallowing, or talking; unusual hoarseness; or swelling of the mouth, face, lips, tongue, or throat.  Signs of high blood sugar like confusion, feeling sleepy, unusual thirst or hunger, passing urine more often, flushing, fast breathing, or breath that smells like fruit.  Signs of Cushing's disease like weight gain in the upper back or belly, moon face, very bad headache, or slow healing.  Signs of a weak adrenal gland like a severe upset stomach or throwing up, severe dizziness or passing out, muscle weakness, feeling very tired, mood changes, decreased appetite, or weight loss.  Signs of low potassium levels like muscle pain or weakness, muscle cramps, or a heartbeat that does not feel normal.  Signs of a pancreas problem (pancreatitis) like very bad stomach pain, very bad back pain, or very bad upset stomach or throwing up.  Signs of high blood pressure like very bad headache or dizziness, passing out, or change in eyesight.  Shortness of breath, a big weight gain, or swelling in the arms or legs.  Not able to pass urine or change in how much urine is passed.  Skin changes (pimples, stretch marks, slow healing, hair growth).  Chest pain or pressure.  Fast, slow, or abnormal heartbeat.  Period (menstrual) changes.  Bone or joint pain.  Change in eyesight.  Mental, mood, or behavior changes that are new or worse.  Seizures.  A burning, numbness, or tingling feeling that is not normal.  Any unexplained bruising or bleeding.  Severe stomach pain.  Black, tarry, or bloody stools.  Throwing up blood or throw up that looks like coffee grounds.  What are some other side effects of this drug?   All drugs may cause side effects. However, many people have no side effects or only have minor side effects. Call your doctor or get medical help if any of these side effects or any other side effects bother you  or do not go away:  Upset stomach or throwing up.  Trouble sleeping.  Restlessness.  Sweating a lot.  Headache.  These are not all of the side effects that may occur. If you have questions about side effects, call your doctor. Call your doctor for medical advice about side effects.  You may report side effects to your national health agency.  You may report side effects to the FDA at 1-794.633.6955. You may also report side effects at https://www.fda.gov/medwatch.  How is this drug best taken?   Use this drug as ordered by your doctor. Read all information given to you. Follow all instructions closely.  Tablets:   Take in the morning if taking once a day.  Take with food.  Keep taking this drug as you have been told by your doctor or other health care provider, even if you feel well.  Injection:   It is given as a shot.  All products:   Tell your doctor if you have missed a dose or recently stopped this drug and you feel very tired, weak, or shaky, or have a fast heartbeat, confusion, sweating, or dizziness.  If you have been taking this drug for many weeks, talk with your doctor before stopping. You may want to slowly stop this drug.  Have your blood work checked as you have been told by your doctor. You may also need to have your eye pressure and bone density checked if you take this drug for a long time.  You may need to lower how much salt is in your diet and take extra potassium. Talk with your doctor.  What do I do if I miss a dose?   Tablets:   Take a missed dose as soon as you think about it.  If it is close to the time for your next dose, skip the missed dose and go back to your normal time.  Do not take 2 doses at the same time or extra doses.  Injection:   Call your doctor to find out what to do.  How do I store and/or throw out this drug?   Tablets:   Store at room temperature in a dry place. Do not store in a bathroom.  Injection:   If you need to store this drug at home, talk with your doctor, nurse,  or pharmacist about how to store it.  All products:   Keep all drugs in a safe place. Keep all drugs out of the reach of children and pets.  Throw away unused or  drugs. Do not flush down a toilet or pour down a drain unless you are told to do so. Check with your pharmacist if you have questions about the best way to throw out drugs. There may be drug take-back programs in your area.  General drug facts   If your symptoms or health problems do not get better or if they become worse, call your doctor.  Do not share your drugs with others and do not take anyone else's drugs.  Some drugs may have another patient information leaflet. If you have any questions about this drug, please talk with your doctor, nurse, pharmacist, or other health care provider.  Some drugs may have another patient information leaflet. Check with your pharmacist. If you have any questions about this drug, please talk with your doctor, nurse, pharmacist, or other health care provider.  If you think there has been an overdose, call your poison control center or get medical care right away. Be ready to tell or show what was taken, how much, and when it happened.  Consumer Information Use and Disclaimer   This generalized information is a limited summary of diagnosis, treatment, and/or medication information. It is not meant to be comprehensive and should be used as a tool to help the user understand and/or assess potential diagnostic and treatment options. It does NOT include all information about conditions, treatments, medications, side effects, or risks that may apply to a specific patient. It is not intended to be medical advice or a substitute for the medical advice, diagnosis, or treatment of a health care provider based on the health care provider's examination and assessment of a patient's specific and unique circumstances. Patients must speak with a health care provider for complete information about their health, medical  questions, and treatment options, including any risks or benefits regarding use of medications. This information does not endorse any treatments or medications as safe, effective, or approved for treating a specific patient. Up[employer], Inc[employer] and its affiliates disclaim any warranty or liability relating to this information or the use thereof. The use of this information is governed by the Terms of Use, available at https://www.[employer].com/en/know/clinical-effectiveness-terms.  Last Reviewed Date   2024-01-26  Copyright   © 2024 [employer], Inc. and its affiliates and/or licensors. All rights reserved.  Adult Patient Counseling Points     Azithromycin (Systemic)   What is this drug used for?   It is used to treat or prevent bacterial infections.  All drugs may cause side effects. However, many people have no side effects or only have minor side effects. Call your doctor or get medical help if any of these side effects or any other side effects bother you or do not go away:   All products:   Headache  Stomach pain or diarrhea  Upset stomach or throwing up  Injection:   Pain where the shot was given  WARNING/CAUTION: Even though it may be rare, some people may have very bad and sometimes deadly side effects when taking a drug. Tell your doctor or get medical help right away if you have any of the following signs or symptoms that may be related to a very bad side effect:   Myasthenia gravis like new or worse muscle weakness, trouble chewing or swallowing, trouble breathing, droopy eyelids, or change in eyesight like blurred eyesight or seeing double  Chest pain or pressure, a fast heartbeat, or an abnormal heartbeat  Dizziness or passing out  Change in hearing  Fever  Swollen gland  Change in eyesight  C diff-associated diarrhea (CDAD) like stomach pain, cramps, or very loose, watery, or bloody stools  Liver problems like dark urine, feeling tired, not hungry, upset stomach or stomach pain, light-colored stools,  throwing up, or yellow skin or eyes  Bermeo-Roel syndrome/toxic epidermal necrolysis like red, swollen, blistered, or peeling skin (with or without fever); red or irritated eyes; or sores in your mouth, throat, nose, or eyes  Signs of an allergic reaction, like rash; hives; itching; red, swollen, blistered, or peeling skin with or without fever; wheezing; tightness in the chest or throat; trouble breathing, swallowing, or talking; unusual hoarseness; or swelling of the mouth, face, lips, tongue, or throat.  Note: This is not a comprehensive list of all side effects. Talk to your doctor if you have questions.  Consumer Information Use and Disclaimer: This information should not be used to decide whether or not to take this medicine or any other medicine. Only the healthcare provider has the knowledge and training to decide which medicines are right for a specific patient. This information does not endorse any medicine as safe, effective, or approved for treating any patient or health condition. This is only a limited summary of general information about the medicine's uses from the patient education leaflet and is not intended to be comprehensive. This limited summary does NOT include all information available about the possible uses, directions, warnings, precautions, interactions, adverse effects, or risks that may apply to this medicine. This information is not intended to provide medical advice, diagnosis or treatment and does not replace information you receive from the healthcare provider. For a more detailed summary of information about the risks and benefits of using this medicine, please speak with your healthcare provider and review the entire patient education leaflet.  Copyright   © 2024 [employer] and its affiliates and/or licensors. All rights reserved.  Last Reviewed Date   2023-11-17

## 2024-11-26 DIAGNOSIS — I10 ESSENTIAL HYPERTENSION: ICD-10-CM

## 2024-11-27 RX ORDER — AMLODIPINE BESYLATE 2.5 MG/1
2.5 TABLET ORAL DAILY
Qty: 90 TABLET | Refills: 1 | Status: SHIPPED | OUTPATIENT
Start: 2024-11-27

## 2024-12-12 ENCOUNTER — OFFICE VISIT (OUTPATIENT)
Age: 74
End: 2024-12-12
Payer: COMMERCIAL

## 2024-12-12 VITALS
BODY MASS INDEX: 29.29 KG/M2 | SYSTOLIC BLOOD PRESSURE: 112 MMHG | DIASTOLIC BLOOD PRESSURE: 80 MMHG | RESPIRATION RATE: 18 BRPM | WEIGHT: 221 LBS | HEART RATE: 72 BPM | HEIGHT: 73 IN

## 2024-12-12 DIAGNOSIS — M79.672 LEFT FOOT PAIN: ICD-10-CM

## 2024-12-12 DIAGNOSIS — B35.1 ONYCHOMYCOSIS: ICD-10-CM

## 2024-12-12 DIAGNOSIS — M72.2 PLANTAR FASCIITIS: Primary | ICD-10-CM

## 2024-12-12 DIAGNOSIS — M76.62 ACHILLES TENDINITIS OF LEFT LOWER EXTREMITY: ICD-10-CM

## 2024-12-12 PROCEDURE — 99213 OFFICE O/P EST LOW 20 MIN: CPT | Performed by: PODIATRIST

## 2024-12-12 NOTE — PROGRESS NOTES
Assessment/Plan: Achilles tendinitis left lower extremity.  Pain.  Planter fasciitis.  Heel spur syndrome.  Mycosis of nail.     Plan.  PCP notes reviewed.  Foot exam performed.  Chart reviewed.  X-rays taken and reviewed with patient.  At this time physical therapy will be considered to help with patient's symptoms and condition.  Patient can stretch daily.  Tylenol as needed.  Proper shoe style and fitting advice given.  Return for follow-up.  In addition patient will take Aleve as needed.  Aftercare instruction given.  Return for follow-up.  Patient will consider vitamin B 5.  In addition he is advised to spray shoes with Lysol periodically.            Diagnoses and all orders for this visit:     Plantar fasciitis     Left foot pain     Achilles tendinitis of left lower extremity     Onychomycosis            Subjective: Patient has pain in his left heel.  Patient has history of Achilles tendinitis and partial tear that was treated 2 or 3 years ago by immobilization.  He now has return of pain in the left heel.  No history of trauma.  He also gets pain in his toes with ambulation and shoewear               Allergies   Allergen Reactions    Augmentin [Amoxicillin-Pot Clavulanate] GI Intolerance    Meperidine Other (See Comments)       Reaction Date: 05Feb2005; Annotation - [id number]: unsure of side effect  Reaction Date: 05Feb2005; Annotation - [id number]: unsure of side effect            Current Outpatient Medications:     acetaminophen (TYLENOL) 500 mg tablet, Take 500 mg by mouth as needed, Disp: , Rfl:     amLODIPine (NORVASC) 2.5 mg tablet, TAKE 1 TABLET BY MOUTH  DAILY, Disp: 90 tablet, Rfl: 3    aspirin 81 mg chewable tablet, Chew 81 mg daily, Disp: , Rfl:     atorvastatin (LIPITOR) 10 mg tablet, Take 1 tablet (10 mg total) by mouth daily, Disp: 90 tablet, Rfl: 1    famotidine (PEPCID) 20 mg tablet, Take 1 tablet (20 mg total) by mouth 2 (two) times a day, Disp: 60 tablet, Rfl: 0    methylPREDNISolone 4 MG  tablet therapy pack, Use as directed on package (Patient not taking: Reported on 2/10/2023), Disp: 21 tablet, Rfl: 0    Multiple Vitamin (MULTIVITAMINS PO), Take 1 capsule by mouth daily, Disp: , Rfl:     quinapril (ACCUPRIL) 10 mg tablet, TAKE 1 TABLET BY MOUTH  EVERY 12 HOURS, Disp: 180 tablet, Rfl: 3           Patient Active Problem List   Diagnosis    Actinic keratosis    History of prostate cancer    Anemia    Benign essential hypertension    Disc degeneration, lumbar    Fatty liver disease, nonalcoholic    Herpes simplex infection    Mixed hyperlipidemia    Organic impotence    Lower extremity edema    Groin mass    S/P prostatectomy    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(HCC)    Periumbilical abdominal pain    Cervicalgia    Cervical radiculopathy    Adenocarcinoma of prostate (HCC)             Patient ID: Gennaro Worthington is a 74 y.o. male.     HPI     The following portions of the patient's history were reviewed and updated as appropriate:      family history includes Hypertension in his mother; Lung cancer in his family; Prostate cancer in his family.       reports that he has never smoked. He has never used smokeless tobacco. He reports current alcohol use. He reports that he does not use drugs.      Objective:  Patient's shoes and socks removed.         Foot Exam     General  General Appearance: appears stated age and healthy   Orientation: alert and oriented to person, place, and time   Affect: appropriate   Gait: antalgic         Right Foot/Ankle      Inspection and Palpation  Tenderness: bony tenderness   Swelling: dorsum   Arch: pes planus  Hammertoes: fifth toe  Skin Exam: dry skin;      Neurovascular  Dorsalis pedis: 2+  Posterior tibial: 2+        Left Foot/Ankle       Inspection and Palpation  Tenderness: bony tenderness, plantar fascia and calcaneus tenderness   Swelling: dorsum   Arch: pes planus  Hammertoes: fifth toe  Skin Exam: dry skin;      Neurovascular  Dorsalis pedis: 2+  Posterior tibial:  2+           Physical Exam  Vitals and nursing note reviewed.   Constitutional:       Appearance: Normal appearance.   Cardiovascular:      Rate and Rhythm: Normal rate and regular rhythm.      Pulses:           Dorsalis pedis pulses are 2+ on the right side and 2+ on the left side.        Posterior tibial pulses are 2+ on the right side and 2+ on the left side.   Musculoskeletal:      Right foot: Bony tenderness present.      Left foot: Bony tenderness present.   Feet:      Right foot:      Skin integrity: Dry skin present.      Left foot:      Skin integrity: Dry skin present.      Comments: Patient is pronated in stance and gait.  Pain with palpation plantar fashion insertion left foot.  Patient has equinus deformity bilateral.  There is palpable thickening of the left Achilles tendon insertion.  X-ray demonstrates plantar calcaneal spur as well as early retrocalcaneal spurring.  Skin:     Capillary Refill: Capillary refill takes less than 2 seconds.   Neurological:      Mental Status: He is alert.   Psychiatric:         Mood and Affect: Mood normal.         Behavior: Behavior normal.         Thought Content: Thought content normal.         Judgment: Judgment normal.

## 2025-01-09 ENCOUNTER — RA CDI HCC (OUTPATIENT)
Dept: OTHER | Facility: HOSPITAL | Age: 75
End: 2025-01-09

## 2025-01-16 ENCOUNTER — APPOINTMENT (OUTPATIENT)
Dept: LAB | Facility: CLINIC | Age: 75
End: 2025-01-16
Payer: COMMERCIAL

## 2025-01-16 ENCOUNTER — OFFICE VISIT (OUTPATIENT)
Dept: FAMILY MEDICINE CLINIC | Facility: CLINIC | Age: 75
End: 2025-01-16
Payer: COMMERCIAL

## 2025-01-16 VITALS
DIASTOLIC BLOOD PRESSURE: 80 MMHG | HEART RATE: 70 BPM | SYSTOLIC BLOOD PRESSURE: 120 MMHG | WEIGHT: 228 LBS | RESPIRATION RATE: 16 BRPM | BODY MASS INDEX: 30.22 KG/M2 | TEMPERATURE: 97 F | HEIGHT: 73 IN

## 2025-01-16 DIAGNOSIS — Z87.19 HISTORY OF VENTRAL HERNIA REPAIR: ICD-10-CM

## 2025-01-16 DIAGNOSIS — E78.2 MIXED HYPERLIPIDEMIA: ICD-10-CM

## 2025-01-16 DIAGNOSIS — Z85.46 HISTORY OF PROSTATE CANCER: ICD-10-CM

## 2025-01-16 DIAGNOSIS — I10 BENIGN ESSENTIAL HYPERTENSION: Primary | ICD-10-CM

## 2025-01-16 DIAGNOSIS — Z98.890 HISTORY OF VENTRAL HERNIA REPAIR: ICD-10-CM

## 2025-01-16 DIAGNOSIS — I10 BENIGN ESSENTIAL HYPERTENSION: ICD-10-CM

## 2025-01-16 PROBLEM — F33.9 DEPRESSION, RECURRENT (HCC): Status: RESOLVED | Noted: 2023-08-10 | Resolved: 2025-01-16

## 2025-01-16 PROBLEM — I72.0 PSEUDOANEURYSM OF CAROTID ARTERY (HCC): Status: RESOLVED | Noted: 2019-09-19 | Resolved: 2025-01-16

## 2025-01-16 PROBLEM — C61 ADENOCARCINOMA OF PROSTATE (HCC): Status: RESOLVED | Noted: 2022-09-23 | Resolved: 2025-01-16

## 2025-01-16 PROCEDURE — 80053 COMPREHEN METABOLIC PANEL: CPT

## 2025-01-16 PROCEDURE — 36415 COLL VENOUS BLD VENIPUNCTURE: CPT

## 2025-01-16 PROCEDURE — 99214 OFFICE O/P EST MOD 30 MIN: CPT | Performed by: NURSE PRACTITIONER

## 2025-01-16 NOTE — PROGRESS NOTES
"Name: Gennaro Worthington      : 1950      MRN: [id number]  Encounter Provider: NEHAL Crum  Encounter Date: 2025   Encounter department: [facility]  :  Chief Complaint   Patient presents with   • Follow-up     6 month follow up cmavp        Assessment & Plan  Benign essential hypertension  stable  Orders:  •  Comprehensive metabolic panel; Future    History of prostate cancer    Orders:  •  Ambulatory Referral to Urology; Future    History of ventral hernia repair    Orders:  •  Ambulatory Referral to General Surgery; Future    Mixed hyperlipidemia  Complaint with statin and tolerating it well              History of Present Illness     Patient is here for follow up on chronic conditions  Complaint with medications and tolerating it well  Denies any depression concerns  Stated that wants to see urologist for routine exams and referral provided  Noted lump close to umbilical and had repair of ventral hernia in past      Review of Systems   Constitutional:  Negative for appetite change, chills, fever and unexpected weight change.   HENT: Negative.     Respiratory: Negative.     Cardiovascular: Negative.    Gastrointestinal:  Negative for abdominal pain, anal bleeding, blood in stool, constipation, diarrhea, nausea, rectal pain and vomiting.        As noted in HPI       Skin: Negative.    Neurological: Negative.    Psychiatric/Behavioral: Negative.         Objective   /80   Pulse 70   Temp (!) 97 °F (36.1 °C)   Resp 16   Ht 6' 1\" (1.854 m)   Wt 103 kg (228 lb)   BMI 30.08 kg/m²      Physical Exam  HENT:      Head: Normocephalic.   Eyes:      Conjunctiva/sclera: Conjunctivae normal.   Cardiovascular:      Rate and Rhythm: Normal rate and regular rhythm.      Pulses: Normal pulses.      Heart sounds: Normal heart sounds.   Pulmonary:      Effort: Pulmonary effort is normal.      Breath sounds: Normal breath sounds.   Abdominal:       Skin:     General: Skin is warm and dry. "   Neurological:      Mental Status: He is alert and oriented to person, place, and time.   Psychiatric:         Mood and Affect: Mood normal.         Behavior: Behavior normal.         Thought Content: Thought content normal.         Judgment: Judgment normal.

## 2025-01-17 ENCOUNTER — RESULTS FOLLOW-UP (OUTPATIENT)
Dept: FAMILY MEDICINE CLINIC | Facility: CLINIC | Age: 75
End: 2025-01-17

## 2025-01-17 LAB
ALBUMIN SERPL BCG-MCNC: 4.4 G/DL (ref 3.5–5)
ALP SERPL-CCNC: 30 U/L (ref 34–104)
ALT SERPL W P-5'-P-CCNC: 19 U/L (ref 7–52)
ANION GAP SERPL CALCULATED.3IONS-SCNC: 9 MMOL/L (ref 4–13)
AST SERPL W P-5'-P-CCNC: 23 U/L (ref 13–39)
BILIRUB SERPL-MCNC: 0.6 MG/DL (ref 0.2–1)
BUN SERPL-MCNC: 16 MG/DL (ref 5–25)
CALCIUM SERPL-MCNC: 8.4 MG/DL (ref 8.4–10.2)
CHLORIDE SERPL-SCNC: 103 MMOL/L (ref 96–108)
CO2 SERPL-SCNC: 28 MMOL/L (ref 21–32)
CREAT SERPL-MCNC: 0.8 MG/DL (ref 0.6–1.3)
GFR SERPL CREATININE-BSD FRML MDRD: 88 ML/MIN/1.73SQ M
GLUCOSE SERPL-MCNC: 90 MG/DL (ref 65–140)
POTASSIUM SERPL-SCNC: 3.9 MMOL/L (ref 3.5–5.3)
PROT SERPL-MCNC: 7.7 G/DL (ref 6.4–8.4)
SODIUM SERPL-SCNC: 140 MMOL/L (ref 135–147)

## 2025-02-01 DIAGNOSIS — E78.2 MIXED HYPERLIPIDEMIA: ICD-10-CM

## 2025-02-01 RX ORDER — ATORVASTATIN CALCIUM 10 MG/1
10 TABLET, FILM COATED ORAL DAILY
Qty: 90 TABLET | Refills: 3 | Status: SHIPPED | OUTPATIENT
Start: 2025-02-01

## 2025-02-20 ENCOUNTER — OFFICE VISIT (OUTPATIENT)
Age: 75
End: 2025-02-20
Payer: COMMERCIAL

## 2025-02-20 VITALS — RESPIRATION RATE: 17 BRPM | BODY MASS INDEX: 30.22 KG/M2 | HEIGHT: 73 IN | WEIGHT: 228 LBS

## 2025-02-20 DIAGNOSIS — M79.672 LEFT FOOT PAIN: ICD-10-CM

## 2025-02-20 DIAGNOSIS — M72.2 PLANTAR FASCIITIS: Primary | ICD-10-CM

## 2025-02-20 DIAGNOSIS — M76.62 ACHILLES TENDINITIS OF LEFT LOWER EXTREMITY: ICD-10-CM

## 2025-02-20 DIAGNOSIS — M25.572 ARTHRALGIA OF LEFT FOOT: ICD-10-CM

## 2025-02-20 DIAGNOSIS — B35.1 ONYCHOMYCOSIS: ICD-10-CM

## 2025-02-20 PROCEDURE — 99213 OFFICE O/P EST LOW 20 MIN: CPT | Performed by: PODIATRIST

## 2025-02-20 NOTE — PROGRESS NOTES
Assessment/Plan: Achilles tendinitis left lower extremity.  Pain.  Planter fasciitis.  Heel spur syndrome.  Mycosis of nail.     Plan.  PCP notes reviewed.  Foot exam performed.  Chart reviewed.  X-rays taken and reviewed with patient.  At this time physical therapy will be considered to help with patient's symptoms and condition.  Patient can stretch daily.  Tylenol as needed.  Proper shoe style and fitting advice given.  Return for follow-up.  In addition patient will take Aleve as needed.  Aftercare instruction given.  Return for follow-up.  Patient will consider vitamin B 5.  In addition he is advised to spray shoes with Lysol periodically.     Vies and treatment of arthralgia of the joint.  He will try Advil or Aleve as needed.  If pain increases or continues, we will consider arthrocentesis left first MPJ      Diagnoses and all orders for this visit:     Plantar fasciitis     Left foot pain     Achilles tendinitis of left lower extremity     Onychomycosis    Arthralgia left first MPJ.            Subjective: Patient has pain in his left heel.  Patient has history of Achilles tendinitis and partial tear that was treated 2 or 3 years ago by immobilization.  He now has return of pain in the left heel.  No history of trauma.  He also gets pain in his toes with ambulation and shoewear               Allergies   Allergen Reactions    Augmentin [Amoxicillin-Pot Clavulanate] GI Intolerance    Meperidine Other (See Comments)       Reaction Date: 05Feb2005; Annotation - [id number]: unsure of side effect  Reaction Date: 05Feb2005; Annotation - [id number]: unsure of side effect            Current Outpatient Medications:     acetaminophen (TYLENOL) 500 mg tablet, Take 500 mg by mouth as needed, Disp: , Rfl:     amLODIPine (NORVASC) 2.5 mg tablet, TAKE 1 TABLET BY MOUTH  DAILY, Disp: 90 tablet, Rfl: 3    aspirin 81 mg chewable tablet, Chew 81 mg daily, Disp: , Rfl:     atorvastatin (LIPITOR) 10 mg tablet, Take 1 tablet (10 mg  total) by mouth daily, Disp: 90 tablet, Rfl: 1    famotidine (PEPCID) 20 mg tablet, Take 1 tablet (20 mg total) by mouth 2 (two) times a day, Disp: 60 tablet, Rfl: 0    methylPREDNISolone 4 MG tablet therapy pack, Use as directed on package (Patient not taking: Reported on 2/10/2023), Disp: 21 tablet, Rfl: 0    Multiple Vitamin (MULTIVITAMINS PO), Take 1 capsule by mouth daily, Disp: , Rfl:     quinapril (ACCUPRIL) 10 mg tablet, TAKE 1 TABLET BY MOUTH  EVERY 12 HOURS, Disp: 180 tablet, Rfl: 3           Patient Active Problem List   Diagnosis    Actinic keratosis    History of prostate cancer    Anemia    Benign essential hypertension    Disc degeneration, lumbar    Fatty liver disease, nonalcoholic    Herpes simplex infection    Mixed hyperlipidemia    Organic impotence    Lower extremity edema    Groin mass    S/P prostatectomy    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(HCC)    Periumbilical abdominal pain    Cervicalgia    Cervical radiculopathy    Adenocarcinoma of prostate (HCC)             Patient ID: Gennaro Worthington is a 74 y.o. male.     HPI     The following portions of the patient's history were reviewed and updated as appropriate:      family history includes Hypertension in his mother; Lung cancer in his family; Prostate cancer in his family.       reports that he has never smoked. He has never used smokeless tobacco. He reports current alcohol use. He reports that he does not use drugs.      Objective:  Patient's shoes and socks removed.         Foot Exam     General  General Appearance: appears stated age and healthy   Orientation: alert and oriented to person, place, and time   Affect: appropriate   Gait: antalgic         Right Foot/Ankle      Inspection and Palpation  Tenderness: bony tenderness   Swelling: dorsum   Arch: pes planus  Hammertoes: fifth toe  Skin Exam: dry skin;      Neurovascular  Dorsalis pedis: 2+  Posterior tibial: 2+        Left Foot/Ankle       Inspection and Palpation  Tenderness: bony  tenderness, plantar fascia and calcaneus tenderness   Swelling: dorsum   Arch: pes planus  Hammertoes: fifth toe  Skin Exam: dry skin;      Neurovascular  Dorsalis pedis: 2+  Posterior tibial: 2+           Physical Exam  Vitals and nursing note reviewed.   Constitutional:       Appearance: Normal appearance.   Cardiovascular:      Rate and Rhythm: Normal rate and regular rhythm.      Pulses:           Dorsalis pedis pulses are 2+ on the right side and 2+ on the left side.        Posterior tibial pulses are 2+ on the right side and 2+ on the left side.   Musculoskeletal:      Right foot: Bony tenderness present.      Left foot: Bony tenderness present.   Feet:      Right foot:      Skin integrity: Dry skin present.      Left foot:      Skin integrity: Dry skin present.      Comments: Patient is pronated in stance and gait.  Pain with palpation plantar fashion insertion left foot.  Patient has equinus deformity bilateral.  There is palpable thickening of the left Achilles tendon insertion.  X-ray demonstrates plantar calcaneal spur as well as early retrocalcaneal spurring.  Skin:     Capillary Refill: Capillary refill takes less than 2 seconds.   Neurological:      Mental Status: He is alert.   Psychiatric:         Mood and Affect: Mood normal.         Behavior: Behavior normal.         Thought Content: Thought content normal.         Judgment: Judgment normal.

## 2025-03-24 ENCOUNTER — OFFICE VISIT (OUTPATIENT)
Dept: FAMILY MEDICINE CLINIC | Facility: CLINIC | Age: 75
End: 2025-03-24
Payer: COMMERCIAL

## 2025-03-24 VITALS
WEIGHT: 226 LBS | HEART RATE: 79 BPM | OXYGEN SATURATION: 94 % | DIASTOLIC BLOOD PRESSURE: 78 MMHG | SYSTOLIC BLOOD PRESSURE: 118 MMHG | HEIGHT: 73 IN | RESPIRATION RATE: 18 BRPM | TEMPERATURE: 97.9 F | BODY MASS INDEX: 29.95 KG/M2

## 2025-03-24 DIAGNOSIS — J06.9 ACUTE URI: Primary | ICD-10-CM

## 2025-03-24 DIAGNOSIS — I10 BENIGN ESSENTIAL HYPERTENSION: ICD-10-CM

## 2025-03-24 DIAGNOSIS — E78.2 MIXED HYPERLIPIDEMIA: ICD-10-CM

## 2025-03-24 PROCEDURE — G2211 COMPLEX E/M VISIT ADD ON: HCPCS | Performed by: NURSE PRACTITIONER

## 2025-03-24 PROCEDURE — 99214 OFFICE O/P EST MOD 30 MIN: CPT | Performed by: NURSE PRACTITIONER

## 2025-03-24 RX ORDER — BENZONATATE 200 MG/1
200 CAPSULE ORAL 3 TIMES DAILY PRN
Qty: 30 CAPSULE | Refills: 0 | Status: SHIPPED | OUTPATIENT
Start: 2025-03-24

## 2025-03-24 NOTE — PATIENT INSTRUCTIONS
Patient Education     Benzonatate (sonia driver)   Brand Names: US Tessalon Perles [DSC]   What is this drug used for?   It is used to relieve coughing.  What do I need to tell my doctor BEFORE I take this drug?   If you are allergic to this drug; any part of this drug; or any other drugs, foods, or substances. Tell your doctor about the allergy and what signs you had.  This drug may interact with other drugs or health problems.  Tell your doctor and pharmacist about all of your drugs (prescription or OTC, natural products, vitamins) and health problems. You must check to make sure that it is safe for you to take this drug with all of your drugs and health problems. Do not start, stop, or change the dose of any drug without checking with your doctor.  What are some things I need to know or do while I take this drug?   Tell all of your health care providers that you take this drug. This includes your doctors, nurses, pharmacists, and dentists.  Avoid driving and doing other tasks or actions that call for you to be alert until you see how this drug affects you.  Keep this drug away from children. Accidental overdose and death have happened in children younger than 10 years old who took this drug on accident. Overdose signs may include restlessness, tremors, seizures, passing out, or heart attack. If a child takes this drug on accident, get medical help right away.  Tell your doctor if you are pregnant, plan on getting pregnant, or are breast-feeding. You will need to talk about the benefits and risks to you and the baby.  What are some side effects that I need to call my doctor about right away?   WARNING/CAUTION: Even though it may be rare, some people may have very bad and sometimes deadly side effects when taking a drug. Tell your doctor or get medical help right away if you have any of the following signs or symptoms that may be related to a very bad side effect:  Signs of an allergic reaction, like rash;  hives; itching; red, swollen, blistered, or peeling skin with or without fever; wheezing; tightness in the chest or throat; trouble breathing, swallowing, or talking; unusual hoarseness; or swelling of the mouth, face, lips, tongue, or throat.  Change in the way you act.  Feeling confused.  Hallucinations (seeing or hearing things that are not there).  Numbness and tingling of the mouth, tongue, throat, and face have happened when this drug was broken, crushed, chewed, dissolved, or sucked before taking. Choking and very bad allergic reactions have also happened. If numbness or tingling happens, do not eat or drink until it goes away. If you have an allergic reaction or if numbness or tingling lasts or gets worse, call your doctor right away.  What are some other side effects of this drug?   All drugs may cause side effects. However, many people have no side effects or only have minor side effects. Call your doctor or get medical help if any of these side effects or any other side effects bother you or do not go away:  Constipation.  Dizziness or headache.  Feeling sleepy.  Stuffy nose.  Upset stomach.  These are not all of the side effects that may occur. If you have questions about side effects, call your doctor. Call your doctor for medical advice about side effects.  You may report side effects to your national health agency.  You may report side effects to the FDA at 1-948.833.4850. You may also report side effects at https://www.fda.gov/medwatch.  How is this drug best taken?   Use this drug as ordered by your doctor. Read all information given to you. Follow all instructions closely.  Swallow whole. Do not chew, break, open, or dissolve.  Do not suck on this product.  Drink lots of noncaffeine liquids unless told to drink less liquid by your doctor.  What do I do if I miss a dose?   If you take this drug on a regular basis, take a missed dose as soon as you think about it.  If it is close to the time for your  next dose, skip the missed dose and go back to your normal time.  Do not take 2 doses at the same time or extra doses.  Many times this drug is taken on an as needed basis. Do not take more often than told by the doctor.  How do I store and/or throw out this drug?   Store at room temperature protected from light. Store in a dry place. Do not store in a bathroom.  Keep all drugs in a safe place. Keep all drugs out of the reach of children and pets.  Throw away unused or  drugs. Do not flush down a toilet or pour down a drain unless you are told to do so. Check with your pharmacist if you have questions about the best way to throw out drugs. There may be drug take-back programs in your area.  General drug facts   If your symptoms or health problems do not get better or if they become worse, call your doctor.  Do not share your drugs with others and do not take anyone else's drugs.  Some drugs may have another patient information leaflet. If you have any questions about this drug, please talk with your doctor, nurse, pharmacist, or other health care provider.  Some drugs may have another patient information leaflet. Check with your pharmacist. If you have any questions about this drug, please talk with your doctor, nurse, pharmacist, or other health care provider.  If you think there has been an overdose, call your poison control center or get medical care right away. Be ready to tell or show what was taken, how much, and when it happened.  Consumer Information Use and Disclaimer   This generalized information is a limited summary of diagnosis, treatment, and/or medication information. It is not meant to be comprehensive and should be used as a tool to help the user understand and/or assess potential diagnostic and treatment options. It does NOT include all information about conditions, treatments, medications, side effects, or risks that may apply to a specific patient. It is not intended to be medical advice  or a substitute for the medical advice, diagnosis, or treatment of a health care provider based on the health care provider's examination and assessment of a patient's specific and unique circumstances. Patients must speak with a health care provider for complete information about their health, medical questions, and treatment options, including any risks or benefits regarding use of medications. This information does not endorse any treatments or medications as safe, effective, or approved for treating a specific patient. [employer] and its affiliates disclaim any warranty or liability relating to this information or the use thereof. The use of this information is governed by the Terms of Use, available at https://www.[employer].com/en/know/clinical-effectiveness-terms.  Last Reviewed Date   2023-02-22  Copyright   © 2024 [employer], Inc[employer] and its affiliates and/or licensors. All rights reserved.

## 2025-03-24 NOTE — PROGRESS NOTES
"Name: Gennaro Worthington      : 1950      MRN: [id number]  Encounter Provider: NEHAL Crum  Encounter Date: 3/24/2025   Encounter department: [facility]  :  Chief Complaint   Patient presents with   • Cold Like Symptoms     Pt here for congestion and coughing       Assessment & Plan  Acute URI  Will start flonase nasal spray daily OTC  Supportive care discussed and advised.  Advised to RTO for any worsening and no improvement.   Follow up for no improvement and worsening of conditions.  Patient advised and educated when to see immediate medical care.    Orders:  •  benzonatate (TESSALON) 200 MG capsule; Take 1 capsule (200 mg total) by mouth 3 (three) times a day as needed for cough    Benign essential hypertension  stable       Mixed hyperlipidemia  Complaint with statin              History of Present Illness   Patient stated that started with scratchy throat couple of days ago and progressed to cough and congestion. Denies fever, chills and sob.  Complaint with medications for chronic illnesses        Review of Systems   Constitutional:  Negative for chills, diaphoresis, fatigue, fever and unexpected weight change.   HENT:  Positive for congestion and postnasal drip. Negative for dental problem, drooling, ear discharge, ear pain, facial swelling, hearing loss, mouth sores, nosebleeds, rhinorrhea, sinus pressure, sinus pain, sneezing, sore throat, tinnitus, trouble swallowing and voice change.    Respiratory:  Positive for cough. Negative for chest tightness, shortness of breath and wheezing.    Cardiovascular: Negative.    Gastrointestinal:  Negative for abdominal pain, constipation, diarrhea, nausea and vomiting.   Skin: Negative.    Neurological:  Negative for dizziness, light-headedness and headaches.       Objective   /78   Pulse 79   Temp 97.9 °F (36.6 °C) (Temporal)   Resp 18   Ht 6' 1\" (1.854 m)   Wt 103 kg (226 lb)   SpO2 94%   BMI 29.82 kg/m²      Physical " Exam  Vitals reviewed.   Constitutional:       Appearance: Normal appearance. He is well-developed.   HENT:      Head: Normocephalic.      Right Ear: Tympanic membrane, ear canal and external ear normal.      Left Ear: Tympanic membrane, ear canal and external ear normal.      Nose: Mucosal edema present.      Right Sinus: No maxillary sinus tenderness or frontal sinus tenderness.      Left Sinus: No maxillary sinus tenderness or frontal sinus tenderness.      Comments: Post nasal drip noted     Mouth/Throat:      Mouth: No oral lesions.      Pharynx: No oropharyngeal exudate or posterior oropharyngeal erythema.   Cardiovascular:      Rate and Rhythm: Normal rate and regular rhythm.      Heart sounds: Normal heart sounds.   Pulmonary:      Effort: Pulmonary effort is normal.      Breath sounds: Normal breath sounds.   Musculoskeletal:      Cervical back: Neck supple.   Lymphadenopathy:      Cervical:      Right cervical: No superficial or posterior cervical adenopathy.     Left cervical: No superficial or posterior cervical adenopathy.   Skin:     General: Skin is warm and dry.   Neurological:      Mental Status: He is alert and oriented to person, place, and time.   Psychiatric:         Behavior: Behavior normal.         Thought Content: Thought content normal.         Judgment: Judgment normal.

## 2025-03-25 DIAGNOSIS — I10 ESSENTIAL HYPERTENSION: ICD-10-CM

## 2025-03-26 RX ORDER — LISINOPRIL 5 MG/1
5 TABLET ORAL DAILY
Qty: 90 TABLET | Refills: 1 | Status: SHIPPED | OUTPATIENT
Start: 2025-03-26

## 2025-04-16 ENCOUNTER — PREP FOR PROCEDURE (OUTPATIENT)
Dept: GASTROENTEROLOGY | Facility: CLINIC | Age: 75
End: 2025-04-16

## 2025-04-16 ENCOUNTER — TELEPHONE (OUTPATIENT)
Dept: GASTROENTEROLOGY | Facility: CLINIC | Age: 75
End: 2025-04-16

## 2025-04-16 DIAGNOSIS — Z53.8 PROCEDURE NOT CARRIED OUT FOR OTHER REASONS: Primary | ICD-10-CM

## 2025-04-16 DIAGNOSIS — Z86.0100 HX OF COLONIC POLYPS: ICD-10-CM

## 2025-04-16 DIAGNOSIS — I10 ESSENTIAL HYPERTENSION: ICD-10-CM

## 2025-04-16 NOTE — TELEPHONE ENCOUNTER
04/16/25  Screened by: Glory De Los Santos MA    Referring Provider Dr Singh     Pre- Screening:     There is no height or weight on file to calculate BMI.29.80  Has patient been referred for a routine screening Colonoscopy? no  Is the patient between 45-75 years old? yes      Previous Colonoscopy yes   If yes:    Date: 5/8/24    Facility:      Reason:       SCHEDULING STAFF: If the patient is between 45yrs-49yrs, please advise patient to confirm benefits/coverage with their insurance company for a routine screening colonoscopy, some insurance carriers will only cover at 50yrs or older. If the patient is over 75years old, please schedule an office visit.     Does the patient want to see a Gastroenterologist prior to their procedure OR are they having any GI symptoms? no    Has the patient been hospitalized or had abdominal surgery in the past 6 months? no    Does the patient use supplemental oxygen? no    Does the patient take Coumadin, Lovenox, Plavix, Elliquis, Xarelto, or other blood thinning medication? no    Has the patient had a stroke, cardiac event, or stent placed in the past year? no    SCHEDULING STAFF: If patient answers NO to above questions, then schedule procedure. If patient answers YES to above questions, then schedule office appointment.     If patient is between 45yrs - 49yrs, please advise patient that we will have to confirm benefits & coverage with their insurance company for a routine screening colonoscopy.

## 2025-04-16 NOTE — TELEPHONE ENCOUNTER
Scheduled date of colonoscopy (as of today):5/28/25  Physician performing colonoscopy:Dr Singh   Location of colonoscopy:[facility]  Bowel prep reviewed with patient:katy   Instructions reviewed with patient by:sb  Clearances: none    Pt instructed to hold iron 7 days prior, instructions mailed to pt. Sb

## 2025-04-17 RX ORDER — AMLODIPINE BESYLATE 2.5 MG/1
2.5 TABLET ORAL DAILY
Qty: 90 TABLET | Refills: 1 | Status: SHIPPED | OUTPATIENT
Start: 2025-04-17

## 2025-04-20 ENCOUNTER — APPOINTMENT (EMERGENCY)
Dept: RADIOLOGY | Facility: HOSPITAL | Age: 75
DRG: 389 | End: 2025-04-20
Payer: COMMERCIAL

## 2025-04-20 ENCOUNTER — HOSPITAL ENCOUNTER (INPATIENT)
Facility: HOSPITAL | Age: 75
LOS: 4 days | Discharge: HOME | DRG: 389 | End: 2025-04-25
Attending: EMERGENCY MEDICINE | Admitting: FAMILY MEDICINE
Payer: COMMERCIAL

## 2025-04-20 DIAGNOSIS — K59.00 CONSTIPATION: ICD-10-CM

## 2025-04-20 DIAGNOSIS — R10.9 ABDOMINAL PAIN: Primary | ICD-10-CM

## 2025-04-20 DIAGNOSIS — R11.10 VOMITING: ICD-10-CM

## 2025-04-20 DIAGNOSIS — K56.609 SBO (SMALL BOWEL OBSTRUCTION) (HCC): ICD-10-CM

## 2025-04-20 LAB
ALBUMIN SERPL BCG-MCNC: 4.2 G/DL (ref 3.5–5)
ALP SERPL-CCNC: 38 U/L (ref 34–104)
ALT SERPL W P-5'-P-CCNC: 17 U/L (ref 7–52)
ANION GAP SERPL CALCULATED.3IONS-SCNC: 13 MMOL/L (ref 4–13)
APTT PPP: 25 SECONDS (ref 23–34)
AST SERPL W P-5'-P-CCNC: 20 U/L (ref 13–39)
BASOPHILS # BLD AUTO: 0.03 THOUSANDS/ÂΜL (ref 0–0.1)
BASOPHILS NFR BLD AUTO: 0 % (ref 0–1)
BILIRUB SERPL-MCNC: 1.03 MG/DL (ref 0.2–1)
BUN SERPL-MCNC: 24 MG/DL (ref 5–25)
CALCIUM SERPL-MCNC: 9 MG/DL (ref 8.4–10.2)
CARDIAC TROPONIN I PNL SERPL HS: 5 NG/L (ref ?–50)
CHLORIDE SERPL-SCNC: 103 MMOL/L (ref 96–108)
CO2 SERPL-SCNC: 22 MMOL/L (ref 21–32)
CREAT SERPL-MCNC: 0.97 MG/DL (ref 0.6–1.3)
EOSINOPHIL # BLD AUTO: 0.16 THOUSAND/ÂΜL (ref 0–0.61)
EOSINOPHIL NFR BLD AUTO: 2 % (ref 0–6)
ERYTHROCYTE [DISTWIDTH] IN BLOOD BY AUTOMATED COUNT: 12.7 % (ref 11.6–15.1)
GFR SERPL CREATININE-BSD FRML MDRD: 76 ML/MIN/1.73SQ M
GLUCOSE SERPL-MCNC: 133 MG/DL (ref 65–140)
GLUCOSE SERPL-MCNC: 133 MG/DL (ref 65–140)
HCT VFR BLD AUTO: 41.5 % (ref 36.5–49.3)
HGB BLD-MCNC: 14.2 G/DL (ref 12–17)
IMM GRANULOCYTES # BLD AUTO: 0.03 THOUSAND/UL (ref 0–0.2)
IMM GRANULOCYTES NFR BLD AUTO: 0 % (ref 0–2)
INR PPP: 1.02 (ref 0.85–1.19)
LACTATE SERPL-SCNC: 1.3 MMOL/L (ref 0.5–2)
LIPASE SERPL-CCNC: 18 U/L (ref 11–82)
LYMPHOCYTES # BLD AUTO: 2.17 THOUSANDS/ÂΜL (ref 0.6–4.47)
LYMPHOCYTES NFR BLD AUTO: 24 % (ref 14–44)
MCH RBC QN AUTO: 31.8 PG (ref 26.8–34.3)
MCHC RBC AUTO-ENTMCNC: 34.2 G/DL (ref 31.4–37.4)
MCV RBC AUTO: 93 FL (ref 82–98)
MONOCYTES # BLD AUTO: 0.85 THOUSAND/ÂΜL (ref 0.17–1.22)
MONOCYTES NFR BLD AUTO: 10 % (ref 4–12)
NEUTROPHILS # BLD AUTO: 5.68 THOUSANDS/ÂΜL (ref 1.85–7.62)
NEUTS SEG NFR BLD AUTO: 64 % (ref 43–75)
NRBC BLD AUTO-RTO: 0 /100 WBCS
PLATELET # BLD AUTO: 178 THOUSANDS/UL (ref 149–390)
PMV BLD AUTO: 9.7 FL (ref 8.9–12.7)
POTASSIUM SERPL-SCNC: 3.6 MMOL/L (ref 3.5–5.3)
PROT SERPL-MCNC: 7.5 G/DL (ref 6.4–8.4)
PROTHROMBIN TIME: 13.9 SECONDS (ref 12.3–15)
RBC # BLD AUTO: 4.47 MILLION/UL (ref 3.88–5.62)
SODIUM SERPL-SCNC: 138 MMOL/L (ref 135–147)
WBC # BLD AUTO: 8.92 THOUSAND/UL (ref 4.31–10.16)

## 2025-04-20 PROCEDURE — 74177 CT ABD & PELVIS W/CONTRAST: CPT

## 2025-04-20 PROCEDURE — 82948 REAGENT STRIP/BLOOD GLUCOSE: CPT

## 2025-04-20 PROCEDURE — 83605 ASSAY OF LACTIC ACID: CPT | Performed by: EMERGENCY MEDICINE

## 2025-04-20 PROCEDURE — 85730 THROMBOPLASTIN TIME PARTIAL: CPT | Performed by: EMERGENCY MEDICINE

## 2025-04-20 PROCEDURE — 99285 EMERGENCY DEPT VISIT HI MDM: CPT | Performed by: EMERGENCY MEDICINE

## 2025-04-20 PROCEDURE — 93005 ELECTROCARDIOGRAM TRACING: CPT

## 2025-04-20 PROCEDURE — 96374 THER/PROPH/DIAG INJ IV PUSH: CPT

## 2025-04-20 PROCEDURE — 36415 COLL VENOUS BLD VENIPUNCTURE: CPT | Performed by: EMERGENCY MEDICINE

## 2025-04-20 PROCEDURE — 80053 COMPREHEN METABOLIC PANEL: CPT | Performed by: EMERGENCY MEDICINE

## 2025-04-20 PROCEDURE — 96375 TX/PRO/DX INJ NEW DRUG ADDON: CPT

## 2025-04-20 PROCEDURE — 83690 ASSAY OF LIPASE: CPT | Performed by: EMERGENCY MEDICINE

## 2025-04-20 PROCEDURE — 85025 COMPLETE CBC W/AUTO DIFF WBC: CPT | Performed by: EMERGENCY MEDICINE

## 2025-04-20 PROCEDURE — 96361 HYDRATE IV INFUSION ADD-ON: CPT

## 2025-04-20 PROCEDURE — 99284 EMERGENCY DEPT VISIT MOD MDM: CPT

## 2025-04-20 PROCEDURE — 85610 PROTHROMBIN TIME: CPT | Performed by: EMERGENCY MEDICINE

## 2025-04-20 PROCEDURE — 84484 ASSAY OF TROPONIN QUANT: CPT | Performed by: EMERGENCY MEDICINE

## 2025-04-20 RX ORDER — ONDANSETRON 2 MG/ML
4 INJECTION INTRAMUSCULAR; INTRAVENOUS ONCE
Status: COMPLETED | OUTPATIENT
Start: 2025-04-20 | End: 2025-04-21

## 2025-04-20 RX ORDER — ONDANSETRON 2 MG/ML
4 INJECTION INTRAMUSCULAR; INTRAVENOUS ONCE
Status: COMPLETED | OUTPATIENT
Start: 2025-04-20 | End: 2025-04-20

## 2025-04-20 RX ORDER — HYDROMORPHONE HCL/PF 1 MG/ML
0.5 SYRINGE (ML) INJECTION ONCE
Refills: 0 | Status: COMPLETED | OUTPATIENT
Start: 2025-04-20 | End: 2025-04-20

## 2025-04-20 RX ADMIN — HYDROMORPHONE HYDROCHLORIDE 0.5 MG: 1 INJECTION, SOLUTION INTRAMUSCULAR; INTRAVENOUS; SUBCUTANEOUS at 23:14

## 2025-04-20 RX ADMIN — IOHEXOL 100 ML: 350 INJECTION, SOLUTION INTRAVENOUS at 22:28

## 2025-04-20 RX ADMIN — ONDANSETRON 4 MG: 2 INJECTION INTRAMUSCULAR; INTRAVENOUS at 21:54

## 2025-04-20 RX ADMIN — SODIUM CHLORIDE 1000 ML: 0.9 INJECTION, SOLUTION INTRAVENOUS at 21:45

## 2025-04-21 ENCOUNTER — APPOINTMENT (INPATIENT)
Dept: RADIOLOGY | Facility: HOSPITAL | Age: 75
DRG: 389 | End: 2025-04-21
Payer: COMMERCIAL

## 2025-04-21 PROBLEM — K56.609 SBO (SMALL BOWEL OBSTRUCTION) (HCC): Status: ACTIVE | Noted: 2025-04-21

## 2025-04-21 LAB
ALBUMIN SERPL BCG-MCNC: 3.6 G/DL (ref 3.5–5)
ALP SERPL-CCNC: 31 U/L (ref 34–104)
ALT SERPL W P-5'-P-CCNC: 16 U/L (ref 7–52)
ANION GAP SERPL CALCULATED.3IONS-SCNC: 13 MMOL/L (ref 4–13)
AST SERPL W P-5'-P-CCNC: 18 U/L (ref 13–39)
ATRIAL RATE: 67 BPM
BACTERIA UR QL AUTO: NORMAL /HPF
BASOPHILS # BLD AUTO: 0.02 THOUSANDS/ÂΜL (ref 0–0.1)
BASOPHILS NFR BLD AUTO: 0 % (ref 0–1)
BILIRUB SERPL-MCNC: 1.04 MG/DL (ref 0.2–1)
BILIRUB UR QL STRIP: NEGATIVE
BUN SERPL-MCNC: 24 MG/DL (ref 5–25)
CALCIUM SERPL-MCNC: 8.3 MG/DL (ref 8.4–10.2)
CHLORIDE SERPL-SCNC: 105 MMOL/L (ref 96–108)
CLARITY UR: CLEAR
CO2 SERPL-SCNC: 22 MMOL/L (ref 21–32)
COLOR UR: YELLOW
CREAT SERPL-MCNC: 0.93 MG/DL (ref 0.6–1.3)
EOSINOPHIL # BLD AUTO: 0.01 THOUSAND/ÂΜL (ref 0–0.61)
EOSINOPHIL NFR BLD AUTO: 0 % (ref 0–6)
ERYTHROCYTE [DISTWIDTH] IN BLOOD BY AUTOMATED COUNT: 12.7 % (ref 11.6–15.1)
GFR SERPL CREATININE-BSD FRML MDRD: 80 ML/MIN/1.73SQ M
GLUCOSE SERPL-MCNC: 133 MG/DL (ref 65–140)
GLUCOSE UR STRIP-MCNC: NEGATIVE MG/DL
HCT VFR BLD AUTO: 38.6 % (ref 36.5–49.3)
HGB BLD-MCNC: 13 G/DL (ref 12–17)
HGB UR QL STRIP.AUTO: NEGATIVE
IMM GRANULOCYTES # BLD AUTO: 0 THOUSAND/UL (ref 0–0.2)
IMM GRANULOCYTES NFR BLD AUTO: 0 % (ref 0–2)
KETONES UR STRIP-MCNC: ABNORMAL MG/DL
LEUKOCYTE ESTERASE UR QL STRIP: NEGATIVE
LYMPHOCYTES # BLD AUTO: 0.29 THOUSANDS/ÂΜL (ref 0.6–4.47)
LYMPHOCYTES NFR BLD AUTO: 6 % (ref 14–44)
MAGNESIUM SERPL-MCNC: 2 MG/DL (ref 1.9–2.7)
MCH RBC QN AUTO: 31.6 PG (ref 26.8–34.3)
MCHC RBC AUTO-ENTMCNC: 33.7 G/DL (ref 31.4–37.4)
MCV RBC AUTO: 94 FL (ref 82–98)
MONOCYTES # BLD AUTO: 0.56 THOUSAND/ÂΜL (ref 0.17–1.22)
MONOCYTES NFR BLD AUTO: 11 % (ref 4–12)
NEUTROPHILS # BLD AUTO: 4.23 THOUSANDS/ÂΜL (ref 1.85–7.62)
NEUTS SEG NFR BLD AUTO: 83 % (ref 43–75)
NITRITE UR QL STRIP: NEGATIVE
NON-SQ EPI CELLS URNS QL MICRO: NORMAL /HPF
NRBC BLD AUTO-RTO: 0 /100 WBCS
P AXIS: 64 DEGREES
PH UR STRIP.AUTO: 6 [PH]
PHOSPHATE SERPL-MCNC: 3.3 MG/DL (ref 2.3–4.1)
PLATELET # BLD AUTO: 151 THOUSANDS/UL (ref 149–390)
PMV BLD AUTO: 9.8 FL (ref 8.9–12.7)
POTASSIUM SERPL-SCNC: 3.8 MMOL/L (ref 3.5–5.3)
PR INTERVAL: 210 MS
PROT SERPL-MCNC: 6.7 G/DL (ref 6.4–8.4)
PROT UR STRIP-MCNC: ABNORMAL MG/DL
QRS AXIS: 19 DEGREES
QRSD INTERVAL: 92 MS
QT INTERVAL: 408 MS
QTC INTERVAL: 431 MS
RBC # BLD AUTO: 4.12 MILLION/UL (ref 3.88–5.62)
RBC #/AREA URNS AUTO: NORMAL /HPF
SODIUM SERPL-SCNC: 140 MMOL/L (ref 135–147)
SP GR UR STRIP.AUTO: 1.02 (ref 1–1.03)
T WAVE AXIS: 63 DEGREES
UROBILINOGEN UR STRIP-ACNC: <2 MG/DL
VENTRICULAR RATE: 67 BPM
WBC # BLD AUTO: 5.11 THOUSAND/UL (ref 4.31–10.16)
WBC #/AREA URNS AUTO: NORMAL /HPF

## 2025-04-21 PROCEDURE — 80053 COMPREHEN METABOLIC PANEL: CPT

## 2025-04-21 PROCEDURE — 85025 COMPLETE CBC W/AUTO DIFF WBC: CPT

## 2025-04-21 PROCEDURE — 83735 ASSAY OF MAGNESIUM: CPT | Performed by: PHYSICIAN ASSISTANT

## 2025-04-21 PROCEDURE — 84100 ASSAY OF PHOSPHORUS: CPT | Performed by: PHYSICIAN ASSISTANT

## 2025-04-21 PROCEDURE — 81001 URINALYSIS AUTO W/SCOPE: CPT

## 2025-04-21 PROCEDURE — 99223 1ST HOSP IP/OBS HIGH 75: CPT | Performed by: FAMILY MEDICINE

## 2025-04-21 PROCEDURE — 96376 TX/PRO/DX INJ SAME DRUG ADON: CPT

## 2025-04-21 PROCEDURE — 99222 1ST HOSP IP/OBS MODERATE 55: CPT | Performed by: SPECIALIST

## 2025-04-21 PROCEDURE — 74019 RADEX ABDOMEN 2 VIEWS: CPT

## 2025-04-21 PROCEDURE — 93010 ELECTROCARDIOGRAM REPORT: CPT | Performed by: INTERNAL MEDICINE

## 2025-04-21 RX ORDER — ACETAMINOPHEN 10 MG/ML
1000 INJECTION, SOLUTION INTRAVENOUS EVERY 6 HOURS PRN
Status: DISCONTINUED | OUTPATIENT
Start: 2025-04-21 | End: 2025-04-23

## 2025-04-21 RX ORDER — ENOXAPARIN SODIUM 100 MG/ML
40 INJECTION SUBCUTANEOUS DAILY
Status: DISCONTINUED | OUTPATIENT
Start: 2025-04-21 | End: 2025-04-25 | Stop reason: HOSPADM

## 2025-04-21 RX ORDER — PROMETHAZINE HYDROCHLORIDE 25 MG/ML
25 INJECTION, SOLUTION INTRAMUSCULAR; INTRAVENOUS EVERY 6 HOURS PRN
Status: DISCONTINUED | OUTPATIENT
Start: 2025-04-21 | End: 2025-04-21

## 2025-04-21 RX ORDER — AMLODIPINE BESYLATE 2.5 MG/1
2.5 TABLET ORAL DAILY
Status: DISCONTINUED | OUTPATIENT
Start: 2025-04-21 | End: 2025-04-22

## 2025-04-21 RX ORDER — HYDROMORPHONE HCL/PF 1 MG/ML
0.5 SYRINGE (ML) INJECTION
Status: DISCONTINUED | OUTPATIENT
Start: 2025-04-21 | End: 2025-04-25 | Stop reason: HOSPADM

## 2025-04-21 RX ORDER — LISINOPRIL 5 MG/1
5 TABLET ORAL DAILY
Status: DISCONTINUED | OUTPATIENT
Start: 2025-04-21 | End: 2025-04-22

## 2025-04-21 RX ORDER — ATORVASTATIN CALCIUM 10 MG/1
10 TABLET, FILM COATED ORAL
Status: DISCONTINUED | OUTPATIENT
Start: 2025-04-21 | End: 2025-04-22

## 2025-04-21 RX ORDER — ASPIRIN 81 MG/1
81 TABLET, CHEWABLE ORAL DAILY
Status: DISCONTINUED | OUTPATIENT
Start: 2025-04-21 | End: 2025-04-22

## 2025-04-21 RX ORDER — POTASSIUM CHLORIDE 14.9 MG/ML
20 INJECTION INTRAVENOUS ONCE
Status: DISCONTINUED | OUTPATIENT
Start: 2025-04-21 | End: 2025-04-21

## 2025-04-21 RX ORDER — ACETAMINOPHEN 160 MG/5ML
650 SUSPENSION ORAL EVERY 6 HOURS PRN
Status: DISCONTINUED | OUTPATIENT
Start: 2025-04-21 | End: 2025-04-21

## 2025-04-21 RX ORDER — SODIUM CHLORIDE 9 MG/ML
100 INJECTION, SOLUTION INTRAVENOUS CONTINUOUS
Status: DISCONTINUED | OUTPATIENT
Start: 2025-04-21 | End: 2025-04-23

## 2025-04-21 RX ORDER — ONDANSETRON 2 MG/ML
4 INJECTION INTRAMUSCULAR; INTRAVENOUS EVERY 6 HOURS PRN
Status: DISCONTINUED | OUTPATIENT
Start: 2025-04-21 | End: 2025-04-25 | Stop reason: HOSPADM

## 2025-04-21 RX ORDER — HYDROMORPHONE HCL/PF 1 MG/ML
0.5 SYRINGE (ML) INJECTION ONCE
Status: COMPLETED | OUTPATIENT
Start: 2025-04-21 | End: 2025-04-21

## 2025-04-21 RX ORDER — DIATRIZOATE MEGLUMINE AND DIATRIZOATE SODIUM 660; 100 MG/ML; MG/ML
120 SOLUTION ORAL; RECTAL
Status: COMPLETED | OUTPATIENT
Start: 2025-04-21 | End: 2025-04-21

## 2025-04-21 RX ORDER — PROMETHAZINE HYDROCHLORIDE 25 MG/ML
25 INJECTION, SOLUTION INTRAMUSCULAR; INTRAVENOUS EVERY 6 HOURS PRN
Status: DISCONTINUED | OUTPATIENT
Start: 2025-04-21 | End: 2025-04-25 | Stop reason: HOSPADM

## 2025-04-21 RX ADMIN — ENOXAPARIN SODIUM 40 MG: 40 INJECTION SUBCUTANEOUS at 08:40

## 2025-04-21 RX ADMIN — ONDANSETRON 4 MG: 2 INJECTION INTRAMUSCULAR; INTRAVENOUS at 21:18

## 2025-04-21 RX ADMIN — DIATRIZOATE MEGLUMINE AND DIATRIZOATE SODIUM 120 ML: 660; 100 LIQUID ORAL; RECTAL at 13:07

## 2025-04-21 RX ADMIN — HYDROMORPHONE HYDROCHLORIDE 0.5 MG: 1 INJECTION, SOLUTION INTRAMUSCULAR; INTRAVENOUS; SUBCUTANEOUS at 21:18

## 2025-04-21 RX ADMIN — ACETAMINOPHEN 1000 MG: 10 INJECTION INTRAVENOUS at 05:57

## 2025-04-21 RX ADMIN — SODIUM CHLORIDE 100 ML/HR: 0.9 INJECTION, SOLUTION INTRAVENOUS at 21:18

## 2025-04-21 RX ADMIN — HYDROMORPHONE HYDROCHLORIDE 0.5 MG: 1 INJECTION, SOLUTION INTRAMUSCULAR; INTRAVENOUS; SUBCUTANEOUS at 00:03

## 2025-04-21 RX ADMIN — ONDANSETRON 4 MG: 2 INJECTION INTRAMUSCULAR; INTRAVENOUS at 00:00

## 2025-04-21 RX ADMIN — SODIUM CHLORIDE 100 ML/HR: 0.9 INJECTION, SOLUTION INTRAVENOUS at 01:02

## 2025-04-21 RX ADMIN — SODIUM CHLORIDE 100 ML/HR: 0.9 INJECTION, SOLUTION INTRAVENOUS at 11:11

## 2025-04-21 NOTE — CASE MANAGEMENT
Case Management Assessment & Discharge Planning Note    Patient name Gennaro Worthington  Location [address] [facility] [address] [place] [address]-* MRN [id number]  : 1950 Date 2025       Current Admission Date: 2025  Current Admission Diagnosis:SBO (small bowel obstruction) (HCC)   Patient Active Problem List    Diagnosis Date Noted Date Diagnosed    SBO (small bowel obstruction) (HCC) 2025     GI bleeding 2024     Syncopal episodes 2024     Gram-negative bacteremia 2024     Enterocolitis 2024     Port-site hernia 2023     Umbilical hernia without obstruction and without gangrene 2023     Cervical radiculopathy 2021     Cervicalgia 2021     Periumbilical abdominal pain 10/14/2020     S/P prostatectomy 2019     Lower extremity edema 2018     Groin mass 2018     Disc degeneration, lumbar 2015     Actinic keratosis 2015     Anemia 2015     Fatty liver disease, nonalcoholic 2014     History of prostate cancer 2013     Mixed hyperlipidemia 2013     Benign essential hypertension 2012     Herpes simplex infection 2012     Organic impotence 2012       LOS (days): 0  Geometric Mean LOS (GMLOS) (days):   Days to GMLOS:     OBJECTIVE:    Risk of Unplanned Readmission Score: 12.76         Current admission status: Inpatient       Preferred Pharmacy:   [id number] - [address] [address]  Phone: [phone] Fax: [phone]    [facility] - [place], [place] - [address]  [address]  [facility] [address]  [facility] [address]  Phone: [phone] Fax: [phone]    Primary Care Provider: NEHAL Crum    Primary Insurance: [employer]  REP  Secondary Insurance:     ASSESSMENT:  Active Health Care Proxies    There are no active Health Care Proxies on file.    Readmission Root Cause  30 Day Readmission:  No    Patient Information  Admitted from:: Home  Mental Status: Alert  During Assessment patient was accompanied by: Spouse  Assessment information provided by:: Patient, Spouse  Primary Caregiver: Self  Support Systems: Spouse/significant other  County of Residence: [place]  What city do you live in?: [place], [place]  Home entry access options. Select all that apply.: Stairs  Number of steps to enter home.: 2  Type of Current Residence: 2 story home  Upon entering residence, is there a bedroom on the main floor (no further steps)?: No  A bedroom is located on the following floor levels of residence (select all that apply):: 2nd Floor  Upon entering residence, is there a bathroom on the main floor (no further steps)?: Yes  Number of steps to 2nd floor from main floor: One Flight  Living Arrangements: Lives w/ Spouse/significant other    Activities of Daily Living Prior to Admission  Functional Status: Independent  Completes ADLs independently?: Yes  Ambulates independently?: Yes  Does patient use assisted devices?: No  Does patient currently own DME?: No  Does the patient have a history of Short-Term Rehab?: No  Does patient have a history of HHC?: No  Does patient currently have HHC?: No     Patient Information Continued  Income Source: Pension/long-term  Does patient have prescription coverage?: Yes  Can the patient afford their medications and any related supplies (such as glucometers or test strips)?: Yes  Does patient receive dialysis treatments?: No     Means of Transportation  Means of Transport to Appts:: Drives Self    DISCHARGE DETAILS:    Discharge planning discussed with:: Patient, Spouse Jaja  Freedom of Choice: Yes  Comments - Freedom of Choice: SW met bedside with patient and spouse to introduce role, complete assessment, and discuss discharge plan. Both patient and spouse state choice is for patient to return home with family support and denied having any questions, concerns, or anticipated  discharge needs that SW can assist with at this time.  CM contacted family/caregiver?: Yes  Were Treatment Team discharge recommendations reviewed with patient/caregiver?: Yes  Did patient/caregiver verbalize understanding of patient care needs?: Yes  Were patient/caregiver advised of the risks associated with not following Treatment Team discharge recommendations?: Yes    Contacts  Patient Contacts: Jaja Worthington (spouse)  Relationship to Patient:: Family  Contact Method: In Person  Reason/Outcome: Continuity of Care, Emergency Contact, Discharge Planning    Requested Home Health Care         Is the patient interested in HHC at discharge?: No    DME Referral Provided  Referral made for DME?: No    Other Referral/Resources/Interventions Provided:  Interventions: None Indicated     Treatment Team Recommendation: Home  Discharge Destination Plan:: Home  Transport at Discharge : Family

## 2025-04-21 NOTE — PLAN OF CARE
Problem: PAIN - ADULT  Goal: Verbalizes/displays adequate comfort level or baseline comfort level  Description: Interventions:- Encourage patient to monitor pain and request assistance- Assess pain using appropriate pain scale- Administer analgesics based on type and severity of pain and evaluate response- Implement non-pharmacological measures as appropriate and evaluate response- Consider cultural and social influences on pain and pain management- Notify physician/advanced practitioner if interventions unsuccessful or patient reports new pain  Outcome: Progressing     Problem: INFECTION - ADULT  Goal: Absence or prevention of progression during hospitalization  Description: INTERVENTIONS:- Assess and monitor for signs and symptoms of infection- Monitor lab/diagnostic results- Monitor all insertion sites, i.e. indwelling lines, tubes, and drains- Monitor endotracheal if appropriate and nasal secretions for changes in amount and color- [place] appropriate cooling/warming therapies per order- Administer medications as ordered- Instruct and encourage patient and family to use good hand hygiene technique- Identify and instruct in appropriate isolation precautions for identified infection/condition  Outcome: Progressing  Goal: Absence of fever/infection during neutropenic period  Description: INTERVENTIONS:- Monitor WBC  Outcome: Progressing     Problem: SAFETY ADULT  Goal: Patient will remain free of falls  Description: INTERVENTIONS:- Educate patient/family on patient safety including physical limitations- Instruct patient to call for assistance with activity - Consult OT/PT to assist with strengthening/mobility - Keep Call bell within reach- Keep bed low and locked with side rails adjusted as appropriate- Keep care items and personal belongings within reach- Initiate and maintain comfort rounds- Make Fall Risk Sign visible to staff- Offer Toileting every 2 Hours, in advance of need- Initiate/Maintain bed/chair  alarm- Obtain necessary fall risk management equipment: - Apply yellow socks and bracelet for high fall risk patients- Consider moving patient to room near nurses station  Outcome: Progressing  Goal: Maintain or return to baseline ADL function  Description: INTERVENTIONS:-  Assess patient's ability to carry out ADLs; assess patient's baseline for ADL function and identify physical deficits which impact ability to perform ADLs (bathing, care of mouth/teeth, toileting, grooming, dressing, etc.)- Assess/evaluate cause of self-care deficits - Assess range of motion- Assess patient's mobility; develop plan if impaired- Assess patient's need for assistive devices and provide as appropriate- Encourage maximum independence but intervene and supervise when necessary- Involve family in performance of ADLs- Assess for home care needs following discharge - Consider OT consult to assist with ADL evaluation and planning for discharge- Provide patient education as appropriate  Outcome: Progressing  Goal: Maintains/Returns to pre admission functional level  Description: INTERVENTIONS:- Perform AM-PAC 6 Click Basic Mobility/ Daily Activity assessment daily.- Set and communicate daily mobility goal to care team and patient/family/caregiver. - Collaborate with rehabilitation services on mobility goals if consulted- Perform Range of Motion 12 times a day.- Reposition patient every 2 hours.- Dangle patient 3 times a day- Stand patient 3 times a day- Ambulate patient 3 times a day- Out of bed to chair 3 times a day - Out of bed for meals 3 times a day- Out of bed for toileting- Record patient progress and toleration of activity level   Outcome: Progressing

## 2025-04-21 NOTE — ASSESSMENT & PLAN NOTE
"Patient with PMHx prostate cancer s/p prostatectomy 2019, HTN, HLD, ventral/umbilical hernia with repair 2023 who presents with nausea, bilious vomiting and periumbilical abdominal pain since Sunday morning.  States last bowel movement Sunday morning.  CT abd showing: \"Multiple loops of abnormally dilated small bowel throughout the abdomen and pelvis with relative collapse of the distal small bowel most compatible with a small bowel obstruction. A clear transition point is not identified. Surgical consultation is advised. Small amount of intra-abdominal and pelvic ascites. No free intraperitoneal air.\"  Initially treated in ED with IV dilaudid, zofran, 1L NS bolus, and NGT was inserted. Vitally stable, no electrolyte derangements.   Abdomen mildly distended, tenderness to periumbilical region with old healed scars noted, not acute abdomen, no gaurding.  NPO  Serial abdominal exams  Continue NGT to LCWS  Appreciate general surgery consultation  "

## 2025-04-21 NOTE — UTILIZATION REVIEW
Initial Clinical Review    Admission: Date/Time/Statement:   Admission Orders (From admission, onward)       Ordered        04/21/25 0003  INPATIENT ADMISSION  Once                          Orders Placed This Encounter   Procedures    INPATIENT ADMISSION     Standing Status:   Standing     Number of Occurrences:   1     Level of Care:   Med Surg [16]     Estimated length of stay:   More than 2 Midnights     Certification:   I certify that inpatient services are medically necessary for this patient for a duration of greater than two midnights. See H&P and MD Progress Notes for additional information about the patient's course of treatment.     ED Arrival Information       Expected   -    Arrival   4/20/2025 21:17    Acuity   Urgent              Means of arrival   Walk-In    Escorted by   Spouse    Service   Hospitalist    Admission type   Emergency              Arrival complaint   Vomiting; Abdominal Pain; Fatigue             Chief Complaint   Patient presents with    Abdominal Pain     Here with wife. Patient states he started yesterday with pain left lower abdomen and started at 11 pm with vomiting last night. Ate toast today , yogurt at lunch, toast and jelly for dinner. Vomited 20 minutes ago, pain worse in abdomen. Soft BM x 2 today. Denies urinary problems.     Vomiting       Initial Presentation: to ER 4/20/25 @ 9:42pm   75 yom to ER from home for lower abd pain & vomiting. Pt became pale & passed out briefly in triage chair. Hx prostate cancer/prostatectomy, HTN, HLD, ventral/umbilical hernia with repair. Presents with distended abd, decreased breath sounds, guarding, dry mucous membranes. Admission CT a/p: SBO. Labs: tbili 1.03, u/a+ketones, prot.  Admitted to inpatient status 4/21/25 for SBO. Plan: NGT, surgery consult    Anticipated Length of Stay/Certification Statement:   Pt will be admitted on inpatient basis with anticipated LOS>MND 2nd SBO.       Date: 4/22/25  Day 3: Has surpassed a 2nd midnight with  active treatments and services.  SBO POA. Abd distended, tender in periumbilcal area. NPO with NGT in place for 700cc drainage/24hrs noted. Continue serial abd exams, monitor VS, follow labs. Follow up imaging.    Per surgery: nausea vomiting and abdominal pain   Plan: Gastrografin challenge and followed by obstruction series     ED Treatment-Medication Administration from 04/20/2025 2117 to 04/21/2025 0051         Date/Time Order Dose Route Action     04/20/2025 2145 sodium chloride 0.9 % bolus 1,000 mL 1,000 mL Intravenous New Bag     04/20/2025 2154 ondansetron (ZOFRAN) injection 4 mg 4 mg Intravenous Given     04/20/2025 2228 iohexol (OMNIPAQUE) 350 MG/ML injection (MULTI-DOSE) 100 mL 100 mL Intravenous Given     04/20/2025 2314 HYDROmorphone (DILAUDID) injection 0.5 mg 0.5 mg Intravenous Given     04/21/2025 0000 ondansetron (ZOFRAN) injection 4 mg 4 mg Intravenous Given     04/21/2025 0003 HYDROmorphone (DILAUDID) injection 0.5 mg 0.5 mg Intravenous Given            Scheduled Medications:  Medications 04/13 04/14 04/15 04/16 04/17 04/18 04/19 04/20 04/21 04/22   amLODIPine (NORVASC) tablet 2.5 mg  Dose: 2.5 mg  Freq: Daily Route: PO  Start: 04/22/25 1100   Admin Instructions:      Order specific questions:                1143        amLODIPine (NORVASC) tablet 2.5 mg  Dose: 2.5 mg  Freq: Daily Route: PER NG TUBE  Start: 04/21/25 0900 End: 04/22/25 1059   Admin Instructions:      Order specific questions:               [phone]      0910     1055     1059-D/C'd      aspirin chewable tablet 81 mg  Dose: 81 mg  Freq: Daily Route: PO  Start: 04/23/25 0900                aspirin chewable tablet 81 mg  Dose: 81 mg  Freq: Daily Route: PER NG TUBE  Start: 04/21/25 0900 End: 04/22/25 1059            (9226) [C]      9064     1059-D/C'd      atorvastatin (LIPITOR) tablet 10 mg  Dose: 10 mg  Freq: Daily with dinner Route: PO  Start: 04/22/25 1630             1630        atorvastatin (LIPITOR) tablet 10 mg  Dose: 10  mg  Freq: Daily with dinner Route: PER NG TUBE  Start: 04/21/25 1630 End: 04/22/25 1059            (1910) [C]      1059-D/C'd      enoxaparin (LOVENOX) subcutaneous injection 40 mg  Dose: 40 mg  Freq: Daily Route: SC  Start: 04/21/25 0900   Admin Instructions:               0840      0858        HYDROmorphone (DILAUDID) injection 0.5 mg  Dose: 0.5 mg  Freq: Once Route: IV  Start: 04/21/25 0015 End: 04/21/25 0003   Admin Instructions:               0003         HYDROmorphone (DILAUDID) injection 0.5 mg  Dose: 0.5 mg  Freq: Once Route: IV  Start: 04/20/25 2315 End: 04/20/25 2314   Admin Instructions:              2314          lisinopril (ZESTRIL) tablet 5 mg  Dose: 5 mg  Freq: Daily Route: PO  Start: 04/23/25 0900   Admin Instructions:      Order specific questions:                   lisinopril (ZESTRIL) tablet 5 mg  Dose: 5 mg  Freq: Daily Route: PER NG TUBE  Start: 04/21/25 0900 End: 04/22/25 1059   Admin Instructions:      Order specific questions:               [phone]     1059-D/C'd      ondansetron (ZOFRAN) injection 4 mg  Dose: 4 mg  Freq: Once Route: IV  Start: 04/20/25 2330 End: 04/21/25 0000   Admin Instructions:               0000         ondansetron (ZOFRAN) injection 4 mg  Dose: 4 mg  Freq: Once Route: IV  Start: 04/20/25 2200 End: 04/20/25 2154   Admin Instructions:              2154          potassium chloride 20 mEq IVPB (premix)  Dose: 20 mEq  Freq: Once Route: IV  Start: 04/21/25 0415 End: 04/21/25 0407   Admin Instructions:               0407-D/C'd       sodium chloride 0.9 % bolus 1,000 mL  Dose: 1,000 mL  Freq: Once Route: IV  Last Dose: 1,000 mL (04/20/25 2145)  Start: 04/20/25 2145 End: 04/20/25 2245 2145                      Continuous Meds Sorted by Name  for Gennaro Worthington as of 04/13/25 through 4/22/25  Legend:       Medications 04/13 04/14 04/15 04/16 04/17 04/18 04/19 04/20 04/21 04/22   sodium chloride 0.9 % infusion  Rate: 100 mL/hr Dose: 100  mL/hr  Freq: Continuous Route: IV  Indications of Use: IV Hydration  Last Dose: 100 mL/hr (04/21/25 2118)  Start: 04/21/25 0030            0102     1111     2118                     PRN Meds Sorted by Name  for Gennaro Worthington as of 04/13/25 through 4/22/25  Legend:         Medications 04/13 04/14 04/15 04/16 04/17 04/18 04/19 04/20 04/21 04/22   acetaminophen (Ofirmev) injection 1,000 mg  Dose: 1,000 mg  Freq: Every 6 hours PRN Route: IV  PRN Reasons: mild pain,fever  Start: 04/21/25 0408 End: 04/23/25 0407   Admin Instructions:               0557         acetaminophen (TYLENOL) oral suspension 650 mg  Dose: 650 mg  Freq: Every 6 hours PRN Route: PER NG TUBE  PRN Reasons: fever,headaches,mild pain  Indications of Use: FEVER,HEADACHE,MILD PAIN  Start: 04/21/25 0025 End: 04/21/25 0408            0408-D/C'd       diatrizoate meglumine-sodium (GASTROGRAFIN) solution 120 mL  Dose: 120 mL  Freq: Once in imaging Route: PO  PRN Reason: contrast  Start: 04/21/25 1258 End: 04/21/25 1307            1307         HYDROmorphone (DILAUDID) injection 0.5 mg  Dose: 0.5 mg  Freq: Every 3 hours PRN Route: IV  PRN Reasons: breakthrough pain,severe pain,moderate pain  Start: 04/21/25 0026   Admin Instructions:               2118         iohexol (OMNIPAQUE) 350 MG/ML injection (MULTI-DOSE) 100 mL  Dose: 100 mL  Freq: Once in imaging Route: IV  PRN Reason: contrast  Start: 04/20/25 2232 End: 04/20/25 2228           2228          ondansetron (ZOFRAN) injection 4 mg  Dose: 4 mg  Freq: Every 6 hours PRN Route: IV  PRN Reasons: nausea,vomiting  Start: 04/21/25 0025   Admin Instructions:               2118         promethazine (PHENERGAN) injection 25 mg  Dose: 25 mg  Freq: Every 6 hours PRN Route: IM  PRN Comment: nausea/vomiting : 2nd line  Start: 04/21/25 0720   Admin Instructions:                   promethazine (PHENERGAN) injection 25 mg  Dose: 25 mg  Freq: Every 6 hours PRN Route: IM  PRN Reasons: nausea,vomiting  PRN Comment: 2nd  line  Start: 04/21/25 0056 End: 04/21/25 0721   Admin Instructions:               0721-D/C'd           ED Triage Vitals [04/20/25 2129]   Temperature Pulse Respirations Blood Pressure SpO2 Pain Score   98.3 °F (36.8 °C) 79 18 111/58 100 % 7     Weight (last 2 days)       Date/Time Weight    04/21/25 01:02:15 99.4 (219.14)    04/20/25 2129 99.4 (219.2)            Vital Signs (last 3 days)       Date/Time Temp Pulse Resp BP MAP (mmHg) SpO2 O2 Device Patient Position - Orthostatic VS Marisel Coma Scale Score Pain    04/22/25 1145 -- 66 -- 121/49 73 97 % -- -- -- --    04/22/25 07:12:27 97.4 °F (36.3 °C) 68 15 148/78 101 96 % -- -- -- --    04/21/25 22:30:59 98 °F (36.7 °C) 68 14 135/72 93 93 % -- -- -- --    04/21/25 2118 -- -- -- -- -- -- -- -- -- 7    04/21/25 20:21:53 98.4 °F (36.9 °C) 63 -- -- -- 95 % -- -- -- --    04/21/25 20:21:52 -- -- -- 116/61 79 -- -- -- -- --    04/21/25 2000 -- -- -- -- -- -- None (Room air) -- 15 --    04/21/25 13:45:10 -- 64 17 117/56 76 97 % None (Room air) Lying -- No Pain    04/21/25 0900 -- -- -- -- -- -- None (Room air) -- -- --    04/21/25 0800 -- -- -- -- -- -- -- -- 15 No Pain    04/21/25 07:39:02 98.3 °F (36.8 °C) 71 -- 98/57 71 95 % -- -- -- --    04/21/25 04:05:05 100 °F (37.8 °C) 75 -- 99/83 88 94 % -- -- -- --    04/21/25 03:29:32 100.4 °F (38 °C) 88 -- 99/83 88 93 % -- -- -- --    04/21/25 0153 -- -- -- -- -- -- None (Room air) -- 15 --    04/21/25 01:02:15 98.1 °F (36.7 °C) 91 16 152/83 106 95 % -- -- -- --    04/21/25 0058 -- -- -- -- -- -- -- -- -- 3    04/21/25 0003 -- -- -- -- -- -- -- -- -- 8    04/20/25 2330 -- 69 18 144/84 108 97 % None (Room air) Lying -- --    04/20/25 2314 -- -- -- -- -- -- -- -- -- 10 - Worst Possible Pain    04/20/25 2215 -- 61 14 107/61 79 97 % None (Room air) Lying -- --    04/20/25 2152 -- -- -- -- -- -- -- -- 15 --    04/20/25 2145 -- 67 16 137/66 95 98 % None (Room air) Lying -- --    04/20/25 2129 98.3 °F (36.8 °C) 79 18 111/58 -- 100 %  None (Room air) Sitting -- 7              Pertinent Labs/Diagnostic Test Results:   Radiology:  XR abdomen complete inc upright and/or decubitus   Final Interpretation by Aline Krishnamurthy MD (04/21 1958)   Concern for small bowel obstruction, as above.         Workstation performed: [id number]         CT abdomen pelvis with contrast   Final Interpretation by Sergio Glass MD (04/20 2347)      Multiple loops of abnormally dilated small bowel throughout the abdomen and pelvis with relative collapse of the distal small bowel most compatible with a small bowel obstruction. A clear transition point is not identified. Surgical consultation is    advised.      Small amount of intra-abdominal and pelvic ascites. No free intraperitoneal air.            I personally discussed this study with CYNTHIA ECKERT on 4/20/2025 11:44 PM.               Workstation performed: [id number]           Cardiology:  ECG 12 lead   Final Result by Dar Elmore MD (04/21 1111)   Sinus rhythm with 1st degree A-V block   Otherwise normal ECG   When compared with ECG of 03-May-2024 18:27,   No significant change was found   Confirmed by Dar Elmore ([id number]) on 4/21/2025 11:11:01 AM        GI:  No orders to display           Results from last 7 days   Lab Units 04/22/25  0516 04/21/25  0556 04/20/25  2143   WBC Thousand/uL 4.62 5.11 8.92   HEMOGLOBIN g/dL 13.1 13.0 14.2   HEMATOCRIT % 39.9 38.6 41.5   PLATELETS Thousands/uL 146* 151 178   TOTAL NEUT ABS Thousands/µL  --  4.23 5.68         Results from last 7 days   Lab Units 04/22/25  0516 04/21/25  0556 04/20/25  2143   SODIUM mmol/L 141 140 138   POTASSIUM mmol/L 3.9 3.8 3.6   CHLORIDE mmol/L 108 105 103   CO2 mmol/L 22 22 22   ANION GAP mmol/L 11 13 13   BUN mg/dL 25 24 24   CREATININE mg/dL 0.80 0.93 0.97   EGFR ml/min/1.73sq m 87 80 76   CALCIUM mg/dL 8.5 8.3* 9.0   MAGNESIUM mg/dL  --  2.0  --    PHOSPHORUS mg/dL  --  3.3  --      Results from last 7 days   Lab Units 04/22/25  0516  "04/21/25  0556 04/20/25  2143   AST U/L 30 18 20   ALT U/L 35 16 17   ALK PHOS U/L 30* 31* 38   TOTAL PROTEIN g/dL 7.1 6.7 7.5   ALBUMIN g/dL 3.7 3.6 4.2   TOTAL BILIRUBIN mg/dL 0.73 1.04* 1.03*     Results from last 7 days   Lab Units 04/20/25  2140   POC GLUCOSE mg/dl 133     Results from last 7 days   Lab Units 04/22/25  0516 04/21/25  0556 04/20/25  2143   GLUCOSE RANDOM mg/dL 110 133 133             No results found for: \"BETA-HYDROXYBUTYRATE\"                   Results from last 7 days   Lab Units 04/20/25  2143   HS TNI 0HR ng/L 5         Results from last 7 days   Lab Units 04/20/25  2143   PROTIME seconds 13.9   INR  1.02   PTT seconds 25             Results from last 7 days   Lab Units 04/20/25  2143   LACTIC ACID mmol/L 1.3                                 Results from last 7 days   Lab Units 04/20/25  2143   LIPASE u/L 18                 Results from last 7 days   Lab Units 04/21/25  0349   CLARITY UA  Clear   COLOR UA  Yellow   SPEC GRAV UA  1.020   PH UA  6.0   GLUCOSE UA mg/dl Negative   KETONES UA mg/dl 80 (3+)*   BLOOD UA  Negative   PROTEIN UA mg/dl 30 (1+)*   NITRITE UA  Negative   BILIRUBIN UA  Negative   UROBILINOGEN UA (BE) mg/dl <2.0   LEUKOCYTES UA  Negative   WBC UA /hpf 1-2   RBC UA /hpf None Seen   BACTERIA UA /hpf Occasional   EPITHELIAL CELLS WET PREP /hpf Occasional                                                   Past Medical History:   Diagnosis Date    Chronic rhinitis 02/05/2005    last assessed 2/5/05, resolved 5/18/17     Elevated liver function tests     last assessed 3/9/15, resolved 5/18/17     Herpes simplex type 1 infection     Hip arthritis     resolved 12/20/17     Hypertension     Shoulder bursitis     last assessed 9/23/13, resolved 5/18/17      Present on Admission:   Benign essential hypertension   Mixed hyperlipidemia      Admitting Diagnosis: Vomiting [R11.10]  SBO (small bowel obstruction) (HCC) [K56.609]  Abdominal pain [R10.9]  Age/Sex: 75 y.o. male    Network " Utilization Review Department  ATTENTION: Please call with any questions or concerns to [phone] and carefully listen to the prompts so that you are directed to the right person. All voicemails are confidential.   For Discharge needs, contact Care Management DC Support Team at [phone] opt. 2  Send all requests for admission clinical reviews, approved or denied determinations and any other requests to dedicated fax number below belonging to the campus where the patient is receiving treatment. List of dedicated fax numbers for the Facilities:  FACILITY NAME UR FAX NUMBER   ADMISSION DENIALS (Administrative/Medical Necessity) [phone]   DISCHARGE SUPPORT TEAM (NETWORK) [phone]   PARENT CHILD HEALTH (Maternity/NICU/Pediatrics) [phone]   [facility] [phone]   [facility] [phone]   [facility] [phone]   [facility] [phone]   [facility] [phone]   [facility] [phone]   [facility] [phone]   [facility] [phone]   [facility] [phone]   [facility] [phone]   [facility] [phone]   [facility] [phone]

## 2025-04-21 NOTE — ED PROVIDER NOTES
Time reflects when diagnosis was documented in both MDM as applicable and the Disposition within this note       Time User Action Codes Description Comment    4/20/2025 11:59 PM Irene Major Add [R10.9] Abdominal pain     4/20/2025 11:59 PM Irene Major Add [R11.10] Vomiting     4/20/2025 11:59 PM Irene Major Add [K56.609] SBO (small bowel obstruction) (HCC)           ED Disposition       ED Disposition   Admit    Condition   Stable    Date/Time   Sun Apr 20, 2025 11:59 PM    Comment   Case was discussed with surgery and the patient's admission status was agreed to be Admission Status: inpatient status to the service of Dr. DEAN .               Assessment & Plan       Medical Decision Making  Differential includes but not limited to colitis, diverticulitis, gastroenteritis, SBO, UTI, kidney stone.  Will check screening blood work, CT scan, give IVF and Zofran.  Pt. Offered pain medicine but he declined.  Prior records reviewed - pt. Admitted with similar presentation including syncopal episode in May 2024 - CT scan showed enterocolitis and he was bacteremic at that time with blood culture positive for gram negative rods.  WBC, lactic, CMP normal.  2300 - awaiting CT report.  Pt. Did decide he wanted pain medicine then so given dilaudid.  2355 - CT shows SBO without clear transition point.  Discussed with Dr. Lozoya, will place NGT and SLIM to admit.      Amount and/or Complexity of Data Reviewed  Labs: ordered.  Radiology: ordered.    Risk  Prescription drug management.  Decision regarding hospitalization.             Medications   HYDROmorphone (DILAUDID) injection 0.5 mg (has no administration in time range)   sodium chloride 0.9 % bolus 1,000 mL (1,000 mL Intravenous New Bag 4/20/25 2145)   ondansetron (ZOFRAN) injection 4 mg (4 mg Intravenous Given 4/20/25 2154)   iohexol (OMNIPAQUE) 350 MG/ML injection (MULTI-DOSE) 100 mL (100 mL Intravenous Given 4/20/25 2228)   HYDROmorphone (DILAUDID) injection  0.5 mg (0.5 mg Intravenous Given 4/20/25 2314)   ondansetron (ZOFRAN) injection 4 mg (4 mg Intravenous Given 4/21/25 0000)       ED Risk Strat Scores   HEART Risk Score      Flowsheet Row Most Recent Value   Heart Score Risk Calculator    History 0 Filed at: 04/20/2025 2227   ECG 0 Filed at: 04/20/2025 2227   Age 2 Filed at: 04/20/2025 2227   Risk Factors 1 Filed at: 04/20/2025 2227   Troponin 0 Filed at: 04/20/2025 2227   HEART Score 3 Filed at: 04/20/2025 2227          HEART Risk Score      Flowsheet Row Most Recent Value   Heart Score Risk Calculator    History 0 Filed at: 04/20/2025 2227   ECG 0 Filed at: 04/20/2025 2227   Age 2 Filed at: 04/20/2025 2227   Risk Factors 1 Filed at: 04/20/2025 2227   Troponin 0 Filed at: 04/20/2025 2227   HEART Score 3 Filed at: 04/20/2025 2227                      No data recorded        SBIRT 22yo+      Flowsheet Row Most Recent Value   Initial Alcohol Screen: US AUDIT-C     1. How often do you have a drink containing alcohol? 0 Filed at: 04/20/2025 2132   3a. Male UNDER 65: How often do you have five or more drinks on one occasion? 0 Filed at: 04/20/2025 2132   Audit-C Score 0 Filed at: 04/20/2025 2132   TRACY: How many times in the past year have you...    Used an illegal drug or used a prescription medication for non-medical reasons? Never Filed at: 04/20/2025 2132                            History of Present Illness       Chief Complaint   Patient presents with    Abdominal Pain     Here with wife. Patient states he started yesterday with pain left lower abdomen and started at 11 pm with vomiting last night. Ate toast today , yogurt at lunch, toast and jelly for dinner. Vomited 20 minutes ago, pain worse in abdomen. Soft BM x 2 today. Denies urinary problems.     Vomiting       Past Medical History:   Diagnosis Date    Chronic rhinitis 02/05/2005    last assessed 2/5/05, resolved 5/18/17     Elevated liver function tests     last assessed 3/9/15, resolved 5/18/17     Herpes  simplex type 1 infection     Hip arthritis     resolved 12/20/17     Hypertension     Shoulder bursitis     last assessed 9/23/13, resolved 5/18/17       Past Surgical History:   Procedure Laterality Date    EPIDURAL BLOCK INJECTION N/A 05/23/2023    Procedure: BLOCK / INJECTION EPIDURAL STEROID CERVICAL C7-T1  (01216);  Surgeon: Bib Ball DO;  Location:  MAIN OR;  Service: Pain Management     HERNIA REPAIR      In guinal: x3 surgeries, [facility], R[facility] and [facility]    DE RPR AA HERNIA 1ST < 3 CM REDUCIBLE N/A 8/22/2023    Procedure: REPAIR HERNIA VENTRAL;  Surgeon: Nehemias Devries MD;  Location: [facility] OR;  Service: General    PROSTATOTOMY      last assessed 1/11/18      Family History   Problem Relation Age of Onset    Hypertension Mother     Lung cancer Family     Prostate cancer Family     Mental illness Neg Hx       Social History     Tobacco Use    Smoking status: Never     Passive exposure: Never    Smokeless tobacco: Never   Vaping Use    Vaping status: Never Used   Substance Use Topics    Alcohol use: Yes     Comment: very rare    Drug use: Never      E-Cigarette/Vaping    E-Cigarette Use Never User       E-Cigarette/Vaping Substances    Nicotine No     THC No     CBD No     Flavoring No     Other No     Unknown No       I have reviewed and agree with the history as documented.     76 yo male c/o left lower abdominal pain and vomiting that started last night.  He vomited several times until about 3:30 am.  Wife said he seemed a little better throughout the day today, was able to eat a little bit.  He did have a couple soft bowel movements.  Then tonight he had gone to bed and then called for her that he had severe abdominal pain and wanted to go to the hospital.  In ER triage chair he got pale and passed out briefly.  No fever, cough.  No chest pain.  He has had hernia surgery in the past.      History provided by:  Patient and spouse   used: No    Abdominal Pain  Associated  symptoms: diarrhea, nausea and vomiting    Associated symptoms: no chest pain, no chills, no cough, no dysuria, no fever, no hematuria, no shortness of breath and no sore throat    Vomiting  Associated symptoms: abdominal pain and diarrhea    Associated symptoms: no chills, no cough, no fever, no headaches, no myalgias and no sore throat        Review of Systems   Constitutional: Negative.  Negative for chills and fever.   HENT: Negative.  Negative for congestion and sore throat.    Eyes: Negative.    Respiratory: Negative.  Negative for cough and shortness of breath.    Cardiovascular: Negative.  Negative for chest pain and leg swelling.   Gastrointestinal:  Positive for abdominal pain, diarrhea, nausea and vomiting.   Genitourinary: Negative.  Negative for dysuria, flank pain and hematuria.   Musculoskeletal: Negative.  Negative for back pain and myalgias.   Skin: Negative.  Negative for rash and wound.   Neurological: Negative.  Negative for dizziness and headaches.   Psychiatric/Behavioral: Negative.  Negative for confusion and hallucinations. The patient is not nervous/anxious.    All other systems reviewed and are negative.          Objective       ED Triage Vitals [04/20/25 2129]   Temperature Pulse Blood Pressure Respirations SpO2 Patient Position - Orthostatic VS   98.3 °F (36.8 °C) 79 111/58 18 100 % Sitting      Temp Source Heart Rate Source BP Location FiO2 (%) Pain Score    Tympanic Monitor Left arm -- 7      Vitals      Date and Time Temp Pulse SpO2 Resp BP Pain Score FACES Pain Rating User   04/20/25 2330 -- 69 97 % 18 144/84 -- --    04/20/25 2314 -- -- -- -- -- 10 - Worst Possible Pain --    04/20/25 2215 -- 61 97 % 14 107/61 -- -- AM   04/20/25 2145 -- 67 Simultaneous filing. User may not have seen previous data. 98 % Simultaneous filing. User may not have seen previous data. 16 Simultaneous filing. User may not have seen previous data. 137/66 Simultaneous filing. User may not have seen  previous data. -- -- LS   04/20/25 2129 98.3 °F (36.8 °C) 79 100 % 18 111/58 7 --             Physical Exam  Vitals and nursing note reviewed.   Constitutional:       General: He is not in acute distress.     Appearance: He is well-developed. He is ill-appearing. He is not diaphoretic.   HENT:      Head: Normocephalic and atraumatic.      Mouth/Throat:      Mouth: Mucous membranes are dry.   Eyes:      General: No scleral icterus.     Conjunctiva/sclera: Conjunctivae normal.   Cardiovascular:      Rate and Rhythm: Normal rate and regular rhythm.      Heart sounds: Normal heart sounds. No murmur heard.  Pulmonary:      Effort: Pulmonary effort is normal. No respiratory distress.      Breath sounds: Normal breath sounds.   Abdominal:      General: Bowel sounds are decreased. There is distension.      Palpations: Abdomen is soft.      Tenderness: There is generalized abdominal tenderness. There is guarding.   Musculoskeletal:         General: No deformity. Normal range of motion.      Cervical back: Normal range of motion and neck supple.      Right lower leg: No edema.      Left lower leg: No edema.   Skin:     General: Skin is warm and dry.      Coloration: Skin is pale.      Findings: No erythema or rash.   Neurological:      General: No focal deficit present.      Mental Status: He is alert and oriented to person, place, and time.      Cranial Nerves: No cranial nerve deficit.   Psychiatric:         Mood and Affect: Mood normal.         Behavior: Behavior normal.         Results Reviewed       Procedure Component Value Units Date/Time    HS Troponin 0hr (reflex protocol) [[id number]]  (Normal) Collected: 04/20/25 2143    Lab Status: Final result Specimen: Blood from Arm, Right Updated: 04/20/25 2213     hs TnI 0hr 5 ng/L     Lactic acid, plasma (w/reflex if result > 2.0) [[id number]]  (Normal) Collected: 04/20/25 2143    Lab Status: Final result Specimen: Blood from Arm, Right Updated: 04/20/25 2209     LACTIC ACID  1.3 mmol/L     Narrative:      Result may be elevated if tourniquet was used during collection.    Comprehensive metabolic panel [[id number]]  (Abnormal) Collected: 04/20/25 2143    Lab Status: Final result Specimen: Blood from Arm, Right Updated: 04/20/25 2206     Sodium 138 mmol/L      Potassium 3.6 mmol/L      Chloride 103 mmol/L      CO2 22 mmol/L      ANION GAP 13 mmol/L      BUN 24 mg/dL      Creatinine 0.97 mg/dL      Glucose 133 mg/dL      Calcium 9.0 mg/dL      AST 20 U/L      ALT 17 U/L      Alkaline Phosphatase 38 U/L      Total Protein 7.5 g/dL      Albumin 4.2 g/dL      Total Bilirubin 1.03 mg/dL      eGFR 76 ml/min/1.73sq m     Narrative:      National Kid[employer] Disease Foundation guidelines for Chronic Kidney Disease (CKD):     Stage 1 with normal or high GFR (GFR > 90 mL/min/1.73 square meters)    Stage 2 Mild CKD (GFR = 60-89 mL/min/1.73 square meters)    Stage 3A Moderate CKD (GFR = 45-59 mL/min/1.73 square meters)    Stage 3B Moderate CKD (GFR = 30-44 mL/min/1.73 square meters)    Stage 4 Severe CKD (GFR = 15-29 mL/min/1.73 square meters)    Stage 5 End Stage CKD (GFR <15 mL/min/1.73 square meters)  Note: GFR calculation is accurate only with a steady state creatinine    Lipase [[id number]]  (Normal) Collected: 04/20/25 2143    Lab Status: Final result Specimen: Blood from Arm, Right Updated: 04/20/25 2206     Lipase 18 u/L     Protime-INR [[id number]]  (Normal) Collected: 04/20/25 2143    Lab Status: Final result Specimen: Blood from Arm, Right Updated: 04/20/25 2203     Protime 13.9 seconds      INR 1.02    Narrative:      INR Therapeutic Range    Indication                                             INR Range      Atrial Fibrillation                                               2.0-3.0  Hypercoagulable State                                    2.0.2.3  Left Ventricular Asist Device                            2.0-3.0  Mechanical Heart Valve                                  -    Aortic(with afib, MI,  embolism, HF, LA enlargement,    and/or coagulopathy)                                     2.0-3.0 (2.5-3.5)     Mitral                                                             2.5-3.5  Prosthetic/Bioprosthetic Heart Valve               2.0-3.0  Venous thromboembolism (VTE: VT, PE        2.0-3.0    APTT [[id number]]  (Normal) Collected: 04/20/25 2143    Lab Status: Final result Specimen: Blood from Arm, Right Updated: 04/20/25 2203     PTT 25 seconds     CBC and differential [[id number]] Collected: 04/20/25 2143    Lab Status: Final result Specimen: Blood from Arm, Right Updated: 04/20/25 2150     WBC 8.92 Thousand/uL      RBC 4.47 Million/uL      Hemoglobin 14.2 g/dL      Hematocrit 41.5 %      MCV 93 fL      MCH 31.8 pg      MCHC 34.2 g/dL      RDW 12.7 %      MPV 9.7 fL      Platelets 178 Thousands/uL      nRBC 0 /100 WBCs      Segmented % 64 %      Immature Grans % 0 %      Lymphocytes % 24 %      Monocytes % 10 %      Eosinophils Relative 2 %      Basophils Relative 0 %      Absolute Neutrophils 5.68 Thousands/µL      Absolute Immature Grans 0.03 Thousand/uL      Absolute Lymphocytes 2.17 Thousands/µL      Absolute Monocytes 0.85 Thousand/µL      Eosinophils Absolute 0.16 Thousand/µL      Basophils Absolute 0.03 Thousands/µL     Fingerstick Glucose (POCT) [[id number]]  (Normal) Collected: 04/20/25 2140    Lab Status: Final result Specimen: Blood Updated: 04/20/25 2141     POC Glucose 133 mg/dl     UA (URINE) with reflex to Scope [[id number]]     Lab Status: No result Specimen: Urine             CT abdomen pelvis with contrast   Final Interpretation by Sergio Glass MD (04/20 2347)      Multiple loops of abnormally dilated small bowel throughout the abdomen and pelvis with relative collapse of the distal small bowel most compatible with a small bowel obstruction. A clear transition point is not identified. Surgical consultation is    advised.      Small amount of intra-abdominal and pelvic ascites. No free  intraperitoneal air.            I personally discussed this study with IRENE ECKERT on 4/20/2025 11:44 PM.               Workstation performed: [id number]             ECG 12 Lead Documentation Only    Date/Time: 4/20/2025 9:43 PM    Performed by: Irene Eckert MD  Authorized by: Irene Eckert MD    Indications / Diagnosis:  Abdominal pain, syncope  ECG reviewed by me, the ED Provider: yes    Patient location:  ED  Previous ECG:     Previous ECG:  Unavailable  Interpretation:     Interpretation: abnormal    Rate:     ECG rate:  67    ECG rate assessment: normal    Rhythm:     Rhythm: sinus rhythm    Ectopy:     Ectopy: none    QRS:     QRS axis:  Normal  Conduction:     Conduction: abnormal      Abnormal conduction: 1st degree    ST segments:     ST segments:  Normal  T waves:     T waves: normal        ED Medication and Procedure Management   Prior to Admission Medications   Prescriptions Last Dose Informant Patient Reported? Taking?   Ferrous Gluconate-C-Folic Acid (IRON-C PO)  Self Yes No   Sig: Take 1 Dose by mouth daily   Multiple Vitamin (MULTIVITAMINS PO)  Self Yes No   Sig: Take 1 capsule by mouth daily   amLODIPine (NORVASC) 2.5 mg tablet   No No   Sig: TAKE 1 TABLET BY MOUTH DAILY   aspirin 81 mg chewable tablet  Self Yes No   Sig: Chew 81 mg daily   atorvastatin (LIPITOR) 10 mg tablet  Self No No   Sig: TAKE 1 TABLET BY MOUTH DAILY   benzonatate (TESSALON) 200 MG capsule   No No   Sig: Take 1 capsule (200 mg total) by mouth 3 (three) times a day as needed for cough   lisinopril (ZESTRIL) 5 mg tablet   No No   Sig: TAKE 1 TABLET BY MOUTH DAILY   polyethylene glycol (GOLYTELY) 4000 mL solution   No No   Sig: Take 4,000 mL by mouth once for 1 dose      Facility-Administered Medications: None     Patient's Medications   Discharge Prescriptions    No medications on file     No discharge procedures on file.  ED SEPSIS DOCUMENTATION   Time reflects when diagnosis was documented in both MDM as applicable and the  Disposition within this note       Time User Action Codes Description Comment    4/20/2025 11:59 PM Irene Major Add [R10.9] Abdominal pain     4/20/2025 11:59 PM Irene Major Add [R11.10] Vomiting     4/20/2025 11:59 PM Irene Major Add [K56.609] SBO (small bowel obstruction) ([facility])                  Irene Major MD  04/21/25 0003

## 2025-04-21 NOTE — CONSULTS
"Consultation - Surgery-General   Name: Gennaro Worthington 75 y.o. male I MRN: [id number]  Unit/Bed#: [address] [place] [address] I Date of Admission: 4/20/2025   Date of Service: 4/21/2025 I Hospital Day: 0   Inpatient consult to Acute Care Surgery  Consult performed by: Britton Varela PA-C  Consult ordered by: NEHAL Logan        Physician Requesting Evaluation: Tahmina Chi DO   Reason for Evaluation / Principal Problem: SBO    Assessment & Plan  SBO (small bowel obstruction) (HCC)  CT on admission revealed a majority of the small bowel is dilated without any obvious transition point  No previous history of small bowel obstruction  History of prostatectomy in 2019, bilateral inguinal hernia repairs, and ventral hernia repair with Ventralex mesh with Dr. Devries in August 2023  Patient had 700 cc of NG tube output in the past 12 hours  Electrolytes are within normal limits  He is passing some flatus intermittently      Gastrografin challenge has been ordered  Continue n.p.o., IV fluid hydration, and NG tube to wall suction  Continue to monitor lytes    History of prostate cancer    Mixed hyperlipidemia      Surgery-General service will follow.    History of Present Illness   Gennaro Worthington is a 75 y.o. male with history of prostate cancer status post prostatectomy in 2019 and ventral hernia repair with Ventralex mesh in August 2023 who presents with nausea, vomiting, and abdominal pain.  Patient reports Saturday evening he started to have malaise and vomiting.  Sunday morning patient had a soft but formed bowel movement.  As the day progressed he started to have left lower quadrant abdominal pain and proceeded to vomit again. He describes the pain as \"tightness\".  The pain does not radiate anywhere.  He reports these are similar symptoms he had when he was admitted to the hospital for enteritis/colitis last year.  Patient denies any recent changes in diet or eating anything abnormal.  Patient denies having any " recent sick contacts or any recent antibiotic use.  Patient denies any family members also sick at home.  Patient admits he eats lots of bagels and not a lot of vegetables.   Patient reports he has had 5 hernia surgeries in the past 10 years, upon chart review 2 of these have included bilateral inguinal hernia repair and the above-stated ventral hernia repair.  He has had colonoscopies in the past most recently last year with polyp removal which was reportedly normal.  Patient reports passing flatus intermittently. Last bowel movement was yesterday                Review of Systems   Constitutional:  Positive for fatigue. Negative for appetite change, chills, diaphoresis and fever.        +malaise   Respiratory: Negative.     Cardiovascular: Negative.    Gastrointestinal:  Positive for abdominal pain, nausea and vomiting. Negative for abdominal distention, constipation and diarrhea.   Genitourinary:  Negative for difficulty urinating, dysuria, flank pain and frequency.   Skin: Negative.    Neurological: Negative.      Medical History Review: I have reviewed the patient's PMH, PSH, Social History, Family History, Meds, and Allergies     Objective :  Temp:  [98.1 °F (36.7 °C)-100.4 °F (38 °C)] 98.3 °F (36.8 °C)  HR:  [61-91] 71  BP: ()/(57-84) 98/57  Resp:  [14-18] 16  SpO2:  [93 %-100 %] 95 %  O2 Device: None (Room air)    Lines/Drains/Airways       Active Status       Name Placement date Placement time Site Days    NG/OG/Enteral Tube 16 Fr Right nare 04/21/25  0029  Right nare  less than 1                  Physical Exam  Vitals reviewed.   Constitutional:       General: He is awake. He is not in acute distress.     Appearance: Normal appearance. He is well-developed and well-groomed.      Interventions: He is not intubated.  HENT:      Head: Normocephalic and atraumatic.      Nose: Nose normal.   Eyes:      Conjunctiva/sclera: Conjunctivae normal.   Cardiovascular:      Rate and Rhythm: Normal rate and regular  rhythm.   Pulmonary:      Effort: Pulmonary effort is normal. No tachypnea, bradypnea or accessory muscle usage. He is not intubated.      Breath sounds: Normal breath sounds.   Abdominal:      General: Bowel sounds are decreased. There is no distension. There are no signs of injury.      Palpations: Abdomen is soft.      Tenderness: There is no abdominal tenderness.      Hernia: A hernia is present. Hernia is present in the ventral area.      Comments: No tympany to percussion  NG tube in place with little output in canister    Musculoskeletal:      Right lower leg: No edema.      Left lower leg: No edema.   Skin:     General: Skin is warm.      Coloration: Skin is not cyanotic, jaundiced or mottled.   Neurological:      General: No focal deficit present.      Mental Status: He is alert.      GCS: GCS eye subscore is 4. GCS verbal subscore is 5. GCS motor subscore is 6.      Cranial Nerves: No dysarthria or facial asymmetry.   Psychiatric:         Attention and Perception: Attention normal.         Mood and Affect: Mood normal.         Speech: Speech normal.         Behavior: Behavior is cooperative.             Lab Results: I have reviewed the following results:  Recent Labs     04/20/25  2143 04/21/25  0556   WBC 8.92 5.11   HGB 14.2 13.0   HCT 41.5 38.6    151   SODIUM 138 140   K 3.6 3.8    105   CO2 22 22   BUN 24 24   CREATININE 0.97 0.93   GLUC 133 133   MG  --  2.0   PHOS  --  3.3   AST 20 18   ALT 17 16   ALB 4.2 3.6   TBILI 1.03* 1.04*   ALKPHOS 38 31*   PTT 25  --    INR 1.02  --    HSTNI0 5  --    LACTICACID 1.3  --        Imaging Results Review: I personally reviewed the following image studies in PACS and associated radiology reports: CT abdomen/pelvis. My interpretation of the radiology images/reports is: Dilatation of a majority of small bowel, no obvious transition point, air in the colon, moderately distended stomach, no free air or pneumatosis intestinalis.  Other Study Results  Review: No additional pertinent studies reviewed.    VTE Pharmacologic Prophylaxis: VTE covered by:  enoxaparin, Subcutaneous, 40 mg at 04/21/25 0840     VTE Mechanical Prophylaxis: sequential compression device    Administrative Statements   I have spent a total time of 55 minutes in caring for this patient on the day of the visit/encounter including Diagnostic results, Instructions for management, Patient and family education, Impressions, Counseling / Coordination of care, Documenting in the medical record, Reviewing/placing orders in the medical record (including tests, medications, and/or procedures), and Obtaining or reviewing history  .

## 2025-04-21 NOTE — PLAN OF CARE
Problem: PAIN - ADULT  Goal: Verbalizes/displays adequate comfort level or baseline comfort level  Description: Interventions:- Encourage patient to monitor pain and request assistance- Assess pain using appropriate pain scale- Administer analgesics based on type and severity of pain and evaluate response- Implement non-pharmacological measures as appropriate and evaluate response- Consider cultural and social influences on pain and pain management- Notify physician/advanced practitioner if interventions unsuccessful or patient reports new pain  Outcome: Progressing     Problem: INFECTION - ADULT  Goal: Absence or prevention of progression during hospitalization  Description: INTERVENTIONS:- Assess and monitor for signs and symptoms of infection- Monitor lab/diagnostic results- Monitor all insertion sites, i.e. indwelling lines, tubes, and drains- Monitor endotracheal if appropriate and nasal secretions for changes in amount and color- [place] appropriate cooling/warming therapies per order- Administer medications as ordered- Instruct and encourage patient and family to use good hand hygiene technique- Identify and instruct in appropriate isolation precautions for identified infection/condition  Outcome: Progressing  Goal: Absence of fever/infection during neutropenic period  Description: INTERVENTIONS:- Monitor WBC  Outcome: Progressing     Problem: SAFETY ADULT  Goal: Patient will remain free of falls  Description: INTERVENTIONS:- Educate patient/family on patient safety including physical limitations- Instruct patient to call for assistance with activity - Consult OT/PT to assist with strengthening/mobility - Keep Call bell within reach- Keep bed low and locked with side rails adjusted as appropriate- Keep care items and personal belongings within reach- Initiate and maintain comfort rounds- Make Fall Risk Sign visible to staff- Offer Toileting every 2 Hours, in advance of need- Initiate/Maintain bed/chair  alarm- Obtain necessary fall risk management equipment: - Apply yellow socks and bracelet for high fall risk patients- Consider moving patient to room near nurses station  Outcome: Progressing  Goal: Maintain or return to baseline ADL function  Description: INTERVENTIONS:-  Assess patient's ability to carry out ADLs; assess patient's baseline for ADL function and identify physical deficits which impact ability to perform ADLs (bathing, care of mouth/teeth, toileting, grooming, dressing, etc.)- Assess/evaluate cause of self-care deficits - Assess range of motion- Assess patient's mobility; develop plan if impaired- Assess patient's need for assistive devices and provide as appropriate- Encourage maximum independence but intervene and supervise when necessary- Involve family in performance of ADLs- Assess for home care needs following discharge - Consider OT consult to assist with ADL evaluation and planning for discharge- Provide patient education as appropriate  Outcome: Progressing  Goal: Maintains/Returns to pre admission functional level  Description: INTERVENTIONS:- Perform AM-PAC 6 Click Basic Mobility/ Daily Activity assessment daily.- Set and communicate daily mobility goal to care team and patient/family/caregiver. - Collaborate with rehabilitation services on mobility goals if consulted- Perform Range of Motion 12 times a day.- Reposition patient every 2 hours.- Dangle patient 3 times a day- Stand patient 3 times a day- Ambulate patient 3 times a day- Out of bed to chair 3 times a day - Out of bed for meals 3 times a day- Out of bed for toileting- Record patient progress and toleration of activity level   Outcome: Progressing     Problem: DISCHARGE PLANNING  Goal: Discharge to home or other facility with appropriate resources  Description: INTERVENTIONS:- Identify barriers to discharge w/patient and caregiver- Arrange for needed discharge resources and transportation as appropriate- Identify discharge  learning needs (meds, wound care, etc.)- Arrange for interpretive services to assist at discharge as needed- Refer to Case Management Department for coordinating discharge planning if the patient needs post-hospital services based on physician/advanced practitioner order or complex needs related to functional status, cognitive ability, or social support system  Outcome: Progressing     Problem: Knowledge Deficit  Goal: Patient/family/caregiver demonstrates understanding of disease process, treatment plan, medications, and discharge instructions  Description: Complete learning assessment and assess knowledge base.Interventions:- Provide teaching at level of understanding- Provide teaching via preferred learning methods  Outcome: Progressing

## 2025-04-21 NOTE — ED NOTES
Triage was completed, patient states he was feeling weak. Decision made to place in hallway bed, patient becomes pale and requested another staff member to help get patient in chair and to stretcher. Patient becomes sweaty and eyes roll back in head and patient has syncopal event while in triage chair.  Several staff come with stretcher. Patient awakens and is assisted to stretcher and taken to CT1. He is pale and diaphoretic, awake, rolled onto left side and vomits large amount of green liquid into emesis bag. Provider responding to triage room at time of syncopal event. Wife present during event.      Lourdes Hutchinson, RN  04/20/25 [id number]

## 2025-04-21 NOTE — H&P
"H&P - Hospitalist   Name: Gennrao Worthington 75 y.o. male I MRN: [id number]  Unit/Bed#: ED CT1 I Date of Admission: 4/20/2025   Date of Service: 4/21/2025 I Hospital Day: 0     Assessment & Plan  SBO (small bowel obstruction) (HCC)  Patient with PMHx prostate cancer s/p prostatectomy 2019, HTN, HLD, ventral/umbilical hernia with repair 2023 who presents with nausea, bilious vomiting and periumbilical abdominal pain since Sunday morning.  States last bowel movement Sunday morning.  CT abd showing: \"Multiple loops of abnormally dilated small bowel throughout the abdomen and pelvis with relative collapse of the distal small bowel most compatible with a small bowel obstruction. A clear transition point is not identified. Surgical consultation is advised. Small amount of intra-abdominal and pelvic ascites. No free intraperitoneal air.\"  Initially treated in ED with IV dilaudid, zofran, 1L NS bolus, and NGT was inserted. Vitally stable, no electrolyte derangements.   Abdomen mildly distended, tenderness to periumbilical region with old healed scars noted, not acute abdomen, no gaurding.  NPO  Serial abdominal exams  Continue NGT to LCWS  Appreciate general surgery consultation  Benign essential hypertension  BP stable  Continue PTA lisinopril and amlodipine with hold parameters  Mixed hyperlipidemia  Continue PTA statin  History of prostate cancer  S/p prostatectomy  Follows with urology outpatient      VTE Pharmacologic Prophylaxis: VTE Score: 4 Moderate Risk (Score 3-4) - Pharmacological DVT Prophylaxis Ordered: enoxaparin (Lovenox).  Code Status: Level 1 - Full Code   Discussion with family: Updated  (wife) at bedside.    Anticipated Length of Stay: Patient will be admitted on an inpatient basis with an anticipated length of stay of greater than 2 midnights secondary to SBO.    History of Present Illness   Chief Complaint: abdominal pain    Gennaro Worthington is a 75 y.o. male with a PMH of prostate cancer s/p " "prostatectomy 2019, HTN, HLD, ventral/umbilical hernia with repair 2023 who presents with nausea, bilious vomiting and periumbilical abdominal pain since Sunday morning. CT abd showing: \"Multiple loops of abnormally dilated small bowel throughout the abdomen and pelvis with relative collapse of the distal small bowel most compatible with a small bowel obstruction. A clear transition point is not identified. Surgical consultation is advised. Small amount of intra-abdominal and pelvic ascites. No free intraperitoneal air.\" Initially treated in ED with IV dilaudid, zofran, 1L NS bolus, and NGT was inserted. Vitally stable, no electrolyte derangements.  Patient denies any CP or SOB.  Denies any urinary symptoms.  Denies any fever or chills.  Denies any lightheadedness, dizziness or palpitations.    Review of Systems   Constitutional:  Negative for chills and fever.   HENT:  Negative for ear pain and sore throat.    Eyes:  Negative for pain and visual disturbance.   Respiratory:  Negative for cough and shortness of breath.    Cardiovascular:  Negative for chest pain, palpitations and leg swelling.   Gastrointestinal:  Positive for abdominal pain, nausea and vomiting. Negative for blood in stool.   Genitourinary:  Negative for dysuria and hematuria.   Musculoskeletal:  Negative for arthralgias and back pain.   Skin:  Negative for color change and rash.   Neurological:  Negative for seizures and syncope.   All other systems reviewed and are negative.      Historical Information   Past Medical History:   Diagnosis Date    Chronic rhinitis 02/05/2005    last assessed 2/5/05, resolved 5/18/17     Elevated liver function tests     last assessed 3/9/15, resolved 5/18/17     Herpes simplex type 1 infection     Hip arthritis     resolved 12/20/17     Hypertension     Shoulder bursitis     last assessed 9/23/13, resolved 5/18/17      Past Surgical History:   Procedure Laterality Date    EPIDURAL BLOCK INJECTION N/A 05/23/2023    " Procedure: BLOCK / INJECTION EPIDURAL STEROID CERVICAL C7-T1  (99416);  Surgeon: Bib Ball DO;  Location:  MAIN OR;  Service: Pain Management     HERNIA REPAIR      In guinal: x3 surgeries, [facility]    PA RPR AA HERNIA 1ST < 3 CM REDUCIBLE N/A 8/22/2023    Procedure: REPAIR HERNIA VENTRAL;  Surgeon: Nehemias Devries MD;  Location: [facility] MAIN OR;  Service: General    PROSTATOTOMY      last assessed 1/11/18     Social History     Tobacco Use    Smoking status: Never     Passive exposure: Never    Smokeless tobacco: Never   Vaping Use    Vaping status: Never Used   Substance and Sexual Activity    Alcohol use: Yes     Comment: very rare    Drug use: Never    Sexual activity: Not Currently     E-Cigarette/Vaping    E-Cigarette Use Never User      E-Cigarette/Vaping Substances    Nicotine No     THC No     CBD No     Flavoring No     Other No     Unknown No      Family History   Problem Relation Age of Onset    Hypertension Mother     Lung cancer Family     Prostate cancer Family     Mental illness Neg Hx      Social History:  Marital Status: /Civil Union   Occupation: Not addressed  Patient Pre-hospital Living Situation: Home  Patient Pre-hospital Level of Mobility: walks  Patient Pre-hospital Diet Restrictions: none    Meds/Allergies   I have reviewed home medications with patient personally.  Prior to Admission medications    Medication Sig Start Date End Date Taking? Authorizing Provider   amLODIPine (NORVASC) 2.5 mg tablet TAKE 1 TABLET BY MOUTH DAILY 4/17/25   NEHAL Crum   aspirin 81 mg chewable tablet Chew 81 mg daily    Historical Provider, MD   atorvastatin (LIPITOR) 10 mg tablet TAKE 1 TABLET BY MOUTH DAILY 2/1/25   NEHAL Crum   benzonatate (TESSALON) 200 MG capsule Take 1 capsule (200 mg total) by mouth 3 (three) times a day as needed for cough 3/24/25   NEHAL Crum   Ferrous Gluconate-C-Folic Acid (IRON-C PO) Take 1 Dose by mouth daily    Historical  Provider, MD   lisinopril (ZESTRIL) 5 mg tablet TAKE 1 TABLET BY MOUTH DAILY 3/26/25   NEHAL Crum   Multiple Vitamin (MULTIVITAMINS PO) Take 1 capsule by mouth daily    Historical Provider, MD   polyethylene glycol (GOLYTELY) 4000 mL solution Take 4,000 mL by mouth once for 1 dose 4/17/25 4/17/25  Jelani Singh MD     Allergies   Allergen Reactions    Augmentin [Amoxicillin-Pot Clavulanate] GI Intolerance    Meperidine Other (See Comments)     Reaction Date: 05Feb2005; Annotation - [id number]: unsure of side effect  Reaction Date: 05Feb2005; Annotation - [id number]: unsure of side effect    Amoxicillin GI Intolerance       Objective :  Temp:  [98.3 °F (36.8 °C)] 98.3 °F (36.8 °C)  HR:  [61-79] 69  BP: (107-144)/(58-84) 144/84  Resp:  [14-18] 18  SpO2:  [97 %-100 %] 97 %  O2 Device: None (Room air)    Physical Exam  Vitals and nursing note reviewed.   Constitutional:       General: He is not in acute distress.     Appearance: He is well-developed. He is not toxic-appearing.   HENT:      Head: Normocephalic and atraumatic.      Mouth/Throat:      Mouth: Mucous membranes are moist.      Pharynx: Oropharynx is clear.   Eyes:      Conjunctiva/sclera: Conjunctivae normal.   Cardiovascular:      Rate and Rhythm: Normal rate and regular rhythm.      Heart sounds: No murmur heard.  Pulmonary:      Effort: Pulmonary effort is normal. No respiratory distress.      Breath sounds: Normal breath sounds.   Abdominal:      General: A surgical scar is present. There is distension.      Palpations: Abdomen is soft.      Tenderness: There is abdominal tenderness in the periumbilical area. There is no guarding.   Musculoskeletal:         General: No swelling.      Cervical back: Neck supple.      Right lower leg: No edema.      Left lower leg: No edema.   Skin:     General: Skin is warm and dry.   Neurological:      General: No focal deficit present.      Mental Status: He is alert and oriented to person, place, and time.  "  Psychiatric:         Mood and Affect: Mood normal.          Lines/Drains:            Lab Results: I have reviewed the following results:  Results from last 7 days   Lab Units 04/20/25  2143   WBC Thousand/uL 8.92   HEMOGLOBIN g/dL 14.2   HEMATOCRIT % 41.5   PLATELETS Thousands/uL 178   SEGS PCT % 64   LYMPHO PCT % 24   MONO PCT % 10   EOS PCT % 2     Results from last 7 days   Lab Units 04/20/25  2143   SODIUM mmol/L 138   POTASSIUM mmol/L 3.6   CHLORIDE mmol/L 103   CO2 mmol/L 22   BUN mg/dL 24   CREATININE mg/dL 0.97   ANION GAP mmol/L 13   CALCIUM mg/dL 9.0   ALBUMIN g/dL 4.2   TOTAL BILIRUBIN mg/dL 1.03*   ALK PHOS U/L 38   ALT U/L 17   AST U/L 20   GLUCOSE RANDOM mg/dL 133     Results from last 7 days   Lab Units 04/20/25  2143   INR  1.02     Results from last 7 days   Lab Units 04/20/25  2140   POC GLUCOSE mg/dl 133     No results found for: \"HGBA1C\"  Results from last 7 days   Lab Units 04/20/25  2143   LACTIC ACID mmol/L 1.3       Imaging Results Review: I reviewed radiology reports from this admission including: CT abdomen/pelvis.  Other Study Results Review: EKG was personally reviewed and my interpretation is: Sinus rhythm with 1st degree AVB, HR 67, QTc 431, nonischemic, no ST-T changes..    Administrative Statements       ** Please Note: This note has been constructed using a voice recognition system. **    "

## 2025-04-21 NOTE — PLAN OF CARE
Problem: PAIN - ADULT  Goal: Verbalizes/displays adequate comfort level or baseline comfort level  Description: Interventions:- Encourage patient to monitor pain and request assistance- Assess pain using appropriate pain scale- Administer analgesics based on type and severity of pain and evaluate response- Implement non-pharmacological measures as appropriate and evaluate response- Consider cultural and social influences on pain and pain management- Notify physician/advanced practitioner if interventions unsuccessful or patient reports new pain  Outcome: Progressing     Problem: INFECTION - ADULT  Goal: Absence or prevention of progression during hospitalization  Description: INTERVENTIONS:- Assess and monitor for signs and symptoms of infection- Monitor lab/diagnostic results- Monitor all insertion sites, i.e. indwelling lines, tubes, and drains- Monitor endotracheal if appropriate and nasal secretions for changes in amount and color- [place] appropriate cooling/warming therapies per order- Administer medications as ordered- Instruct and encourage patient and family to use good hand hygiene technique- Identify and instruct in appropriate isolation precautions for identified infection/condition  Outcome: Progressing  Goal: Absence of fever/infection during neutropenic period  Description: INTERVENTIONS:- Monitor WBC  Outcome: Progressing     Problem: SAFETY ADULT  Goal: Patient will remain free of falls  Description: INTERVENTIONS:- Educate patient/family on patient safety including physical limitations- Instruct patient to call for assistance with activity - Consult OT/PT to assist with strengthening/mobility - Keep Call bell within reach- Keep bed low and locked with side rails adjusted as appropriate- Keep care items and personal belongings within reach- Initiate and maintain comfort rounds- Make Fall Risk Sign visible to staff- Offer Toileting every 2 Hours, in advance of need- Initiate/Maintain bed/chair  alarm- Obtain necessary fall risk management equipment: - Apply yellow socks and bracelet for high fall risk patients- Consider moving patient to room near nurses station  Outcome: Progressing  Goal: Maintain or return to baseline ADL function  Description: INTERVENTIONS:-  Assess patient's ability to carry out ADLs; assess patient's baseline for ADL function and identify physical deficits which impact ability to perform ADLs (bathing, care of mouth/teeth, toileting, grooming, dressing, etc.)- Assess/evaluate cause of self-care deficits - Assess range of motion- Assess patient's mobility; develop plan if impaired- Assess patient's need for assistive devices and provide as appropriate- Encourage maximum independence but intervene and supervise when necessary- Involve family in performance of ADLs- Assess for home care needs following discharge - Consider OT consult to assist with ADL evaluation and planning for discharge- Provide patient education as appropriate  Outcome: Progressing  Goal: Maintains/Returns to pre admission functional level  Description: INTERVENTIONS:- Perform AM-PAC 6 Click Basic Mobility/ Daily Activity assessment daily.- Set and communicate daily mobility goal to care team and patient/family/caregiver. - Collaborate with rehabilitation services on mobility goals if consulted- Perform Range of Motion 12 times a day.- Reposition patient every 2 hours.- Dangle patient 3 times a day- Stand patient 3 times a day- Ambulate patient 3 times a day- Out of bed to chair 3 times a day - Out of bed for meals 3 times a day- Out of bed for toileting- Record patient progress and toleration of activity level   Outcome: Progressing     Problem: DISCHARGE PLANNING  Goal: Discharge to home or other facility with appropriate resources  Description: INTERVENTIONS:- Identify barriers to discharge w/patient and caregiver- Arrange for needed discharge resources and transportation as appropriate- Identify discharge  learning needs (meds, wound care, etc.)- Arrange for interpretive services to assist at discharge as needed- Refer to Case Management Department for coordinating discharge planning if the patient needs post-hospital services based on physician/advanced practitioner order or complex needs related to functional status, cognitive ability, or social support system  Outcome: Progressing     Problem: Knowledge Deficit  Goal: Patient/family/caregiver demonstrates understanding of disease process, treatment plan, medications, and discharge instructions  Description: Complete learning assessment and assess knowledge base.Interventions:- Provide teaching at level of understanding- Provide teaching via preferred learning methods  Outcome: Progressing

## 2025-04-21 NOTE — ASSESSMENT & PLAN NOTE
CT on admission revealed a majority of the small bowel is dilated without any obvious transition point  No previous history of small bowel obstruction  History of prostatectomy in 2019, bilateral inguinal hernia repairs, and ventral hernia repair with Ventralex mesh with Dr. Devries in August 2023  Patient had 700 cc of NG tube output in the past 12 hours  Electrolytes are within normal limits  He is passing some flatus intermittently      Gastrografin challenge has been ordered  Continue n.p.o., IV fluid hydration, and NG tube to wall suction  Continue to monitor lytes

## 2025-04-22 LAB
ALBUMIN SERPL BCG-MCNC: 3.7 G/DL (ref 3.5–5)
ALP SERPL-CCNC: 30 U/L (ref 34–104)
ALT SERPL W P-5'-P-CCNC: 35 U/L (ref 7–52)
ANION GAP SERPL CALCULATED.3IONS-SCNC: 11 MMOL/L (ref 4–13)
AST SERPL W P-5'-P-CCNC: 30 U/L (ref 13–39)
BILIRUB SERPL-MCNC: 0.73 MG/DL (ref 0.2–1)
BUN SERPL-MCNC: 25 MG/DL (ref 5–25)
CALCIUM SERPL-MCNC: 8.5 MG/DL (ref 8.4–10.2)
CHLORIDE SERPL-SCNC: 108 MMOL/L (ref 96–108)
CO2 SERPL-SCNC: 22 MMOL/L (ref 21–32)
CREAT SERPL-MCNC: 0.8 MG/DL (ref 0.6–1.3)
ERYTHROCYTE [DISTWIDTH] IN BLOOD BY AUTOMATED COUNT: 12.6 % (ref 11.6–15.1)
GFR SERPL CREATININE-BSD FRML MDRD: 87 ML/MIN/1.73SQ M
GLUCOSE SERPL-MCNC: 110 MG/DL (ref 65–140)
HCT VFR BLD AUTO: 39.9 % (ref 36.5–49.3)
HGB BLD-MCNC: 13.1 G/DL (ref 12–17)
MCH RBC QN AUTO: 31.7 PG (ref 26.8–34.3)
MCHC RBC AUTO-ENTMCNC: 32.8 G/DL (ref 31.4–37.4)
MCV RBC AUTO: 97 FL (ref 82–98)
PLATELET # BLD AUTO: 146 THOUSANDS/UL (ref 149–390)
PMV BLD AUTO: 10.1 FL (ref 8.9–12.7)
POTASSIUM SERPL-SCNC: 3.9 MMOL/L (ref 3.5–5.3)
PROT SERPL-MCNC: 7.1 G/DL (ref 6.4–8.4)
RBC # BLD AUTO: 4.13 MILLION/UL (ref 3.88–5.62)
SODIUM SERPL-SCNC: 141 MMOL/L (ref 135–147)
WBC # BLD AUTO: 4.62 THOUSAND/UL (ref 4.31–10.16)

## 2025-04-22 PROCEDURE — 80053 COMPREHEN METABOLIC PANEL: CPT | Performed by: FAMILY MEDICINE

## 2025-04-22 PROCEDURE — 99232 SBSQ HOSP IP/OBS MODERATE 35: CPT | Performed by: SPECIALIST

## 2025-04-22 PROCEDURE — 99232 SBSQ HOSP IP/OBS MODERATE 35: CPT | Performed by: INTERNAL MEDICINE

## 2025-04-22 PROCEDURE — 85027 COMPLETE CBC AUTOMATED: CPT | Performed by: FAMILY MEDICINE

## 2025-04-22 RX ORDER — ATORVASTATIN CALCIUM 10 MG/1
10 TABLET, FILM COATED ORAL
Status: DISCONTINUED | OUTPATIENT
Start: 2025-04-22 | End: 2025-04-25 | Stop reason: HOSPADM

## 2025-04-22 RX ORDER — ASPIRIN 81 MG/1
81 TABLET, CHEWABLE ORAL DAILY
Status: DISCONTINUED | OUTPATIENT
Start: 2025-04-23 | End: 2025-04-25 | Stop reason: HOSPADM

## 2025-04-22 RX ORDER — AMLODIPINE BESYLATE 2.5 MG/1
2.5 TABLET ORAL DAILY
Status: DISCONTINUED | OUTPATIENT
Start: 2025-04-22 | End: 2025-04-25 | Stop reason: HOSPADM

## 2025-04-22 RX ORDER — LISINOPRIL 5 MG/1
5 TABLET ORAL DAILY
Status: DISCONTINUED | OUTPATIENT
Start: 2025-04-23 | End: 2025-04-25 | Stop reason: HOSPADM

## 2025-04-22 RX ADMIN — ENOXAPARIN SODIUM 40 MG: 40 INJECTION SUBCUTANEOUS at 08:58

## 2025-04-22 RX ADMIN — ATORVASTATIN CALCIUM 10 MG: 10 TABLET, FILM COATED ORAL at 18:23

## 2025-04-22 RX ADMIN — ASPIRIN 81 MG: 81 TABLET, CHEWABLE ORAL at 08:58

## 2025-04-22 RX ADMIN — AMLODIPINE BESYLATE 2.5 MG: 2.5 TABLET ORAL at 11:43

## 2025-04-22 RX ADMIN — SODIUM CHLORIDE 100 ML/HR: 0.9 INJECTION, SOLUTION INTRAVENOUS at 21:14

## 2025-04-22 NOTE — PROGRESS NOTES
Progress Note - Hospitalist   Name: Gennaro Worthington 75 y.o. male I MRN: [id number]  Unit/Bed#: 4 [address] I Date of Admission: 2025   Date of Service: 2025 I Hospital Day: 1    Assessment & Plan  SBO (small bowel obstruction) (HCC)  History of prostate cancer status post prostatectomy, hypertension, hyperlipidemia, and abdominal surgeries who presented to the hospital with nausea vomiting abdominal pain found to have SBO  Being followed by surgery  Had several bowel movements and passing flatus  Surgery has removed NG tube and placed patient on clears  Benign essential hypertension  Vitals stable.  Will restart amlodipine and lisinopril  Mixed hyperlipidemia  Continue atorvastatin  History of prostate cancer  Status post prostatectomy  No urinary complaints    VTE Pharmacologic Prophylaxis: VTE Score: 4 Moderate Risk (Score 3-4) - Pharmacological DVT Prophylaxis Ordered: enoxaparin (Lovenox).    Mobility:   Basic Mobility Inpatient Raw Score: 20  JH-HLM Goal: 6: Walk 10 steps or more  JH-HLM Achieved: 6: Walk 10 steps or more  JH-HLM Goal achieved. Continue to encourage appropriate mobility.    Patient Centered Rounds: I have performed bedside rounds with nursing staff today.  Discussions with Specialists or Other Care Team Provider: Case management and surgery    Education and Discussions with Family / Patient: Updated  (wife) at bedside.    Current Length of Stay: 1 day(s)  Current Patient Status: Inpatient   Certification Statement: The patient will continue to require additional inpatient hospital stay due to SBO and slow advancement of diet  Discharge Plan: Anticipate discharge tomorrow to home.    Code Status: Level 1 - Full Code    Subjective   Patient seen and examined.  Had bowel movement and passing of flatus    Objective   Vitals:   Temp (24hrs), Av.9 °F (36.6 °C), Min:97.4 °F (36.3 °C), Max:98.4 °F (36.9 °C)    Temp:  [97.4 °F (36.3 °C)-98.4 °F (36.9 °C)] 97.4 °F (36.3  °C)  HR:  [63-68] 66  Resp:  [14-17] 15  BP: (116-148)/(49-78) 121/49  SpO2:  [93 %-97 %] 97 %  Body mass index is 28.91 kg/m².     Input and Output Summary (last 24 hours):     Intake/Output Summary (Last 24 hours) at 4/22/2025 1216  Last data filed at 4/22/2025 0500  Gross per 24 hour   Intake --   Output 100 ml   Net -100 ml       Physical Exam  Vitals reviewed.   Constitutional:       General: He is not in acute distress.  HENT:      Head: Atraumatic.   Eyes:      General: No scleral icterus.     Extraocular Movements: Extraocular movements intact.   Cardiovascular:      Rate and Rhythm: Regular rhythm.      Heart sounds:      No gallop.   Pulmonary:      Effort: Pulmonary effort is normal. No respiratory distress.   Abdominal:      General: Bowel sounds are normal.      Palpations: Abdomen is soft.      Tenderness: There is no abdominal tenderness.   Musculoskeletal:         General: No tenderness or deformity.   Skin:     General: Skin is warm.      Coloration: Skin is not jaundiced.   Neurological:      General: No focal deficit present.      Mental Status: He is alert.      Motor: No weakness.   Psychiatric:         Mood and Affect: Mood normal.       Lines/Drains:  Invasive Devices       Peripheral Intravenous Line  Duration             Peripheral IV 04/20/25 Proximal;Right;Ventral (anterior) Forearm 1 day              Drain  Duration             NG/OG/Enteral Tube 16 Fr Right nare 1 day                            Lab Results: I have reviewed the following results:   Results from last 7 days   Lab Units 04/22/25  0516 04/21/25  0556 04/20/25  2143   WBC Thousand/uL 4.62 5.11 8.92   HEMOGLOBIN g/dL 13.1 13.0 14.2   PLATELETS Thousands/uL 146* 151 178   MCV fL 97 94 93   INR   --   --  1.02     Results from last 7 days   Lab Units 04/22/25  0516 04/21/25  0556 04/20/25  2143   SODIUM mmol/L 141 140 138   POTASSIUM mmol/L 3.9 3.8 3.6   CHLORIDE mmol/L 108 105 103   CO2 mmol/L 22 22 22   ANION GAP mmol/L 11 13  13   BUN mg/dL 25 24 24   CREATININE mg/dL 0.80 0.93 0.97   CALCIUM mg/dL 8.5 8.3* 9.0   ALBUMIN g/dL 3.7 3.6 4.2   TOTAL BILIRUBIN mg/dL 0.73 1.04* 1.03*   ALK PHOS U/L 30* 31* 38   ALT U/L 35 16 17   AST U/L 30 18 20   EGFR ml/min/1.73sq m 87 80 76   GLUCOSE RANDOM mg/dL 110 133 133     Results from last 7 days   Lab Units 04/21/25  0556   MAGNESIUM mg/dL 2.0   PHOSPHORUS mg/dL 3.3         Results from last 7 days   Lab Units 04/20/25  2143   HS TNI 0HR ng/L 5          Results from last 7 days   Lab Units 04/20/25  2143   LACTIC ACID mmol/L 1.3     Results from last 7 days   Lab Units 04/20/25  2140   POC GLUCOSE mg/dl 133               Recent Cultures (last 7 days):         Imaging:  Reviewed radiology reports from this admission including:  XR abdomen complete inc upright and/or decubitus  Result Date: 4/21/2025  Impression: Concern for small bowel obstruction, as above. Workstation performed: [id number]     CT abdomen pelvis with contrast  Result Date: 4/20/2025  Impression: Multiple loops of abnormally dilated small bowel throughout the abdomen and pelvis with relative collapse of the distal small bowel most compatible with a small bowel obstruction. A clear transition point is not identified. Surgical consultation is advised. Small amount of intra-abdominal and pelvic ascites. No free intraperitoneal air. I personally discussed this study with CYNTHIA ECKERT on 4/20/2025 11:44 PM. Workstation performed: [id number]       Last 24 Hours Medication List:     Current Facility-Administered Medications:     acetaminophen (Ofirmev) injection 1,000 mg, Q6H PRN, Last Rate: 1,000 mg (04/21/25 0557)    amLODIPine (NORVASC) tablet 2.5 mg, Daily    [START ON 4/23/2025] aspirin chewable tablet 81 mg, Daily    atorvastatin (LIPITOR) tablet 10 mg, Daily With Dinner    enoxaparin (LOVENOX) subcutaneous injection 40 mg, Daily    HYDROmorphone (DILAUDID) injection 0.5 mg, Q3H PRN    [START ON 4/23/2025] lisinopril (ZESTRIL) tablet  5 mg, Daily    ondansetron (ZOFRAN) injection 4 mg, Q6H PRN    promethazine (PHENERGAN) injection 25 mg, Q6H PRN    sodium chloride 0.9 % infusion, Continuous, Last Rate: 100 mL/hr (04/21/25 2118)    Administrative Statements   Today, Patient Was Seen By: Nathan Richmond, DO  I have spent a total time of 35 minutes in caring for this patient on the day of the visit/encounter including Patient and family education, Counseling / Coordination of care, Documenting in the medical record, Reviewing/placing orders in the medical record (including tests, medications, and/or procedures), and Communicating with other healthcare professionals .    **Please Note: This note may have been constructed using a voice recognition system.**

## 2025-04-22 NOTE — ASSESSMENT & PLAN NOTE
CT on admission revealed a majority of the small bowel is dilated without any obvious transition point, No previous history of small bowel obstruction.  History of prostatectomy in 2019, bilateral inguinal hernia repairs, and ventral hernia repair with Ventralex mesh with Dr. Devries in August 2023  Minimal NG tube output, blood work unremarkable   He had a loose bowel movement last night      Removed NG tube   Trial clear liquid diet   OOB encouraged

## 2025-04-22 NOTE — ASSESSMENT & PLAN NOTE
History of prostate cancer status post prostatectomy, hypertension, hyperlipidemia, and abdominal surgeries who presented to the hospital with nausea vomiting abdominal pain found to have SBO  Being followed by surgery  Had several bowel movements and passing flatus  Surgery has removed NG tube and placed patient on clears

## 2025-04-22 NOTE — PLAN OF CARE
Problem: PAIN - ADULT  Goal: Verbalizes/displays adequate comfort level or baseline comfort level  Description: Interventions:- Encourage patient to monitor pain and request assistance- Assess pain using appropriate pain scale- Administer analgesics based on type and severity of pain and evaluate response- Implement non-pharmacological measures as appropriate and evaluate response- Consider cultural and social influences on pain and pain management- Notify physician/advanced practitioner if interventions unsuccessful or patient reports new pain  Outcome: Progressing     Problem: INFECTION - ADULT  Goal: Absence or prevention of progression during hospitalization  Description: INTERVENTIONS:- Assess and monitor for signs and symptoms of infection- Monitor lab/diagnostic results- Monitor all insertion sites, i.e. indwelling lines, tubes, and drains- Monitor endotracheal if appropriate and nasal secretions for changes in amount and color- [place] appropriate cooling/warming therapies per order- Administer medications as ordered- Instruct and encourage patient and family to use good hand hygiene technique- Identify and instruct in appropriate isolation precautions for identified infection/condition  Outcome: Progressing  Goal: Absence of fever/infection during neutropenic period  Description: INTERVENTIONS:- Monitor WBC  Outcome: Progressing     Problem: SAFETY ADULT  Goal: Patient will remain free of falls  Description: INTERVENTIONS:- Educate patient/family on patient safety including physical limitations- Instruct patient to call for assistance with activity - Consult OT/PT to assist with strengthening/mobility - Keep Call bell within reach- Keep bed low and locked with side rails adjusted as appropriate- Keep care items and personal belongings within reach- Initiate and maintain comfort rounds- Make Fall Risk Sign visible to staff- Offer Toileting every 2 Hours, in advance of need- Initiate/Maintain bed/chair  alarm- Obtain necessary fall risk management equipment: - Apply yellow socks and bracelet for high fall risk patients- Consider moving patient to room near nurses station  Outcome: Progressing  Goal: Maintain or return to baseline ADL function  Description: INTERVENTIONS:-  Assess patient's ability to carry out ADLs; assess patient's baseline for ADL function and identify physical deficits which impact ability to perform ADLs (bathing, care of mouth/teeth, toileting, grooming, dressing, etc.)- Assess/evaluate cause of self-care deficits - Assess range of motion- Assess patient's mobility; develop plan if impaired- Assess patient's need for assistive devices and provide as appropriate- Encourage maximum independence but intervene and supervise when necessary- Involve family in performance of ADLs- Assess for home care needs following discharge - Consider OT consult to assist with ADL evaluation and planning for discharge- Provide patient education as appropriate  Outcome: Progressing  Goal: Maintains/Returns to pre admission functional level  Description: INTERVENTIONS:- Perform AM-PAC 6 Click Basic Mobility/ Daily Activity assessment daily.- Set and communicate daily mobility goal to care team and patient/family/caregiver. - Collaborate with rehabilitation services on mobility goals if consulted- Perform Range of Motion 12 times a day.- Reposition patient every 2 hours.- Dangle patient 3 times a day- Stand patient 3 times a day- Ambulate patient 3 times a day- Out of bed to chair 3 times a day - Out of bed for meals 3 times a day- Out of bed for toileting- Record patient progress and toleration of activity level   Outcome: Progressing     Problem: DISCHARGE PLANNING  Goal: Discharge to home or other facility with appropriate resources  Description: INTERVENTIONS:- Identify barriers to discharge w/patient and caregiver- Arrange for needed discharge resources and transportation as appropriate- Identify discharge  learning needs (meds, wound care, etc.)- Arrange for interpretive services to assist at discharge as needed- Refer to Case Management Department for coordinating discharge planning if the patient needs post-hospital services based on physician/advanced practitioner order or complex needs related to functional status, cognitive ability, or social support system  Outcome: Progressing     Problem: Knowledge Deficit  Goal: Patient/family/caregiver demonstrates understanding of disease process, treatment plan, medications, and discharge instructions  Description: Complete learning assessment and assess knowledge base.Interventions:- Provide teaching at level of understanding- Provide teaching via preferred learning methods  Outcome: Progressing

## 2025-04-22 NOTE — PROGRESS NOTES
Progress Note - Surgery-General   Name: eGnnaro Worthington 75 y.o. male I MRN: [id number]  Unit/Bed#: [address] [place] [address] I Date of Admission: 4/20/2025   Date of Service: 4/22/2025 I Hospital Day: 1    Assessment & Plan  SBO (small bowel obstruction) ([facility])  CT on admission revealed a majority of the small bowel is dilated without any obvious transition point, No previous history of small bowel obstruction.  History of prostatectomy in 2019, bilateral inguinal hernia repairs, and ventral hernia repair with Ventralex mesh with Dr. Devries in August 2023  Minimal NG tube output, blood work unremarkable   He had a loose bowel movement last night      Removed NG tube   Trial clear liquid diet   OOB encouraged      History of prostate cancer    Mixed hyperlipidemia        Surgery-General service will follow.    Subjective   Patient had a loose bowel movement last night.  Patient denies any nausea or vomiting during clamp trial.    Objective :  Temp:  [97.4 °F (36.3 °C)-98.4 °F (36.9 °C)] 97.4 °F (36.3 °C)  HR:  [63-68] 68  BP: (116-148)/(56-78) 148/78  Resp:  [14-17] 15  SpO2:  [93 %-97 %] 96 %  O2 Device: None (Room air)    I/O         04/20 0701  04/21 0700 04/21 0701  04/22 0700 04/22 0701  04/23 0700    Urine (mL/kg/hr) 200      Emesis/NG output 700 100     Stool  0     Total Output 900 100     Net -900 -100            Unmeasured Stool Occurrence  1 x           Lines/Drains/Airways       Active Status       Name Placement date Placement time Site Days    NG/OG/Enteral Tube 16 Fr Right nare 04/21/25  0029  Right nare  1                  Physical Exam  Vitals reviewed.   Constitutional:       General: He is awake. He is not in acute distress.     Appearance: Normal appearance. He is well-developed, well-groomed and overweight. He is not toxic-appearing or diaphoretic.      Interventions: He is not intubated.  HENT:      Head: Normocephalic and atraumatic. Not macrocephalic and not microcephalic. No raccoon eyes, Ednney's sign,  right periorbital erythema or left periorbital erythema.      Right Ear: Hearing and external ear normal.      Left Ear: Hearing and external ear normal.      Nose: Nose normal.   Eyes:      General: No scleral icterus.        Right eye: No discharge.         Left eye: No discharge.      Conjunctiva/sclera: Conjunctivae normal.      Right eye: Right conjunctiva is not injected. No hemorrhage.     Left eye: Left conjunctiva is not injected. No hemorrhage.     Pupils: Pupils are equal, round, and reactive to light.   Cardiovascular:      Rate and Rhythm: Normal rate and regular rhythm.      Heart sounds: Normal heart sounds.   Pulmonary:      Effort: Pulmonary effort is normal. No tachypnea, bradypnea or respiratory distress. He is not intubated.      Breath sounds: Normal breath sounds. No stridor. No decreased breath sounds, wheezing or rhonchi.   Abdominal:      General: A surgical scar is present. Bowel sounds are normal. There is no distension.      Palpations: Abdomen is soft. Abdomen is not rigid.      Tenderness: There is no abdominal tenderness. There is no guarding or rebound.      Hernia: A hernia is present. Hernia is present in the ventral area.      Comments: No tympany to percussion   Musculoskeletal:      Right lower leg: No edema.      Left lower leg: No edema.   Skin:     General: Skin is warm.      Coloration: Skin is not cyanotic, jaundiced or mottled.      Findings: No rash.   Neurological:      Mental Status: He is alert. He is not disoriented.      GCS: GCS eye subscore is 4. GCS verbal subscore is 5. GCS motor subscore is 6.      Cranial Nerves: No cranial nerve deficit, dysarthria or facial asymmetry.   Psychiatric:         Attention and Perception: Attention normal.         Mood and Affect: Mood normal.         Speech: Speech normal.         Behavior: Behavior normal. Behavior is cooperative.         Lab Results: I have reviewed the following results:  Recent Labs     04/20/25  7781  04/21/25  0556 04/22/25  0516   WBC 8.92 5.11 4.62   HGB 14.2 13.0 13.1   HCT 41.5 38.6 39.9    151 146*   SODIUM 138 140 141   K 3.6 3.8 3.9    105 108   CO2 22 22 22   BUN 24 24 25   CREATININE 0.97 0.93 0.80   GLUC 133 133 110   MG  --  2.0  --    PHOS  --  3.3  --    AST 20 18 30   ALT 17 16 35   ALB 4.2 3.6 3.7   TBILI 1.03* 1.04* 0.73   ALKPHOS 38 31* 30*   PTT 25  --   --    INR 1.02  --   --    HSTNI0 5  --   --    LACTICACID 1.3  --   --        Imaging Results Review: I personally reviewed the following image studies in PACS and associated radiology reports: xray(s). My interpretation of the radiology images/reports is: Patient did not have any previous oral contrast, current contrast in the colon must be from this current study.  Other Study Results Review: No additional pertinent studies reviewed.    VTE Pharmacologic Prophylaxis: VTE covered by:  enoxaparin, Subcutaneous, 40 mg at 04/22/25 0858     VTE Mechanical Prophylaxis: sequential compression device

## 2025-04-23 PROBLEM — D75.89 BICYTOPENIA: Status: ACTIVE | Noted: 2025-04-23

## 2025-04-23 LAB
ANION GAP SERPL CALCULATED.3IONS-SCNC: 9 MMOL/L (ref 4–13)
BUN SERPL-MCNC: 16 MG/DL (ref 5–25)
CALCIUM SERPL-MCNC: 8.4 MG/DL (ref 8.4–10.2)
CHLORIDE SERPL-SCNC: 107 MMOL/L (ref 96–108)
CO2 SERPL-SCNC: 24 MMOL/L (ref 21–32)
CREAT SERPL-MCNC: 0.73 MG/DL (ref 0.6–1.3)
ERYTHROCYTE [DISTWIDTH] IN BLOOD BY AUTOMATED COUNT: 12.5 % (ref 11.6–15.1)
GFR SERPL CREATININE-BSD FRML MDRD: 90 ML/MIN/1.73SQ M
GLUCOSE SERPL-MCNC: 88 MG/DL (ref 65–140)
HCT VFR BLD AUTO: 36.4 % (ref 36.5–49.3)
HGB BLD-MCNC: 12.2 G/DL (ref 12–17)
MCH RBC QN AUTO: 31.8 PG (ref 26.8–34.3)
MCHC RBC AUTO-ENTMCNC: 33.5 G/DL (ref 31.4–37.4)
MCV RBC AUTO: 95 FL (ref 82–98)
PLATELET # BLD AUTO: 141 THOUSANDS/UL (ref 149–390)
PMV BLD AUTO: 10.2 FL (ref 8.9–12.7)
POTASSIUM SERPL-SCNC: 3.7 MMOL/L (ref 3.5–5.3)
RBC # BLD AUTO: 3.84 MILLION/UL (ref 3.88–5.62)
SODIUM SERPL-SCNC: 140 MMOL/L (ref 135–147)
WBC # BLD AUTO: 3.74 THOUSAND/UL (ref 4.31–10.16)

## 2025-04-23 PROCEDURE — 99232 SBSQ HOSP IP/OBS MODERATE 35: CPT | Performed by: SURGERY

## 2025-04-23 PROCEDURE — 80048 BASIC METABOLIC PNL TOTAL CA: CPT | Performed by: INTERNAL MEDICINE

## 2025-04-23 PROCEDURE — 99232 SBSQ HOSP IP/OBS MODERATE 35: CPT | Performed by: INTERNAL MEDICINE

## 2025-04-23 PROCEDURE — 85027 COMPLETE CBC AUTOMATED: CPT | Performed by: INTERNAL MEDICINE

## 2025-04-23 RX ORDER — ACETAMINOPHEN 325 MG/1
650 TABLET ORAL EVERY 4 HOURS PRN
Status: DISCONTINUED | OUTPATIENT
Start: 2025-04-23 | End: 2025-04-25 | Stop reason: HOSPADM

## 2025-04-23 RX ADMIN — LISINOPRIL 5 MG: 5 TABLET ORAL at 08:11

## 2025-04-23 RX ADMIN — ENOXAPARIN SODIUM 40 MG: 40 INJECTION SUBCUTANEOUS at 08:11

## 2025-04-23 RX ADMIN — ASPIRIN 81 MG: 81 TABLET, CHEWABLE ORAL at 08:11

## 2025-04-23 RX ADMIN — ATORVASTATIN CALCIUM 10 MG: 10 TABLET, FILM COATED ORAL at 17:31

## 2025-04-23 RX ADMIN — AMLODIPINE BESYLATE 2.5 MG: 2.5 TABLET ORAL at 08:11

## 2025-04-23 NOTE — ASSESSMENT & PLAN NOTE
Bicytopenia was brought to my attention by family  Very mild leukopenia and thrombocytopenia dilutional from IV fluids  Suggest recheck in 2 weeks to ensure resolution    Results from last 7 days   Lab Units 04/23/25  0454 04/22/25  0516 04/21/25  0556   WBC Thousand/uL 3.74* 4.62 5.11   PLATELETS Thousands/uL 141* 146* 151

## 2025-04-23 NOTE — ASSESSMENT & PLAN NOTE
Presented originally with N/V/D, likely not complete SBO  CT on admission revealed a majority of the small bowel is dilated without any obvious transition point, No previous history of small bowel obstruction.  History of prostatectomy in 2019, bilateral inguinal hernia repairs, and ventral hernia repair with Ventralex mesh with Dr. Devries in August 2023  blood work unremarkable   Continues to have loose bowel movements and flatus. Less consistent with SBO. Progressing well symptomatically and tolerating advance in diet       Plan:   Encouraged OOB and ambulating   Advance diet as tolerated  Discharge per primary team

## 2025-04-23 NOTE — PROGRESS NOTES
Progress Note - Surgery-General   Name: Gennaro Worthington 75 y.o. male I MRN: [id number]  Unit/Bed#: [address] [place], [place] [address] I Date of Admission: 4/20/2025   Date of Service: 4/23/2025 I Hospital Day: 2    Assessment & Plan  SBO (small bowel obstruction) (HCC)  Presented originally with N/V/D, likely not complete SBO  CT on admission revealed a majority of the small bowel is dilated without any obvious transition point, No previous history of small bowel obstruction.  History of prostatectomy in 2019, bilateral inguinal hernia repairs, and ventral hernia repair with Ventralex mesh with Dr. Devries in August 2023  blood work unremarkable   Continues to have loose bowel movements and flatus. Less consistent with SBO. Progressing well symptomatically and tolerating advance in diet       Plan:   Encouraged OOB and ambulating   Advance diet as tolerated  Discharge per primary team      History of prostate cancer  S/P prostatotomy 2018  Mixed hyperlipidemia        Surgery-General service signing off.    Subjective   Patient has no acute complaints today.  He is continuing to have non-bloody watery bowel movements. Last one was at midnight. Denies abdominal pain, chest pain, SOB, N/V, fevers/chills.      Objective :  Temp:  [97.7 °F (36.5 °C)-98.3 °F (36.8 °C)] 98.3 °F (36.8 °C)  HR:  [61-66] 61  BP: (121-148)/(49-78) 148/65  Resp:  [16-18] 16  SpO2:  [96 %-100 %] 96 %    I/O         04/21 0701  04/22 0700 04/22 0701  04/23 0700 04/23 0701  04/24 0700    P.O.  480     I.V. (mL/kg)  2393.3 (24.1)     Total Intake(mL/kg)  2873.3 (28.9)     Urine (mL/kg/hr)       Emesis/NG output 100      Stool 0      Total Output 100      Net -100 +2873.3            Unmeasured Urine Occurrence  2 x     Unmeasured Stool Occurrence 1 x 1 x           Lines/Drains/Airways       Active Status       Name Placement date Placement time Site Days    NG/OG/Enteral Tube 16 Fr Right nare 04/21/25  0029  Right nare  2                  Physical Exam  Vitals reviewed.    Constitutional:       General: He is awake. He is not in acute distress.     Appearance: Normal appearance. He is well-developed and overweight. He is not toxic-appearing.   HENT:      Head: Normocephalic and atraumatic.      Mouth/Throat:      Mouth: Mucous membranes are dry.   Eyes:      General: No scleral icterus.        Right eye: No discharge.         Left eye: No discharge.      Extraocular Movements: Extraocular movements intact.      Pupils: Pupils are equal, round, and reactive to light.   Cardiovascular:      Rate and Rhythm: Normal rate and regular rhythm.      Heart sounds: No murmur heard.  Pulmonary:      Effort: Pulmonary effort is normal. No tachypnea, bradypnea or respiratory distress.      Breath sounds: Normal breath sounds. No wheezing.   Abdominal:      General: Abdomen is protuberant. A surgical scar is present. Bowel sounds are normal. There is no distension.      Tenderness: There is no abdominal tenderness. There is left CVA tenderness.      Hernia: A hernia is present. Hernia is present in the ventral area.   Musculoskeletal:         General: No swelling, tenderness, deformity or signs of injury.      Right lower leg: No edema.      Left lower leg: No edema.   Skin:     General: Skin is warm and dry.      Capillary Refill: Capillary refill takes less than 2 seconds.      Coloration: Skin is not cyanotic, jaundiced or mottled.   Neurological:      General: No focal deficit present.      Mental Status: He is alert and oriented to person, place, and time.      GCS: GCS eye subscore is 4. GCS verbal subscore is 5. GCS motor subscore is 6.   Psychiatric:         Mood and Affect: Mood normal.         Speech: Speech normal.         Behavior: Behavior normal. Behavior is cooperative.         Lab Results: I have reviewed the following results:  Recent Labs     04/20/25  2143 04/21/25  0556 04/22/25  0516 04/23/25  0454   WBC 8.92 5.11 4.62 3.74*   HGB 14.2 13.0 13.1 12.2   HCT 41.5 38.6 39.9  36.4*    151 146* 141*   SODIUM 138 140 141 140   K 3.6 3.8 3.9 3.7    105 108 107   CO2 22 22 22 24   BUN 24 24 25 16   CREATININE 0.97 0.93 0.80 0.73   GLUC 133 133 110 88   MG  --  2.0  --   --    PHOS  --  3.3  --   --    AST 20 18 30  --    ALT 17 16 35  --    ALB 4.2 3.6 3.7  --    TBILI 1.03* 1.04* 0.73  --    ALKPHOS 38 31* 30*  --    PTT 25  --   --   --    INR 1.02  --   --   --    HSTNI0 5  --   --   --    LACTICACID 1.3  --   --   --        Imaging Results Review: No pertinent imaging studies reviewed.  Other Study Results Review: No additional pertinent studies reviewed.    VTE Pharmacologic Prophylaxis: VTE covered by:  enoxaparin, Subcutaneous, 40 mg at 04/23/25 0811     VTE Mechanical Prophylaxis: sequential compression device

## 2025-04-23 NOTE — ASSESSMENT & PLAN NOTE
History of prostate cancer status post prostatectomy, hypertension, hyperlipidemia, and abdominal surgeries who presented to the hospital with nausea vomiting abdominal pain found to have SBO  Being followed by surgery  Had several bowel movements and passing flatus  Surgery has removed NG tube and diet advance to surgical soft

## 2025-04-23 NOTE — PLAN OF CARE
Problem: PAIN - ADULT  Goal: Verbalizes/displays adequate comfort level or baseline comfort level  Description: Interventions:- Encourage patient to monitor pain and request assistance- Assess pain using appropriate pain scale- Administer analgesics based on type and severity of pain and evaluate response- Implement non-pharmacological measures as appropriate and evaluate response- Consider cultural and social influences on pain and pain management- Notify physician/advanced practitioner if interventions unsuccessful or patient reports new pain  Outcome: Progressing     Problem: INFECTION - ADULT  Goal: Absence or prevention of progression during hospitalization  Description: INTERVENTIONS:- Assess and monitor for signs and symptoms of infection- Monitor lab/diagnostic results- Monitor all insertion sites, i.e. indwelling lines, tubes, and drains- Monitor endotracheal if appropriate and nasal secretions for changes in amount and color- [place] appropriate cooling/warming therapies per order- Administer medications as ordered- Instruct and encourage patient and family to use good hand hygiene technique- Identify and instruct in appropriate isolation precautions for identified infection/condition  Outcome: Progressing  Goal: Absence of fever/infection during neutropenic period  Description: INTERVENTIONS:- Monitor WBC  Outcome: Progressing     Problem: SAFETY ADULT  Goal: Patient will remain free of falls  Description: INTERVENTIONS:- Educate patient/family on patient safety including physical limitations- Instruct patient to call for assistance with activity - Consult OT/PT to assist with strengthening/mobility - Keep Call bell within reach- Keep bed low and locked with side rails adjusted as appropriate- Keep care items and personal belongings within reach- Initiate and maintain comfort rounds- Make Fall Risk Sign visible to staff- Offer Toileting every 2 Hours, in advance of need- Initiate/Maintain bed/chair  alarm- Obtain necessary fall risk management equipment: - Apply yellow socks and bracelet for high fall risk patients- Consider moving patient to room near nurses station  Outcome: Progressing  Goal: Maintain or return to baseline ADL function  Description: INTERVENTIONS:-  Assess patient's ability to carry out ADLs; assess patient's baseline for ADL function and identify physical deficits which impact ability to perform ADLs (bathing, care of mouth/teeth, toileting, grooming, dressing, etc.)- Assess/evaluate cause of self-care deficits - Assess range of motion- Assess patient's mobility; develop plan if impaired- Assess patient's need for assistive devices and provide as appropriate- Encourage maximum independence but intervene and supervise when necessary- Involve family in performance of ADLs- Assess for home care needs following discharge - Consider OT consult to assist with ADL evaluation and planning for discharge- Provide patient education as appropriate  Outcome: Progressing  Goal: Maintains/Returns to pre admission functional level  Description: INTERVENTIONS:- Perform AM-PAC 6 Click Basic Mobility/ Daily Activity assessment daily.- Set and communicate daily mobility goal to care team and patient/family/caregiver. - Collaborate with rehabilitation services on mobility goals if consulted- Perform Range of Motion 12 times a day.- Reposition patient every 2 hours.- Dangle patient 3 times a day- Stand patient 3 times a day- Ambulate patient 3 times a day- Out of bed to chair 3 times a day - Out of bed for meals 3 times a day- Out of bed for toileting- Record patient progress and toleration of activity level   Outcome: Progressing     Problem: DISCHARGE PLANNING  Goal: Discharge to home or other facility with appropriate resources  Description: INTERVENTIONS:- Identify barriers to discharge w/patient and caregiver- Arrange for needed discharge resources and transportation as appropriate- Identify discharge  learning needs (meds, wound care, etc.)- Arrange for interpretive services to assist at discharge as needed- Refer to Case Management Department for coordinating discharge planning if the patient needs post-hospital services based on physician/advanced practitioner order or complex needs related to functional status, cognitive ability, or social support system  Outcome: Progressing     Problem: Knowledge Deficit  Goal: Patient/family/caregiver demonstrates understanding of disease process, treatment plan, medications, and discharge instructions  Description: Complete learning assessment and assess knowledge base.Interventions:- Provide teaching at level of understanding- Provide teaching via preferred learning methods  Outcome: Progressing

## 2025-04-23 NOTE — PROGRESS NOTES
Progress Note - Hospitalist   Name: Gennaro Worthington 75 y.o. male I MRN: [id number]  Unit/Bed#: 4 [facility] 420-01 I Date of Admission: 4/20/2025   Date of Service: 4/23/2025 I Hospital Day: 2    Assessment & Plan  SBO (small bowel obstruction) (HCC)  History of prostate cancer status post prostatectomy, hypertension, hyperlipidemia, and abdominal surgeries who presented to the hospital with nausea vomiting abdominal pain found to have SBO  Being followed by surgery  Had several bowel movements and passing flatus  Surgery has removed NG tube and diet advance to surgical soft  Bicytopenia  Bicytopenia was brought to my attention by family  Very mild leukopenia and thrombocytopenia dilutional from IV fluids  Suggest recheck in 2 weeks to ensure resolution    Results from last 7 days   Lab Units 04/23/25  0454 04/22/25  0516 04/21/25  0556   WBC Thousand/uL 3.74* 4.62 5.11   PLATELETS Thousands/uL 141* 146* 151     Benign essential hypertension  Vitals stable.  Continue amlodipine and lisinopril  Mixed hyperlipidemia  Continue atorvastatin  History of prostate cancer  Status post prostatectomy  No urinary complaints    VTE Pharmacologic Prophylaxis: VTE Score: 4 Moderate Risk (Score 3-4) - Pharmacological DVT Prophylaxis Ordered: enoxaparin (Lovenox).    Mobility:   Basic Mobility Inpatient Raw Score: 20  JH-HLM Goal: 6: Walk 10 steps or more  JH-HLM Achieved: 6: Walk 10 steps or more  JH-HLM Goal achieved. Continue to encourage appropriate mobility.    Patient Centered Rounds: I have performed bedside rounds with nursing staff today.  Discussions with Specialists or Other Care Team Provider: Case management    Education and Discussions with Family / Patient: Updated  (wife) at bedside.    Current Length of Stay: 2 day(s)  Current Patient Status: Inpatient   Certification Statement: The patient will continue to require additional inpatient hospital stay due to advancing diet  Discharge Plan: Anticipate discharge  tomorrow to home.    Code Status: Level 1 - Full Code    Subjective   Patient seen and examined.  Tolerating liquids.  Going to try solid lunch    Objective   Vitals:   Temp (24hrs), Av.1 °F (36.7 °C), Min:97.7 °F (36.5 °C), Max:98.3 °F (36.8 °C)    Temp:  [97.7 °F (36.5 °C)-98.3 °F (36.8 °C)] 98.3 °F (36.8 °C)  HR:  [61-62] 61  Resp:  [16-18] 16  BP: (134-148)/(65-78) 148/65  SpO2:  [96 %-100 %] 96 %  Body mass index is 28.91 kg/m².     Input and Output Summary (last 24 hours):     Intake/Output Summary (Last 24 hours) at 2025 1235  Last data filed at 2025 0600  Gross per 24 hour   Intake 2873.33 ml   Output --   Net 2873.33 ml       Physical Exam  Vitals reviewed.   Constitutional:       General: He is not in acute distress.  HENT:      Head: Atraumatic.   Eyes:      Extraocular Movements: Extraocular movements intact.   Cardiovascular:      Rate and Rhythm: Regular rhythm.      Heart sounds: Normal heart sounds.   Pulmonary:      Effort: Pulmonary effort is normal.      Breath sounds: Decreased breath sounds present. No wheezing.   Abdominal:      General: Bowel sounds are decreased. There is distension.      Palpations: Abdomen is soft.      Tenderness: There is no abdominal tenderness.   Musculoskeletal:         General: No swelling or tenderness.   Skin:     General: Skin is warm and dry.   Neurological:      General: No focal deficit present.      Mental Status: He is alert and oriented to person, place, and time.      Cranial Nerves: No cranial nerve deficit.   Psychiatric:         Mood and Affect: Mood normal.       Lines/Drains:  Invasive Devices       Peripheral Intravenous Line  Duration             Peripheral IV 25 Proximal;Right;Ventral (anterior) Forearm 2 days    Peripheral IV 25 Right;Upper;Ventral (anterior) Arm <1 day              Drain  Duration             NG/OG/Enteral Tube 16 Fr Right nare 2 days                            Lab Results: I have reviewed the following  results:   Results from last 7 days   Lab Units 04/23/25  0454 04/22/25  0516 04/21/25  0556 04/20/25  2143   WBC Thousand/uL 3.74* 4.62 5.11 8.92   HEMOGLOBIN g/dL 12.2 13.1 13.0 14.2   PLATELETS Thousands/uL 141* 146* 151 178   MCV fL 95 97 94 93   INR   --   --   --  1.02     Results from last 7 days   Lab Units 04/23/25  0454 04/22/25  0516 04/21/25  0556 04/20/25  2143   SODIUM mmol/L 140 141 140 138   POTASSIUM mmol/L 3.7 3.9 3.8 3.6   CHLORIDE mmol/L 107 108 105 103   CO2 mmol/L 24 22 22 22   ANION GAP mmol/L 9 11 13 13   BUN mg/dL 16 25 24 24   CREATININE mg/dL 0.73 0.80 0.93 0.97   CALCIUM mg/dL 8.4 8.5 8.3* 9.0   ALBUMIN g/dL  --  3.7 3.6 4.2   TOTAL BILIRUBIN mg/dL  --  0.73 1.04* 1.03*   ALK PHOS U/L  --  30* 31* 38   ALT U/L  --  35 16 17   AST U/L  --  30 18 20   EGFR ml/min/1.73sq m 90 87 80 76   GLUCOSE RANDOM mg/dL 88 110 133 133     Results from last 7 days   Lab Units 04/21/25  0556   MAGNESIUM mg/dL 2.0   PHOSPHORUS mg/dL 3.3         Results from last 7 days   Lab Units 04/20/25  2143   HS TNI 0HR ng/L 5          Results from last 7 days   Lab Units 04/20/25  2143   LACTIC ACID mmol/L 1.3     Results from last 7 days   Lab Units 04/20/25  2140   POC GLUCOSE mg/dl 133               Recent Cultures (last 7 days):         Imaging:  Reviewed radiology reports from this admission including:  XR abdomen complete inc upright and/or decubitus  Result Date: 4/21/2025  Impression: Concern for small bowel obstruction, as above. Workstation performed: [id number]     CT abdomen pelvis with contrast  Result Date: 4/20/2025  Impression: Multiple loops of abnormally dilated small bowel throughout the abdomen and pelvis with relative collapse of the distal small bowel most compatible with a small bowel obstruction. A clear transition point is not identified. Surgical consultation is advised. Small amount of intra-abdominal and pelvic ascites. No free intraperitoneal air. I personally discussed this study with  CYNTHIA ECKERT on 4/20/2025 11:44 PM. Workstation performed: [id number]       Last 24 Hours Medication List:     Current Facility-Administered Medications:     amLODIPine (NORVASC) tablet 2.5 mg, Daily    aspirin chewable tablet 81 mg, Daily    atorvastatin (LIPITOR) tablet 10 mg, Daily With Dinner    enoxaparin (LOVENOX) subcutaneous injection 40 mg, Daily    HYDROmorphone (DILAUDID) injection 0.5 mg, Q3H PRN    lisinopril (ZESTRIL) tablet 5 mg, Daily    ondansetron (ZOFRAN) injection 4 mg, Q6H PRN    promethazine (PHENERGAN) injection 25 mg, Q6H PRN    sodium chloride 0.9 % infusion, Continuous, Last Rate: 100 mL/hr (04/22/25 2114)    Administrative Statements   Today, Patient Was Seen By: Nathan Richmond, DO  I have spent a total time of 35 minutes in caring for this patient on the day of the visit/encounter including Patient and family education, Counseling / Coordination of care, Documenting in the medical record, Reviewing/placing orders in the medical record (including tests, medications, and/or procedures), and Communicating with other healthcare professionals .    **Please Note: This note may have been constructed using a voice recognition system.**

## 2025-04-24 LAB
ALBUMIN SERPL BCG-MCNC: 3.4 G/DL (ref 3.5–5)
ALP SERPL-CCNC: 30 U/L (ref 34–104)
ALT SERPL W P-5'-P-CCNC: 27 U/L (ref 7–52)
ANION GAP SERPL CALCULATED.3IONS-SCNC: 9 MMOL/L (ref 4–13)
AST SERPL W P-5'-P-CCNC: 23 U/L (ref 13–39)
BASOPHILS # BLD AUTO: 0.02 THOUSANDS/ÂΜL (ref 0–0.1)
BASOPHILS NFR BLD AUTO: 1 % (ref 0–1)
BILIRUB SERPL-MCNC: 0.55 MG/DL (ref 0.2–1)
BUN SERPL-MCNC: 14 MG/DL (ref 5–25)
CALCIUM ALBUM COR SERPL-MCNC: 8.6 MG/DL (ref 8.3–10.1)
CALCIUM SERPL-MCNC: 8.1 MG/DL (ref 8.4–10.2)
CHLORIDE SERPL-SCNC: 106 MMOL/L (ref 96–108)
CO2 SERPL-SCNC: 23 MMOL/L (ref 21–32)
CREAT SERPL-MCNC: 0.75 MG/DL (ref 0.6–1.3)
EOSINOPHIL # BLD AUTO: 0.14 THOUSAND/ÂΜL (ref 0–0.61)
EOSINOPHIL NFR BLD AUTO: 3 % (ref 0–6)
ERYTHROCYTE [DISTWIDTH] IN BLOOD BY AUTOMATED COUNT: 12.1 % (ref 11.6–15.1)
GFR SERPL CREATININE-BSD FRML MDRD: 89 ML/MIN/1.73SQ M
GLUCOSE SERPL-MCNC: 87 MG/DL (ref 65–140)
HCT VFR BLD AUTO: 34.1 % (ref 36.5–49.3)
HGB BLD-MCNC: 11.4 G/DL (ref 12–17)
IMM GRANULOCYTES # BLD AUTO: 0.02 THOUSAND/UL (ref 0–0.2)
IMM GRANULOCYTES NFR BLD AUTO: 1 % (ref 0–2)
LYMPHOCYTES # BLD AUTO: 1.02 THOUSANDS/ÂΜL (ref 0.6–4.47)
LYMPHOCYTES NFR BLD AUTO: 25 % (ref 14–44)
MCH RBC QN AUTO: 31 PG (ref 26.8–34.3)
MCHC RBC AUTO-ENTMCNC: 33.4 G/DL (ref 31.4–37.4)
MCV RBC AUTO: 93 FL (ref 82–98)
MONOCYTES # BLD AUTO: 0.49 THOUSAND/ÂΜL (ref 0.17–1.22)
MONOCYTES NFR BLD AUTO: 12 % (ref 4–12)
NEUTROPHILS # BLD AUTO: 2.45 THOUSANDS/ÂΜL (ref 1.85–7.62)
NEUTS SEG NFR BLD AUTO: 58 % (ref 43–75)
NRBC BLD AUTO-RTO: 0 /100 WBCS
PLATELET # BLD AUTO: 142 THOUSANDS/UL (ref 149–390)
PMV BLD AUTO: 10.3 FL (ref 8.9–12.7)
POTASSIUM SERPL-SCNC: 3.6 MMOL/L (ref 3.5–5.3)
PROT SERPL-MCNC: 6.2 G/DL (ref 6.4–8.4)
RBC # BLD AUTO: 3.68 MILLION/UL (ref 3.88–5.62)
SODIUM SERPL-SCNC: 138 MMOL/L (ref 135–147)
WBC # BLD AUTO: 4.14 THOUSAND/UL (ref 4.31–10.16)

## 2025-04-24 PROCEDURE — RECHECK: Performed by: INTERNAL MEDICINE

## 2025-04-24 PROCEDURE — 99232 SBSQ HOSP IP/OBS MODERATE 35: CPT | Performed by: INTERNAL MEDICINE

## 2025-04-24 PROCEDURE — 85025 COMPLETE CBC W/AUTO DIFF WBC: CPT | Performed by: INTERNAL MEDICINE

## 2025-04-24 PROCEDURE — 80053 COMPREHEN METABOLIC PANEL: CPT | Performed by: INTERNAL MEDICINE

## 2025-04-24 RX ORDER — AMOXICILLIN 250 MG
1 CAPSULE ORAL 2 TIMES DAILY
Status: DISCONTINUED | OUTPATIENT
Start: 2025-04-24 | End: 2025-04-25 | Stop reason: HOSPADM

## 2025-04-24 RX ORDER — POLYETHYLENE GLYCOL 3350 17 G/17G
17 POWDER, FOR SOLUTION ORAL 2 TIMES DAILY
Status: DISCONTINUED | OUTPATIENT
Start: 2025-04-24 | End: 2025-04-25 | Stop reason: HOSPADM

## 2025-04-24 RX ADMIN — ASPIRIN 81 MG: 81 TABLET, CHEWABLE ORAL at 08:49

## 2025-04-24 RX ADMIN — POLYETHYLENE GLYCOL 3350 17 G: 17 POWDER, FOR SOLUTION ORAL at 17:45

## 2025-04-24 RX ADMIN — SENNOSIDES AND DOCUSATE SODIUM 1 TABLET: 50; 8.6 TABLET ORAL at 17:45

## 2025-04-24 RX ADMIN — SENNOSIDES AND DOCUSATE SODIUM 1 TABLET: 50; 8.6 TABLET ORAL at 11:49

## 2025-04-24 RX ADMIN — ENOXAPARIN SODIUM 40 MG: 40 INJECTION SUBCUTANEOUS at 08:49

## 2025-04-24 RX ADMIN — LISINOPRIL 5 MG: 5 TABLET ORAL at 08:49

## 2025-04-24 RX ADMIN — ATORVASTATIN CALCIUM 10 MG: 10 TABLET, FILM COATED ORAL at 16:28

## 2025-04-24 RX ADMIN — AMLODIPINE BESYLATE 2.5 MG: 2.5 TABLET ORAL at 08:49

## 2025-04-24 NOTE — PROGRESS NOTES
Progress Note - Hospitalist   Name: Gennaro Worthington 75 y.o. male I MRN: [id number]  Unit/Bed#: 4 [place] 420-01 I Date of Admission: 4/20/2025   Date of Service: 4/24/2025 I Hospital Day: 3    Assessment & Plan  SBO (small bowel obstruction) (HCC)  History of prostate cancer status post prostatectomy, hypertension, hyperlipidemia, and abdominal surgeries who presented to the hospital with nausea vomiting abdominal pain found to have SBO  Was followed by surgery.  Surgery has removed NG tube and diet advance to surgical soft  Only passing gas and small BM today.  Will add stool softeners and reevaluate for discharge later today.  Bicytopenia  Bicytopenia reported by family on blood work  Very mild leukopenia and thrombocytopenia dilutional from IV fluids  Suggest recheck in 2 weeks to ensure resolution    Results from last 7 days   Lab Units 04/24/25  0503 04/23/25  0454 04/22/25  0516   WBC Thousand/uL 4.14* 3.74* 4.62   PLATELETS Thousands/uL 142* 141* 146*     Benign essential hypertension  Vitals stable.  Continue amlodipine and lisinopril  Mixed hyperlipidemia  Continue atorvastatin  History of prostate cancer  Status post prostatectomy  No urinary complaints    VTE Pharmacologic Prophylaxis: VTE Score: 4 Moderate Risk (Score 3-4) - Pharmacological DVT Prophylaxis Ordered: enoxaparin (Lovenox).    Mobility:   Basic Mobility Inpatient Raw Score: 24  JH-HLM Goal: 8: Walk 250 feet or more  JH-HLM Achieved: 6: Walk 10 steps or more  JH-HLM Goal achieved. Continue to encourage appropriate mobility.    Patient Centered Rounds: I have performed bedside rounds with nursing staff today.  Discussions with Specialists or Other Care Team Provider: Case management    Education and Discussions with Family / Patient: Updated  (wife) at bedside.    Current Length of Stay: 3 day(s)  Current Patient Status: Inpatient   Certification Statement:   Discharge Plan: Anticipate discharge later today or tomorrow to  home.    Code Status: Level 1 - Full Code    Subjective   Patient seen and examined.  Walking halls tolerating diet.  Only small bowel movement but not nauseous    Objective   Vitals:   Temp (24hrs), Av.2 °F (36.8 °C), Min:98 °F (36.7 °C), Max:98.4 °F (36.9 °C)    Temp:  [98 °F (36.7 °C)-98.4 °F (36.9 °C)] 98.4 °F (36.9 °C)  HR:  [62-63] 63  BP: (124-134)/(61-75) 124/61  SpO2:  [95 %-98 %] 95 %  Body mass index is 28.91 kg/m².     Input and Output Summary (last 24 hours):     Intake/Output Summary (Last 24 hours) at 2025 1140  Last data filed at 2025 2101  Gross per 24 hour   Intake 120 ml   Output --   Net 120 ml       Physical Exam  Vitals reviewed.   Constitutional:       General: He is not in acute distress.     Appearance: Normal appearance.   HENT:      Head: Normocephalic.   Eyes:      General: No scleral icterus.  Cardiovascular:      Rate and Rhythm: Regular rhythm.      Heart sounds:      No gallop.   Pulmonary:      Effort: Pulmonary effort is normal. No respiratory distress.   Abdominal:      General: Bowel sounds are normal.      Palpations: Abdomen is soft.      Tenderness: There is no abdominal tenderness.   Musculoskeletal:         General: No tenderness or deformity.      Cervical back: Normal range of motion.   Skin:     General: Skin is warm.      Coloration: Skin is not jaundiced.   Neurological:      General: No focal deficit present.      Mental Status: He is alert.      Motor: No weakness.   Psychiatric:         Mood and Affect: Mood normal.       Lines/Drains:  Invasive Devices       Peripheral Intravenous Line  Duration             Peripheral IV 25 Proximal;Right;Ventral (anterior) Forearm 3 days    Peripheral IV 25 Right;Upper;Ventral (anterior) Arm 1 day              Drain  Duration             NG/OG/Enteral Tube 16 Fr Right nare 3 days                            Lab Results: I have reviewed the following results:   Results from last 7 days   Lab Units  04/24/25  0503 04/23/25  0454 04/22/25  0516 04/21/25  0556 04/20/25  2143   WBC Thousand/uL 4.14* 3.74* 4.62   < > 8.92   HEMOGLOBIN g/dL 11.4* 12.2 13.1   < > 14.2   PLATELETS Thousands/uL 142* 141* 146*   < > 178   MCV fL 93 95 97   < > 93   INR   --   --   --   --  1.02    < > = values in this interval not displayed.     Results from last 7 days   Lab Units 04/24/25  0503 04/23/25  0454 04/22/25  0516 04/21/25  0556   SODIUM mmol/L 138 140 141 140   POTASSIUM mmol/L 3.6 3.7 3.9 3.8   CHLORIDE mmol/L 106 107 108 105   CO2 mmol/L 23 24 22 22   ANION GAP mmol/L 9 9 11 13   BUN mg/dL 14 16 25 24   CREATININE mg/dL 0.75 0.73 0.80 0.93   CALCIUM mg/dL 8.1* 8.4 8.5 8.3*   ALBUMIN g/dL 3.4*  --  3.7 3.6   TOTAL BILIRUBIN mg/dL 0.55  --  0.73 1.04*   ALK PHOS U/L 30*  --  30* 31*   ALT U/L 27  --  35 16   AST U/L 23  --  30 18   EGFR ml/min/1.73sq m 89 90 87 80   GLUCOSE RANDOM mg/dL 87 88 110 133     Results from last 7 days   Lab Units 04/21/25  0556   MAGNESIUM mg/dL 2.0   PHOSPHORUS mg/dL 3.3         Results from last 7 days   Lab Units 04/20/25  2143   HS TNI 0HR ng/L 5          Results from last 7 days   Lab Units 04/20/25  2143   LACTIC ACID mmol/L 1.3     Results from last 7 days   Lab Units 04/20/25  2140   POC GLUCOSE mg/dl 133               Recent Cultures (last 7 days):         Imaging:  Reviewed radiology reports from this admission including:  XR abdomen complete inc upright and/or decubitus  Result Date: 4/21/2025  Impression: Concern for small bowel obstruction, as above. Workstation performed: [id number]     CT abdomen pelvis with contrast  Result Date: 4/20/2025  Impression: Multiple loops of abnormally dilated small bowel throughout the abdomen and pelvis with relative collapse of the distal small bowel most compatible with a small bowel obstruction. A clear transition point is not identified. Surgical consultation is advised. Small amount of intra-abdominal and pelvic ascites. No free intraperitoneal  air. I personally discussed this study with CYNTHIA ECKERT on 4/20/2025 11:44 PM. Workstation performed: [id number]       Last 24 Hours Medication List:     Current Facility-Administered Medications:     acetaminophen (TYLENOL) tablet 650 mg, Q4H PRN    amLODIPine (NORVASC) tablet 2.5 mg, Daily    aspirin chewable tablet 81 mg, Daily    atorvastatin (LIPITOR) tablet 10 mg, Daily With Dinner    enoxaparin (LOVENOX) subcutaneous injection 40 mg, Daily    HYDROmorphone (DILAUDID) injection 0.5 mg, Q3H PRN    lisinopril (ZESTRIL) tablet 5 mg, Daily    ondansetron (ZOFRAN) injection 4 mg, Q6H PRN    promethazine (PHENERGAN) injection 25 mg, Q6H PRN    senna-docusate sodium (SENOKOT S) 8.6-50 mg per tablet 1 tablet, BID    Administrative Statements   Today, Patient Was Seen By: Nathan Richmond, DO  I have spent a total time of 35 minutes in caring for this patient on the day of the visit/encounter including Patient and family education, Documenting in the medical record, Reviewing/placing orders in the medical record (including tests, medications, and/or procedures), and Communicating with other healthcare professionals .    **Please Note: This note may have been constructed using a voice recognition system.**

## 2025-04-24 NOTE — PLAN OF CARE
Problem: PAIN - ADULT  Goal: Verbalizes/displays adequate comfort level or baseline comfort level  Description: Interventions:- Encourage patient to monitor pain and request assistance- Assess pain using appropriate pain scale- Administer analgesics based on type and severity of pain and evaluate response- Implement non-pharmacological measures as appropriate and evaluate response- Consider cultural and social influences on pain and pain management- Notify physician/advanced practitioner if interventions unsuccessful or patient reports new pain  Outcome: Progressing     Problem: INFECTION - ADULT  Goal: Absence or prevention of progression during hospitalization  Description: INTERVENTIONS:- Assess and monitor for signs and symptoms of infection- Monitor lab/diagnostic results- Monitor all insertion sites, i.e. indwelling lines, tubes, and drains- Monitor endotracheal if appropriate and nasal secretions for changes in amount and color- [place] appropriate cooling/warming therapies per order- Administer medications as ordered- Instruct and encourage patient and family to use good hand hygiene technique- Identify and instruct in appropriate isolation precautions for identified infection/condition  Outcome: Progressing  Goal: Absence of fever/infection during neutropenic period  Description: INTERVENTIONS:- Monitor WBC  Outcome: Progressing     Problem: SAFETY ADULT  Goal: Patient will remain free of falls  Description: INTERVENTIONS:- Educate patient/family on patient safety including physical limitations- Instruct patient to call for assistance with activity - Consult OT/PT to assist with strengthening/mobility - Keep Call bell within reach- Keep bed low and locked with side rails adjusted as appropriate- Keep care items and personal belongings within reach- Initiate and maintain comfort rounds- Make Fall Risk Sign visible to staff- Offer Toileting every 2 Hours, in advance of need- Initiate/Maintain bed/chair  alarm- Obtain necessary fall risk management equipment: - Apply yellow socks and bracelet for high fall risk patients- Consider moving patient to room near nurses station  Outcome: Progressing  Goal: Maintain or return to baseline ADL function  Description: INTERVENTIONS:-  Assess patient's ability to carry out ADLs; assess patient's baseline for ADL function and identify physical deficits which impact ability to perform ADLs (bathing, care of mouth/teeth, toileting, grooming, dressing, etc.)- Assess/evaluate cause of self-care deficits - Assess range of motion- Assess patient's mobility; develop plan if impaired- Assess patient's need for assistive devices and provide as appropriate- Encourage maximum independence but intervene and supervise when necessary- Involve family in performance of ADLs- Assess for home care needs following discharge - Consider OT consult to assist with ADL evaluation and planning for discharge- Provide patient education as appropriate  Outcome: Progressing  Goal: Maintains/Returns to pre admission functional level  Description: INTERVENTIONS:- Perform AM-PAC 6 Click Basic Mobility/ Daily Activity assessment daily.- Set and communicate daily mobility goal to care team and patient/family/caregiver. - Collaborate with rehabilitation services on mobility goals if consulted- Perform Range of Motion 12 times a day.- Reposition patient every 2 hours.- Dangle patient 3 times a day- Stand patient 3 times a day- Ambulate patient 3 times a day- Out of bed to chair 3 times a day - Out of bed for meals 3 times a day- Out of bed for toileting- Record patient progress and toleration of activity level   Outcome: Progressing     Problem: DISCHARGE PLANNING  Goal: Discharge to home or other facility with appropriate resources  Description: INTERVENTIONS:- Identify barriers to discharge w/patient and caregiver- Arrange for needed discharge resources and transportation as appropriate- Identify discharge  learning needs (meds, wound care, etc.)- Arrange for interpretive services to assist at discharge as needed- Refer to Case Management Department for coordinating discharge planning if the patient needs post-hospital services based on physician/advanced practitioner order or complex needs related to functional status, cognitive ability, or social support system  Outcome: Progressing     Problem: Knowledge Deficit  Goal: Patient/family/caregiver demonstrates understanding of disease process, treatment plan, medications, and discharge instructions  Description: Complete learning assessment and assess knowledge base.Interventions:- Provide teaching at level of understanding- Provide teaching via preferred learning methods  Outcome: Progressing

## 2025-04-24 NOTE — PROGRESS NOTES
Treatment plan    Patient reevaluated.  Spouse at bedside.  Patient has not had bowel movement yet this afternoon since small one this morning.  Will schedule miralax twice daily.  Reassess for discharge tomorrow    Nathan Richmond DO FACP

## 2025-04-24 NOTE — ASSESSMENT & PLAN NOTE
Bicytopenia reported by family on blood work  Very mild leukopenia and thrombocytopenia dilutional from IV fluids  Suggest recheck in 2 weeks to ensure resolution    Results from last 7 days   Lab Units 04/24/25  0503 04/23/25  0454 04/22/25  0516   WBC Thousand/uL 4.14* 3.74* 4.62   PLATELETS Thousands/uL 142* 141* 146*

## 2025-04-24 NOTE — CASE MANAGEMENT
Case Management Discharge Planning Note    Patient name Gennaro Worthington  Location [address] [place] [address]/[address] [place] [address]-* MRN [id number]  : 1950 Date 2025       Current Admission Date: 2025  Current Admission Diagnosis:SBO (small bowel obstruction) (HCC)   Patient Active Problem List    Diagnosis Date Noted Date Diagnosed    Bicytopenia 2025     SBO (small bowel obstruction) (HCC) 2025     GI bleeding 2024     Syncopal episodes 2024     Gram-negative bacteremia 2024     Enterocolitis 2024     Port-site hernia 2023     Umbilical hernia without obstruction and without gangrene 2023     Cervical radiculopathy 2021     Cervicalgia 2021     Periumbilical abdominal pain 10/14/2020     S/P prostatectomy 2019     Lower extremity edema 2018     Groin mass 2018     Disc degeneration, lumbar 2015     Actinic keratosis 2015     Anemia 2015     Fatty liver disease, nonalcoholic 2014     History of prostate cancer 2013     Mixed hyperlipidemia 2013     Benign essential hypertension 2012     Herpes simplex infection 2012     Organic impotence 2012       LOS (days): 3  Geometric Mean LOS (GMLOS) (days): 2.3  Days to GMLOS:-1.2     OBJECTIVE:  Risk of Unplanned Readmission Score: 12.35         Current admission status: Inpatient   Preferred Pharmacy:   [id number] - [address] [address]  Phone: [phone] Fax: [phone]    [facility] - [place], [place] - [address]  [address]  [address] [address]  Phone: [phone] Fax: [phone]    Primary Care Provider: NEHAL Crum    Primary Insurance: THALIA  REP  Secondary Insurance:     DISCHARGE DETAILS:    Discharge planning discussed with:: Patient, Spouse    Contacts  Patient Contacts: Jaja Worthington  (spouse)  Relationship to Patient:: Family  Contact Method: In Person  Reason/Outcome: Continuity of Care, Emergency Contact, Discharge Planning    Treatment Team Recommendation: Home  Discharge Destination Plan:: Home  Transport at Discharge : Family     IMM Given (Date):: 04/24/25  IMM Given to:: Patient  IMM reviewed with patient, patient agrees with discharge determination. Copy placed in scan bin.

## 2025-04-24 NOTE — ASSESSMENT & PLAN NOTE
History of prostate cancer status post prostatectomy, hypertension, hyperlipidemia, and abdominal surgeries who presented to the hospital with nausea vomiting abdominal pain found to have SBO  Was followed by surgery.  Surgery has removed NG tube and diet advance to surgical soft  Only passing gas and small BM today.  Will add stool softeners and reevaluate for discharge later today.

## 2025-04-24 NOTE — PLAN OF CARE
Problem: PAIN - ADULT  Goal: Verbalizes/displays adequate comfort level or baseline comfort level  Description: Interventions:- Encourage patient to monitor pain and request assistance- Assess pain using appropriate pain scale- Administer analgesics based on type and severity of pain and evaluate response- Implement non-pharmacological measures as appropriate and evaluate response- Consider cultural and social influences on pain and pain management- Notify physician/advanced practitioner if interventions unsuccessful or patient reports new pain  Outcome: Progressing     Problem: PAIN - ADULT  Goal: Verbalizes/displays adequate comfort level or baseline comfort level  Description: Interventions:- Encourage patient to monitor pain and request assistance- Assess pain using appropriate pain scale- Administer analgesics based on type and severity of pain and evaluate response- Implement non-pharmacological measures as appropriate and evaluate response- Consider cultural and social influences on pain and pain management- Notify physician/advanced practitioner if interventions unsuccessful or patient reports new pain  Outcome: Progressing     Problem: INFECTION - ADULT  Goal: Absence or prevention of progression during hospitalization  Description: INTERVENTIONS:- Assess and monitor for signs and symptoms of infection- Monitor lab/diagnostic results- Monitor all insertion sites, i.e. indwelling lines, tubes, and drains- Monitor endotracheal if appropriate and nasal secretions for changes in amount and color- [place] appropriate cooling/warming therapies per order- Administer medications as ordered- Instruct and encourage patient and family to use good hand hygiene technique- Identify and instruct in appropriate isolation precautions for identified infection/condition  Outcome: Progressing  Goal: Absence of fever/infection during neutropenic period  Description: INTERVENTIONS:- Monitor WBC  Outcome: Progressing      Problem: SAFETY ADULT  Goal: Patient will remain free of falls  Description: INTERVENTIONS:- Educate patient/family on patient safety including physical limitations- Instruct patient to call for assistance with activity - Consult OT/PT to assist with strengthening/mobility - Keep Call bell within reach- Keep bed low and locked with side rails adjusted as appropriate- Keep care items and personal belongings within reach- Initiate and maintain comfort rounds- Make Fall Risk Sign visible to staff- Offer Toileting every 2 Hours, in advance of need- Initiate/Maintain bed/chair alarm- Obtain necessary fall risk management equipment: - Apply yellow socks and bracelet for high fall risk patients- Consider moving patient to room near nurses station  Outcome: Progressing  Goal: Maintain or return to baseline ADL function  Description: INTERVENTIONS:-  Assess patient's ability to carry out ADLs; assess patient's baseline for ADL function and identify physical deficits which impact ability to perform ADLs (bathing, care of mouth/teeth, toileting, grooming, dressing, etc.)- Assess/evaluate cause of self-care deficits - Assess range of motion- Assess patient's mobility; develop plan if impaired- Assess patient's need for assistive devices and provide as appropriate- Encourage maximum independence but intervene and supervise when necessary- Involve family in performance of ADLs- Assess for home care needs following discharge - Consider OT consult to assist with ADL evaluation and planning for discharge- Provide patient education as appropriate  Outcome: Progressing  Goal: Maintains/Returns to pre admission functional level  Description: INTERVENTIONS:- Perform AM-PAC 6 Click Basic Mobility/ Daily Activity assessment daily.- Set and communicate daily mobility goal to care team and patient/family/caregiver. - Collaborate with rehabilitation services on mobility goals if consulted- Perform Range of Motion 12 times a day.-  Reposition patient every 2 hours.- Dangle patient 3 times a day- Stand patient 3 times a day- Ambulate patient 3 times a day- Out of bed to chair 3 times a day - Out of bed for meals 3 times a day- Out of bed for toileting- Record patient progress and toleration of activity level   Outcome: Progressing     Problem: DISCHARGE PLANNING  Goal: Discharge to home or other facility with appropriate resources  Description: INTERVENTIONS:- Identify barriers to discharge w/patient and caregiver- Arrange for needed discharge resources and transportation as appropriate- Identify discharge learning needs (meds, wound care, etc.)- Arrange for interpretive services to assist at discharge as needed- Refer to Case Management Department for coordinating discharge planning if the patient needs post-hospital services based on physician/advanced practitioner order or complex needs related to functional status, cognitive ability, or social support system  Outcome: Progressing     Problem: Knowledge Deficit  Goal: Patient/family/caregiver demonstrates understanding of disease process, treatment plan, medications, and discharge instructions  Description: Complete learning assessment and assess knowledge base.Interventions:- Provide teaching at level of understanding- Provide teaching via preferred learning methods  Outcome: Progressing

## 2025-04-24 NOTE — PLAN OF CARE
Problem: PAIN - ADULT  Goal: Verbalizes/displays adequate comfort level or baseline comfort level  Description: Interventions:- Encourage patient to monitor pain and request assistance- Assess pain using appropriate pain scale- Administer analgesics based on type and severity of pain and evaluate response- Implement non-pharmacological measures as appropriate and evaluate response- Consider cultural and social influences on pain and pain management- Notify physician/advanced practitioner if interventions unsuccessful or patient reports new pain  Outcome: Progressing     Problem: INFECTION - ADULT  Goal: Absence or prevention of progression during hospitalization  Description: INTERVENTIONS:- Assess and monitor for signs and symptoms of infection- Monitor lab/diagnostic results- Monitor all insertion sites, i.e. indwelling lines, tubes, and drains- Monitor endotracheal if appropriate and nasal secretions for changes in amount and color- [place] appropriate cooling/warming therapies per order- Administer medications as ordered- Instruct and encourage patient and family to use good hand hygiene technique- Identify and instruct in appropriate isolation precautions for identified infection/condition  Outcome: Progressing  Goal: Absence of fever/infection during neutropenic period  Description: INTERVENTIONS:- Monitor WBC  Outcome: Progressing     Problem: SAFETY ADULT  Goal: Patient will remain free of falls  Description: INTERVENTIONS:- Educate patient/family on patient safety including physical limitations- Instruct patient to call for assistance with activity - Consult OT/PT to assist with strengthening/mobility - Keep Call bell within reach- Keep bed low and locked with side rails adjusted as appropriate- Keep care items and personal belongings within reach- Initiate and maintain comfort rounds- Make Fall Risk Sign visible to staff- Offer Toileting every 2 Hours, in advance of need- Initiate/Maintain bed/chair  alarm- Obtain necessary fall risk management equipment: - Apply yellow socks and bracelet for high fall risk patients- Consider moving patient to room near nurses station  Outcome: Progressing  Goal: Maintain or return to baseline ADL function  Description: INTERVENTIONS:-  Assess patient's ability to carry out ADLs; assess patient's baseline for ADL function and identify physical deficits which impact ability to perform ADLs (bathing, care of mouth/teeth, toileting, grooming, dressing, etc.)- Assess/evaluate cause of self-care deficits - Assess range of motion- Assess patient's mobility; develop plan if impaired- Assess patient's need for assistive devices and provide as appropriate- Encourage maximum independence but intervene and supervise when necessary- Involve family in performance of ADLs- Assess for home care needs following discharge - Consider OT consult to assist with ADL evaluation and planning for discharge- Provide patient education as appropriate  Outcome: Progressing  Goal: Maintains/Returns to pre admission functional level  Description: INTERVENTIONS:- Perform AM-PAC 6 Click Basic Mobility/ Daily Activity assessment daily.- Set and communicate daily mobility goal to care team and patient/family/caregiver. - Collaborate with rehabilitation services on mobility goals if consulted- Perform Range of Motion 12 times a day.- Reposition patient every 2 hours.- Dangle patient 3 times a day- Stand patient 3 times a day- Ambulate patient 3 times a day- Out of bed to chair 3 times a day - Out of bed for meals 3 times a day- Out of bed for toileting- Record patient progress and toleration of activity level   Outcome: Progressing     Problem: DISCHARGE PLANNING  Goal: Discharge to home or other facility with appropriate resources  Description: INTERVENTIONS:- Identify barriers to discharge w/patient and caregiver- Arrange for needed discharge resources and transportation as appropriate- Identify discharge  learning needs (meds, wound care, etc.)- Arrange for interpretive services to assist at discharge as needed- Refer to Case Management Department for coordinating discharge planning if the patient needs post-hospital services based on physician/advanced practitioner order or complex needs related to functional status, cognitive ability, or social support system  Outcome: Progressing     Problem: Knowledge Deficit  Goal: Patient/family/caregiver demonstrates understanding of disease process, treatment plan, medications, and discharge instructions  Description: Complete learning assessment and assess knowledge base.Interventions:- Provide teaching at level of understanding- Provide teaching via preferred learning methods  Outcome: Progressing

## 2025-04-25 ENCOUNTER — APPOINTMENT (INPATIENT)
Dept: RADIOLOGY | Facility: HOSPITAL | Age: 75
DRG: 389 | End: 2025-04-25
Payer: COMMERCIAL

## 2025-04-25 VITALS
TEMPERATURE: 97.9 F | OXYGEN SATURATION: 96 % | WEIGHT: 219.14 LBS | SYSTOLIC BLOOD PRESSURE: 139 MMHG | DIASTOLIC BLOOD PRESSURE: 73 MMHG | HEIGHT: 73 IN | BODY MASS INDEX: 29.04 KG/M2 | HEART RATE: 59 BPM | RESPIRATION RATE: 17 BRPM

## 2025-04-25 PROBLEM — R93.89 ABNORMAL CT SCAN: Status: ACTIVE | Noted: 2025-04-25

## 2025-04-25 PROCEDURE — 74176 CT ABD & PELVIS W/O CONTRAST: CPT

## 2025-04-25 PROCEDURE — 99239 HOSP IP/OBS DSCHRG MGMT >30: CPT | Performed by: INTERNAL MEDICINE

## 2025-04-25 RX ORDER — BISACODYL 10 MG
10 SUPPOSITORY, RECTAL RECTAL ONCE
Status: COMPLETED | OUTPATIENT
Start: 2025-04-25 | End: 2025-04-25

## 2025-04-25 RX ORDER — AMOXICILLIN 250 MG
1 CAPSULE ORAL 2 TIMES DAILY
COMMUNITY
Start: 2025-04-25

## 2025-04-25 RX ORDER — POLYETHYLENE GLYCOL 3350 17 G/17G
17 POWDER, FOR SOLUTION ORAL DAILY PRN
COMMUNITY
Start: 2025-04-25

## 2025-04-25 RX ADMIN — POLYETHYLENE GLYCOL 3350 17 G: 17 POWDER, FOR SOLUTION ORAL at 08:33

## 2025-04-25 RX ADMIN — AMLODIPINE BESYLATE 2.5 MG: 2.5 TABLET ORAL at 08:33

## 2025-04-25 RX ADMIN — ATORVASTATIN CALCIUM 10 MG: 10 TABLET, FILM COATED ORAL at 16:25

## 2025-04-25 RX ADMIN — SENNOSIDES AND DOCUSATE SODIUM 1 TABLET: 50; 8.6 TABLET ORAL at 08:33

## 2025-04-25 RX ADMIN — ASPIRIN 81 MG: 81 TABLET, CHEWABLE ORAL at 08:33

## 2025-04-25 RX ADMIN — LISINOPRIL 5 MG: 5 TABLET ORAL at 08:33

## 2025-04-25 RX ADMIN — BISACODYL 10 MG: 10 SUPPOSITORY RECTAL at 12:58

## 2025-04-25 RX ADMIN — ENOXAPARIN SODIUM 40 MG: 40 INJECTION SUBCUTANEOUS at 08:33

## 2025-04-25 NOTE — ASSESSMENT & PLAN NOTE
History of prostate cancer status post prostatectomy, hypertension, hyperlipidemia, and abdominal surgeries who presented to the hospital with nausea vomiting abdominal pain found to have SBO  Was followed by surgery.  Surgery had removed NG tube and diet advanced  Stool softeners with miralax were added and advised ambulation  Discussed with surgery.  Repeat CT scan today with advancement of contrast to rectum  Had a good bowel movement after suppository this afternoon.

## 2025-04-25 NOTE — DISCHARGE SUMMARY
Discharge Summary - Hospitalist   Name: Gennaro Worthington 75 y.o. male I MRN: [id number]  Unit/Bed#: [address] [place] 420-01 I Date of Admission: 4/20/2025   Date of Service: 4/25/2025 I Hospital Day: 4    Assessment & Plan  SBO (small bowel obstruction) (HCC)  History of prostate cancer status post prostatectomy, hypertension, hyperlipidemia, and abdominal surgeries who presented to the hospital with nausea vomiting abdominal pain found to have SBO  Was followed by surgery.  Surgery had removed NG tube and diet advanced  Stool softeners with miralax were added and advised ambulation  Discussed with surgery.  Repeat CT scan today with advancement of contrast to rectum  Had a good bowel movement after suppository this afternoon.  Abnormal CT scan  Visualized lung fields on today's CT questions of pneumonia  Patient asymptomatic and does not have rhonchi on exam.  Unlikely pneumonia and may be related to atelectasis  Bicytopenia  Bicytopenia reported by family on blood work  Very mild leukopenia and thrombocytopenia dilutional from IV fluids  Follow-up with PCP and can recheck as an outpatient to ensure resolution    Results from last 7 days   Lab Units 04/24/25  0503 04/23/25  0454 04/22/25  0516   WBC Thousand/uL 4.14* 3.74* 4.62   PLATELETS Thousands/uL 142* 141* 146*     Benign essential hypertension  Vitals stable.  Continue amlodipine and lisinopril  Mixed hyperlipidemia  Continue atorvastatin  History of prostate cancer  Status post prostatectomy  No urinary complaints      Medical Problems       Resolved Problems  Date Reviewed: 4/25/2025   None        Discharging Physician / Practitioner: Nathan Richmond DO  PCP: NEHAL Crum  Admission Date:   Admission Orders (From admission, onward)       Ordered        04/21/25 0003  INPATIENT ADMISSION  Once                        Discharge Date: 04/25/25    Consultations During Hospital Stay:  IP CONSULT TO ACUTE CARE SURGERY     Procedures Performed:   * No surgery found *      Images:   CT abdomen pelvis wo contrast  Result Date: 4/25/2025  Impression: 1) Mildly dilated, fluid-filled loops of ileum, without a discrete transition point, improved compared to 4/20/2025. Oral contrast administered for abdominal radiograph on 4/21/2025, seen from the transverse colon to the rectum. These findings suggest ileus or improving partial small bowel obstruction. 2) No convincing bowel inflammation. 3) New cluster of tree-in-bud opacities in the left lower lobe, suggestive of pneumonia. 4) Additional findings as above. The study was marked in EPIC for immediate notification. Workstation performed: [id number]     XR abdomen complete inc upright and/or decubitus  Result Date: 4/21/2025  Impression: Concern for small bowel obstruction, as above. Workstation performed: [id number]     CT abdomen pelvis with contrast  Result Date: 4/20/2025  Impression: Multiple loops of abnormally dilated small bowel throughout the abdomen and pelvis with relative collapse of the distal small bowel most compatible with a small bowel obstruction. A clear transition point is not identified. Surgical consultation is advised. Small amount of intra-abdominal and pelvic ascites. No free intraperitoneal air. I personally discussed this study with CYNTHIA ECKERT on 4/20/2025 11:44 PM. Workstation performed: [id number]       Lab Results: I have reviewed the following results:  Results from last 7 days   Lab Units 04/24/25  0503 04/23/25  0454 04/22/25  0516 04/21/25  0556 04/20/25  2143   WBC Thousand/uL 4.14* 3.74* 4.62 5.11 8.92   HEMOGLOBIN g/dL 11.4* 12.2 13.1 13.0 14.2   HEMATOCRIT % 34.1* 36.4* 39.9 38.6 41.5   MCV fL 93 95 97 94 93   PLATELETS Thousands/uL 142* 141* 146* 151 178   INR   --   --   --   --  1.02     Results from last 7 days   Lab Units 04/24/25  0503 04/23/25  0454 04/22/25  0516 04/21/25  0556 04/20/25  2143   SODIUM mmol/L 138 140 141 140 138   POTASSIUM mmol/L 3.6 3.7 3.9 3.8 3.6   CHLORIDE mmol/L 106 107  108 105 103   CO2 mmol/L 23 24 22 22 22   BUN mg/dL 14 16 25 24 24   CREATININE mg/dL 0.75 0.73 0.80 0.93 0.97   CALCIUM mg/dL 8.1* 8.4 8.5 8.3* 9.0   ALBUMIN g/dL 3.4*  --  3.7 3.6 4.2   TOTAL BILIRUBIN mg/dL 0.55  --  0.73 1.04* 1.03*   ALK PHOS U/L 30*  --  30* 31* 38   ALT U/L 27  --  35 16 17   AST U/L 23  --  30 18 20   EGFR ml/min/1.73sq m 89 90 87 80 76   GLUCOSE RANDOM mg/dL 87 88 110 133 133         Results from last 7 days   Lab Units 04/20/25  2143   HS TNI 0HR ng/L 5              Results from last 7 days   Lab Units 04/20/25  2140   POC GLUCOSE mg/dl 133             Results from last 7 days   Lab Units 04/20/25  2143   LACTIC ACID mmol/L 1.3           Results from last 7 days   Lab Units 04/21/25  0349   COLOR UA  Yellow   CLARITY UA  Clear   SPEC GRAV UA  1.020   PH UA  6.0   LEUKOCYTES UA  Negative   NITRITE UA  Negative   GLUCOSE UA mg/dl Negative   KETONES UA mg/dl 80 (3+)*   BLOOD UA  Negative      Results from last 7 days   Lab Units 04/21/25  0349   RBC UA /hpf None Seen   WBC UA /hpf 1-2   EPITHELIAL CELLS WET PREP /hpf Occasional   BACTERIA UA /hpf Occasional                Incidental Findings:      Test Results Pending at Discharge (will require follow up):      Reason for Admission:   Abdominal Pain (Here with wife. Patient states he started yesterday with pain left lower abdomen and started at 11 pm with vomiting last night. Ate toast today , yogurt at lunch, toast and jelly for dinner. Vomited 20 minutes ago, pain worse in abdomen. Soft BM x 2 today. Denies urinary problems. ) and Vomiting    Hospital Course:   Gennaro Worthington is a 75 y.o. male patient who originally presented to the hospital on 4/20/2025 due to abdominal pain nausea and vomiting.  The patient was found to have SBO and admitted.  The patient was followed by surgery and had NG tube decompression.  Eventually his abdominal distention resolved.  His diet was advanced.  He tolerated solid food and had bowel movement today.   Surgery did review his CT scan today demonstrating contrast in colon and patient is cleared for discharge.    Please see above list of diagnoses and related plan for additional information.     Condition at Discharge: stable     Discharge Day Visit / Exam:   Refer to the progress note from earlier today    Discussion with Family: Spouse    Discharge instructions/Information to patient and family:   See after visit summary for information provided to patient and family.      Provisions for Follow-Up Care:  See after visit summary for information related to follow-up care and any pertinent home health orders.      Mobility at time of Discharge:  Basic Mobility Inpatient Raw Score: 24  JH-HLM Goal: 8: Walk 250 feet or more  JH-HLM Achieved: 8: Walk 250 feet ot more  JH-HLM Goal achieved. Continue to encourage appropriate mobility.    Disposition:   Home    Planned Readmission: No     Discharge Medications:  See after visit summary for reconciled discharge medications provided to patient and family.      Administrative Statements   I spent 35 minutes discharging the patient. This time was spent on the day of discharge. I had direct contact with the patient on the day of discharge. Greater than 50% of the total time was spent examining patient, answering all patient questions, arranging and discussing plan of care with patient as well as directly providing post-discharge instructions.  Additional time then spent on discharge activities.    **Please Note: This note may have been constructed using a voice recognition system**

## 2025-04-25 NOTE — ASSESSMENT & PLAN NOTE
Visualized lung fields on today's CT questions of pneumonia  Patient asymptomatic and does not have rhonchi on exam.  Unlikely pneumonia and may be related to atelectasis

## 2025-04-25 NOTE — PLAN OF CARE
Problem: PAIN - ADULT  Goal: Verbalizes/displays adequate comfort level or baseline comfort level  Description: Interventions:- Encourage patient to monitor pain and request assistance- Assess pain using appropriate pain scale- Administer analgesics based on type and severity of pain and evaluate response- Implement non-pharmacological measures as appropriate and evaluate response- Consider cultural and social influences on pain and pain management- Notify physician/advanced practitioner if interventions unsuccessful or patient reports new pain  Outcome: Progressing     Problem: INFECTION - ADULT  Goal: Absence or prevention of progression during hospitalization  Description: INTERVENTIONS:- Assess and monitor for signs and symptoms of infection- Monitor lab/diagnostic results- Monitor all insertion sites, i.e. indwelling lines, tubes, and drains- Monitor endotracheal if appropriate and nasal secretions for changes in amount and color- [place] appropriate cooling/warming therapies per order- Administer medications as ordered- Instruct and encourage patient and family to use good hand hygiene technique- Identify and instruct in appropriate isolation precautions for identified infection/condition  Outcome: Progressing  Goal: Absence of fever/infection during neutropenic period  Description: INTERVENTIONS:- Monitor WBC  Outcome: Progressing     Problem: SAFETY ADULT  Goal: Patient will remain free of falls  Description: INTERVENTIONS:- Educate patient/family on patient safety including physical limitations- Instruct patient to call for assistance with activity - Consult OT/PT to assist with strengthening/mobility - Keep Call bell within reach- Keep bed low and locked with side rails adjusted as appropriate- Keep care items and personal belongings within reach- Initiate and maintain comfort rounds- Make Fall Risk Sign visible to staff- Offer Toileting every 2 Hours, in advance of need- Initiate/Maintain bed/chair  alarm- Obtain necessary fall risk management equipment: - Apply yellow socks and bracelet for high fall risk patients- Consider moving patient to room near nurses station  Outcome: Progressing  Goal: Maintain or return to baseline ADL function  Description: INTERVENTIONS:-  Assess patient's ability to carry out ADLs; assess patient's baseline for ADL function and identify physical deficits which impact ability to perform ADLs (bathing, care of mouth/teeth, toileting, grooming, dressing, etc.)- Assess/evaluate cause of self-care deficits - Assess range of motion- Assess patient's mobility; develop plan if impaired- Assess patient's need for assistive devices and provide as appropriate- Encourage maximum independence but intervene and supervise when necessary- Involve family in performance of ADLs- Assess for home care needs following discharge - Consider OT consult to assist with ADL evaluation and planning for discharge- Provide patient education as appropriate  Outcome: Progressing  Goal: Maintains/Returns to pre admission functional level  Description: INTERVENTIONS:- Perform AM-PAC 6 Click Basic Mobility/ Daily Activity assessment daily.- Set and communicate daily mobility goal to care team and patient/family/caregiver. - Collaborate with rehabilitation services on mobility goals if consulted- Perform Range of Motion 12 times a day.- Reposition patient every 2 hours.- Dangle patient 3 times a day- Stand patient 3 times a day- Ambulate patient 3 times a day- Out of bed to chair 3 times a day - Out of bed for meals 3 times a day- Out of bed for toileting- Record patient progress and toleration of activity level   Outcome: Progressing     Problem: DISCHARGE PLANNING  Goal: Discharge to home or other facility with appropriate resources  Description: INTERVENTIONS:- Identify barriers to discharge w/patient and caregiver- Arrange for needed discharge resources and transportation as appropriate- Identify discharge  learning needs (meds, wound care, etc.)- Arrange for interpretive services to assist at discharge as needed- Refer to Case Management Department for coordinating discharge planning if the patient needs post-hospital services based on physician/advanced practitioner order or complex needs related to functional status, cognitive ability, or social support system  Outcome: Progressing     Problem: Knowledge Deficit  Goal: Patient/family/caregiver demonstrates understanding of disease process, treatment plan, medications, and discharge instructions  Description: Complete learning assessment and assess knowledge base.Interventions:- Provide teaching at level of understanding- Provide teaching via preferred learning methods  Outcome: Progressing     Problem: GASTROINTESTINAL - ADULT  Goal: Minimal or absence of nausea and/or vomiting  Description: INTERVENTIONS:- Administer IV fluids if ordered to ensure adequate hydration- Maintain NPO status until nausea and vomiting are resolved- Nasogastric tube if ordered- Administer ordered antiemetic medications as needed- Provide nonpharmacologic comfort measures as appropriate- Advance diet as tolerated, if ordered- Consider nutrition services referral to assist patient with adequate nutrition and appropriate food choices  Outcome: Progressing  Goal: Maintains or returns to baseline bowel function  Description: INTERVENTIONS:- Assess bowel function- Encourage oral fluids to ensure adequate hydration- Administer IV fluids if ordered to ensure adequate hydration- Administer ordered medications as needed- Encourage mobilization and activity- Consider nutritional services referral to assist patient with adequate nutrition and appropriate food choices  Outcome: Progressing  Goal: Maintains adequate nutritional intake  Description: INTERVENTIONS:- Monitor percentage of each meal consumed- Identify factors contributing to decreased intake, treat as appropriate- Assist with meals as  needed- Monitor I&O, weight, and lab values if indicated- Obtain nutrition services referral as needed  Outcome: Progressing

## 2025-04-25 NOTE — ASSESSMENT & PLAN NOTE
Bicytopenia reported by family on blood work  Very mild leukopenia and thrombocytopenia dilutional from IV fluids  Follow-up with PCP and can recheck as an outpatient to ensure resolution    Results from last 7 days   Lab Units 04/24/25  0503 04/23/25  0454 04/22/25  0516   WBC Thousand/uL 4.14* 3.74* 4.62   PLATELETS Thousands/uL 142* 141* 146*

## 2025-04-25 NOTE — PLAN OF CARE
Problem: PAIN - ADULT  Goal: Verbalizes/displays adequate comfort level or baseline comfort level  Description: Interventions:- Encourage patient to monitor pain and request assistance- Assess pain using appropriate pain scale- Administer analgesics based on type and severity of pain and evaluate response- Implement non-pharmacological measures as appropriate and evaluate response- Consider cultural and social influences on pain and pain management- Notify physician/advanced practitioner if interventions unsuccessful or patient reports new pain  Outcome: Progressing     Problem: INFECTION - ADULT  Goal: Absence or prevention of progression during hospitalization  Description: INTERVENTIONS:- Assess and monitor for signs and symptoms of infection- Monitor lab/diagnostic results- Monitor all insertion sites, i.e. indwelling lines, tubes, and drains- Monitor endotracheal if appropriate and nasal secretions for changes in amount and color- [place] appropriate cooling/warming therapies per order- Administer medications as ordered- Instruct and encourage patient and family to use good hand hygiene technique- Identify and instruct in appropriate isolation precautions for identified infection/condition  Outcome: Progressing  Goal: Absence of fever/infection during neutropenic period  Description: INTERVENTIONS:- Monitor WBC  Outcome: Progressing     Problem: SAFETY ADULT  Goal: Patient will remain free of falls  Description: INTERVENTIONS:- Educate patient/family on patient safety including physical limitations- Instruct patient to call for assistance with activity - Consult OT/PT to assist with strengthening/mobility - Keep Call bell within reach- Keep bed low and locked with side rails adjusted as appropriate- Keep care items and personal belongings within reach- Initiate and maintain comfort rounds- Make Fall Risk Sign visible to staff- Offer Toileting every 2 Hours, in advance of need- Initiate/Maintain bed/chair  alarm- Obtain necessary fall risk management equipment: - Apply yellow socks and bracelet for high fall risk patients- Consider moving patient to room near nurses station  Outcome: Progressing  Goal: Maintain or return to baseline ADL function  Description: INTERVENTIONS:-  Assess patient's ability to carry out ADLs; assess patient's baseline for ADL function and identify physical deficits which impact ability to perform ADLs (bathing, care of mouth/teeth, toileting, grooming, dressing, etc.)- Assess/evaluate cause of self-care deficits - Assess range of motion- Assess patient's mobility; develop plan if impaired- Assess patient's need for assistive devices and provide as appropriate- Encourage maximum independence but intervene and supervise when necessary- Involve family in performance of ADLs- Assess for home care needs following discharge - Consider OT consult to assist with ADL evaluation and planning for discharge- Provide patient education as appropriate  Outcome: Progressing  Goal: Maintains/Returns to pre admission functional level  Description: INTERVENTIONS:- Perform AM-PAC 6 Click Basic Mobility/ Daily Activity assessment daily.- Set and communicate daily mobility goal to care team and patient/family/caregiver. - Collaborate with rehabilitation services on mobility goals if consulted- Perform Range of Motion 12 times a day.- Reposition patient every 2 hours.- Dangle patient 3 times a day- Stand patient 3 times a day- Ambulate patient 3 times a day- Out of bed to chair 3 times a day - Out of bed for meals 3 times a day- Out of bed for toileting- Record patient progress and toleration of activity level   Outcome: Progressing     Problem: DISCHARGE PLANNING  Goal: Discharge to home or other facility with appropriate resources  Description: INTERVENTIONS:- Identify barriers to discharge w/patient and caregiver- Arrange for needed discharge resources and transportation as appropriate- Identify discharge  learning needs (meds, wound care, etc.)- Arrange for interpretive services to assist at discharge as needed- Refer to Case Management Department for coordinating discharge planning if the patient needs post-hospital services based on physician/advanced practitioner order or complex needs related to functional status, cognitive ability, or social support system  Outcome: Progressing     Problem: Knowledge Deficit  Goal: Patient/family/caregiver demonstrates understanding of disease process, treatment plan, medications, and discharge instructions  Description: Complete learning assessment and assess knowledge base.Interventions:- Provide teaching at level of understanding- Provide teaching via preferred learning methods  Outcome: Progressing

## 2025-04-25 NOTE — PROGRESS NOTES
Progress Note - Hospitalist   Name: Gennaro Worthington 75 y.o. male I MRN: [id number]  Unit/Bed#: 4 [facility] 420-01 I Date of Admission: 4/20/2025   Date of Service: 4/25/2025 I Hospital Day: 4    Assessment & Plan  SBO (small bowel obstruction) (HCC)  History of prostate cancer status post prostatectomy, hypertension, hyperlipidemia, and abdominal surgeries who presented to the hospital with nausea vomiting abdominal pain found to have SBO  Was followed by surgery.  Surgery had removed NG tube and diet advanced  Still no bowel movement for almost 2 days.  Added stool softeners miralax and advise ambulation  Discussed with surgery.  Repeat CT scan today with advancement of contrast to rectum  Will give suppository x 1.  Bicytopenia  Bicytopenia reported by family on blood work  Very mild leukopenia and thrombocytopenia dilutional from IV fluids  Suggest recheck in 2 weeks to ensure resolution    Results from last 7 days   Lab Units 04/24/25  0503 04/23/25  0454 04/22/25  0516   WBC Thousand/uL 4.14* 3.74* 4.62   PLATELETS Thousands/uL 142* 141* 146*     Benign essential hypertension  Vitals stable.  Continue amlodipine and lisinopril  Mixed hyperlipidemia  Continue atorvastatin  History of prostate cancer  Status post prostatectomy  No urinary complaints    VTE Pharmacologic Prophylaxis: VTE Score: 4 Moderate Risk (Score 3-4) - Pharmacological DVT Prophylaxis Ordered: enoxaparin (Lovenox).    Mobility:   Basic Mobility Inpatient Raw Score: 24  JH-HLM Goal: 8: Walk 250 feet or more  JH-HLM Achieved: 8: Walk 250 feet ot more  JH-HLM Goal achieved. Continue to encourage appropriate mobility.    Patient Centered Rounds: I have performed bedside rounds with nursing staff today.  Discussions with Specialists or Other Care Team Provider: Case management and surgery    Education and Discussions with Family / Patient: Updated  (wife) at bedside.    Current Length of Stay: 4 day(s)  Current Patient Status: Inpatient    Certification Statement:   Discharge Plan: Anticipate discharge later today or tomorrow to home.    Code Status: Level 1 - Full Code    Subjective   Patient seen and examined.  No bowel movement.  Is walking halls    Objective   Vitals:   Temp (24hrs), Av °F (36.7 °C), Min:97.9 °F (36.6 °C), Max:98.2 °F (36.8 °C)    Temp:  [97.9 °F (36.6 °C)-98.2 °F (36.8 °C)] 97.9 °F (36.6 °C)  HR:  [56-62] 59  Resp:  [17] 17  BP: (125-139)/(66-74) 139/73  SpO2:  [96 %-98 %] 96 %  Body mass index is 28.91 kg/m².     Input and Output Summary (last 24 hours):     Intake/Output Summary (Last 24 hours) at 2025 1314  Last data filed at 2025 0822  Gross per 24 hour   Intake 360 ml   Output 350 ml   Net 10 ml       Physical Exam  Vitals reviewed.   Constitutional:       General: He is not in acute distress.     Appearance: Normal appearance.   HENT:      Head: Atraumatic.   Cardiovascular:      Rate and Rhythm: Regular rhythm.      Heart sounds:      No friction rub.   Pulmonary:      Effort: Pulmonary effort is normal. No respiratory distress.   Abdominal:      General: Bowel sounds are normal. There is distension.      Palpations: Abdomen is soft.      Tenderness: There is no abdominal tenderness. There is no guarding.   Musculoskeletal:         General: No tenderness or deformity.   Skin:     General: Skin is warm.      Findings: No erythema.   Neurological:      General: No focal deficit present.      Mental Status: He is alert.      Motor: No weakness.   Psychiatric:         Mood and Affect: Mood normal.       Lines/Drains:  Invasive Devices       Peripheral Intravenous Line  Duration             Peripheral IV 25 Proximal;Right;Ventral (anterior) Forearm 4 days    Peripheral IV 25 Right;Upper;Ventral (anterior) Arm 2 days                            Lab Results: I have reviewed the following results:   Results from last 7 days   Lab Units 25  0503 25  0454 25  0516 25  0556  04/20/25  2143   WBC Thousand/uL 4.14* 3.74* 4.62   < > 8.92   HEMOGLOBIN g/dL 11.4* 12.2 13.1   < > 14.2   PLATELETS Thousands/uL 142* 141* 146*   < > 178   MCV fL 93 95 97   < > 93   INR   --   --   --   --  1.02    < > = values in this interval not displayed.     Results from last 7 days   Lab Units 04/24/25  0503 04/23/25  0454 04/22/25  0516 04/21/25  0556   SODIUM mmol/L 138 140 141 140   POTASSIUM mmol/L 3.6 3.7 3.9 3.8   CHLORIDE mmol/L 106 107 108 105   CO2 mmol/L 23 24 22 22   ANION GAP mmol/L 9 9 11 13   BUN mg/dL 14 16 25 24   CREATININE mg/dL 0.75 0.73 0.80 0.93   CALCIUM mg/dL 8.1* 8.4 8.5 8.3*   ALBUMIN g/dL 3.4*  --  3.7 3.6   TOTAL BILIRUBIN mg/dL 0.55  --  0.73 1.04*   ALK PHOS U/L 30*  --  30* 31*   ALT U/L 27  --  35 16   AST U/L 23  --  30 18   EGFR ml/min/1.73sq m 89 90 87 80   GLUCOSE RANDOM mg/dL 87 88 110 133     Results from last 7 days   Lab Units 04/21/25  0556   MAGNESIUM mg/dL 2.0   PHOSPHORUS mg/dL 3.3         Results from last 7 days   Lab Units 04/20/25  2143   HS TNI 0HR ng/L 5          Results from last 7 days   Lab Units 04/20/25  2143   LACTIC ACID mmol/L 1.3     Results from last 7 days   Lab Units 04/20/25  2140   POC GLUCOSE mg/dl 133               Recent Cultures (last 7 days):         Imaging:  Reviewed radiology reports from this admission including:  CT abdomen pelvis wo contrast  Result Date: 4/25/2025  Impression: 1) Mildly dilated, fluid-filled loops of ileum, without a discrete transition point, improved compared to 4/20/2025. Oral contrast administered for abdominal radiograph on 4/21/2025, seen from the transverse colon to the rectum. These findings suggest ileus or improving partial small bowel obstruction. 2) No convincing bowel inflammation. 3) New cluster of tree-in-bud opacities in the left lower lobe, suggestive of pneumonia. 4) Additional findings as above. The study was marked in EPIC for immediate notification. Workstation performed: [id number]       Last 24  Hours Medication List:     Current Facility-Administered Medications:     acetaminophen (TYLENOL) tablet 650 mg, Q4H PRN    amLODIPine (NORVASC) tablet 2.5 mg, Daily    aspirin chewable tablet 81 mg, Daily    atorvastatin (LIPITOR) tablet 10 mg, Daily With Dinner    enoxaparin (LOVENOX) subcutaneous injection 40 mg, Daily    HYDROmorphone (DILAUDID) injection 0.5 mg, Q3H PRN    lisinopril (ZESTRIL) tablet 5 mg, Daily    ondansetron (ZOFRAN) injection 4 mg, Q6H PRN    polyethylene glycol (MIRALAX) packet 17 g, BID    promethazine (PHENERGAN) injection 25 mg, Q6H PRN    senna-docusate sodium (SENOKOT S) 8.6-50 mg per tablet 1 tablet, BID    Administrative Statements   Today, Patient Was Seen By: Nathan Richmond, DO  I have spent a total time of 35 minutes in caring for this patient on the day of the visit/encounter including Patient and family education, Counseling / Coordination of care, Documenting in the medical record, Reviewing/placing orders in the medical record (including tests, medications, and/or procedures), and Communicating with other healthcare professionals .    **Please Note: This note may have been constructed using a voice recognition system.**

## 2025-04-25 NOTE — ASSESSMENT & PLAN NOTE
History of prostate cancer status post prostatectomy, hypertension, hyperlipidemia, and abdominal surgeries who presented to the hospital with nausea vomiting abdominal pain found to have SBO  Was followed by surgery.  Surgery had removed NG tube and diet advanced  Still no bowel movement for almost 2 days.  Added stool softeners miralax and advise ambulation  Discussed with surgery.  Repeat CT scan today with advancement of contrast to rectum  Will give suppository x 1.

## 2025-04-28 ENCOUNTER — TELEPHONE (OUTPATIENT)
Dept: FAMILY MEDICINE CLINIC | Facility: CLINIC | Age: 75
End: 2025-04-28

## 2025-04-28 NOTE — TELEPHONE ENCOUNTER
----- Message from Nathan Richmond DO sent at 4/25/2025  4:29 PM EDT -----  Thank you for allowing us to participate in the care of your patient, Gennaro Worthington, who was hospitalized from 4/20/2025 through 4/25/2025 with the admitting diagnosis of SBO.  Surgery was following and the patient had NG tube decompression.  He eventually had bowel movement and is cleared for discharge.  Wife was concerned about leukopenia/thrombocytopenia which improved during hospitalization      If you have any additional questions or would like to discuss further, please feel free to contact me.    Nathan Richmond DO  [facility], Hospitalist  [phone]

## 2025-04-28 NOTE — UTILIZATION REVIEW
NOTIFICATION OF ADMISSION DISCHARGE   This is a Notification of Discharge from [facility]. Please be advised that this patient has been discharge from our facility. Below you will find the admission and discharge date and time including the patient’s disposition.   UTILIZATION REVIEW CONTACT:  Utilization Review Assistants  Network Utilization Review Department  Phone: [phone] x carefully listen to the prompts. All voicemails are confidential.  Email: [email or web address]     ADMISSION INFORMATION  PRESENTATION DATE: 4/20/2025  9:33 PM  OBERVATION ADMISSION DATE: N/A  INPATIENT ADMISSION DATE: 4/21/25 12:03 AM   DISCHARGE DATE: 4/25/2025  5:27 PM   DISPOSITION:Home/Self Care    Network Utilization Review Department  ATTENTION: Please call with any questions or concerns to [phone] and carefully listen to the prompts so that you are directed to the right person. All voicemails are confidential.   For Discharge needs, contact Care Management DC Support Team at [phone] opt. 2  Send all requests for admission clinical reviews, approved or denied determinations and any other requests to dedicated fax number below belonging to the campus where the patient is receiving treatment. List of dedicated fax numbers for the Facilities:  FACILITY NAME UR FAX NUMBER   ADMISSION DENIALS (Administrative/Medical Necessity) [phone]   DISCHARGE SUPPORT TEAM ([facility]) [phone]   PARENT CHILD HEALTH (Maternity/NICU/Pediatrics) [phone]   [facility] [phone]   [facility] [phone]   [facility] [phone]   [facility] [phone]   [facility] [phone]   [facility] [phone]   [facility] [phone]   [facility] [phone]   [facility]  [facility] [phone]   [facility] [phone]   [facility] [phone]   [facility] [phone]

## 2025-04-30 ENCOUNTER — TELEPHONE (OUTPATIENT)
Age: 75
End: 2025-04-30

## 2025-04-30 NOTE — TELEPHONE ENCOUNTER
Patient called in to cancel procedure schedule on 5/28 with Dr. Singh. Patient is requesting a call back to reschedule next week.

## 2025-05-01 ENCOUNTER — TRANSITIONAL CARE MANAGEMENT (OUTPATIENT)
Dept: FAMILY MEDICINE CLINIC | Facility: CLINIC | Age: 75
End: 2025-05-01

## 2025-05-01 ENCOUNTER — OFFICE VISIT (OUTPATIENT)
Dept: FAMILY MEDICINE CLINIC | Facility: CLINIC | Age: 75
End: 2025-05-01
Payer: COMMERCIAL

## 2025-05-01 VITALS
WEIGHT: 223 LBS | HEIGHT: 73 IN | HEART RATE: 68 BPM | TEMPERATURE: 97.9 F | SYSTOLIC BLOOD PRESSURE: 120 MMHG | BODY MASS INDEX: 29.55 KG/M2 | DIASTOLIC BLOOD PRESSURE: 72 MMHG | RESPIRATION RATE: 20 BRPM

## 2025-05-01 DIAGNOSIS — K56.609 SBO (SMALL BOWEL OBSTRUCTION) (HCC): Primary | ICD-10-CM

## 2025-05-01 DIAGNOSIS — I10 BENIGN ESSENTIAL HYPERTENSION: ICD-10-CM

## 2025-05-01 DIAGNOSIS — E78.2 MIXED HYPERLIPIDEMIA: ICD-10-CM

## 2025-05-01 PROCEDURE — 99496 TRANSJ CARE MGMT HIGH F2F 7D: CPT | Performed by: NURSE PRACTITIONER

## 2025-05-01 NOTE — PROGRESS NOTES
Transition of Care Visit:  Name: Gennaro Worthington      : 1950      MRN: [id number]  Encounter Provider: NEHAL Crum  Encounter Date: 2025   Encounter department: [facility]    Assessment & Plan  SBO (small bowel obstruction) (HCC)  Will start taking metamucil daily.  Will cut down Senakot to once a day for a week and if having still regular BM every day then will stop after week  Will old oral iron at this time         Benign essential hypertension  Stable with current regimen       Mixed hyperlipidemia  Complaint with statin            History of Present Illness     Transitional Care Management Review:   Gennaro Worthington is a 75 y.o. male here for TCM follow up.     During the TCM phone call patient stated:  TCM Call (since 2025)     Hospital care reviewed  Records reviewed    Patient was hospitialized at  [facility]    Date of Admission  25    Date of discharge  25    Diagnosis  SBO (small bowel obstruction    Disposition  Home    Were the patients medications reviewed and updated  Yes      TCM Call (since 2025)     Post hospital issues  None    Scheduled for follow up?  Yes    Did you obtain your prescribed medications  Yes    Do you need help managing your prescriptions or medications  No    Is transportation to your appointment needed  No    I have advised the patient to call PCP with any new or worsening symptoms  Yo Ann LPN    Living Arrangements  Spouse or Significiant other    Support System  Family    The type of support provided  Physical; Emotional    Do you have social support  Yes, as much as I need    Are you recieving home care services  No        Patient is here to follow up after recent hospitalization secondary to SBO. Patient got NG tube and it was relieved with that. Stated that having regular BM every day and it is soft and would like to know when to cut down on stool softeners. Denies abdominal pain, fever, chills, nausea and  "vomiting  Complaint with medications and tolerating it well        Review of Systems   Constitutional:  Negative for appetite change, chills, fever and unexpected weight change.   Respiratory: Negative.     Cardiovascular: Negative.    Gastrointestinal:  Negative for abdominal pain, anal bleeding, blood in stool, constipation, diarrhea, nausea, rectal pain and vomiting.        As noted in HPI       Genitourinary: Negative.    Skin: Negative.      Objective   /72   Pulse 68   Temp 97.9 °F (36.6 °C)   Resp 20   Ht 6' 1\" (1.854 m)   Wt 101 kg (223 lb)   BMI 29.42 kg/m²     Physical Exam  Constitutional:       Appearance: Normal appearance. He is well-developed.   Cardiovascular:      Rate and Rhythm: Normal rate and regular rhythm.      Heart sounds: Normal heart sounds.   Pulmonary:      Effort: Pulmonary effort is normal.      Breath sounds: Normal breath sounds.   Abdominal:      General: Bowel sounds are normal. There is no distension.      Palpations: Abdomen is soft.      Tenderness: There is no abdominal tenderness. There is no rebound.   Skin:     General: Skin is warm and dry.   Neurological:      Mental Status: He is alert and oriented to person, place, and time.   Psychiatric:         Behavior: Behavior normal.         Thought Content: Thought content normal.         Judgment: Judgment normal.       Medications have been reviewed by provider in current encounter      "

## 2025-05-01 NOTE — PATIENT INSTRUCTIONS
"Will start taking metamucil daily.  Will cut down Senakot to once a day for a week and if having still regular BM every day then will stop after week    Patient Education     Small bowel obstruction   The Basics   Written by the doctors and editors at [facility]   What is a small bowel obstruction? -- A small bowel obstruction, or \"SBO,\" is a condition in which the small intestine gets blocked. \"Small bowel\" is another term for the small intestine (figure 1). In an SBO, air, fluid, and food get stuck in the intestine. They can't move through the small intestine the way they normally would.  The intestine can be partly or completely blocked in an SBO. A complete block can lead to serious problems. That's because:   The intestine can get swollen from the trapped air, fluid, and food. This swelling can make the intestine less able to absorb fluid, which can lead to dehydration and kidney failure.   If the intestine wall tears, the fluid in the intestine can leak out. This can cause a belly infection.   When the intestine is blocked, the blood vessels that bring oxygen to the intestine can get blocked, too. Without blood, parts of the intestine can die.  In some cases, the small intestine looks blocked on tests even when it isn't. This could be from either intestinal \"ileus\" or \"pseudo-obstruction.\" This article is only about real obstruction.  What causes an SBO? -- The most common causes of an SBO are:   Past surgery in the belly - After surgery, scar tissue can form in the belly and press on the intestine.   Hernias - A hernia is an opening in the muscle or tissue that covers the muscle. Part of the intestine can slide through that opening and get trapped in a hernia. A hernia inside the belly can also cause SBO.   Twisting of the intestines   Tumor - Tumors (cancerous and noncancerous) can grow inside or outside the intestine and block it.  What are the symptoms of an SBO? -- Symptoms depend on where your intestine is " "blocked and how blocked it is. The most common symptoms are:   Nausea and vomiting   Belly pain   Belly swelling and bloating   Not being able to have a bowel movement or pass gas  Will I need tests? -- Yes. Your doctor will ask about your symptoms and do an exam. If your doctor thinks that you have an SBO, they will do imaging tests of your belly and blood tests.  The imaging tests can include an X-ray, CT scan, or a series of X-rays called a \"GI series.\" For the CT scan and GI series, you might drink a liquid called \"contrast\" beforehand. The contrast will show up on the CT scan or X-rays. It can help the doctor see what's causing the blockage.  How is an SBO treated? -- Treatment depends on the blockage and your symptoms.  If you have an SBO, you will be treated in the hospital. Your doctor will give you fluids and medicines. You will not be allowed to eat or drink. You will also get something called a \"nasogastric tube.\" This is a thin tube that goes in your nose, down your esophagus, and into your stomach. The tube can suck up the fluid and air in your stomach. This will make your stomach feel better and help keep you from vomiting.  Most people will not need any other treatment. That's because, many times, an SBO can get better on its own. This is often the case for a partial SBO, or an SBO that has happened after earlier belly surgery.  Some people will need surgery. You are likely to need surgery if you have a belly infection, you have a complete blockage, or your SBO does not get better with fluids and the nasogastric tube in a few days. You also need surgery if the SBO is caused by a hernia, a tumor, or a twist in the intestine.  During surgery, your doctor will remove what is causing the blockage and, if needed, any unhealthy parts of your small intestine. If your doctor removes the unhealthy intestine, they will usually reconnect your intestines so you will not need an ostomy bag. (An ostomy bag is a bag " "that attaches to the skin and collects bowel movements. It is used if the intestines cannot be reconnected after a surgery.)  All topics are updated as new evidence becomes available and our peer review process is complete.  This topic retrieved from [employer] on: Feb 26, 2024.  Topic 65059 Version 12.0  Release: 32.2.4 - C32.56  © 2024 UpToDate, Inc. and/or its affiliates. All rights reserved.  figure 1: Digestive system     This drawing shows the organs in the body that process food. Together, these organs are called the \"digestive system\" or \"digestive tract.\" As food travels through this system, the body absorbs nutrients and water.  Graphic 26858 Version 5.0  Consumer Information Use and Disclaimer   Disclaimer: This generalized information is a limited summary of diagnosis, treatment, and/or medication information. It is not meant to be comprehensive and should be used as a tool to help the user understand and/or assess potential diagnostic and treatment options. It does NOT include all information about conditions, treatments, medications, side effects, or risks that may apply to a specific patient. It is not intended to be medical advice or a substitute for the medical advice, diagnosis, or treatment of a health care provider based on the health care provider's examination and assessment of a patient's specific and unique circumstances. Patients must speak with a health care provider for complete information about their health, medical questions, and treatment options, including any risks or benefits regarding use of medications. This information does not endorse any treatments or medications as safe, effective, or approved for treating a specific patient. Up[employer]Date, Inc. and its affiliates disclaim any warranty or liability relating to this information or the use thereof.The use of this information is governed by the Terms of Use, available at " https://www.woltersOkanuwer.com/en/know/clinical-effectiveness-terms. 2024© [employer] and its affiliates and/or licensors. All rights reserved.  Copyright   © 2024 [employer] and/or its affiliates. All rights reserved.

## 2025-05-15 ENCOUNTER — OFFICE VISIT (OUTPATIENT)
Age: 75
End: 2025-05-15
Payer: COMMERCIAL

## 2025-05-15 VITALS — WEIGHT: 223 LBS | HEIGHT: 73 IN | BODY MASS INDEX: 29.55 KG/M2 | RESPIRATION RATE: 17 BRPM

## 2025-05-15 DIAGNOSIS — M72.2 PLANTAR FASCIITIS: ICD-10-CM

## 2025-05-15 DIAGNOSIS — B35.1 ONYCHOMYCOSIS: ICD-10-CM

## 2025-05-15 DIAGNOSIS — M76.62 ACHILLES TENDINITIS OF LEFT LOWER EXTREMITY: ICD-10-CM

## 2025-05-15 DIAGNOSIS — M79.672 PAIN IN BOTH FEET: Primary | ICD-10-CM

## 2025-05-15 DIAGNOSIS — M79.671 PAIN IN BOTH FEET: Primary | ICD-10-CM

## 2025-05-15 PROCEDURE — 99213 OFFICE O/P EST LOW 20 MIN: CPT | Performed by: PODIATRIST

## 2025-05-15 NOTE — PROGRESS NOTES
Assessment/Plan: Achilles tendinitis left lower extremity.  Pain.  Planter fasciitis.  Heel spur syndrome.  Mycosis of nail.     Plan.  PCP notes reviewed.  Foot exam performed.  Chart reviewed.  X-rays taken and reviewed with patient.  At this time physical therapy will be considered to help with patient's symptoms and condition.  Patient can stretch daily.  Tylenol as needed.  Proper shoe style and fitting advice given.  Return for follow-up.  In addition patient will take Aleve as needed.  Aftercare instruction given.  Return for follow-up.  Patient will consider vitamin B 5.  In addition he is advised to spray shoes with Lysol periodically.     Vies and treatment of arthralgia of the joint.  He will try Advil or Aleve as needed.  If pain increases or continues, we will consider arthrocentesis left first MPJ      Diagnoses and all orders for this visit:     Plantar fasciitis     Left foot pain     Achilles tendinitis of left lower extremity     Onychomycosis     Arthralgia left first MPJ.            Subjective: Patient has pain in his left heel.  Patient has history of Achilles tendinitis and partial tear that was treated 2 or 3 years ago by immobilization.  He now has return of pain in the left heel.  No history of trauma.  He also gets pain in his toes with ambulation and shoewear               Allergies   Allergen Reactions    Augmentin [Amoxicillin-Pot Clavulanate] GI Intolerance    Meperidine Other (See Comments)       Reaction Date: 05Feb2005; Annotation - [id number]: unsure of side effect  Reaction Date: 05Feb2005; Annotation - [id number]: unsure of side effect            Current Outpatient Medications:     acetaminophen (TYLENOL) 500 mg tablet, Take 500 mg by mouth as needed, Disp: , Rfl:     amLODIPine (NORVASC) 2.5 mg tablet, TAKE 1 TABLET BY MOUTH  DAILY, Disp: 90 tablet, Rfl: 3    aspirin 81 mg chewable tablet, Chew 81 mg daily, Disp: , Rfl:     atorvastatin (LIPITOR) 10 mg tablet, Take 1 tablet (10  mg total) by mouth daily, Disp: 90 tablet, Rfl: 1    famotidine (PEPCID) 20 mg tablet, Take 1 tablet (20 mg total) by mouth 2 (two) times a day, Disp: 60 tablet, Rfl: 0    methylPREDNISolone 4 MG tablet therapy pack, Use as directed on package (Patient not taking: Reported on 2/10/2023), Disp: 21 tablet, Rfl: 0    Multiple Vitamin (MULTIVITAMINS PO), Take 1 capsule by mouth daily, Disp: , Rfl:     quinapril (ACCUPRIL) 10 mg tablet, TAKE 1 TABLET BY MOUTH  EVERY 12 HOURS, Disp: 180 tablet, Rfl: 3           Patient Active Problem List   Diagnosis    Actinic keratosis    History of prostate cancer    Anemia    Benign essential hypertension    Disc degeneration, lumbar    Fatty liver disease, nonalcoholic    Herpes simplex infection    Mixed hyperlipidemia    Organic impotence    Lower extremity edema    Groin mass    S/P prostatectomy    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(HCC)    Periumbilical abdominal pain    Cervicalgia    Cervical radiculopathy    Adenocarcinoma of prostate (HCC)             Patient ID: Gennaro Worthington is a 75 y.o. male.     HPI     The following portions of the patient's history were reviewed and updated as appropriate:      family history includes Hypertension in his mother; Lung cancer in his family; Prostate cancer in his family.       reports that he has never smoked. He has never used smokeless tobacco. He reports current alcohol use. He reports that he does not use drugs.      Objective:  Patient's shoes and socks removed.         Foot Exam     General  General Appearance: appears stated age and healthy   Orientation: alert and oriented to person, place, and time   Affect: appropriate   Gait: antalgic         Right Foot/Ankle      Inspection and Palpation  Tenderness: bony tenderness   Swelling: dorsum   Arch: pes planus  Hammertoes: fifth toe  Skin Exam: dry skin;      Neurovascular  Dorsalis pedis: 2+  Posterior tibial: 2+        Left Foot/Ankle       Inspection and Palpation  Tenderness:  bony tenderness, plantar fascia and calcaneus tenderness   Swelling: dorsum   Arch: pes planus  Hammertoes: fifth toe  Skin Exam: dry skin;      Neurovascular  Dorsalis pedis: 2+  Posterior tibial: 2+           Physical Exam  Vitals and nursing note reviewed.   Constitutional:       Appearance: Normal appearance.   Cardiovascular:      Rate and Rhythm: Normal rate and regular rhythm.      Pulses:           Dorsalis pedis pulses are 2+ on the right side and 2+ on the left side.        Posterior tibial pulses are 2+ on the right side and 2+ on the left side.   Musculoskeletal:      Right foot: Bony tenderness present.      Left foot: Bony tenderness present.   Feet:      Right foot:      Skin integrity: Dry skin present.      Left foot:      Skin integrity: Dry skin present.      Comments: Patient is pronated in stance and gait.  Pain with palpation plantar fashion insertion left foot.  Patient has equinus deformity bilateral.  There is palpable thickening of the left Achilles tendon insertion.  X-ray demonstrates plantar calcaneal spur as well as early retrocalcaneal spurring.  Skin:     Capillary Refill: Capillary refill takes less than 2 seconds.   Neurological:      Mental Status: He is alert.   Psychiatric:         Mood and Affect: Mood normal.         Behavior: Behavior normal.         Thought Content: Thought content normal.         Judgment: Judgment normal.

## 2025-07-10 ENCOUNTER — RA CDI HCC (OUTPATIENT)
Dept: OTHER | Facility: HOSPITAL | Age: 75
End: 2025-07-10

## 2025-08-11 ENCOUNTER — OFFICE VISIT (OUTPATIENT)
Age: 75
End: 2025-08-11
Payer: COMMERCIAL

## 2025-08-18 DIAGNOSIS — I10 ESSENTIAL HYPERTENSION: ICD-10-CM

## 2025-08-20 RX ORDER — LISINOPRIL 5 MG/1
5 TABLET ORAL DAILY
Qty: 90 TABLET | Refills: 1 | Status: SHIPPED | OUTPATIENT
Start: 2025-08-20

## (undated) DEVICE — BASIC DOUBLE BASIN 2-LF: Brand: MEDLINE INDUSTRIES, INC.

## (undated) DEVICE — 3M™ STERI-STRIP™ REINFORCED ADHESIVE SKIN CLOSURES, R1547, 1/2 IN X 4 IN (12 MM X 100 MM), 6 STRIPS/ENVELOPE: Brand: 3M™ STERI-STRIP™

## (undated) DEVICE — NEEDLE TUOHY 20G X 3.5 IN WINGED

## (undated) DEVICE — Device: Brand: PORTEX

## (undated) DEVICE — UNDYED BRAIDED (POLYGLACTIN 910), SYNTHETIC ABSORBABLE SUTURE: Brand: COATED VICRYL

## (undated) DEVICE — INTENDED FOR TISSUE SEPARATION, AND OTHER PROCEDURES THAT REQUIRE A SHARP SURGICAL BLADE TO PUNCTURE OR CUT.: Brand: BARD-PARKER SAFETY BLADES SIZE 15, STERILE

## (undated) DEVICE — PLASTIC ADHESIVE BANDAGE: Brand: CURITY

## (undated) DEVICE — SPONGE GAUZE 4 X 9

## (undated) DEVICE — SUT PDS II 0 CT-2 27 IN Z334H

## (undated) DEVICE — SYRINGE 5ML LL

## (undated) DEVICE — GLOVE INDICATOR PI UNDERGLOVE SZ 7.5 BLUE

## (undated) DEVICE — SYRINGE LOR 8ML PLASTIC LL

## (undated) DEVICE — DRAPE UTILITY

## (undated) DEVICE — IV EXTENSION TUBING SMALL BORE

## (undated) DEVICE — NEPTUNE E-SEP SMOKE EVACUATION PENCIL, COATED, 70MM BLADE, PUSH BUTTON SWITCH: Brand: NEPTUNE E-SEP

## (undated) DEVICE — CHLORAPREP HI-LITE 26ML ORANGE

## (undated) DEVICE — GLOVE SRG BIOGEL 7

## (undated) DEVICE — STERILE LATEX POWDER-FREE SURGICAL GLOVESWITH NITRILE COATING: Brand: PROTEXIS

## (undated) DEVICE — FABRIC REINFORCED, SURGICAL GOWN, XL: Brand: CONVERTORS

## (undated) DEVICE — CHLORASCRUB PREP 1ML

## (undated) DEVICE — PACK GENERAL LF

## (undated) DEVICE — SYRINGE 3ML LL

## (undated) DEVICE — 3M™ TEGADERM™ TRANSPARENT FILM DRESSING FRAME STYLE, 1626W, 4 IN X 4-3/4 IN (10 CM X 12 CM), 50/CT 4CT/CASE: Brand: 3M™ TEGADERM™

## (undated) DEVICE — SUT MONOCRYL 4-0 PS-2 27 IN Y426H

## (undated) DEVICE — TIBURON LAPAROTOMY DRAPE: Brand: CONVERTORS

## (undated) DEVICE — ANTIBACTERIAL VIOLET BRAIDED (POLYGLACTIN 910), SYNTHETIC ABSORBABLE SUTURE: Brand: COATED VICRYL